# Patient Record
Sex: FEMALE | Race: WHITE | NOT HISPANIC OR LATINO | Employment: OTHER | URBAN - METROPOLITAN AREA
[De-identification: names, ages, dates, MRNs, and addresses within clinical notes are randomized per-mention and may not be internally consistent; named-entity substitution may affect disease eponyms.]

---

## 2017-01-04 ENCOUNTER — RADIANT APPOINTMENT (OUTPATIENT)
Dept: BONE DENSITY | Facility: CLINIC | Age: 77
End: 2017-01-04
Attending: INTERNAL MEDICINE
Payer: COMMERCIAL

## 2017-01-04 ENCOUNTER — TELEPHONE (OUTPATIENT)
Dept: INTERNAL MEDICINE | Facility: CLINIC | Age: 77
End: 2017-01-04

## 2017-01-04 DIAGNOSIS — E03.4 HYPOTHYROIDISM DUE TO ACQUIRED ATROPHY OF THYROID: ICD-10-CM

## 2017-01-04 DIAGNOSIS — Z78.0 ASYMPTOMATIC POSTMENOPAUSAL STATUS: ICD-10-CM

## 2017-01-04 LAB — TSH SERPL DL<=0.005 MIU/L-ACNC: 2.21 MU/L (ref 0.4–4)

## 2017-01-04 PROCEDURE — 84443 ASSAY THYROID STIM HORMONE: CPT | Performed by: INTERNAL MEDICINE

## 2017-01-04 PROCEDURE — 36415 COLL VENOUS BLD VENIPUNCTURE: CPT | Performed by: INTERNAL MEDICINE

## 2017-01-04 PROCEDURE — 77080 DXA BONE DENSITY AXIAL: CPT | Performed by: INTERNAL MEDICINE

## 2017-01-04 NOTE — PROGRESS NOTES
Quick Note:    Clementina Cooper    Your thyroid result is normal. No dose changes are needed.     Sincerely,      TAISHA MORROW M.D.  ______

## 2017-01-04 NOTE — Clinical Note
"[address]  [address]  [phone]                                   2017    Clementina Cooper  [address] [facility] [address]        Dear Clementina,      Here is your bone density report.      Although your density looks almost normal, your family history of fragility fracture (your mom) puts you at higher risk for a fragility fracture.  Treatment with a bone binder (\"bisphosphonate\") in order to reduce risk of fragility fractures IS recommended for you.  An example would be a weekly dose of Fosamax.     If you would like to consider this prescription (in addition to daily vitamin D, calcium and exercise) please make an appointment to review and discuss.        Otherwise, feel free to bring this discussion up at your next routinely scheduled visit.     Results for orders placed or performed in visit on 17   DEXA HIP/PELVIS/SPINE - Future    Narrative    BONE DENSITOMETRY  [address] [address]  2017     PATIENT: Clementina DURAN Allison  CHART: [id number]    :  1940  AGE:  76 year old  SEX:  female   REFERRING PROVIDER:  Estela Davila MD    PROCEDURE:  Bone density scanning was performed using DXA technology of   the lumbar spine and hip.  Scanning was performed on a Lunar Prodigy   scanner.  Reporting is completed in the form of a T-score.  The T-score   represents the standard deviation from peak bone mass based on a young   healthy adult.    REFERENCE T-SCORES:       Normal                -1.0 and greater                                  Osteopenia         Between -1.0 and   -2.5                                            Osteoporosis     -2.5 and less                                         RISK FACTORS:  Post-menopausal, Parent history of osteoporosis with a hip   fracture    CURRENT TREATMENT:  Vitamin D    FINDINGS:               Lumbar Spine " "(L1-L4)      T-score:  1.2, degenerative changes   present               Left Femoral Neck            T-score:  -0.3               Right Femoral Neck         T-score:  -0.4                            Lumbar (L1-L4) BMD: 1.329                            Total Hip Mean BMD: 1.030    IMPRESSION  Normal bone mineral density., Degenerative changes of the lumbar spine   which may falsely elevate results.    Patient had a study performed previously, however the scans are not   available to compare to the current study.   Recommendations include ensuring adequate Calcium and Vitamin D.    The current NOF Guidelines recommend treatment for patients with prior hip   or vertebral fracture, T-score -2.5 or below, or 10 year risk of any major   osteoporotic fracture >20% or 10 year risk of hip fracture >3%, as   calculated using the FRAX calculator (www.shef.ac.uk/FRAX or you can   google \"FRAX\").      This patient's risks based on available information, with the use of FRAX,   are 12 % for major osteoporotic fracture and 4.3 % for hip fracture.   Based on these guidelines, treatment (in addition to calcium and vitamin   D) is recommended for this patient, after ruling out other causes of   osteoporosis.  This is meant as an aid to clinical decision making; one must still use   clinical judgement.      Follow up can be considered in 2 years.   ___________________  Vivien Reyez M.D.  Electronically signed        If you have any questions please call the clinic at [phone]    Sincerely,    TAISHA MORROW M.D.  bmd    "

## 2017-01-04 NOTE — TELEPHONE ENCOUNTER
Clementina Cooper           Your thyroid result is normal.  No dose changes are needed.           Sincerely,            Chayito Romero RN  [facility]

## 2017-01-04 NOTE — TELEPHONE ENCOUNTER
Called patient at [phone] (home) to notify of message below from provider. Unable to reach, left a VM to call back to RN triage line.     Chayito Romero RN  [facility]

## 2017-01-05 NOTE — TELEPHONE ENCOUNTER
Notified patient of the message below per PCP.  Patient stated understanding and agreeable with the plan of care. Tosin Dozier RN C[facility] Triage.

## 2017-01-08 NOTE — PROGRESS NOTES
"Quick Note:    Clementina Cooper    Here is your bone density report.     Although your density looks almost normal, your family history of fragility fracture (your mom) puts you at higher risk for a fragility fracture. Treatment with a bone binder (\"bisphosphonate\") in order to reduce risk of fragility fractures IS recommended for you. An example would be a weekly dose of Fosamax.     If you would like to consider this prescription (in addition to daily vitamin D, calcium and exercise) please make an appointment to review and discuss.       Otherwise, feel free to bring this discussion up at your next routinely scheduled visit.       Sincerely,       TAISHA MORROW M.D.  ______  "

## 2017-01-19 ENCOUNTER — TRANSFERRED RECORDS (OUTPATIENT)
Dept: HEALTH INFORMATION MANAGEMENT | Facility: CLINIC | Age: 77
End: 2017-01-19

## 2017-01-23 ENCOUNTER — ANTICOAGULATION THERAPY VISIT (OUTPATIENT)
Dept: NURSING | Facility: CLINIC | Age: 77
End: 2017-01-23
Payer: COMMERCIAL

## 2017-01-23 DIAGNOSIS — Z79.01 LONG-TERM (CURRENT) USE OF ANTICOAGULANTS: Primary | ICD-10-CM

## 2017-01-23 LAB — INR POINT OF CARE: 2.3 (ref 0.86–1.14)

## 2017-01-23 PROCEDURE — 36416 COLLJ CAPILLARY BLOOD SPEC: CPT

## 2017-01-23 PROCEDURE — 99207 ZZC NO CHARGE NURSE ONLY: CPT

## 2017-01-23 PROCEDURE — 85610 PROTHROMBIN TIME: CPT | Mod: QW

## 2017-01-23 NOTE — MR AVS SNAPSHOT
Clementina Cooper   1/23/2017 9:40 AM   Anticoagulation Therapy Visit    Description:  76 year old female   Provider:  CONNOR ANTI COAG   Department:  Cp Nurse           INR as of 1/23/2017     Selected INR 2.3 (1/23/2017)      Anticoagulation Summary as of 1/23/2017     INR goal 2.0-3.0   Selected INR 2.3 (1/23/2017)   Full instructions 5 mg on Mon, Wed, Fri; 2.5 mg all other days   Next INR check 3/6/2017    Indications   Long-term (current) use of anticoagulants [Z79.01] [Z79.01]  Atrial fibrillation (H) (Resolved) [I48.91]  Atrial fibrillation (H) (Resolved) [I48.91]         Your next Anticoagulation Clinic appointment(s)     Mar 06, 2017  9:40 AM   [facility]    [address] [place] [address] [phone]              Contact Numbers     [facility]  Please call [phone] or [phone]  to cancel and/or reschedule your appointment.   Please call [phone] with any problems or questions regarding your therapy          January 2017 Details    Sun Mon Tue Wed Thu Fri Sat     1               2               3               4               5               6               7                 8               9               10               11               12               13               14                 15               16               17               18               19               20               21                 22               23      5 mg   See details      24      2.5 mg         25      5 mg         26      2.5 mg         27      5 mg         28      2.5 mg           29      2.5 mg         30      5 mg         31      2.5 mg              Date Details   01/23 This INR check               How to take your warfarin dose     To take:  2.5 mg Take 0.5 of a 5 mg tablet.    To take:  5 mg Take 1 of the 5 mg tablets.           February 2017 Details    [place]  Mon Tue Wed Thu Fri Sat        1      5 mg         2      2.5 mg         3      5 mg         4      2.5 mg           5      2.5 mg         6      5 mg         7      2.5 mg         8      5 mg         9      2.5 mg         10      5 mg         11      2.5 mg           12      2.5 mg         13      5 mg         14      2.5 mg         15      5 mg         16      2.5 mg         17      5 mg         18      2.5 mg           19      2.5 mg         20      5 mg         21      2.5 mg         22      5 mg         23      2.5 mg         24      5 mg         25      2.5 mg           26      2.5 mg         27      5 mg         28      2.5 mg              Date Details   No additional details            How to take your warfarin dose     To take:  2.5 mg Take 0.5 of a 5 mg tablet.    To take:  5 mg Take 1 of the 5 mg tablets.           March 2017 Details    Sun Mon Tue Wed Thu Fri Sat        1      5 mg         2      2.5 mg         3      5 mg         4      2.5 mg           5      2.5 mg         6            7               8               9               10               11                 12               13               14               15               16               17               18                 19               20               21               22               23               24               25                 26               27               28               29               30               31                 Date Details   No additional details    Date of next INR:  3/6/2017         How to take your warfarin dose     To take:  2.5 mg Take 0.5 of a 5 mg tablet.    To take:  5 mg Take 1 of the 5 mg tablets.

## 2017-01-23 NOTE — PROGRESS NOTES
ANTICOAGULATION FOLLOW-UP CLINIC VISIT    Patient Name:  Clementina Cooper  Date:  1/23/2017  Contact Type:  Face to Face    SUBJECTIVE:     Patient Findings     Positives No Problem Findings           OBJECTIVE    INR PROTIME   Date Value Ref Range Status   01/23/2017 2.3* 0.86 - 1.14 Final       ASSESSMENT / PLAN  INR assessment THER    Recheck INR In: 5 WEEKS    INR Location Clinic      Anticoagulation Summary as of 1/23/2017     INR goal 2.0-3.0   Selected INR 2.3 (1/23/2017)   Maintenance plan 5 mg (5 mg x 1) on Mon, Wed, Fri; 2.5 mg (5 mg x 0.5) all other days   Full instructions 5 mg on Mon, Wed, Fri; 2.5 mg all other days   Weekly total 25 mg   No change documented Shagufta Bell RN   Plan last modified Shagufta Bell RN (3/22/2016)   Next INR check 3/6/2017   Target end date     Indications   Long-term (current) use of anticoagulants [Z79.01] [Z79.01]  Atrial fibrillation (H) (Resolved) [I48.91]  Atrial fibrillation (H) (Resolved) [I48.91]         Anticoagulation Episode Summary     INR check location     Preferred lab     Send INR reminders to  [facility]    Comments       Anticoagulation Care Providers     Provider Role Specialty Phone number    Estela Davila MD  Internal Medicine [phone]            See the Encounter Report to view Anticoagulation Flowsheet and Dosing Calendar (Go to Encounters tab in chart review, and find the Anticoagulation Therapy Visit)    Dosage adjustment made based on physician directed care plan.    Shagufta Bell RN

## 2017-02-09 ENCOUNTER — TELEPHONE (OUTPATIENT)
Dept: INTERNAL MEDICINE | Facility: CLINIC | Age: 77
End: 2017-02-09

## 2017-02-09 DIAGNOSIS — E78.5 HYPERLIPIDEMIA LDL GOAL <100: ICD-10-CM

## 2017-02-09 DIAGNOSIS — E03.4 HYPOTHYROIDISM DUE TO ACQUIRED ATROPHY OF THYROID: ICD-10-CM

## 2017-02-09 DIAGNOSIS — I48.20 CHRONIC ATRIAL FIBRILLATION (H): ICD-10-CM

## 2017-02-09 DIAGNOSIS — E11.9 TYPE 2 DIABETES MELLITUS WITHOUT COMPLICATION, WITHOUT LONG-TERM CURRENT USE OF INSULIN (H): Primary | ICD-10-CM

## 2017-02-09 DIAGNOSIS — Z79.01 LONG-TERM (CURRENT) USE OF ANTICOAGULANTS: ICD-10-CM

## 2017-02-09 NOTE — TELEPHONE ENCOUNTER
.Reason for Call: Has appointment on 3/28/2017 with you and would like to come in one week prior for fasting labs    Order or referral being requested: Fasting Labs    Date needed: 3/21/2017    Has the patient been seen by the PCP for this problem? Yes    Additional comments:     Phone number Patient can be reached at:  [phone]    Best Time:      Can we leave a detailed message on this number?  No    Call taken on 2/9/2017 at 3:12 PM by Sara Gomez

## 2017-02-09 NOTE — TELEPHONE ENCOUNTER
Routed to provider to place desired future lab orders for pre-visit labs for 3/27/17 appt.    Anna Bruno RN  [facility]

## 2017-03-07 ENCOUNTER — ANTICOAGULATION THERAPY VISIT (OUTPATIENT)
Dept: NURSING | Facility: CLINIC | Age: 77
End: 2017-03-07
Payer: COMMERCIAL

## 2017-03-07 DIAGNOSIS — Z79.01 LONG-TERM (CURRENT) USE OF ANTICOAGULANTS: ICD-10-CM

## 2017-03-07 LAB — INR POINT OF CARE: 2.3 (ref 0.86–1.14)

## 2017-03-07 PROCEDURE — 85610 PROTHROMBIN TIME: CPT | Mod: QW

## 2017-03-07 PROCEDURE — 99207 ZZC NO CHARGE NURSE ONLY: CPT

## 2017-03-07 PROCEDURE — 36416 COLLJ CAPILLARY BLOOD SPEC: CPT

## 2017-03-07 NOTE — MR AVS SNAPSHOT
Clementina Cooper   3/7/2017 3:20 PM   Anticoagulation Therapy Visit    Description:  76 year old female   Provider:  CON[facility] ANTI COAG   Department:  Cp Nurse           INR as of 3/7/2017     Today's INR 2.3      Anticoagulation Summary as of 3/7/2017     INR goal 2.0-3.0   Today's INR 2.3   Full instructions 5 mg on Mon, Wed, Fri; 2.5 mg all other days   Next INR check 4/18/2017    Indications   Long-term (current) use of anticoagulants [Z79.01] [Z79.01]  Atrial fibrillation (H) (Resolved) [I48.91]  Atrial fibrillation (H) (Resolved) [I48.91]         Your next Anticoagulation Clinic appointment(s)     Apr 18, 2017  9:40 AM CDT   Anticoagulation [facility] with CONNOR ANTI COAG   [facility]    [address] [facility] [address] [phone]              Contact Numbers     [facility]  Please call [phone] or [phone]  to cancel and/or reschedule your appointment.   Please call [phone] with any problems or questions regarding your therapy          March 2017 Details    Sun Mon Tue Wed Thu Fri Sat        1               2               3               4                 5               6               7      2.5 mg   See details      8      5 mg         9      2.5 mg         10      5 mg         11      2.5 mg           12      2.5 mg         13      5 mg         14      2.5 mg         15      5 mg         16      2.5 mg         17      5 mg         18      2.5 mg           19      2.5 mg         20      5 mg         21      2.5 mg         22      5 mg         23      2.5 mg         24      5 mg         25      2.5 mg           26      2.5 mg         27      5 mg         28      2.5 mg         29      5 mg         30      2.5 mg         31      5 mg           Date Details   03/07 This INR check               How to take your warfarin dose     To take:  2.5 mg Take 0.5 of a 5 mg tablet.    To take:  5 mg Take 1 of  the 5 mg tablets.           April 2017 Details    Sun Mon Tue Wed Thu Fri Sat           1      2.5 mg           2      2.5 mg         3      5 mg         4      2.5 mg         5      5 mg         6      2.5 mg         7      5 mg         8      2.5 mg           9      2.5 mg         10      5 mg         11      2.5 mg         12      5 mg         13      2.5 mg         14      5 mg         15      2.5 mg           16      2.5 mg         17      5 mg         18            19               20               21               22                 23               24               25               26               27               28               29                 30                      Date Details   No additional details    Date of next INR:  4/18/2017         How to take your warfarin dose     To take:  2.5 mg Take 0.5 of a 5 mg tablet.    To take:  5 mg Take 1 of the 5 mg tablets.

## 2017-03-15 ENCOUNTER — OFFICE VISIT (OUTPATIENT)
Dept: FAMILY MEDICINE | Facility: CLINIC | Age: 77
End: 2017-03-15
Payer: COMMERCIAL

## 2017-03-15 VITALS
OXYGEN SATURATION: 96 % | HEART RATE: 79 BPM | HEIGHT: 66 IN | TEMPERATURE: 97.7 F | DIASTOLIC BLOOD PRESSURE: 74 MMHG | SYSTOLIC BLOOD PRESSURE: 132 MMHG

## 2017-03-15 DIAGNOSIS — J01.90 ACUTE SINUSITIS WITH SYMPTOMS > 10 DAYS: Primary | ICD-10-CM

## 2017-03-15 DIAGNOSIS — I48.20 CHRONIC ATRIAL FIBRILLATION (H): ICD-10-CM

## 2017-03-15 PROCEDURE — 99213 OFFICE O/P EST LOW 20 MIN: CPT | Performed by: FAMILY MEDICINE

## 2017-03-15 RX ORDER — CEFUROXIME AXETIL 250 MG/1
250 TABLET ORAL 2 TIMES DAILY
Qty: 20 TABLET | Refills: 0 | Status: SHIPPED | OUTPATIENT
Start: 2017-03-15 | End: 2017-03-28

## 2017-03-15 NOTE — NURSING NOTE
"Chief Complaint   Patient presents with     Sinus Problem     poss sinus inf x 1.5 weeks,sore throat,cough, runny nose        Initial /74  Pulse 79  Temp 97.7  F (36.5  C) (Oral)  Ht 5' 5.5\" (1.664 m)  SpO2 96% Estimated body mass index is 33.92 kg/(m^2) as calculated from the following:    Height as of 4/5/16: 5' 5.5\" (1.664 m).    Weight as of 10/5/16: 207 lb (93.9 kg).  Medication Reconciliation: complete  Oneal Mabry MA    "

## 2017-03-15 NOTE — MR AVS SNAPSHOT
After Visit Summary   3/15/2017    Clementina Cooper    MRN: [id number]           Patient Information     Date Of Birth          1940        Visit Information        Provider Department      3/15/2017 10:40 AM Candie Tinoco MD [facility]        Today's Diagnoses     Acute sinusitis with symptoms > 10 days    -  1    Chronic atrial fibrillation (H)           Follow-ups after your visit        Your next 10 appointments already scheduled     Mar 21, 2017  7:30 AM CDT   LAB with CP LAB   [facility]    [address] [place] [address]   [phone]           Patient must bring picture ID.  Patient should be prepared to give a urine specimen  Please do not eat 10-12 hours before your appointment if you are coming in fasting for labs on lipids, cholesterol, or glucose (sugar).  Pregnant women should follow their Care Team instructions. Water with medications is okay. Do not drink coffee or other fluids.   If you have concerns about taking  your medications, please ask at office or if scheduling via [employer], send a message by clicking on Secure Messaging, Message Your Care Team.            Mar 28, 2017  9:20 AM CDT   SHORT with Estela Davila MD   [facility]    [address] [place] [id number] [phone]            Apr 18, 2017  9:40 AM CDT   Anticoagulation Visit with CP ANTI COAG   [facility]    [address] [place] [address] [phone]              Who to contact     If you have questions or need follow up information about today's clinic visit or your schedule please contact [facility] directly at [phone].  Normal or non-critical lab and imaging results will be  "communicated to you by [employer], letter or phone within 4 business days after the clinic has received the results. If you do not hear from us within 7 days, please contact the clinic through [employer] or phone. If you have a critical or abnormal lab result, we will notify you by phone as soon as possible.  Submit refill requests through [employer] or call your pharmacy and they will forward the refill request to us. Please allow 3 business days for your refill to be completed.          Additional Information About Your Visit        [employer] Information     [employer] lets you send messages to your doctor, view your test results, renew your prescriptions, schedule appointments and more. To sign up, go to [email or web address] . Click on \"Log in\" on the left side of the screen, which will take you to the Welcome page. Then click on \"Sign up Now\" on the right side of the page.     You will be asked to enter the access code listed below, as well as some personal information. Please follow the directions to create your username and password.     Your access code is: [email or web address]  Expires: 2017 11:18 AM     Your access code will  in 90 days. If you need help or a new code, please call your [place] clinic or [phone].        Care EveryWhere ID     This is your Care EveryWhere ID. This could be used by other organizations to access your [place] medical records  [id number]        Your Vitals Were     Pulse Temperature Height Pulse Oximetry          79 97.7  F (36.5  C) (Oral) 5' 5.5\" (1.664 m) 96%         Blood Pressure from Last 3 Encounters:   03/15/17 132/74   10/05/16 121/60   16 132/76    Weight from Last 3 Encounters:   10/05/16 207 lb (93.9 kg)   16 209 lb (94.8 kg)   16 205 lb (93 kg)              Today, you had the following     No orders found for display         Today's Medication Changes          These changes are accurate as of: 3/15/17 11:21 AM.  If you have any questions, ask your " nurse or doctor.               Start taking these medicines.        Dose/Directions    cefUROXime 250 MG tablet   Commonly known as:  CEFTIN   Used for:  Acute sinusitis with symptoms > 10 days   Started by:  Candie Tinoco MD        Dose:  250 mg   Take 1 tablet (250 mg) by mouth 2 times daily   Quantity:  20 tablet   Refills:  0         These medicines have changed or have updated prescriptions.        Dose/Directions    levothyroxine 100 MCG tablet   Commonly known as:  SYNTHROID/LEVOTHROID   This may have changed:  Another medication with the same name was removed. Continue taking this medication, and follow the directions you see here.   Used for:  Hypothyroidism due to acquired atrophy of thyroid   Changed by:  Estela Davila MD        Dose:  100 mcg   Take 1 tablet (100 mcg) by mouth daily   Quantity:  90 tablet   Refills:  3            Where to get your medicines      These medications were sent to [facility]/pharmacy #[id number] - [facility], [place] - [address] [address]     Phone:  [phone]     cefUROXime 250 MG tablet                Primary Care Provider Office Phone # Fax #    Estela Davila [phone]       [facility] [address] [facility] [address]        Thank you!     Thank you for choosing [facility]  for your care. Our goal is always to provide you with excellent care. Hearing back from our patients is one way we can continue to improve our services. Please take a few minutes to complete the written survey that you may receive in the mail after your visit with us. Thank you!             Your Updated Medication List - Protect others around you: Learn how to safely use, store and throw away your medicines at www.disposemymeds.org.          This list is accurate as of: 3/15/17 11:21 AM.  Always use your most recent med list.                   Brand Name Dispense Instructions for use     ACE NOT PRESCRIBED (INTENTIONAL)     0 each    1 each At Bedtime ACE Inhibitor not prescribed due to Other:  Neck swelling.  Also, BP is on low normal side with the betablocker       aspirin 81 MG tablet      Take 1 tablet by mouth daily.       atorvastatin 20 MG tablet    LIPITOR    102 tablet    Take 1 tablet (20 mg) by mouth daily       B-12 1000 MCG Tbcr      Take 1 tablet by mouth daily       Biotin 5000 MCG Caps      Take  by mouth.       blood glucose monitoring test strip    no brand specified    200 each    One Touch Ultra test strips - Use to test blood sugar 2 times daily or as directed.       CALCIUM 500 + D PO      one daily       cefUROXime 250 MG tablet    CEFTIN    20 tablet    Take 1 tablet (250 mg) by mouth 2 times daily       CENTRUM SILVER PO      one daily       cholecalciferol 1000 UNIT tablet    vitamin D     1 TABLET DAILY       clobetasol 0.05 % cream    TEMOVATE    60 g    Apply sparingly to affected area twice weekly       Evening Primrose Oil 1000 MG Caps      Take by mouth daily       levothyroxine 100 MCG tablet    SYNTHROID/LEVOTHROID    90 tablet    Take 1 tablet (100 mcg) by mouth daily       losartan 25 MG tablet    COZAAR    45 tablet    Take 0.5 tablets (12.5 mg) by mouth daily       metoprolol 25 MG tablet    LOPRESSOR    90 tablet    Take 1 tablet (25 mg) by mouth every evening May take one extra tab as needed if HR>100 at rest       * order for DME     1 Box    Equipment being ordered:  chemstrips for daily blood sugar checks.       * order for DME     90 days    Equipment being ordered:  One Touch strips to be used daily       OSTEO BI-FLEX ADV TRIPLE ST Tabs      Take 2 tablets by mouth daily       warfarin 5 MG tablet    COUMADIN    102 tablet    Take 1 tablet (5 mg) by mouth daily MWF & 1/2 tablet (2.5mg) all other days       * Notice:  This list has 2 medication(s) that are the same as other medications prescribed for you. Read the directions carefully, and ask your  doctor or other care provider to review them with you.

## 2017-03-15 NOTE — PROGRESS NOTES
"  SUBJECTIVE:                                                    Clementina Cooper is a 76 year old female who presents to clinic today for the following health issues:    ENT Symptoms             Symptoms: cc Present Absent Comment   Fever/Chills   x    Fatigue   x    Muscle Aches   x    Eye Irritation  x  Watery eyes    Sneezing  x     Nasal Ej/Drg  x     Sinus Pressure/Pain  x     Loss of smell   x    Dental pain   x    Sore Throat  x     Swollen Glands   x    Ear Pain/Fullness   x    Cough  x     Wheeze   x    Chest Pain  x  From coughing    Shortness of breath   x    Rash   x    Other    Headache      Symptom duration:   X 1.5 weeks    Symptom severity:  moderate    Treatments tried:  tylenol    Contacts:  none      Not feeling well for more than 1 week now.   Sinus pressure, nasal congestion.   Cough, seems from the post nasal drip.   Upper chest gets tight with coughing bouts.     Problem list and histories reviewed & adjusted, as indicated.  Additional history: as documented    BP Readings from Last 3 Encounters:   03/15/17 132/74   10/05/16 121/60   08/09/16 132/76    Wt Readings from Last 3 Encounters:   10/05/16 207 lb (93.9 kg)   08/09/16 209 lb (94.8 kg)   06/09/16 205 lb (93 kg)                    Reviewed and updated as needed this visit by clinical staff  Tobacco  Allergies  Meds  Med Hx  Surg Hx  Fam Hx  Soc Hx      Reviewed and updated as needed this visit by Provider         ROS:  Constitutional, HEENT, cardiovascular, pulmonary, gi and gu systems are negative, except as otherwise noted.    OBJECTIVE:                                                    /74  Pulse 79  Temp 97.7  F (36.5  C) (Oral)  Ht 5' 5.5\" (1.664 m)  SpO2 96%  There is no height or weight on file to calculate BMI.  GENERAL: healthy, alert and no distress  HENT: ear canals and TM's normal, nose : bilateral nostrils congestion and mouth without ulcers or lesions  SINUSES: tenderness over bilateral maxillary sinuses. "   NECK: no adenopathy  RESP: lungs clear to auscultation - no rales, rhonchi or wheezes  CV: regular rate and rhythm, normal S1 S2, no S3 or S4, no murmur, click or rub, no peripheral edema and peripheral pulses strong  MS: no gross musculoskeletal defects noted, no edema       ASSESSMENT/PLAN:                                                        ICD-10-CM    1. Acute sinusitis with symptoms > 10 days J01.90 cefUROXime (CEFTIN) 250 MG tablet   2. Chronic atrial fibrillation (H) I48.2      Pt is on warfarin for her chronic A fib.   Cefuroxime as above.    Nasal saline drops, steam inhalation. Tylenol for pain if needed.   F/u if not better.       Candie Tinoco MD  [facility]

## 2017-03-21 DIAGNOSIS — Z79.01 LONG-TERM (CURRENT) USE OF ANTICOAGULANTS: ICD-10-CM

## 2017-03-21 DIAGNOSIS — E78.5 HYPERLIPIDEMIA LDL GOAL <100: ICD-10-CM

## 2017-03-21 DIAGNOSIS — E11.9 TYPE 2 DIABETES MELLITUS WITHOUT COMPLICATION, WITHOUT LONG-TERM CURRENT USE OF INSULIN (H): ICD-10-CM

## 2017-03-21 DIAGNOSIS — E03.4 HYPOTHYROIDISM DUE TO ACQUIRED ATROPHY OF THYROID: ICD-10-CM

## 2017-03-21 DIAGNOSIS — I48.20 CHRONIC ATRIAL FIBRILLATION (H): ICD-10-CM

## 2017-03-21 LAB
ALT SERPL W P-5'-P-CCNC: 29 U/L (ref 0–50)
ANION GAP SERPL CALCULATED.3IONS-SCNC: 9 MMOL/L (ref 3–14)
AST SERPL W P-5'-P-CCNC: 13 U/L (ref 0–45)
BUN SERPL-MCNC: 20 MG/DL (ref 7–30)
CALCIUM SERPL-MCNC: 9.1 MG/DL (ref 8.5–10.1)
CHLORIDE SERPL-SCNC: 105 MMOL/L (ref 94–109)
CHOLEST SERPL-MCNC: 154 MG/DL
CO2 SERPL-SCNC: 27 MMOL/L (ref 20–32)
CREAT SERPL-MCNC: 0.82 MG/DL (ref 0.52–1.04)
ERYTHROCYTE [DISTWIDTH] IN BLOOD BY AUTOMATED COUNT: 13.2 % (ref 10–15)
GFR SERPL CREATININE-BSD FRML MDRD: 68 ML/MIN/1.7M2
GLUCOSE SERPL-MCNC: 174 MG/DL (ref 70–99)
HBA1C MFR BLD: 8 % (ref 4.3–6)
HCT VFR BLD AUTO: 43.1 % (ref 35–47)
HDLC SERPL-MCNC: 42 MG/DL
HGB BLD-MCNC: 14.4 G/DL (ref 11.7–15.7)
LDLC SERPL CALC-MCNC: 72 MG/DL
MCH RBC QN AUTO: 30.4 PG (ref 26.5–33)
MCHC RBC AUTO-ENTMCNC: 33.4 G/DL (ref 31.5–36.5)
MCV RBC AUTO: 91 FL (ref 78–100)
NONHDLC SERPL-MCNC: 112 MG/DL
PLATELET # BLD AUTO: 332 10E9/L (ref 150–450)
POTASSIUM SERPL-SCNC: 4 MMOL/L (ref 3.4–5.3)
RBC # BLD AUTO: 4.74 10E12/L (ref 3.8–5.2)
SODIUM SERPL-SCNC: 141 MMOL/L (ref 133–144)
T4 FREE SERPL-MCNC: 1.02 NG/DL (ref 0.76–1.46)
TRIGL SERPL-MCNC: 202 MG/DL
TSH SERPL DL<=0.005 MIU/L-ACNC: 5.43 MU/L (ref 0.4–4)
WBC # BLD AUTO: 6.8 10E9/L (ref 4–11)

## 2017-03-21 PROCEDURE — 84460 ALANINE AMINO (ALT) (SGPT): CPT | Performed by: INTERNAL MEDICINE

## 2017-03-21 PROCEDURE — 84450 TRANSFERASE (AST) (SGOT): CPT | Performed by: INTERNAL MEDICINE

## 2017-03-21 PROCEDURE — 84443 ASSAY THYROID STIM HORMONE: CPT | Performed by: INTERNAL MEDICINE

## 2017-03-21 PROCEDURE — 80048 BASIC METABOLIC PNL TOTAL CA: CPT | Performed by: INTERNAL MEDICINE

## 2017-03-21 PROCEDURE — 84439 ASSAY OF FREE THYROXINE: CPT | Performed by: INTERNAL MEDICINE

## 2017-03-21 PROCEDURE — 80061 LIPID PANEL: CPT | Performed by: INTERNAL MEDICINE

## 2017-03-21 PROCEDURE — 85027 COMPLETE CBC AUTOMATED: CPT | Performed by: INTERNAL MEDICINE

## 2017-03-21 PROCEDURE — 36415 COLL VENOUS BLD VENIPUNCTURE: CPT | Performed by: INTERNAL MEDICINE

## 2017-03-21 PROCEDURE — 83036 HEMOGLOBIN GLYCOSYLATED A1C: CPT | Performed by: INTERNAL MEDICINE

## 2017-03-22 NOTE — PROGRESS NOTES
Clementina Cooper    Enclosed are your results.  Your labs are normal/stable at this time.  The HgbA1C is a little above goal, and we can discuss that at your upcoming appointment.  I look forward to seeing you then.     Please call  with any questions.  We can also discuss any questions regarding these labs at your next scheduled visit.    Sincerely,    Estela Davila M.D.

## 2017-03-28 ENCOUNTER — TELEPHONE (OUTPATIENT)
Dept: FAMILY MEDICINE | Facility: CLINIC | Age: 77
End: 2017-03-28

## 2017-03-28 ENCOUNTER — OFFICE VISIT (OUTPATIENT)
Dept: INTERNAL MEDICINE | Facility: CLINIC | Age: 77
End: 2017-03-28
Payer: COMMERCIAL

## 2017-03-28 VITALS
DIASTOLIC BLOOD PRESSURE: 57 MMHG | TEMPERATURE: 96.9 F | SYSTOLIC BLOOD PRESSURE: 127 MMHG | WEIGHT: 206 LBS | OXYGEN SATURATION: 93 % | HEART RATE: 70 BPM | BODY MASS INDEX: 33.76 KG/M2

## 2017-03-28 DIAGNOSIS — I48.20 CHRONIC ATRIAL FIBRILLATION (H): ICD-10-CM

## 2017-03-28 DIAGNOSIS — E11.9 TYPE 2 DIABETES MELLITUS WITHOUT COMPLICATION, WITHOUT LONG-TERM CURRENT USE OF INSULIN (H): Primary | ICD-10-CM

## 2017-03-28 DIAGNOSIS — E03.4 HYPOTHYROIDISM DUE TO ACQUIRED ATROPHY OF THYROID: ICD-10-CM

## 2017-03-28 DIAGNOSIS — Z23 NEED FOR STREPTOCOCCUS PNEUMONIAE VACCINATION: ICD-10-CM

## 2017-03-28 DIAGNOSIS — I73.9 PVD (PERIPHERAL VASCULAR DISEASE) (H): ICD-10-CM

## 2017-03-28 PROCEDURE — 99214 OFFICE O/P EST MOD 30 MIN: CPT | Performed by: INTERNAL MEDICINE

## 2017-03-28 RX ORDER — METOPROLOL TARTRATE 25 MG/1
25 TABLET, FILM COATED ORAL EVERY EVENING
Qty: 90 TABLET | Refills: 3 | Status: SHIPPED | OUTPATIENT
Start: 2017-03-28 | End: 2018-05-02

## 2017-03-28 NOTE — NURSING NOTE
"Chief Complaint   Patient presents with     Diabetes     Health Maintenance     foot,microalbumin        Initial /57 (BP Location: Right arm, Patient Position: Chair, Cuff Size: Adult Large)  Pulse 70  Temp 96.9  F (36.1  C) (Oral)  Wt 206 lb (93.4 kg)  SpO2 93%  BMI 33.76 kg/m2 Estimated body mass index is 33.76 kg/(m^2) as calculated from the following:    Height as of 3/15/17: 5' 5.5\" (1.664 m).    Weight as of this encounter: 206 lb (93.4 kg).  Medication Reconciliation: complete   Dannielle Travis ma      "

## 2017-03-28 NOTE — TELEPHONE ENCOUNTER
The patient was seen in clinic today by her pcp when the patient returned home and read through her AVS it stated that she was given the Pneumonia immunization, the patient said she discussed getting this and was supposed to receive this but never did. Please update in patients chart, the patient will be placed on schedule to receive immunization on a separate day.    . Riri Leo  Patient Representative

## 2017-03-28 NOTE — PROGRESS NOTES
SUBJECTIVE:                                                    Clementina Cooper is a 76 year old female who presents to clinic today for the following health issues:     A1C borderline at 8.0  this has been trending up....   TSH borderline at 5.43, we had increased levothyroxine from 88 to 100 since Fall '16    Will start walking soon.     Diabetes Follow-up    Patient is checking blood sugars: once daily.  Results are as follows:         am - 160    Diabetic concerns: None     Symptoms of hypoglycemia (low blood sugar): none     Paresthesias (numbness or burning in feet) or sores: No     Date of last diabetic eye exam: 11/4/16       Amount of exercise or physical activity: None    Problems taking medications regularly: No    Medication side effects: none    Diet: regular (no restrictions)           Problem list and histories reviewed & adjusted, as indicated.  Additional history: as documented    Patient Active Problem List   Diagnosis     PVD (peripheral vascular disease) (H)     Family history of colon cancer     HYPERLIPIDEMIA LDL GOAL <100     Open-angle glaucoma, mild-mod, ou     Advanced directives, counseling/discussion     Essential tremor     Pseudophakia, ou     S/P iridectomy, ou     Anticoagulation goal of INR 2 to 3     Hypothyroidism due to acquired atrophy of thyroid     Chronic atrial fibrillation (H)     overweight  HCC     Long-term (current) use of anticoagulants [Z79.01]     Acute pain of left knee     Type 2 diabetes mellitus without complication, without long-term current use of insulin (H)     Glaucoma suspect, bilateral     Past Surgical History:   Procedure Laterality Date     ANGIOPLASTY      right leg     C NONSPECIFIC PROCEDURE  2001    neck surgery/trauma     C STOMACH SURGERY PROCEDURE UNLISTED       CATARACT IOL, RT/LT       HC TRABECULOPLASTY BY LASER SURGERY       HC VASCULAR SURGERY PROCEDURE UNLIST       HYSTERECTOMY, CERVIX STATUS UNKNOWN  1989    uterine bleeding      HYSTERECTOMY, PAP NO LONGER INDICATED       LASER YAG IRIDOTOMY  remotely    both eyes (elsewhere)     PHACOEMULSIFICATION WITH STANDARD INTRAOCULAR LENS IMPLANT  7/2012; 8/2012    right eye; left eye       Social History   Substance Use Topics     Smoking status: Former Smoker     Quit date: 12/4/1999     Smokeless tobacco: Never Used     Alcohol use Yes      Comment: occasional     Family History   Problem Relation Age of Onset     DIABETES Mother      HEART DISEASE Mother      HEART DISEASE Father      HEART DISEASE Son      CANCER Brother      HEART DISEASE Sister      CANCER Brother      CANCER Brother      HEART DISEASE Sister          Current Outpatient Prescriptions   Medication Sig Dispense Refill     metoprolol (LOPRESSOR) 25 MG tablet Take 1 tablet (25 mg) by mouth every evening May take one extra tab as needed if HR>100 at rest 90 tablet 3     metFORMIN (GLUCOPHAGE) 500 MG tablet Take 1 tablet (500 mg) by mouth daily (with dinner) 30 tablet 3     losartan (COZAAR) 25 MG tablet Take 0.5 tablets (12.5 mg) by mouth daily 45 tablet 1     warfarin (COUMADIN) 5 MG tablet Take 1 tablet (5 mg) by mouth daily MWF & 1/2 tablet (2.5mg) all other days 102 tablet 3     atorvastatin (LIPITOR) 20 MG tablet Take 1 tablet (20 mg) by mouth daily 102 tablet 3     levothyroxine (SYNTHROID,LEVOTHROID) 100 MCG tablet Take 1 tablet (100 mcg) by mouth daily 90 tablet 3     clobetasol (TEMOVATE) 0.05 % cream Apply sparingly to affected area twice weekly 60 g 2     blood glucose monitoring (NO BRAND SPECIFIED) test strip One Touch Ultra test strips - Use to test blood sugar 2 times daily or as directed. 200 each 3     ORDER FOR DME Equipment being ordered:  One Touch strips to be used daily 90 days 3     ORDER FOR DME Equipment being ordered:  chemstrips for daily blood sugar checks. 1 Box 3     Cyanocobalamin (B-12) 1000 MCG TBCR Take 1 tablet by mouth daily       Evening Primrose Oil 1000 MG CAPS Take by mouth daily        Biotin 5000 MCG CAPS Take  by mouth.       aspirin 81 MG tablet Take 1 tablet by mouth daily.  3     CENTRUM SILVER OR one daily       CALCIUM 500 + D OR one daily       VITAMIN D 1000 UNIT OR TABS 1 TABLET DAILY       ACE NOT PRESCRIBED, INTENTIONAL, 1 each At Bedtime ACE Inhibitor not prescribed due to Other:  Neck swelling.  Also, BP is on low normal side with the betablocker (Patient not taking: Reported on 3/28/2017) 0 each 0     Allergies   Allergen Reactions     Benzocaine      Pravastatin      Muscle aches     Vagisil [Kdc:Benzocaine+Cetyl Alcohol+Edetic Acid+Methylparaben+Propylene Glycol+...]      It is the benzocaine       Xarelto [Rivaroxaban]      Daily headache     Zocor [Hmg-Coa-R Inhibitors]      Muscle aches     Recent Labs   Lab Test  03/21/17   0728  01/04/17   0914  09/28/16   0756  03/29/16   0738  06/18/15   0740   A1C  8.0*   --   7.5*  7.0*  7.2*   LDL  72   --    --   87  72   HDL  42*   --    --   39*  40*   TRIG  202*   --    --   213*  211*   ALT  29   --    --   32  34   CR  0.82   --   0.83  0.80  0.74   GFRESTIMATED  68   --   66  69  76   GFRESTBLACK  82   --   80  84  >90   GFR Calc     POTASSIUM  4.0   --   4.3  4.1  4.0   TSH  5.43*  2.21  5.10*  6.31*  3.95      BP Readings from Last 3 Encounters:   03/28/17 127/57   03/15/17 132/74   10/05/16 121/60    Wt Readings from Last 3 Encounters:   03/28/17 206 lb (93.4 kg)   10/05/16 207 lb (93.9 kg)   08/09/16 209 lb (94.8 kg)                  Labs reviewed in EPIC    Reviewed and updated as needed this visit by clinical staff  Tobacco  Allergies  Med Hx  Surg Hx  Fam Hx  Soc Hx      Reviewed and updated as needed this visit by Provider         ROS:  Constitutional, HEENT, cardiovascular, pulmonary, gi and gu systems are negative, except as otherwise noted.    OBJECTIVE:                                                    /57 (BP Location: Right arm, Patient Position: Chair, Cuff Size: Adult Large)  Pulse 70   Temp 96.9  F (36.1  C) (Oral)  Wt 206 lb (93.4 kg)  SpO2 93%  BMI 33.76 kg/m2  Body mass index is 33.76 kg/(m^2).  GENERAL: healthy, alert and no distress  EYES: Eyes grossly normal to inspection, PERRL and conjunctivae and sclerae normal  NECK: no adenopathy, no asymmetry, masses, or scars and thyroid normal to palpation  RESP: lungs clear to auscultation - no rales, rhonchi or wheezes  CV: regular rate and rhythm, normal S1 S2, no S3 or S4, no murmur, click or rub, no peripheral edema and peripheral pulses strong  MS: no gross musculoskeletal defects noted, no edema    Diagnostic Test Results:  Results for orders placed or performed in visit on 03/21/17   Lipid panel reflex to direct LDL   Result Value Ref Range    Cholesterol 154 <200 mg/dL    Triglycerides 202 (H) <150 mg/dL    HDL Cholesterol 42 (L) >49 mg/dL    LDL Cholesterol Calculated 72 <100 mg/dL    Non HDL Cholesterol 112 <130 mg/dL   Basic metabolic panel   Result Value Ref Range    Sodium 141 133 - 144 mmol/L    Potassium 4.0 3.4 - 5.3 mmol/L    Chloride 105 94 - 109 mmol/L    Carbon Dioxide 27 20 - 32 mmol/L    Anion Gap 9 3 - 14 mmol/L    Glucose 174 (H) 70 - 99 mg/dL    Urea Nitrogen 20 7 - 30 mg/dL    Creatinine 0.82 0.52 - 1.04 mg/dL    GFR Estimate 68 >60 mL/min/1.7m2    GFR Estimate If Black 82 >60 mL/min/1.7m2    Calcium 9.1 8.5 - 10.1 mg/dL   AST   Result Value Ref Range    AST 13 0 - 45 U/L   ALT   Result Value Ref Range    ALT 29 0 - 50 U/L   Hemoglobin A1c   Result Value Ref Range    Hemoglobin A1C 8.0 (H) 4.3 - 6.0 %   CBC with platelets   Result Value Ref Range    WBC 6.8 4.0 - 11.0 10e9/L    RBC Count 4.74 3.8 - 5.2 10e12/L    Hemoglobin 14.4 11.7 - 15.7 g/dL    Hematocrit 43.1 35.0 - 47.0 %    MCV 91 78 - 100 fl    MCH 30.4 26.5 - 33.0 pg    MCHC 33.4 31.5 - 36.5 g/dL    RDW 13.2 10.0 - 15.0 %    Platelet Count 332 150 - 450 10e9/L   TSH with free T4 reflex   Result Value Ref Range    TSH 5.43 (H) 0.40 - 4.00 mU/L   T4 free   Result  Value Ref Range    T4 Free 1.02 0.76 - 1.46 ng/dL        ASSESSMENT/PLAN:                                                          ICD-10-CM    1. Type 2 diabetes mellitus without complication, without long-term current use of insulin (H) E11.9 metFORMIN (GLUCOPHAGE) 500 MG tablet     **Albumin Random Urine Quant FUTURE 3mo     **A1C FUTURE 3mo   2. PVD (peripheral vascular disease) (H) I73.9    3. Chronic atrial fibrillation (H) I48.2 metoprolol (LOPRESSOR) 25 MG tablet   4. Hypothyroidism due to acquired atrophy of thyroid E03.4    5. Need for Streptococcus pneumoniae vaccination Z23 PNEUMOCOCCAL VACCINE,ADULT,SQ OR IM     VACCINE ADMINISTRATION, INITIAL       Patient Instructions   Start metformin 500 mg daily for diabetes    Return to clinic 3 months with labs prior             Estela Davila MD  [facility]

## 2017-03-28 NOTE — MR AVS SNAPSHOT
After Visit Summary   3/28/2017    Clementina Cooper    MRN: [id number]           Patient Information     Date Of Birth          1940        Visit Information        Provider Department      3/28/2017 9:20 AM Estela Davila MD [facility]        Today's Diagnoses     Type 2 diabetes mellitus without complication, without long-term current use of insulin (H)    -  1    PVD (peripheral vascular disease) (H)        Chronic atrial fibrillation (H)        Hypothyroidism due to acquired atrophy of thyroid        Need for Streptococcus pneumoniae vaccination          Care Instructions    Start metformin 500 mg daily for diabetes    Return to clinic 3 months with labs prior        Follow-ups after your visit        Your next 10 appointments already scheduled     Apr 18, 2017  9:40 AM CDT   Anticoagulation Visit with [facility] ANTI COAG   [facility] ([facility]    [address] [place] [id number] [phone]              Future tests that were ordered for you today     Open Future Orders        Priority Expected Expires Ordered    **Albumin Random Urine Quant FUTURE 3mo Routine 5/27/2017 7/26/2017 3/28/2017    **A1C FUTURE 3mo Routine 6/26/2017 7/26/2017 3/28/2017            Who to contact     If you have questions or need follow up information about today's clinic visit or your schedule please contact [facility] directly at [phone].  Normal or non-critical lab and imaging results will be communicated to you by MyChart, letter or phone within 4 business days after the clinic has received the results. If you do not hear from us within 7 days, please contact the clinic through MyChart or phone. If you have a critical or abnormal lab result, we will notify you by phone as soon as possible.  Submit refill requests through PayPal or call your pharmacy and they will forward the refill  "request to us. Please allow 3 business days for your refill to be completed.          Additional Information About Your Visit        "[employer] Information     [employer] lets you send messages to your doctor, view your test results, renew your prescriptions, schedule appointments and more. To sign up, go to [email or web address] . Click on \"Log in\" on the left side of the screen, which will take you to the Welcome page. Then click on \"Sign up Now\" on the right side of the page.     You will be asked to enter the access code listed below, as well as some personal information. Please follow the directions to create your username and password.     Your access code is: [email or web address]  Expires: 2017 11:18 AM     Your access code will  in 90 days. If you need help or a new code, please call your [facility] clinic or [phone].        Care EveryWhere ID     This is your Care EveryWhere ID. This could be used by other organizations to access your [facility] medical records  [id number]        Your Vitals Were     Pulse Temperature Pulse Oximetry BMI (Body Mass Index)          70 96.9  F (36.1  C) (Oral) 93% 33.76 kg/m2         Blood Pressure from Last 3 Encounters:   17 127/57   03/15/17 132/74   10/05/16 121/60    Weight from Last 3 Encounters:   17 206 lb (93.4 kg)   10/05/16 207 lb (93.9 kg)   16 209 lb (94.8 kg)              We Performed the Following     PNEUMOCOCCAL VACCINE,ADULT,SQ OR IM     VACCINE ADMINISTRATION, INITIAL          Today's Medication Changes          These changes are accurate as of: 3/28/17  9:48 AM.  If you have any questions, ask your nurse or doctor.               Start taking these medicines.        Dose/Directions    metFORMIN 500 MG tablet   Commonly known as:  GLUCOPHAGE   Used for:  Type 2 diabetes mellitus without complication, without long-term current use of insulin (H)   Started by:  Estela Davila MD        Dose:  500 mg   Take 1 tablet (500 mg) by mouth " daily (with dinner)   Quantity:  30 tablet   Refills:  3            Where to get your medicines      These medications were sent to [facility]/pharmacy #[id number] - [place], [place] - [address] [facility]  [address] [place] [address] [place] [address]     Phone:  [phone]     metFORMIN 500 MG tablet    metoprolol 25 MG tablet                Primary Care Provider Office Phone # Fax #    Estela Davila [phone]       [facility] [address] [place], [place]. \[facility]ospital [facility] Outlook.\"" [id number]        Thank you!     Thank you for choosing [facility]  for your care. Our goal is always to provide you with excellent care. Hearing back from our patients is one way we can continue to improve our services. Please take a few minutes to complete the written survey that you may receive in the mail after your visit with us. Thank you!             Your Updated Medication List - Protect others around you: Learn how to safely use, store and throw away your medicines at www.disposemymeds.org.          This list is accurate as of: 3/28/17  9:48 AM.  Always use your most recent med list.                   Brand Name Dispense Instructions for use    ACE NOT PRESCRIBED (INTENTIONAL)     0 each    1 each At Bedtime ACE Inhibitor not prescribed due to Other:  Neck swelling.  Also, BP is on low normal side with the betablocker       aspirin 81 MG tablet      Take 1 tablet by mouth daily.       atorvastatin 20 MG tablet    LIPITOR    102 tablet    Take 1 tablet (20 mg) by mouth daily       B-12 1000 MCG Tbcr      Take 1 tablet by mouth daily       Biotin 5000 MCG Caps      Take  by mouth.       blood glucose monitoring test strip    no brand specified    200 each    One Touch Ultra test strips - Use to test blood sugar 2 times daily or as directed.       CALCIUM 500 + D PO      one daily       CENTRUM SILVER PO      one daily       cholecalciferol 1000 UNIT tablet    vitamin D     1 TABLET DAILY       clobetasol  0.05 % cream    TEMOVATE    60 g    Apply sparingly to affected area twice weekly       Evening Primrose Oil 1000 MG Caps      Take by mouth daily       levothyroxine 100 MCG tablet    SYNTHROID/LEVOTHROID    90 tablet    Take 1 tablet (100 mcg) by mouth daily       losartan 25 MG tablet    COZAAR    45 tablet    Take 0.5 tablets (12.5 mg) by mouth daily       metFORMIN 500 MG tablet    GLUCOPHAGE    30 tablet    Take 1 tablet (500 mg) by mouth daily (with dinner)       metoprolol 25 MG tablet    LOPRESSOR    90 tablet    Take 1 tablet (25 mg) by mouth every evening May take one extra tab as needed if HR>100 at rest       * order for DME     1 Box    Equipment being ordered:  chemstrips for daily blood sugar checks.       * order for DME     90 days    Equipment being ordered:  One Touch strips to be used daily       warfarin 5 MG tablet    COUMADIN    102 tablet    Take 1 tablet (5 mg) by mouth daily MWF & 1/2 tablet (2.5mg) all other days       * Notice:  This list has 2 medication(s) that are the same as other medications prescribed for you. Read the directions carefully, and ask your doctor or other care provider to review them with you.

## 2017-04-18 ENCOUNTER — ANTICOAGULATION THERAPY VISIT (OUTPATIENT)
Dept: NURSING | Facility: CLINIC | Age: 77
End: 2017-04-18
Payer: COMMERCIAL

## 2017-04-18 DIAGNOSIS — Z79.01 LONG-TERM (CURRENT) USE OF ANTICOAGULANTS: ICD-10-CM

## 2017-04-18 LAB — INR POINT OF CARE: 2.2 (ref 0.86–1.14)

## 2017-04-18 PROCEDURE — 85610 PROTHROMBIN TIME: CPT | Mod: QW

## 2017-04-18 PROCEDURE — 36416 COLLJ CAPILLARY BLOOD SPEC: CPT

## 2017-04-18 PROCEDURE — 99207 ZZC NO CHARGE NURSE ONLY: CPT

## 2017-04-18 NOTE — MR AVS SNAPSHOT
Clementina Cooper   4/18/2017 9:40 AM   Anticoagulation Therapy Visit    Description:  76 year old female   Provider:  [facility]   Department:  [facility]           INR as of 4/18/2017     Today's INR 2.2      Anticoagulation Summary as of 4/18/2017     INR goal 2.0-3.0   Today's INR 2.2   Full instructions 5 mg on Mon, Wed, Fri; 2.5 mg all other days   Next INR check 5/30/2017    Indications   Long-term (current) use of anticoagulants [Z79.01] [Z79.01]  Atrial fibrillation (H) (Resolved) [I48.91]  Atrial fibrillation (H) (Resolved) [I48.91]         Your next Anticoagulation Clinic appointment(s)     May 30, 2017 10:00 AM CDT   [facility] with [facility]   [facility]    [address] [place] [id number] [phone]              Contact Numbers     [facility]  Please call [phone] or [phone]  to cancel and/or reschedule your appointment.   Please call [phone] with any problems or questions regarding your therapy          April 2017 Details    Sun Mon Tue Wed Thu Fri Sat           1                 2               3               4               5               6               7               8                 9               10               11               12               13               14               15                 16               17               18      2.5 mg   See details      19      5 mg         20      2.5 mg         21      5 mg         22      2.5 mg           23      2.5 mg         24      5 mg         25      2.5 mg         26      5 mg         27      2.5 mg         28      5 mg         29      2.5 mg           30      2.5 mg                Date Details   04/18 This INR check               How to take your warfarin dose     To take:  2.5 mg Take 0.5 of a 5 mg tablet.    To take:  5 mg Take 1 of the 5 mg tablets.           May 2017 Details    Sun Mon Tue Wed  Thu Fri Sat      1      5 mg         2      2.5 mg         3      5 mg         4      2.5 mg         5      5 mg         6      2.5 mg           7      2.5 mg         8      5 mg         9      2.5 mg         10      5 mg         11      2.5 mg         12      5 mg         13      2.5 mg           14      2.5 mg         15      5 mg         16      2.5 mg         17      5 mg         18      2.5 mg         19      5 mg         20      2.5 mg           21      2.5 mg         22      5 mg         23      2.5 mg         24      5 mg         25      2.5 mg         26      5 mg         27      2.5 mg           28      2.5 mg         29      5 mg         30            31                   Date Details   No additional details    Date of next INR:  5/30/2017         How to take your warfarin dose     To take:  2.5 mg Take 0.5 of a 5 mg tablet.    To take:  5 mg Take 1 of the 5 mg tablets.

## 2017-04-18 NOTE — PROGRESS NOTES
ANTICOAGULATION FOLLOW-UP CLINIC VISIT    Patient Name:  Clementina Cooper  Date:  4/18/2017  Contact Type:  Face to Face    SUBJECTIVE:     Patient Findings     Positives No Problem Findings           OBJECTIVE    INR Protime   Date Value Ref Range Status   04/18/2017 2.2 (A) 0.86 - 1.14 Final       ASSESSMENT / PLAN  INR assessment THER    Recheck INR In: 6 WEEKS    INR Location Clinic      Anticoagulation Summary as of 4/18/2017     INR goal 2.0-3.0   Today's INR 2.2   Maintenance plan 5 mg (5 mg x 1) on Mon, Wed, Fri; 2.5 mg (5 mg x 0.5) all other days   Full instructions 5 mg on Mon, Wed, Fri; 2.5 mg all other days   Weekly total 25 mg   No change documented Shagufta Bell RN   Plan last modified Shagufta Bell RN (3/22/2016)   Next INR check 5/30/2017   Target end date     Indications   Long-term (current) use of anticoagulants [Z79.01] [Z79.01]  Atrial fibrillation (H) (Resolved) [I48.91]  Atrial fibrillation (H) (Resolved) [I48.91]         Anticoagulation Episode Summary     INR check location     Preferred lab     Send INR reminders to [facility]    Comments       Anticoagulation Care Providers     Provider Role Specialty Phone number    Estela Davila MD  Internal Medicine [phone]            See the Encounter Report to view Anticoagulation Flowsheet and Dosing Calendar (Go to Encounters tab in chart review, and find the Anticoagulation Therapy Visit)    Dosage adjustment made based on physician directed care plan.    Shagufta Bell RN

## 2017-04-25 DIAGNOSIS — E11.9 TYPE 2 DIABETES MELLITUS WITHOUT COMPLICATION, WITHOUT LONG-TERM CURRENT USE OF INSULIN (H): ICD-10-CM

## 2017-04-25 NOTE — TELEPHONE ENCOUNTER
Reason for Call:  Medication or medication refill:    Do you use a [facility]?  Name of the pharmacy and phone number for the current request:  [facility], pended    Name of the medication requested: metFORMIN (GLUCOPHAGE) 500 MG    Other request: patient requesting a 90 day supply.  This was a new prescription and she states that it is working well.  Please call to inform when done.    Can we leave a detailed message on this number? YES    Phone number patient can be reached at: Home number on file [phone] (home)    Best Time: any    Call taken on 4/25/2017 at 10:21 AM by Rissa Kim    metFORMIN (GLUCOPHAGE) 500 MG         Last Written Prescription Date: 3-28-17  Last Fill Quantity: 30, # refills: 3  Last Office Visit with [facility], P or [facility] prescribing provider:  3-28-17   Next 5 appointments (look out 90 days)     Jul 03, 2017 10:00 AM CDT   SHORT with Estela Davila MD   [facility] ([facility])    [address] [place] [id number] [phone]                   BP Readings from Last 3 Encounters:   03/28/17 127/57   03/15/17 132/74   10/05/16 121/60     Lab Results   Component Value Date    MICROL 7 03/29/2016     Lab Results   Component Value Date    UMALCR 7.85 03/29/2016     Creatinine   Date Value Ref Range Status   03/21/2017 0.82 0.52 - 1.04 mg/dL Final   ]  GFR Estimate   Date Value Ref Range Status   03/21/2017 68 >60 mL/min/1.7m2 Final     Comment:     Non  GFR Calc   09/28/2016 66 >60 mL/min/1.7m2 Final     Comment:     Non  GFR Calc   03/29/2016 69 >60 mL/min/1.7m2 Final     Comment:     Non  GFR Calc     GFR Estimate If Black   Date Value Ref Range Status   03/21/2017 82 >60 mL/min/1.7m2 Final     Comment:      GFR Calc   09/28/2016 80 >60 mL/min/1.7m2 Final     Comment:      GFR Calc   03/29/2016 84 >60 mL/min/1.7m2 Final      Comment:      GFR Calc     Lab Results   Component Value Date    CHOL 154 03/21/2017     Lab Results   Component Value Date    HDL 42 03/21/2017     Lab Results   Component Value Date    LDL 72 03/21/2017     Lab Results   Component Value Date    TRIG 202 03/21/2017     Lab Results   Component Value Date    CHOLHDLRATIO 3.8 06/18/2015     Lab Results   Component Value Date    AST 13 03/21/2017     Lab Results   Component Value Date    ALT 29 03/21/2017     Lab Results   Component Value Date    A1C 8.0 03/21/2017    A1C 7.5 09/28/2016    A1C 7.0 03/29/2016    A1C 7.2 06/18/2015    A1C 6.9 10/29/2014     Potassium   Date Value Ref Range Status   03/21/2017 4.0 3.4 - 5.3 mmol/L Final

## 2017-05-30 ENCOUNTER — ANTICOAGULATION THERAPY VISIT (OUTPATIENT)
Dept: NURSING | Facility: CLINIC | Age: 77
End: 2017-05-30
Payer: COMMERCIAL

## 2017-05-30 DIAGNOSIS — Z79.01 LONG-TERM (CURRENT) USE OF ANTICOAGULANTS: ICD-10-CM

## 2017-05-30 LAB — INR POINT OF CARE: 2.4 (ref 0.86–1.14)

## 2017-05-30 PROCEDURE — 36416 COLLJ CAPILLARY BLOOD SPEC: CPT

## 2017-05-30 PROCEDURE — 85610 PROTHROMBIN TIME: CPT | Mod: QW

## 2017-05-30 PROCEDURE — 99207 ZZC NO CHARGE NURSE ONLY: CPT

## 2017-05-30 NOTE — MR AVS SNAPSHOT
Clementina Cooper   5/30/2017 10:00 AM   Anticoagulation Therapy Visit    Description:  76 year old female   Provider:  [facility]   Department:  [facility]           INR as of 5/30/2017     Today's INR 2.4      Anticoagulation Summary as of 5/30/2017     INR goal 2.0-3.0   Today's INR 2.4   Full instructions 5 mg on Mon, Wed, Fri; 2.5 mg all other days   Next INR check 7/11/2017    Indications   Long-term (current) use of anticoagulants [Z79.01] [Z79.01]  Atrial fibrillation (H) (Resolved) [I48.91]  Atrial fibrillation (H) (Resolved) [I48.91]         Your next Anticoagulation Clinic appointment(s)     Jul 11, 2017 10:00 AM CDT   Anticoagulation Visit with [facility]   [facility] ([facility]    [address] [place] [id number] [phone]              Contact Numbers     [facility]  Please call [phone] or [phone]  to cancel and/or reschedule your appointment.   Please call [phone] with any problems or questions regarding your therapy          May 2017 Details    Sun Mon Tue Wed Thu Fri Sat      1               2               3               4               5               6                 7               8               9               10               11               12               13                 14               15               16               17               18               19               20                 21               22               23               24               25               26               27                 28               29               30      2.5 mg   See details      31      5 mg             Date Details   05/30 This INR check               How to take your warfarin dose     To take:  2.5 mg Take 0.5 of a 5 mg tablet.    To take:  5 mg Take 1 of the 5 mg tablets.           June 2017 Details    Sun Mon Tue Wed Thu Fri Sat         1      2.5 mg         2      5  mg         3      2.5 mg           4      2.5 mg         5      5 mg         6      2.5 mg         7      5 mg         8      2.5 mg         9      5 mg         10      2.5 mg           11      2.5 mg         12      5 mg         13      2.5 mg         14      5 mg         15      2.5 mg         16      5 mg         17      2.5 mg           18      2.5 mg         19      5 mg         20      2.5 mg         21      5 mg         22      2.5 mg         23      5 mg         24      2.5 mg           25      2.5 mg         26      5 mg         27      2.5 mg         28      5 mg         29      2.5 mg         30      5 mg           Date Details   No additional details            How to take your warfarin dose     To take:  2.5 mg Take 0.5 of a 5 mg tablet.    To take:  5 mg Take 1 of the 5 mg tablets.           July 2017 Details    Sun Mon Tue Wed Thu Fri Sat           1      2.5 mg           2      2.5 mg         3      5 mg         4      2.5 mg         5      5 mg         6      2.5 mg         7      5 mg         8      2.5 mg           9      2.5 mg         10      5 mg         11            12               13               14               15                 16               17               18               19               20               21               22                 23               24               25               26               27               28               29                 30               31                     Date Details   No additional details    Date of next INR:  7/11/2017         How to take your warfarin dose     To take:  2.5 mg Take 0.5 of a 5 mg tablet.    To take:  5 mg Take 1 of the 5 mg tablets.

## 2017-05-30 NOTE — PROGRESS NOTES
ANTICOAGULATION FOLLOW-UP CLINIC VISIT    Patient Name:  Clementina Cooper  Date:  5/30/2017  Contact Type:  Face to Face    SUBJECTIVE:     Patient Findings     Positives No Problem Findings           OBJECTIVE    INR Protime   Date Value Ref Range Status   05/30/2017 2.4 (A) 0.86 - 1.14 Final       ASSESSMENT / PLAN  INR assessment THER    Recheck INR In: 6 WEEKS    INR Location Clinic      Anticoagulation Summary as of 5/30/2017     INR goal 2.0-3.0   Today's INR 2.4   Maintenance plan 5 mg (5 mg x 1) on Mon, Wed, Fri; 2.5 mg (5 mg x 0.5) all other days   Full instructions 5 mg on Mon, Wed, Fri; 2.5 mg all other days   Weekly total 25 mg   No change documented Shagufta Bell RN   Plan last modified Shagufta Bell RN (3/22/2016)   Next INR check 7/11/2017   Target end date     Indications   Long-term (current) use of anticoagulants [Z79.01] [Z79.01]  Atrial fibrillation (H) (Resolved) [I48.91]  Atrial fibrillation (H) (Resolved) [I48.91]         Anticoagulation Episode Summary     INR check location     Preferred lab     Send INR reminders to [facility]    Comments       Anticoagulation Care Providers     Provider Role Specialty Phone number    Estela Davila MD  Internal Medicine [phone]            See the Encounter Report to view Anticoagulation Flowsheet and Dosing Calendar (Go to Encounters tab in chart review, and find the Anticoagulation Therapy Visit)    Dosage adjustment made based on physician directed care plan.    Shagufta Bell RN

## 2017-06-14 DIAGNOSIS — E11.9 DIABETES MELLITUS, TYPE 2 (H): ICD-10-CM

## 2017-06-14 RX ORDER — LOSARTAN POTASSIUM 25 MG/1
TABLET ORAL
Qty: 45 TABLET | Refills: 0 | Status: SHIPPED | OUTPATIENT
Start: 2017-06-14 | End: 2017-09-12

## 2017-06-14 NOTE — TELEPHONE ENCOUNTER
losartan (COZAAR) 25 MG tablet      Last Written Prescription Date: 12-27-16  Last Fill Quantity: 45, # refills: 1  Last Office Visit with G, P or [facility] prescribing provider: 3-28-17  Next 5 appointments (look out 90 days)     Jul 03, 2017 10:00 AM CDT   SHORT with Estela Davila MD   [facility] ([facility])    [address] [place] [id number] [phone]                   Potassium   Date Value Ref Range Status   03/21/2017 4.0 3.4 - 5.3 mmol/L Final     Creatinine   Date Value Ref Range Status   03/21/2017 0.82 0.52 - 1.04 mg/dL Final     BP Readings from Last 3 Encounters:   03/28/17 127/57   03/15/17 132/74   10/05/16 121/60

## 2017-06-14 NOTE — TELEPHONE ENCOUNTER
Prescription approved per [facility] Refill Protocol.     Gita Cunningham RN

## 2017-06-26 DIAGNOSIS — E11.9 TYPE 2 DIABETES MELLITUS WITHOUT COMPLICATION, WITHOUT LONG-TERM CURRENT USE OF INSULIN (H): ICD-10-CM

## 2017-06-26 LAB
CREAT UR-MCNC: 71 MG/DL
HBA1C MFR BLD: 7 % (ref 4.3–6)
MICROALBUMIN UR-MCNC: 6 MG/L
MICROALBUMIN/CREAT UR: 8.32 MG/G CR (ref 0–25)

## 2017-06-26 PROCEDURE — 82043 UR ALBUMIN QUANTITATIVE: CPT | Performed by: INTERNAL MEDICINE

## 2017-06-26 PROCEDURE — 83036 HEMOGLOBIN GLYCOSYLATED A1C: CPT | Performed by: INTERNAL MEDICINE

## 2017-06-26 PROCEDURE — 36415 COLL VENOUS BLD VENIPUNCTURE: CPT | Performed by: INTERNAL MEDICINE

## 2017-07-03 ENCOUNTER — OFFICE VISIT (OUTPATIENT)
Dept: FAMILY MEDICINE | Facility: CLINIC | Age: 77
End: 2017-07-03
Payer: COMMERCIAL

## 2017-07-03 VITALS
SYSTOLIC BLOOD PRESSURE: 119 MMHG | TEMPERATURE: 97 F | BODY MASS INDEX: 33.27 KG/M2 | HEART RATE: 70 BPM | DIASTOLIC BLOOD PRESSURE: 66 MMHG | OXYGEN SATURATION: 94 % | WEIGHT: 203 LBS

## 2017-07-03 DIAGNOSIS — L40.9 PSORIASIS: ICD-10-CM

## 2017-07-03 DIAGNOSIS — E03.4 HYPOTHYROIDISM DUE TO ACQUIRED ATROPHY OF THYROID: ICD-10-CM

## 2017-07-03 DIAGNOSIS — E11.9 TYPE 2 DIABETES MELLITUS WITHOUT COMPLICATION, WITHOUT LONG-TERM CURRENT USE OF INSULIN (H): Primary | ICD-10-CM

## 2017-07-03 DIAGNOSIS — G25.0 ESSENTIAL TREMOR: ICD-10-CM

## 2017-07-03 DIAGNOSIS — Z23 NEED FOR PNEUMOCOCCAL VACCINE: ICD-10-CM

## 2017-07-03 DIAGNOSIS — I48.20 CHRONIC ATRIAL FIBRILLATION (H): ICD-10-CM

## 2017-07-03 DIAGNOSIS — I73.9 PVD (PERIPHERAL VASCULAR DISEASE) (H): ICD-10-CM

## 2017-07-03 PROCEDURE — G0009 ADMIN PNEUMOCOCCAL VACCINE: HCPCS | Performed by: INTERNAL MEDICINE

## 2017-07-03 PROCEDURE — 90732 PPSV23 VACC 2 YRS+ SUBQ/IM: CPT | Performed by: INTERNAL MEDICINE

## 2017-07-03 PROCEDURE — 99214 OFFICE O/P EST MOD 30 MIN: CPT | Mod: 25 | Performed by: INTERNAL MEDICINE

## 2017-07-03 PROCEDURE — 99207 C FOOT EXAM  NO CHARGE: CPT | Performed by: INTERNAL MEDICINE

## 2017-07-03 RX ORDER — PROPRANOLOL HYDROCHLORIDE 10 MG/1
10 TABLET ORAL 2 TIMES DAILY PRN
Qty: 30 TABLET | Refills: 1 | Status: SHIPPED | OUTPATIENT
Start: 2017-07-03 | End: 2017-07-24

## 2017-07-03 RX ORDER — CLOBETASOL PROPIONATE 0.5 MG/G
CREAM TOPICAL 2 TIMES DAILY
COMMUNITY
Start: 2017-07-03 | End: 2018-05-22

## 2017-07-03 NOTE — PROGRESS NOTES
SUBJECTIVE:                                                    Clementina Cooper is a 76 year old female who presents to clinic today for the following health issues:    77 y/o with h/o PVD and essential tremor here for DM follow up.       A1C  recently 7.0   TSH borderline at 5.43, we had increased levothyroxine from 88 to 100 since Fall '16     Serial Free T4 remain normal at 1.0...  Should recheck TSH, Free T4 again along with TPO in fall '17       Pvx (23)...  Willing to have done today    Diabetes Follow-up    Patient is checking blood sugars: once daily.  Results are as follows:         am - 160's    Diabetic concerns: None     Symptoms of hypoglycemia (low blood sugar): none     Paresthesias (numbness or burning in feet) or sores: No     Date of last diabetic eye exam: 11/4/16      Amount of exercise or physical activity: 3-5 days a week walks     Problems taking medications regularly: No    Medication side effects: none    Diet: regular (no restrictions)      Patient feels her ET is getting worse.   Dropped a tray once.  At [employer] check out, unable to write check on the small platform    Comes and goes.     Problem list and histories reviewed & adjusted, as indicated.  Additional history: as documented    Patient Active Problem List   Diagnosis     PVD (peripheral vascular disease) (H)     Family history of colon cancer     HYPERLIPIDEMIA LDL GOAL <100     Open-angle glaucoma, mild-mod, ou     Advanced directives, counseling/discussion     Essential tremor     Pseudophakia, ou     S/P iridectomy, ou     Anticoagulation goal of INR 2 to 3     Hypothyroidism due to acquired atrophy of thyroid     Chronic atrial fibrillation (H)     overweight  HCC     Long-term (current) use of anticoagulants [Z79.01]     Acute pain of left knee     Type 2 diabetes mellitus without complication, without long-term current use of insulin (H)     Glaucoma suspect, bilateral     Past Surgical History:   Procedure Laterality  Date     ANGIOPLASTY      right leg     C NONSPECIFIC PROCEDURE  2001    neck surgery/trauma     C STOMACH SURGERY PROCEDURE UNLISTED       CATARACT IOL, RT/LT       HC TRABECULOPLASTY BY LASER SURGERY       HC VASCULAR SURGERY PROCEDURE UNLIST       HYSTERECTOMY, CERVIX STATUS UNKNOWN  1989    uterine bleeding     HYSTERECTOMY, PAP NO LONGER INDICATED       LASER YAG IRIDOTOMY  remotely    both eyes (elsewhere)     PHACOEMULSIFICATION WITH STANDARD INTRAOCULAR LENS IMPLANT  7/2012; 8/2012    right eye; left eye       Social History   Substance Use Topics     Smoking status: Former Smoker     Quit date: 12/4/1999     Smokeless tobacco: Never Used     Alcohol use Yes      Comment: occasional     Family History   Problem Relation Age of Onset     DIABETES Mother      HEART DISEASE Mother      HEART DISEASE Father      HEART DISEASE Son      CANCER Brother      HEART DISEASE Sister      CANCER Brother      CANCER Brother      HEART DISEASE Sister          Current Outpatient Prescriptions   Medication Sig Dispense Refill     losartan (COZAAR) 25 MG tablet TAKE 0.5 TABLETS (12.5 MG) BY MOUTH DAILY 45 tablet 0     metFORMIN (GLUCOPHAGE) 500 MG tablet Take 1 tablet (500 mg) by mouth daily (with dinner) 90 tablet 3     metoprolol (LOPRESSOR) 25 MG tablet Take 1 tablet (25 mg) by mouth every evening May take one extra tab as needed if HR>100 at rest 90 tablet 3     warfarin (COUMADIN) 5 MG tablet Take 1 tablet (5 mg) by mouth daily MWF & 1/2 tablet (2.5mg) all other days 102 tablet 3     atorvastatin (LIPITOR) 20 MG tablet Take 1 tablet (20 mg) by mouth daily 102 tablet 3     levothyroxine (SYNTHROID,LEVOTHROID) 100 MCG tablet Take 1 tablet (100 mcg) by mouth daily 90 tablet 3     clobetasol (TEMOVATE) 0.05 % cream Apply sparingly to affected area twice weekly 60 g 2     blood glucose monitoring (NO BRAND SPECIFIED) test strip One Touch Ultra test strips - Use to test blood sugar 2 times daily or as directed. 200 each 3      ORDER FOR DME Equipment being ordered:  One Touch strips to be used daily 90 days 3     ORDER FOR DME Equipment being ordered:  chemstrips for daily blood sugar checks. 1 Box 3     Cyanocobalamin (B-12) 1000 MCG TBCR Take 1 tablet by mouth daily       Evening Primrose Oil 1000 MG CAPS Take by mouth daily       Biotin 5000 MCG CAPS Take  by mouth.       aspirin 81 MG tablet Take 1 tablet by mouth daily.  3     CENTRUM SILVER OR one daily       CALCIUM 500 + D OR one daily       VITAMIN D 1000 UNIT OR TABS 1 TABLET DAILY       ACE NOT PRESCRIBED, INTENTIONAL, 1 each At Bedtime ACE Inhibitor not prescribed due to Other:  Neck swelling.  Also, BP is on low normal side with the betablocker (Patient not taking: Reported on 3/28/2017) 0 each 0     Allergies   Allergen Reactions     Benzocaine      Pravastatin      Muscle aches     Vagisil [Kdc:Benzocaine+Cetyl Alcohol+Edetic Acid+Methylparaben+Propylene Glycol+...]      It is the benzocaine       Xarelto [Rivaroxaban]      Daily headache     Zocor [Hmg-Coa-R Inhibitors]      Muscle aches     Recent Labs   Lab Test  06/26/17   1000  03/21/17   0728  01/04/17   0914  09/28/16   0756  03/29/16   0738  06/18/15   0740   A1C  7.0*  8.0*   --   7.5*  7.0*  7.2*   LDL   --   72   --    --   87  72   HDL   --   42*   --    --   39*  40*   TRIG   --   202*   --    --   213*  211*   ALT   --   29   --    --   32  34   CR   --   0.82   --   0.83  0.80  0.74   GFRESTIMATED   --   68   --   66  69  76   GFRESTBLACK   --   82   --   80  84  >90   GFR Calc     POTASSIUM   --   4.0   --   4.3  4.1  4.0   TSH   --   5.43*  2.21  5.10*  6.31*  3.95      BP Readings from Last 3 Encounters:   07/03/17 119/66   03/28/17 127/57   03/15/17 132/74    Wt Readings from Last 3 Encounters:   07/03/17 203 lb (92.1 kg)   03/28/17 206 lb (93.4 kg)   10/05/16 207 lb (93.9 kg)                  Labs reviewed in EPIC    Reviewed and updated as needed this visit by clinical staff  Tobacco   Allergies  Med Hx  Surg Hx  Fam Hx  Soc Hx      Reviewed and updated as needed this visit by Provider         ROS:  Constitutional, HEENT, cardiovascular, pulmonary, gi and gu systems are negative, except as otherwise noted.    OBJECTIVE:     /66 (BP Location: Right arm, Patient Position: Chair, Cuff Size: Adult Large)  Pulse 70  Temp 97  F (36.1  C) (Oral)  Wt 203 lb (92.1 kg)  SpO2 94%  BMI 33.27 kg/m2  Body mass index is 33.27 kg/(m^2).  GENERAL: healthy, alert and no distress  EYES: Eyes grossly normal to inspection, PERRL and conjunctivae and sclerae normal  NECK: no adenopathy, no asymmetry, masses, or scars and thyroid normal to palpation  RESP: lungs clear to auscultation - no rales, rhonchi or wheezes  CV: regular rate and rhythm, normal S1 S2, no S3 or S4, no murmur, click or rub, no peripheral edema and peripheral pulses strong  ABDOMEN: soft, nontender, no hepatosplenomegaly, no masses and bowel sounds normal  MS: no gross musculoskeletal defects noted, no edema  SKIN: no suspicious lesions or rashes  NEURO: Normal strength and tone, mentation intact and speech normal  PSYCH: mentation appears normal, affect normal/bright  Diabetic foot exam: no trophic changes or ulcerative lesions, normal sensory exam and DP reduced left, but normal temp, color and hair growth.     Diagnostic Test Results:  Results for orders placed or performed in visit on 06/26/17   **Albumin Random Urine Quant FUTURE 3mo   Result Value Ref Range    Creatinine Urine 71 mg/dL    Albumin Urine mg/L 6 mg/L    Albumin Urine mg/g Cr 8.32 0 - 25 mg/g Cr   **A1C FUTURE 3mo   Result Value Ref Range    Hemoglobin A1C 7.0 (H) 4.3 - 6.0 %       ASSESSMENT/PLAN:           ICD-10-CM    1. Type 2 diabetes mellitus without complication, without long-term current use of insulin (H) E11.9 FOOT EXAM   2. Chronic atrial fibrillation (H) I48.2 Hemoglobin A1c     INR CLINIC REFERRAL   3. PVD (peripheral vascular disease) (H) I73.9    4.  Hypothyroidism due to acquired atrophy of thyroid E03.4 TSH     T4, free     Thyroid peroxidase antibody   5. Essential tremor G25.0 propranolol (INDERAL) 10 MG tablet   6. Need for pneumococcal vaccine Z23 PNEUMOCOCCAL VACCINE,ADULT,SQ OR IM     VACCINE ADMINISTRATION, INITIAL   7. Psoriasis L40.9 clobetasol (TEMOVATE) 0.05 % cream     Okay to increase intervals for INR checks to 5-6 weeks as she is very stable with INR.  Patient requested this and understands that under changed circumstances this would not be the plan.     She is well controlled.  Pvx(23) updated    Propanolol prn tremor.  Trial.  Discussed notion of non selective beta blocker.  Will keep metoprolol on med list.     Will recheck TFTs at next visit (increased dose for subclinical values months ago)     she has normal feet, with diminished pulse on left.  Watchful waiting.  Not limited in any way on left side.     Estela Davila MD  [facility]

## 2017-07-03 NOTE — NURSING NOTE
Screening Questionnaire for Adult Immunization    Are you sick today?   No   Do you have allergies to medications, food, a vaccine component or latex?   No   Have you ever had a serious reaction after receiving a vaccination?   No   Do you have a long-term health problem with heart disease, lung disease, asthma, kidney disease, metabolic disease (e.g. diabetes), anemia, or other blood disorder?   Yes   Do you have cancer, leukemia, HIV/AIDS, or any other immune system problem?   No   In the past 3 months, have you taken medications that affect  your immune system, such as prednisone, other steroids, or anticancer drugs; drugs for the treatment of rheumatoid arthritis, Crohn s disease, or psoriasis; or have you had radiation treatments?   No   Have you had a seizure, or a brain or other nervous system problem?   No   During the past year, have you received a transfusion of blood or blood     products, or been given immune (gamma) globulin or antiviral drug?   No   For women: Are you pregnant or is there a chance you could become        pregnant during the next month?   No   Have you received any vaccinations in the past 4 weeks?   No     Immunization questionnaire was positive for at least one answer.  Notified Dr. Davila.      MNV doesn't apply on this patient     Patient instructed to remain in clinic fo15 minutes afterwards, and to report any adverse reaction to me immediately.       Screening performed by Dannielle Travis on 7/3/2017 at 10:55 AM.

## 2017-07-03 NOTE — NURSING NOTE
"Chief Complaint   Patient presents with     Diabetes     Health Maintenance       Initial /66 (BP Location: Right arm, Patient Position: Chair, Cuff Size: Adult Large)  Pulse 70  Temp 97  F (36.1  C) (Oral)  Wt 203 lb (92.1 kg)  SpO2 94%  BMI 33.27 kg/m2 Estimated body mass index is 33.27 kg/(m^2) as calculated from the following:    Height as of 3/15/17: 5' 5.5\" (1.664 m).    Weight as of this encounter: 203 lb (92.1 kg).  Medication Reconciliation: complete   Dannielle Travis ma      "

## 2017-07-03 NOTE — Clinical Note
Hi,  I think it is okay for Lynda to check INR q6 weeks as long as she remains solidly stable as she has been.  Let me know if you have any misgivings.  She will come sooner if symptoms like black/bloody stool or if abx started..

## 2017-07-03 NOTE — MR AVS SNAPSHOT
After Visit Summary   7/3/2017    Clementina Cooper    MRN: [id number]           Patient Information     Date Of Birth          1940        Visit Information        Provider Department      7/3/2017 10:00 AM Estela Davila MD [facility]        Today's Diagnoses     Type 2 diabetes mellitus without complication, without long-term current use of insulin (H)    -  1    Chronic atrial fibrillation (H)        PVD (peripheral vascular disease) (H)        Hypothyroidism due to acquired atrophy of thyroid        Essential tremor        Need for pneumococcal vaccine        Psoriasis          Care Instructions    Trial propanolol 10 mg -- one tab up to twice daily as needed for tremor    Return to clinic about 4 months (November '17) with labs prior              Follow-ups after your visit        Additional Services     INR CLINIC REFERRAL       Your provider has referred you to INR Services.    Please be aware that coverage of these services is subject to the terms and limitations of your health insurance plan.  Call member services at your health plan with any benefit or coverage questions.    If her INR is 2.x for 4 months in a row, okay to increase interval between checks to 5 - 6 weeks.     Indication for Anticoagulation: Atrial Fibrillation  If nonstandard INR is desired, indicate goal range and explanation:    Expected Duration of Therapy: Lifetime                  Follow-up notes from your care team     Return in about 4 months (around 11/3/2017) for diabetes follow up.      Your next 10 appointments already scheduled     Jul 11, 2017 10:00 AM CDT   Anticoagulation Visit with CP [facility]AG   [facility]    [address] [place] [address]   [phone]              Future tests that were ordered for you today     Open Future Orders        Priority Expected Expires Ordered    TSH  "Routine 2017 2018 7/3/2017    T4, free Routine 2017 2018 7/3/2017    Thyroid peroxidase antibody Routine 2017 2018 7/3/2017    Hemoglobin A1c Routine  2018 7/3/2017            Who to contact     If you have questions or need follow up information about today's clinic visit or your schedule please contact [facility] directly at [phone].  Normal or non-critical lab and imaging results will be communicated to you by MyChart, letter or phone within 4 business days after the clinic has received the results. If you do not hear from us within 7 days, please contact the clinic through [employer] or phone. If you have a critical or abnormal lab result, we will notify you by phone as soon as possible.  Submit refill requests through [employer] or call your pharmacy and they will forward the refill request to us. Please allow 3 business days for your refill to be completed.          Additional Information About Your Visit        Bringme[facility]Smack Information     [facility] [employer] lets you send messages to your doctor, view your test results, renew your prescriptions, schedule appointments and more. To sign up, go to [email or web address] . Click on \"Log in\" on the left side of the screen, which will take you to the Welcome page. Then click on \"Sign up Now\" on the right side of the page.     You will be asked to enter the access code listed below, as well as some personal information. Please follow the directions to create your username and password.     Your access code is: [email or web address]  Expires: 10/1/2017 10:57 AM     Your access code will  in 90 days. If you need help or a new code, please call your [facility] or [phone].        Care EveryWhere ID     This is your Care EveryWhere ID. This could be used by other organizations to access your [facility] medical records  [id number]        Your Vitals Were     Pulse Temperature Pulse Oximetry BMI (Body Mass Index)          70 97 "  F (36.1  C) (Oral) 94% 33.27 kg/m2         Blood Pressure from Last 3 Encounters:   07/03/17 119/66   03/28/17 127/57   03/15/17 132/74    Weight from Last 3 Encounters:   07/03/17 203 lb (92.1 kg)   03/28/17 206 lb (93.4 kg)   10/05/16 207 lb (93.9 kg)              We Performed the Following     FOOT EXAM     INR CLINIC REFERRAL     PNEUMOCOCCAL VACCINE,ADULT,SQ OR IM     VACCINE ADMINISTRATION, INITIAL          Today's Medication Changes          These changes are accurate as of: 7/3/17 10:57 AM.  If you have any questions, ask your nurse or doctor.               Start taking these medicines.        Dose/Directions    propranolol 10 MG tablet   Commonly known as:  INDERAL   Used for:  Essential tremor   Started by:  Estela Davila MD        Dose:  10 mg   Take 1 tablet (10 mg) by mouth 2 times daily as needed   Quantity:  30 tablet   Refills:  1            Where to get your medicines      These medications were sent to [facility]/pharmacy #[id number] [address] [address]     Phone:  [phone]     propranolol 10 MG tablet                Primary Care Provider Office Phone # Fax #    Estela Davila [phone]       [facility] [address] [facility] [address]        Equal Access to Services     DERECK ZEPEDA AH: Abhishek valle hadasho Soomaali, waaxda luqadaha, qaybta kaalmada adeegyada, jeanette anderson. So [facility] [phone].    ATENCIÓN: Si habla español, tiene a carvajal disposición servicios gratuitos de asistencia lingüística. Llame al [phone].    We comply with applicable federal civil rights laws and [place] laws. We do not discriminate on the basis of race, color, national origin, age, disability sex, sexual orientation or gender identity.            Thank you!     Thank you for choosing [facility]  for your care. Our goal is always to provide you with  excellent care. Hearing back from our patients is one way we can continue to improve our services. Please take a few minutes to complete the written survey that you may receive in the mail after your visit with us. Thank you!             Your Updated Medication List - Protect others around you: Learn how to safely use, store and throw away your medicines at www.disposemymeds.org.          This list is accurate as of: 7/3/17 10:57 AM.  Always use your most recent med list.                   Brand Name Dispense Instructions for use Diagnosis    ACE NOT PRESCRIBED (INTENTIONAL)     0 each    1 each At Bedtime ACE Inhibitor not prescribed due to Other:  Neck swelling.  Also, BP is on low normal side with the betablocker    Type 2 diabetes, HbA1c goal < 7% (H)       aspirin 81 MG tablet      Take 1 tablet by mouth daily.    Type 2 diabetes, HbA1c goal < 7% (H)       atorvastatin 20 MG tablet    LIPITOR    102 tablet    Take 1 tablet (20 mg) by mouth daily    Hyperlipidemia LDL goal <100, PVD (peripheral vascular disease) (H)       B-12 1000 MCG Tbcr      Take 1 tablet by mouth daily        Biotin 5000 MCG Caps      Take  by mouth.        blood glucose monitoring test strip    no brand specified    200 each    One Touch Ultra test strips - Use to test blood sugar 2 times daily or as directed.    Type 2 diabetes mellitus without complication (H)       CALCIUM 500 + D PO      one daily        CENTRUM SILVER PO      one daily        cholecalciferol 1000 UNIT tablet    vitamin D     1 TABLET DAILY        * clobetasol 0.05 % cream    TEMOVATE    60 g    Apply sparingly to affected area twice weekly    Vulvar itching       * clobetasol 0.05 % cream    TEMOVATE     Apply topically 2 times daily    Psoriasis       Evening Primrose Oil 1000 MG Caps      Take by mouth daily        levothyroxine 100 MCG tablet    SYNTHROID/LEVOTHROID    90 tablet    Take 1 tablet (100 mcg) by mouth daily    Hypothyroidism due to acquired atrophy of  thyroid       losartan 25 MG tablet    COZAAR    45 tablet    TAKE 0.5 TABLETS (12.5 MG) BY MOUTH DAILY    Diabetes mellitus, type 2 (H)       metFORMIN 500 MG tablet    GLUCOPHAGE    90 tablet    Take 1 tablet (500 mg) by mouth daily (with dinner)    Type 2 diabetes mellitus without complication, without long-term current use of insulin (H)       metoprolol 25 MG tablet    LOPRESSOR    90 tablet    Take 1 tablet (25 mg) by mouth every evening May take one extra tab as needed if HR>100 at rest    Chronic atrial fibrillation (H)       * order for DME     1 Box    Equipment being ordered:  chemstrips for daily blood sugar checks.    Type 2 diabetes, HbA1c goal < 7% (H)       * order for DME     90 days    Equipment being ordered:  One Touch strips to be used daily    Type 2 diabetes, HbA1c goal < 7% (H)       propranolol 10 MG tablet    INDERAL    30 tablet    Take 1 tablet (10 mg) by mouth 2 times daily as needed    Essential tremor       warfarin 5 MG tablet    COUMADIN    102 tablet    Take 1 tablet (5 mg) by mouth daily MWF & 1/2 tablet (2.5mg) all other days    Anticoagulation goal of INR 2 to 3       * Notice:  This list has 4 medication(s) that are the same as other medications prescribed for you. Read the directions carefully, and ask your doctor or other care provider to review them with you.

## 2017-07-03 NOTE — PATIENT INSTRUCTIONS
Trial propanolol 10 mg -- one tab up to twice daily as needed for tremor    Return to clinic about 4 months (November '17) with labs prior

## 2017-07-11 ENCOUNTER — ANTICOAGULATION THERAPY VISIT (OUTPATIENT)
Dept: NURSING | Facility: CLINIC | Age: 77
End: 2017-07-11
Payer: COMMERCIAL

## 2017-07-11 DIAGNOSIS — Z79.01 LONG-TERM (CURRENT) USE OF ANTICOAGULANTS: ICD-10-CM

## 2017-07-11 LAB — INR POINT OF CARE: 2.8 (ref 0.86–1.14)

## 2017-07-11 PROCEDURE — 36416 COLLJ CAPILLARY BLOOD SPEC: CPT

## 2017-07-11 PROCEDURE — 85610 PROTHROMBIN TIME: CPT | Mod: QW

## 2017-07-11 PROCEDURE — 99207 ZZC NO CHARGE NURSE ONLY: CPT

## 2017-07-11 NOTE — PROGRESS NOTES
ANTICOAGULATION FOLLOW-UP CLINIC VISIT    Patient Name:  Clementina Cooper  Date:  7/11/2017  Contact Type:  Face to Face    SUBJECTIVE: see below.  Patient will call INR RN to be seen sooner if develops any signs INR might be elevated.     Patient Findings     Positives Change in medications (Inderal new for tremors.  this medicine can raise INR.  plan recheck in 2-3 wks.)           OBJECTIVE    INR Protime   Date Value Ref Range Status   07/11/2017 2.8 (A) 0.86 - 1.14 Final       ASSESSMENT / PLAN  INR assessment THER    Recheck INR In: 3 WEEKS    INR Location Clinic      Anticoagulation Summary as of 7/11/2017     INR goal 2.0-3.0   Today's INR 2.8   Maintenance plan 5 mg (5 mg x 1) on Mon, Wed, Fri; 2.5 mg (5 mg x 0.5) all other days   Full instructions 5 mg on Mon, Wed, Fri; 2.5 mg all other days   Weekly total 25 mg   No change documented Shagufta Bell RN   Plan last modified Shagufta Bell RN (3/22/2016)   Next INR check 8/1/2017   Target end date     Indications   Long-term (current) use of anticoagulants [Z79.01] [Z79.01]  Atrial fibrillation (H) (Resolved) [I48.91]  Atrial fibrillation (H) (Resolved) [I48.91]         Anticoagulation Episode Summary     INR check location     Preferred lab     Send INR reminders to [facility] CLINIC    Comments       Anticoagulation Care Providers     Provider Role Specialty Phone number    Estela Davila MD  Internal Medicine [phone]            See the Encounter Report to view Anticoagulation Flowsheet and Dosing Calendar (Go to Encounters tab in chart review, and find the Anticoagulation Therapy Visit)    Dosage adjustment made based on physician directed care plan.    Shagufta Bell RN

## 2017-07-11 NOTE — MR AVS SNAPSHOT
Clementina Cooper   7/11/2017 10:00 AM   Anticoagulation Therapy Visit    Description:  77 year old female   Provider:  CONNOR ANTI COAG   Department:  Cp Nurse           INR as of 7/11/2017     Today's INR 2.8      Anticoagulation Summary as of 7/11/2017     INR goal 2.0-3.0   Today's INR 2.8   Full instructions 5 mg on Mon, Wed, Fri; 2.5 mg all other days   Next INR check 8/1/2017    Indications   Long-term (current) use of anticoagulants [Z79.01] [Z79.01]  Atrial fibrillation (H) (Resolved) [I48.91]  Atrial fibrillation (H) (Resolved) [I48.91]         Your next Anticoagulation Clinic appointment(s)     Aug 01, 2017  9:40 AM CDT   Anticoagulation Visit with CONNOR ANTI COAG   [facility] ([facility]    [address] [place] [address] [phone]              Contact Numbers     [facility]  Please call [phone] or [phone]  to cancel and/or reschedule your appointment.   Please call [phone] with any problems or questions regarding your therapy          July 2017 Details    Sun Mon Tue Wed Thu Fri Sat           1                 2               3               4               5               6               7               8                 9               10               11      2.5 mg   See details      12      5 mg         13      2.5 mg         14      5 mg         15      2.5 mg           16      2.5 mg         17      5 mg         18      2.5 mg         19      5 mg         20      2.5 mg         21      5 mg         22      2.5 mg           23      2.5 mg         24      5 mg         25      2.5 mg         26      5 mg         27      2.5 mg         28      5 mg         29      2.5 mg           30      2.5 mg         31      5 mg               Date Details   07/11 This INR check               How to take your warfarin dose     To take:  2.5 mg Take 0.5 of a 5 mg tablet.    To take:  5 mg Take 1 of the 5 mg  tablets.           August 2017 Details    Sun Mon Tue Wed Thu Fri Sat       1            2               3               4               5                 6               7               8               9               10               11               12                 13               14               15               16               17               18               19                 20               21               22               23               24               25               26                 27               28               29               30               31                  Date Details   No additional details    Date of next INR:  8/1/2017         How to take your warfarin dose     To take:  2.5 mg Take 0.5 of a 5 mg tablet.

## 2017-07-24 ENCOUNTER — TELEPHONE (OUTPATIENT)
Dept: FAMILY MEDICINE | Facility: CLINIC | Age: 77
End: 2017-07-24

## 2017-07-24 DIAGNOSIS — G25.0 ESSENTIAL TREMOR: ICD-10-CM

## 2017-07-24 RX ORDER — PROPRANOLOL HYDROCHLORIDE 10 MG/1
10 TABLET ORAL 2 TIMES DAILY PRN
Qty: 180 TABLET | Refills: 1 | Status: SHIPPED | OUTPATIENT
Start: 2017-07-24 | End: 2018-01-27

## 2017-07-24 NOTE — TELEPHONE ENCOUNTER
Please see message below, okay to send 90 day supply?    Routed to PCP.    Tere Eller RN  [facility]

## 2017-07-24 NOTE — TELEPHONE ENCOUNTER
Reason for Call:  Medication or medication refill:    Do you use a [facility]?  Name of the pharmacy and phone number for the current request:  [facility] [phone]    Name of the medication requested: propranolol    Other request: Patient says medication is working well and would like to get the 90 day supply     Can we leave a detailed message on this number? YES    Phone number patient can be reached at: Home number on file [phone] (home)    Best Time: anytime    Call taken on 7/24/2017 at 2:26 PM by Inez Cyr

## 2017-08-08 ENCOUNTER — ANTICOAGULATION THERAPY VISIT (OUTPATIENT)
Dept: NURSING | Facility: CLINIC | Age: 77
End: 2017-08-08
Payer: COMMERCIAL

## 2017-08-08 DIAGNOSIS — Z79.01 LONG-TERM (CURRENT) USE OF ANTICOAGULANTS: ICD-10-CM

## 2017-08-08 LAB — INR POINT OF CARE: 2 (ref 0.86–1.14)

## 2017-08-08 PROCEDURE — 85610 PROTHROMBIN TIME: CPT | Mod: QW

## 2017-08-08 PROCEDURE — 36416 COLLJ CAPILLARY BLOOD SPEC: CPT

## 2017-08-08 PROCEDURE — 99207 ZZC NO CHARGE NURSE ONLY: CPT

## 2017-08-08 NOTE — MR AVS SNAPSHOT
Clementina Cooper   8/8/2017 9:00 AM   Anticoagulation Therapy Visit    Description:  77 year old female   Provider:  CON[facility] ANTI COAG   Department:  Cp Nurse           INR as of 8/8/2017     Today's INR 2.0      Anticoagulation Summary as of 8/8/2017     INR goal 2.0-3.0   Today's INR 2.0   Full instructions 5 mg on Mon, Wed, Fri; 2.5 mg all other days   Next INR check 10/3/2017    Indications   Long-term (current) use of anticoagulants [Z79.01] [Z79.01]  Atrial fibrillation (H) (Resolved) [I48.91]  Atrial fibrillation (H) (Resolved) [I48.91]         Your next Anticoagulation Clinic appointment(s)     Oct 03, 2017  9:20 AM CDT   Anticoagulation Visit with CONNOR ANTI COAG   [facility]    [address] [facility] [address] [phone]              Contact Numbers     [facility]  Please call [phone] or [phone]  to cancel and/or reschedule your appointment.   Please call [phone] with any problems or questions regarding your therapy          August 2017 Details    Sun Mon Tue Wed Thu Fri Sat       1               2               3               4               5                 6               7               8      2.5 mg   See details      9      5 mg         10      2.5 mg         11      5 mg         12      2.5 mg           13      2.5 mg         14      5 mg         15      2.5 mg         16      5 mg         17      2.5 mg         18      5 mg         19      2.5 mg           20      2.5 mg         21      5 mg         22      2.5 mg         23      5 mg         24      2.5 mg         25      5 mg         26      2.5 mg           27      2.5 mg         28      5 mg         29      2.5 mg         30      5 mg         31      2.5 mg            Date Details   08/08 This INR check               How to take your warfarin dose     To take:  2.5 mg Take 0.5 of a 5 mg tablet.    To take:  5 mg Take 1 of the  5 mg tablets.           September 2017 Details    Sun Mon Tue Wed Thu Fri Sat          1      5 mg         2      2.5 mg           3      2.5 mg         4      5 mg         5      2.5 mg         6      5 mg         7      2.5 mg         8      5 mg         9      2.5 mg           10      2.5 mg         11      5 mg         12      2.5 mg         13      5 mg         14      2.5 mg         15      5 mg         16      2.5 mg           17      2.5 mg         18      5 mg         19      2.5 mg         20      5 mg         21      2.5 mg         22      5 mg         23      2.5 mg           24      2.5 mg         25      5 mg         26      2.5 mg         27      5 mg         28      2.5 mg         29      5 mg         30      2.5 mg          Date Details   No additional details            How to take your warfarin dose     To take:  2.5 mg Take 0.5 of a 5 mg tablet.    To take:  5 mg Take 1 of the 5 mg tablets.           October 2017 Details    Sun Mon Tue Wed Thu Fri Sat     1      2.5 mg         2      5 mg         3            4               5               6               7                 8               9               10               11               12               13               14                 15               16               17               18               19               20               21                 22               23               24               25               26               27               28                 29               30               31                    Date Details   No additional details    Date of next INR:  10/3/2017         How to take your warfarin dose     To take:  2.5 mg Take 0.5 of a 5 mg tablet.    To take:  5 mg Take 1 of the 5 mg tablets.

## 2017-08-08 NOTE — PROGRESS NOTES
ANTICOAGULATION FOLLOW-UP CLINIC VISIT    Patient Name:  Clementina Cooper  Date:  8/8/2017  Contact Type:  Face to Face    SUBJECTIVE: patient requests to go 8 weeks in-between INR appt.  PCP ok'd this, see chart note.     Patient Findings     Positives No Problem Findings           OBJECTIVE    INR Protime   Date Value Ref Range Status   08/08/2017 2.0 (A) 0.86 - 1.14 Final       ASSESSMENT / PLAN  INR assessment THER    Recheck INR In: 8 WEEKS    INR Location Clinic      Anticoagulation Summary as of 8/8/2017     INR goal 2.0-3.0   Today's INR 2.0   Maintenance plan 5 mg (5 mg x 1) on Mon, Wed, Fri; 2.5 mg (5 mg x 0.5) all other days   Full instructions 5 mg on Mon, Wed, Fri; 2.5 mg all other days   Weekly total 25 mg   No change documented Shagufta Bell, RN   Plan last modified Shagufta Bell RN (3/22/2016)   Next INR check 10/3/2017   Target end date     Indications   Long-term (current) use of anticoagulants [Z79.01] [Z79.01]  Atrial fibrillation (H) (Resolved) [I48.91]  Atrial fibrillation (H) (Resolved) [I48.91]         Anticoagulation Episode Summary     INR check location     Preferred lab     Send INR reminders to [facility] CL[facility]    Comments       Anticoagulation Care Providers     Provider Role Specialty Phone number    Estela Davila MD  Internal Medicine [phone]            See the Encounter Report to view Anticoagulation Flowsheet and Dosing Calendar (Go to Encounters tab in chart review, and find the Anticoagulation Therapy Visit)    Dosage adjustment made based on physician directed care plan.    Shagufta Bell RN

## 2017-09-12 DIAGNOSIS — E11.9 DIABETES MELLITUS, TYPE 2 (H): ICD-10-CM

## 2017-09-12 RX ORDER — LOSARTAN POTASSIUM 25 MG/1
TABLET ORAL
Qty: 45 TABLET | Refills: 1 | Status: SHIPPED | OUTPATIENT
Start: 2017-09-12 | End: 2018-03-15

## 2017-09-12 NOTE — TELEPHONE ENCOUNTER
losartan (COZAAR) 25 MG tablet      Last Written Prescription Date: 6/14/17  Last Fill Quantity: 45, # refills: 0  Last Office Visit with G, P or [facility] prescribing provider: 7/3/17  Next 5 appointments (look out 90 days)     Nov 02, 2017  9:00 AM CDT   SHORT with Estela Davila MD   [facility]    [address] [place] [address] [phone]                   Potassium   Date Value Ref Range Status   03/21/2017 4.0 3.4 - 5.3 mmol/L Final     Creatinine   Date Value Ref Range Status   03/21/2017 0.82 0.52 - 1.04 mg/dL Final     BP Readings from Last 3 Encounters:   07/03/17 119/66   03/28/17 127/57   03/15/17 132/74

## 2017-09-18 ENCOUNTER — OFFICE VISIT (OUTPATIENT)
Dept: FAMILY MEDICINE | Facility: CLINIC | Age: 77
End: 2017-09-18
Payer: COMMERCIAL

## 2017-09-18 VITALS
HEART RATE: 66 BPM | WEIGHT: 202 LBS | SYSTOLIC BLOOD PRESSURE: 108 MMHG | DIASTOLIC BLOOD PRESSURE: 63 MMHG | OXYGEN SATURATION: 96 % | TEMPERATURE: 97.5 F | BODY MASS INDEX: 33.1 KG/M2

## 2017-09-18 DIAGNOSIS — J01.00 ACUTE NON-RECURRENT MAXILLARY SINUSITIS: Primary | ICD-10-CM

## 2017-09-18 PROCEDURE — 99213 OFFICE O/P EST LOW 20 MIN: CPT | Performed by: NURSE PRACTITIONER

## 2017-09-18 RX ORDER — FLUTICASONE PROPIONATE 50 MCG
1-2 SPRAY, SUSPENSION (ML) NASAL DAILY
Qty: 1 BOTTLE | Refills: 1 | Status: SHIPPED | OUTPATIENT
Start: 2017-09-18 | End: 2018-01-15

## 2017-09-18 ASSESSMENT — PAIN SCALES - GENERAL: PAINLEVEL: SEVERE PAIN (6)

## 2017-09-18 NOTE — NURSING NOTE
"Chief Complaint   Patient presents with     Sinus Problem       Initial /63 (BP Location: Right arm, Patient Position: Chair, Cuff Size: Adult Regular)  Pulse 66  Temp 97.5  F (36.4  C) (Oral)  Wt 202 lb (91.6 kg)  SpO2 96%  Breastfeeding? No  BMI 33.1 kg/m2 Estimated body mass index is 33.1 kg/(m^2) as calculated from the following:    Height as of 3/15/17: 5' 5.5\" (1.664 m).    Weight as of this encounter: 202 lb (91.6 kg).  Medication Reconciliation: complete.  CELSO Vitale MA      "

## 2017-09-18 NOTE — MR AVS SNAPSHOT
After Visit Summary   9/18/2017    Clementina Cooper    MRN: [id number]           Patient Information     Date Of Birth          1940        Visit Information        Provider Department      9/18/2017 9:40 AM Paris Lemus APRN CNP [facility]        Today's Diagnoses     Acute non-recurrent maxillary sinusitis    -  1       Follow-ups after your visit        Your next 10 appointments already scheduled     Oct 03, 2017  9:20 AM CDT   Anticoagulation Visit with CP ANTI COAG   [facility] ([facility]    [address] [place] [id number]   [phone]            Oct 26, 2017  9:30 AM CDT   LAB with CP LAB   [facility] ([facility])    [address] [place] [id number]   [phone]           Patient must bring picture ID. Patient should be prepared to give a urine specimen  Please do not eat 10-12 hours before your appointment if you are coming in fasting for labs on lipids, cholesterol, or glucose (sugar). Pregnant women should follow their Care Team instructions. Water with medications is okay. Do not drink coffee or other fluids. If you have concerns about taking  your medications, please ask at office or if scheduling via [employer], send a message by clicking on Secure Messaging, Message Your Care Team.            Nov 01, 2017 10:00 AM CDT   [facility]ul [facility] MD   [facility]    [address] [place] [id number]   [phone]            Nov 02, 2017  9:00 AM CDT   SHORT with Estela Davila MD   [facility] ([facility]    [address] [place] [id number]   [phone]              Who to contact     If you have questions or need follow up information about today's clinic visit  "or your schedule please contact [facility] directly at [phone].  Normal or non-critical lab and imaging results will be communicated to you by MyChart, letter or phone within 4 business days after the clinic has received the results. If you do not hear from us within 7 days, please contact the clinic through [employer] or phone. If you have a critical or abnormal lab result, we will notify you by phone as soon as possible.  Submit refill requests through [employer] or call your pharmacy and they will forward the refill request to us. Please allow 3 business days for your refill to be completed.          Additional Information About Your Visit        [employer] Information     [employer] lets you send messages to your doctor, view your test results, renew your prescriptions, schedule appointments and more. To sign up, go to [email or web address] . Click on \"Log in\" on the left side of the screen, which will take you to the Welcome page. Then click on \"Sign up Now\" on the right side of the page.     You will be asked to enter the access code listed below, as well as some personal information. Please follow the directions to create your username and password.     Your access code is: [email or web address]  Expires: 10/1/2017 10:57 AM     Your access code will  in 90 days. If you need help or a new code, please call your [facility] clinic or [phone].        Care EveryWhere ID     This is your Care EveryWhere ID. This could be used by other organizations to access your [facility] medical records  [id number]        Your Vitals Were     Pulse Temperature Pulse Oximetry Breastfeeding? BMI (Body Mass Index)       66 97.5  F (36.4  C) (Oral) 96% No 33.1 kg/m2        Blood Pressure from Last 3 Encounters:   17 108/63   17 119/66   17 127/57    Weight from Last 3 Encounters:   17 202 lb (91.6 kg)   17 203 lb (92.1 kg)   17 206 lb (93.4 kg)              Today, you had the " following     No orders found for display         Today's Medication Changes          These changes are accurate as of: 9/18/17  9:59 AM.  If you have any questions, ask your nurse or doctor.               Start taking these medicines.        Dose/Directions    amoxicillin-clavulanate 875-125 MG per tablet   Commonly known as:  AUGMENTIN   Used for:  Acute non-recurrent maxillary sinusitis   Started by:  Paris Lemus APRN CNP        Dose:  1 tablet   Take 1 tablet by mouth 2 times daily   Quantity:  20 tablet   Refills:  0       fluticasone 50 MCG/ACT spray   Commonly known as:  FLONASE   Used for:  Acute non-recurrent maxillary sinusitis   Started by:  Paris Lemus APRN CNP        Dose:  1-2 spray   Spray 1-2 sprays into both nostrils daily   Quantity:  1 Bottle   Refills:  1            Where to get your medicines      These medications were sent to [facility]/pharmacy #[id number] - [address] [address]     Phone:  [phone]     amoxicillin-clavulanate 875-125 MG per tablet    fluticasone 50 MCG/ACT spray                Primary Care Provider Office Phone # Fax #    Estela Davila [phone]       [address] [facility] [address]        Equal Access to Services     DERECK ZEPEDA AH: Abhishek valle hadasho Soomaali, waaxda luqadaha, qaybta kaalmada adeegyada, waxay chris anderson. So [facility] [phone].    ATENCIÓN: Si habla español, tiene a carvajal disposición servicios gratuitos de asistencia lingüística. jefry al [phone].    We comply with applicable federal civil rights laws and [place] laws. We do not discriminate on the basis of race, color, national origin, age, disability sex, sexual orientation or gender identity.            Thank you!     Thank you for choosing [facility]  for your care. Our goal is always to provide you with excellent care. Hearing back from our  patients is one way we can continue to improve our services. Please take a few minutes to complete the written survey that you may receive in the mail after your visit with us. Thank you!             Your Updated Medication List - Protect others around you: Learn how to safely use, store and throw away your medicines at www.disposemymeds.org.          This list is accurate as of: 9/18/17  9:59 AM.  Always use your most recent med list.                   Brand Name Dispense Instructions for use Diagnosis    ACE NOT PRESCRIBED (INTENTIONAL)     0 each    1 each At Bedtime ACE Inhibitor not prescribed due to Other:  Neck swelling.  Also, BP is on low normal side with the betablocker    Type 2 diabetes, HbA1c goal < 7% (H)       amoxicillin-clavulanate 875-125 MG per tablet    AUGMENTIN    20 tablet    Take 1 tablet by mouth 2 times daily    Acute non-recurrent maxillary sinusitis       aspirin 81 MG tablet      Take 1 tablet by mouth daily.    Type 2 diabetes, HbA1c goal < 7% (H)       atorvastatin 20 MG tablet    LIPITOR    102 tablet    Take 1 tablet (20 mg) by mouth daily    Hyperlipidemia LDL goal <100, PVD (peripheral vascular disease) (H)       B-12 1000 MCG Tbcr      Take 1 tablet by mouth daily        Biotin 5000 MCG Caps      Take  by mouth.        blood glucose monitoring test strip    no brand specified    200 each    One Touch Ultra test strips - Use to test blood sugar 2 times daily or as directed.    Type 2 diabetes mellitus without complication (H)       CALCIUM 500 + D PO      one daily        CENTRUM SILVER PO      one daily        cholecalciferol 1000 UNIT tablet    vitamin D     1 TABLET DAILY        * clobetasol 0.05 % cream    TEMOVATE    60 g    Apply sparingly to affected area twice weekly    Vulvar itching       * clobetasol 0.05 % cream    TEMOVATE     Apply topically 2 times daily    Psoriasis       Evening Primrose Oil 1000 MG Caps      Take by mouth daily        fluticasone 50 MCG/ACT spray     FLONASE    1 Bottle    Spray 1-2 sprays into both nostrils daily    Acute non-recurrent maxillary sinusitis       levothyroxine 100 MCG tablet    SYNTHROID/LEVOTHROID    90 tablet    Take 1 tablet (100 mcg) by mouth daily    Hypothyroidism due to acquired atrophy of thyroid       losartan 25 MG tablet    COZAAR    45 tablet    TAKE 0.5 TABLETS (12.5 MG) BY MOUTH DAILY    Diabetes mellitus, type 2 (H)       metFORMIN 500 MG tablet    GLUCOPHAGE    90 tablet    Take 1 tablet (500 mg) by mouth daily (with dinner)    Type 2 diabetes mellitus without complication, without long-term current use of insulin (H)       metoprolol 25 MG tablet    LOPRESSOR    90 tablet    Take 1 tablet (25 mg) by mouth every evening May take one extra tab as needed if HR>100 at rest    Chronic atrial fibrillation (H)       * order for DME     1 Box    Equipment being ordered:  chemstrips for daily blood sugar checks.    Type 2 diabetes, HbA1c goal < 7% (H)       * order for DME     90 days    Equipment being ordered:  One Touch strips to be used daily    Type 2 diabetes, HbA1c goal < 7% (H)       propranolol 10 MG tablet    INDERAL    180 tablet    Take 1 tablet (10 mg) by mouth 2 times daily as needed    Essential tremor       warfarin 5 MG tablet    COUMADIN    102 tablet    Take 1 tablet (5 mg) by mouth daily MWF & 1/2 tablet (2.5mg) all other days    Anticoagulation goal of INR 2 to 3       * Notice:  This list has 4 medication(s) that are the same as other medications prescribed for you. Read the directions carefully, and ask your doctor or other care provider to review them with you.

## 2017-09-18 NOTE — PROGRESS NOTES
"  SUBJECTIVE:   Clementina Cooper is a 77 year old female who presents to clinic today for the following health issues:      Acute Illness   Acute illness concerns: Sinus infection  Onset: 4 days    Fever: YES- chills and sweats    Chills/Sweats: YES    Headache (location?): YES    Sinus Pressure:YES- tender, post-nasal drainage and facial pain    Conjunctivitis:  no    Ear Pain: no    Rhinorrhea: YES    Congestion: YES- head    Sore Throat: no     Cough: no    Wheeze: no    Decreased Appetite: YES    Nausea: no    Vomiting: no    Diarrhea:  no    Dysuria/Freq.: no    Fatigue/Achiness: YES    Sick/Strep Exposure: no     Therapies Tried and outcome: OTC tylenol for the headache    \"my whole head hurts\"  Pain worse in right maxillary  Pain is worsening  Subjective fevers      Problem list and histories reviewed & adjusted, as indicated.  Additional history: none    Patient Active Problem List   Diagnosis     PVD (peripheral vascular disease) (H)     Family history of colon cancer     HYPERLIPIDEMIA LDL GOAL <100     Open-angle glaucoma, mild-mod, ou     Advanced directives, counseling/discussion     Essential tremor     Pseudophakia, ou     S/P iridectomy, ou     Anticoagulation goal of INR 2 to 3     Hypothyroidism due to acquired atrophy of thyroid     Chronic atrial fibrillation (H)     overweight  HCC     Long-term (current) use of anticoagulants [Z79.01]     Acute pain of left knee     Type 2 diabetes mellitus without complication, without long-term current use of insulin (H)     Glaucoma suspect, bilateral     Past Surgical History:   Procedure Laterality Date     ANGIOPLASTY      right leg     C NONSPECIFIC PROCEDURE  2001    neck surgery/trauma     C STOMACH SURGERY PROCEDURE UNLISTED       CATARACT IOL, RT/LT       HC TRABECULOPLASTY BY LASER SURGERY       HC VASCULAR SURGERY PROCEDURE UNLIST       HYSTERECTOMY, CERVIX STATUS UNKNOWN  1989    uterine bleeding     HYSTERECTOMY, PAP NO LONGER INDICATED       " LASER YAG IRIDOTOMY  remotely    both eyes (elsewhere)     PHACOEMULSIFICATION WITH STANDARD INTRAOCULAR LENS IMPLANT  7/2012; 8/2012    right eye; left eye       Social History   Substance Use Topics     Smoking status: Former Smoker     Quit date: 12/4/1999     Smokeless tobacco: Never Used     Alcohol use Yes      Comment: occasional     Family History   Problem Relation Age of Onset     DIABETES Mother      HEART DISEASE Mother      HEART DISEASE Father      HEART DISEASE Son      CANCER Brother      HEART DISEASE Sister      CANCER Brother      CANCER Brother      HEART DISEASE Sister          Current Outpatient Prescriptions   Medication Sig Dispense Refill     amoxicillin-clavulanate (AUGMENTIN) 875-125 MG per tablet Take 1 tablet by mouth 2 times daily 20 tablet 0     fluticasone (FLONASE) 50 MCG/ACT spray Spray 1-2 sprays into both nostrils daily 1 Bottle 1     losartan (COZAAR) 25 MG tablet TAKE 0.5 TABLETS (12.5 MG) BY MOUTH DAILY 45 tablet 1     propranolol (INDERAL) 10 MG tablet Take 1 tablet (10 mg) by mouth 2 times daily as needed 180 tablet 1     clobetasol (TEMOVATE) 0.05 % cream Apply topically 2 times daily       metFORMIN (GLUCOPHAGE) 500 MG tablet Take 1 tablet (500 mg) by mouth daily (with dinner) 90 tablet 3     metoprolol (LOPRESSOR) 25 MG tablet Take 1 tablet (25 mg) by mouth every evening May take one extra tab as needed if HR>100 at rest 90 tablet 3     warfarin (COUMADIN) 5 MG tablet Take 1 tablet (5 mg) by mouth daily MWF & 1/2 tablet (2.5mg) all other days 102 tablet 3     atorvastatin (LIPITOR) 20 MG tablet Take 1 tablet (20 mg) by mouth daily 102 tablet 3     levothyroxine (SYNTHROID,LEVOTHROID) 100 MCG tablet Take 1 tablet (100 mcg) by mouth daily 90 tablet 3     clobetasol (TEMOVATE) 0.05 % cream Apply sparingly to affected area twice weekly 60 g 2     blood glucose monitoring (NO BRAND SPECIFIED) test strip One Touch Ultra test strips - Use to test blood sugar 2 times daily or as  directed. 200 each 3     ORDER FOR DME Equipment being ordered:  One Touch strips to be used daily 90 days 3     ACE NOT PRESCRIBED, INTENTIONAL, 1 each At Bedtime ACE Inhibitor not prescribed due to Other:  Neck swelling.  Also, BP is on low normal side with the betablocker 0 each 0     ORDER FOR DME Equipment being ordered:  chemstrips for daily blood sugar checks. 1 Box 3     Cyanocobalamin (B-12) 1000 MCG TBCR Take 1 tablet by mouth daily       Evening Primrose Oil 1000 MG CAPS Take by mouth daily       Biotin 5000 MCG CAPS Take  by mouth.       aspirin 81 MG tablet Take 1 tablet by mouth daily.  3     CENTRUM SILVER OR one daily       CALCIUM 500 + D OR one daily       VITAMIN D 1000 UNIT OR TABS 1 TABLET DAILY           Reviewed and updated as needed this visit by clinical staffTobacco  Allergies  Meds  Med Hx  Surg Hx  Fam Hx  Soc Hx      Reviewed and updated as needed this visit by Provider         ROS:  Constitutional, HEENT, cardiovascular, pulmonary, gi and gu systems are negative, except as otherwise noted.      OBJECTIVE:   /63 (BP Location: Right arm, Patient Position: Chair, Cuff Size: Adult Regular)  Pulse 66  Temp 97.5  F (36.4  C) (Oral)  Wt 202 lb (91.6 kg)  SpO2 96%  Breastfeeding? No  BMI 33.1 kg/m2  Body mass index is 33.1 kg/(m^2).  GENERAL: healthy, alert and no distress  EYES: Eyes grossly normal to inspection, PERRL and conjunctivae and sclerae normal  HENT: normal cephalic/atraumatic, ear canals and TM's normal, nose and mouth without ulcers or lesions, nasal mucosa edematous , rhinorrhea yellow, oropharynx clear, oral mucous membranes moist and sinuses: maxillary tenderness on right  NECK: no adenopathy, no asymmetry, masses, or scars and thyroid normal to palpation  RESP: lungs clear to auscultation - no rales, rhonchi or wheezes  CV: regular rate and rhythm, normal S1 S2, no S3 or S4, no murmur, click or rub, no peripheral edema and peripheral pulses  strong    Diagnostic Test Results:  none     ASSESSMENT/PLAN:       ICD-10-CM    1. Acute non-recurrent maxillary sinusitis J01.00 amoxicillin-clavulanate (AUGMENTIN) 875-125 MG per tablet     fluticasone (FLONASE) 50 MCG/ACT spray       Patient had acute tenderness over right maxillary sinus. Symptoms present only 4 days, but as they are worsening and it has been 4 days, do recommend antibiotic treatment.  Also add steroid nasal spray  Reviewed self cares, hydration, steam inhalation    CHARLOTTE Guzman [facility]

## 2017-09-28 ENCOUNTER — ALLIED HEALTH/NURSE VISIT (OUTPATIENT)
Dept: NURSING | Facility: CLINIC | Age: 77
End: 2017-09-28
Payer: COMMERCIAL

## 2017-09-28 DIAGNOSIS — Z23 NEED FOR PROPHYLACTIC VACCINATION AND INOCULATION AGAINST INFLUENZA: Primary | ICD-10-CM

## 2017-09-28 PROCEDURE — 99207 ZZC NO CHARGE NURSE ONLY: CPT

## 2017-09-28 PROCEDURE — 90662 IIV NO PRSV INCREASED AG IM: CPT

## 2017-09-28 PROCEDURE — G0008 ADMIN INFLUENZA VIRUS VAC: HCPCS

## 2017-09-28 NOTE — MR AVS SNAPSHOT
After Visit Summary   9/28/2017    Clementina Cooper    MRN: [id number]           Patient Information     Date Of Birth          1940        Visit Information        Provider Department      9/28/2017 9:05 AM CP FLU CLINIC [facility]        Today's Diagnoses     Need for prophylactic vaccination and inoculation against influenza    -  1       Follow-ups after your visit        Your next 10 appointments already scheduled     Oct 03, 2017  9:20 AM CDT   Anticoagulation Visit with CP ANTI COAG   [facility] ([facility])    [address] [place] [id number]   [phone]            Oct 26, 2017  9:30 AM CDT   LAB with CP LAB   [facility] ([facility])    [address] [place] [id number]   [phone]           Patient must bring picture ID. Patient should be prepared to give a urine specimen  Please do not eat 10-12 hours before your appointment if you are coming in fasting for labs on lipids, cholesterol, or glucose (sugar). Pregnant women should follow their Care Team instructions. Water with medications is okay. Do not drink coffee or other fluids. If you have concerns about taking  your medications, please ask at office or if scheduling via NEM[employer]t, send a message by clicking on Secure Messaging, Message Your Care Team.            Nov 01, 2017 10:00 AM CDT   New Visit with Rahul Jennings MD   [facility] ([facility])    [address] [address]   [phone]            Nov 02, 2017  9:00 AM CDT   SHORT with Estela Davila MD   [facility] ([facility])    [address] [place] [phone]   [phone]              Who to contact     If you have questions or need follow up information about today's  "clinic visit or your schedule please contact [facility] directly at [phone].  Normal or non-critical lab and imaging results will be communicated to you by MyChart, letter or phone within 4 business days after the clinic has received the results. If you do not hear from us within 7 days, please contact the clinic through [email or web address] or phone. If you have a critical or abnormal lab result, we will notify you by phone as soon as possible.  Submit refill requests through [employer] or call your pharmacy and they will forward the refill request to us. Please allow 3 business days for your refill to be completed.          Additional Information About Your Visit        MyChart Information     [employer] lets you send messages to your doctor, view your test results, renew your prescriptions, schedule appointments and more. To sign up, go to [email or web address] . Click on \"Log in\" on the left side of the screen, which will take you to the Welcome page. Then click on \"Sign up Now\" on the right side of the page.     You will be asked to enter the access code listed below, as well as some personal information. Please follow the directions to create your username and password.     Your access code is: [email or web address]  Expires: 10/1/2017 10:57 AM     Your access code will  in 90 days. If you need help or a new code, please call your [facility] clinic or [phone].        Care EveryWhere ID     This is your Care EveryWhere ID. This could be used by other organizations to access your [facility] medical records  [id number]         Blood Pressure from Last 3 Encounters:   17 108/63   17 119/66   17 127/57    Weight from Last 3 Encounters:   17 202 lb (91.6 kg)   17 203 lb (92.1 kg)   17 206 lb (93.4 kg)              We Performed the Following     ADMIN INFLUENZA (For MEDICARE Patients ONLY) []     FLU VACCINE, INCREASED ANTIGEN, PRESV FREE, AGE 65+ [51026]        " Primary Care Provider Office Phone # Fax #    Estela Davila [phone]       [address] [facility] [address]        Equal Access to Services     DERECK ZEPEDA : Hadii aad ku hadkitjuvenal Jayleen, waaxda luqadaha, qaybta kaalmada sravan, jeanette hanson kalebisaac pereyra brayan anderson. So [facility] [phone].    ATENCIÓN: Si habla español, tiene a carvajal disposición servicios gratuitos de asistencia lingüística. Llame al [phone].    We comply with applicable federal civil rights laws and [place] laws. We do not discriminate on the basis of race, color, national origin, age, disability sex, sexual orientation or gender identity.            Thank you!     Thank you for choosing [facility]  for your care. Our goal is always to provide you with excellent care. Hearing back from our patients is one way we can continue to improve our services. Please take a few minutes to complete the written survey that you may receive in the mail after your visit with us. Thank you!             Your Updated Medication List - Protect others around you: Learn how to safely use, store and throw away your medicines at www.disposemymeds.org.          This list is accurate as of: 9/28/17  9:05 AM.  Always use your most recent med list.                   Brand Name Dispense Instructions for use Diagnosis    ACE NOT PRESCRIBED (INTENTIONAL)     0 each    1 each At Bedtime ACE Inhibitor not prescribed due to Other:  Neck swelling.  Also, BP is on low normal side with the betablocker    Type 2 diabetes, HbA1c goal < 7% (H)       amoxicillin-clavulanate 875-125 MG per tablet    AUGMENTIN    20 tablet    Take 1 tablet by mouth 2 times daily    Acute non-recurrent maxillary sinusitis       aspirin 81 MG tablet      Take 1 tablet by mouth daily.    Type 2 diabetes, HbA1c goal < 7% (H)       atorvastatin 20 MG tablet    LIPITOR    102 tablet    Take 1 tablet (20 mg) by mouth daily    Hyperlipidemia  LDL goal <100, PVD (peripheral vascular disease) (H)       B-12 1000 MCG Tbcr      Take 1 tablet by mouth daily        Biotin 5000 MCG Caps      Take  by mouth.        blood glucose monitoring test strip    no brand specified    200 each    One Touch Ultra test strips - Use to test blood sugar 2 times daily or as directed.    Type 2 diabetes mellitus without complication (H)       CALCIUM 500 + D PO      one daily        CENTRUM SILVER PO      one daily        cholecalciferol 1000 UNIT tablet    vitamin D     1 TABLET DAILY        * clobetasol 0.05 % cream    TEMOVATE    60 g    Apply sparingly to affected area twice weekly    Vulvar itching       * clobetasol 0.05 % cream    TEMOVATE     Apply topically 2 times daily    Psoriasis       Evening Primrose Oil 1000 MG Caps      Take by mouth daily        fluticasone 50 MCG/ACT spray    FLONASE    1 Bottle    Spray 1-2 sprays into both nostrils daily    Acute non-recurrent maxillary sinusitis       levothyroxine 100 MCG tablet    SYNTHROID/LEVOTHROID    90 tablet    Take 1 tablet (100 mcg) by mouth daily    Hypothyroidism due to acquired atrophy of thyroid       losartan 25 MG tablet    COZAAR    45 tablet    TAKE 0.5 TABLETS (12.5 MG) BY MOUTH DAILY    Diabetes mellitus, type 2 (H)       metFORMIN 500 MG tablet    GLUCOPHAGE    90 tablet    Take 1 tablet (500 mg) by mouth daily (with dinner)    Type 2 diabetes mellitus without complication, without long-term current use of insulin (H)       metoprolol 25 MG tablet    LOPRESSOR    90 tablet    Take 1 tablet (25 mg) by mouth every evening May take one extra tab as needed if HR>100 at rest    Chronic atrial fibrillation (H)       * order for DME     1 Box    Equipment being ordered:  chemstrips for daily blood sugar checks.    Type 2 diabetes, HbA1c goal < 7% (H)       * order for DME     90 days    Equipment being ordered:  One Touch strips to be used daily    Type 2 diabetes, HbA1c goal < 7% (H)       propranolol 10 MG  tablet    INDERAL    180 tablet    Take 1 tablet (10 mg) by mouth 2 times daily as needed    Essential tremor       warfarin 5 MG tablet    COUMADIN    102 tablet    Take 1 tablet (5 mg) by mouth daily MWF & 1/2 tablet (2.5mg) all other days    Anticoagulation goal of INR 2 to 3       * Notice:  This list has 4 medication(s) that are the same as other medications prescribed for you. Read the directions carefully, and ask your doctor or other care provider to review them with you.

## 2017-09-28 NOTE — PROGRESS NOTES
Injectable Influenza Immunization Documentation    1.  Is the person to be vaccinated sick today?   No    2. Does the person to be vaccinated have an allergy to a component   of the vaccine?   No    3. Has the person to be vaccinated ever had a serious reaction   to influenza vaccine in the past?   No    4. Has the person to be vaccinated ever had Guillain-Barré syndrome?   No    Form completed by Dannielle Travis ma

## 2017-10-03 ENCOUNTER — ANTICOAGULATION THERAPY VISIT (OUTPATIENT)
Dept: NURSING | Facility: CLINIC | Age: 77
End: 2017-10-03
Payer: COMMERCIAL

## 2017-10-03 DIAGNOSIS — Z79.01 LONG-TERM (CURRENT) USE OF ANTICOAGULANTS: ICD-10-CM

## 2017-10-03 LAB — INR POINT OF CARE: 1.8 (ref 0.86–1.14)

## 2017-10-03 PROCEDURE — 85610 PROTHROMBIN TIME: CPT | Mod: QW

## 2017-10-03 PROCEDURE — 99207 ZZC NO CHARGE NURSE ONLY: CPT

## 2017-10-03 PROCEDURE — 36416 COLLJ CAPILLARY BLOOD SPEC: CPT

## 2017-10-03 NOTE — MR AVS SNAPSHOT
Clementina Cooper   10/3/2017 9:20 AM   Anticoagulation Therapy Visit    Description:  77 year old female   Provider:  CONNOR ANTI COAG   Department:  Cp Nurse           INR as of 10/3/2017     Today's INR 1.8!      Anticoagulation Summary as of 10/3/2017     INR goal 2.0-3.0   Today's INR 1.8!   Full instructions 10/3: 5 mg; Otherwise 5 mg on Mon, Wed, Fri; 2.5 mg all other days   Next INR check 11/28/2017    Indications   Long-term (current) use of anticoagulants [Z79.01] [Z79.01]  Atrial fibrillation (H) (Resolved) [I48.91]  Atrial fibrillation (H) (Resolved) [I48.91]         Your next Anticoagulation Clinic appointment(s)     Nov 28, 2017  9:20 AM CST   Anticoagulation Visit with CONNOR ANTI MAHING   [facility] ([facility]    [address] [facility] [address] [phone]              Contact Numbers     [facility]  Please call [phone] or [phone]  to cancel and/or reschedule your appointment.   Please call [phone] with any problems or questions regarding your therapy          October 2017 Details    Sun Mon Tue Wed Thu Fri Sat     1               2               3      5 mg   See details      4      5 mg         5      2.5 mg         6      5 mg         7      2.5 mg           8      2.5 mg         9      5 mg         10      2.5 mg         11      5 mg         12      2.5 mg         13      5 mg         14      2.5 mg           15      2.5 mg         16      5 mg         17      2.5 mg         18      5 mg         19      2.5 mg         20      5 mg         21      2.5 mg           22      2.5 mg         23      5 mg         24      2.5 mg         25      5 mg         26      2.5 mg         27      5 mg         28      2.5 mg           29      2.5 mg         30      5 mg         31      2.5 mg              Date Details   10/03 This INR check               How to take your warfarin dose     To take:  2.5 mg Take  0.5 of a 5 mg tablet.    To take:  5 mg Take 1 of the 5 mg tablets.           November 2017 Details    Sun Mon Tue Wed Thu Fri Sat        1      5 mg         2      2.5 mg         3      5 mg         4      2.5 mg           5      2.5 mg         6      5 mg         7      2.5 mg         8      5 mg         9      2.5 mg         10      5 mg         11      2.5 mg           12      2.5 mg         13      5 mg         14      2.5 mg         15      5 mg         16      2.5 mg         17      5 mg         18      2.5 mg           19      2.5 mg         20      5 mg         21      2.5 mg         22      5 mg         23      2.5 mg         24      5 mg         25      2.5 mg           26      2.5 mg         27      5 mg         28            29               30                  Date Details   No additional details    Date of next INR:  11/28/2017         How to take your warfarin dose     To take:  2.5 mg Take 0.5 of a 5 mg tablet.    To take:  5 mg Take 1 of the 5 mg tablets.

## 2017-10-03 NOTE — PROGRESS NOTES
ANTICOAGULATION FOLLOW-UP CLINIC VISIT    Patient Name:  Clementina Cooper  Date:  10/3/2017  Contact Type:  Face to Face    SUBJECTIVE: see below.  Patient denies changes in diet or medicine.  No symptoms of bleeding or clotting.  I offered recheck in 2 weeks and patient prefers her 8 week recheck. She has a lab appt 10-26 and I offered to add an INR to that appt and patient declines.  Therefore, plan to take extra warfarin today and resume schedule.     Patient Findings     Positives Missed doses (pt states missed one dose and did not take it because it was so late.  We discussed for the future to take it as soon as she remembers because her INR is affected with even one dose missed.)           OBJECTIVE    INR Protime   Date Value Ref Range Status   10/03/2017 1.8 (A) 0.86 - 1.14 Final       ASSESSMENT / PLAN  INR assessment THER    Recheck INR In: 8 WEEKS    INR Location Clinic      Anticoagulation Summary as of 10/3/2017     INR goal 2.0-3.0   Today's INR 1.8!   Maintenance plan 5 mg (5 mg x 1) on Mon, Wed, Fri; 2.5 mg (5 mg x 0.5) all other days   Full instructions 10/3: 5 mg; Otherwise 5 mg on Mon, Wed, Fri; 2.5 mg all other days   Weekly total 25 mg   Plan last modified Shagufta Bell RN (3/22/2016)   Next INR check 11/28/2017   Target end date Indefinite    Indications   Long-term (current) use of anticoagulants [Z79.01] [Z79.01]  Atrial fibrillation (H) (Resolved) [I48.91]  Atrial fibrillation (H) (Resolved) [I48.91]         Anticoagulation Episode Summary     INR check location     Preferred lab     Send INR reminders to [facility]    Comments       Anticoagulation Care Providers     Provider Role Specialty Phone number    Estela Davila MD  Internal Medicine [phone]            See the Encounter Report to view Anticoagulation Flowsheet and Dosing Calendar (Go to Encounters tab in chart review, and find the Anticoagulation Therapy Visit)    Dosage adjustment made based on physician  directed care plan.    Shagufta Bell RN

## 2017-10-10 DIAGNOSIS — L29.2 VULVAR ITCHING: ICD-10-CM

## 2017-10-10 RX ORDER — CLOBETASOL PROPIONATE 0.5 MG/G
CREAM TOPICAL
Qty: 60 G | Refills: 2 | Status: SHIPPED | OUTPATIENT
Start: 2017-10-10 | End: 2017-11-02

## 2017-10-10 NOTE — TELEPHONE ENCOUNTER
Clobetasol (TEMOVATE) 0.05% cream  Last Written Prescription Date: 08/09/2016  Last Fill Quantity: 60 g,  # refills: 2   Last Office Visit with [facility], [facility] or [facility] prescribing provider: 07/03/2017                                         Next 5 appointments (look out 90 days)     Nov 02, 2017  9:00 AM CDT   SHORT with Estela Davila MD   [facility] ([facility])    [address] [place] [id number] [phone]

## 2017-10-11 DIAGNOSIS — E03.4 HYPOTHYROIDISM DUE TO ACQUIRED ATROPHY OF THYROID: ICD-10-CM

## 2017-10-11 NOTE — TELEPHONE ENCOUNTER
levothyroxine (SYNTHROID,LEVOTHROID) 100 MCG tablet     Last Written Prescription Date: 10-5-16  Last Quantity: 90, # refills: 3  Last Office Visit with G, P or [facility] prescribing provider: 9-18-17   Next 5 appointments (look out 90 days)     Nov 02, 2017  9:00 AM CDT   SHORT with Estela Davila MD   [facility]    [address] [place] [id number] [phone]                   TSH   Date Value Ref Range Status   03/21/2017 5.43 (H) 0.40 - 4.00 mU/L Final

## 2017-10-12 RX ORDER — LEVOTHYROXINE SODIUM 100 UG/1
TABLET ORAL
Qty: 90 TABLET | Refills: 3 | Status: SHIPPED | OUTPATIENT
Start: 2017-10-12 | End: 2018-10-15

## 2017-10-26 DIAGNOSIS — I48.20 CHRONIC ATRIAL FIBRILLATION (H): ICD-10-CM

## 2017-10-26 DIAGNOSIS — E03.4 HYPOTHYROIDISM DUE TO ACQUIRED ATROPHY OF THYROID: ICD-10-CM

## 2017-10-26 LAB
HBA1C MFR BLD: 6.9 % (ref 4.3–6)
T4 FREE SERPL-MCNC: 1.13 NG/DL (ref 0.76–1.46)
TSH SERPL DL<=0.005 MIU/L-ACNC: 2.84 MU/L (ref 0.4–4)

## 2017-10-26 PROCEDURE — 84439 ASSAY OF FREE THYROXINE: CPT | Performed by: INTERNAL MEDICINE

## 2017-10-26 PROCEDURE — 36415 COLL VENOUS BLD VENIPUNCTURE: CPT | Performed by: INTERNAL MEDICINE

## 2017-10-26 PROCEDURE — 84443 ASSAY THYROID STIM HORMONE: CPT | Performed by: INTERNAL MEDICINE

## 2017-10-26 PROCEDURE — 83036 HEMOGLOBIN GLYCOSYLATED A1C: CPT | Performed by: INTERNAL MEDICINE

## 2017-10-26 PROCEDURE — 86376 MICROSOMAL ANTIBODY EACH: CPT | Performed by: INTERNAL MEDICINE

## 2017-10-26 NOTE — LETTER
[facility]  [address] [place]  [address]  [phone]        October 27, 2017    Clementina Cooper  [address] [address]        Dear Clementina,    Enclosed are your results.  Your labs are normal/stable at this time.     Please call  with any questions.  We can also discuss any questions regarding these labs at your next scheduled visit.    Results for orders placed or performed in visit on 10/26/17   TSH   Result Value Ref Range    TSH 2.84 0.40 - 4.00 mU/L   T4, free   Result Value Ref Range    T4 Free 1.13 0.76 - 1.46 ng/dL   Thyroid peroxidase antibody   Result Value Ref Range    Thyroid Peroxidase Antibody <10 <35 IU/mL   Hemoglobin A1c   Result Value Ref Range    Hemoglobin A1C 6.9 (H) 4.3 - 6.0 %       If you have any questions please call the clinic at [phone].    Sincerely,    Estela RIZVI

## 2017-10-27 LAB — THYROPEROXIDASE AB SERPL-ACNC: <10 IU/ML

## 2017-10-31 NOTE — PATIENT INSTRUCTIONS
Return to clinic Spring/Summer '18 with fasting labs prior -- Annual Physical.     Nystatin cream to area twice daily until healed    You can apply occasional hydrocortisone cream (over the counter) for the irritation

## 2017-11-02 ENCOUNTER — OFFICE VISIT (OUTPATIENT)
Dept: FAMILY MEDICINE | Facility: CLINIC | Age: 77
End: 2017-11-02
Payer: COMMERCIAL

## 2017-11-02 VITALS
BODY MASS INDEX: 33.59 KG/M2 | DIASTOLIC BLOOD PRESSURE: 74 MMHG | TEMPERATURE: 97 F | SYSTOLIC BLOOD PRESSURE: 116 MMHG | HEART RATE: 63 BPM | WEIGHT: 205 LBS | OXYGEN SATURATION: 94 %

## 2017-11-02 DIAGNOSIS — E78.5 HYPERLIPIDEMIA LDL GOAL <100: ICD-10-CM

## 2017-11-02 DIAGNOSIS — I73.9 PVD (PERIPHERAL VASCULAR DISEASE) (H): ICD-10-CM

## 2017-11-02 DIAGNOSIS — Z13.5 SCREENING FOR DIABETIC RETINOPATHY: ICD-10-CM

## 2017-11-02 DIAGNOSIS — E11.9 TYPE 2 DIABETES MELLITUS WITHOUT COMPLICATION, WITHOUT LONG-TERM CURRENT USE OF INSULIN (H): Primary | ICD-10-CM

## 2017-11-02 DIAGNOSIS — Z79.01 LONG-TERM (CURRENT) USE OF ANTICOAGULANTS: ICD-10-CM

## 2017-11-02 DIAGNOSIS — B37.2 INTERTRIGINOUS CANDIDIASIS: ICD-10-CM

## 2017-11-02 DIAGNOSIS — E03.4 HYPOTHYROIDISM DUE TO ACQUIRED ATROPHY OF THYROID: ICD-10-CM

## 2017-11-02 DIAGNOSIS — E66.9 OBESITY (BMI 30-39.9): ICD-10-CM

## 2017-11-02 DIAGNOSIS — I48.20 CHRONIC ATRIAL FIBRILLATION (H): ICD-10-CM

## 2017-11-02 PROCEDURE — 99214 OFFICE O/P EST MOD 30 MIN: CPT | Performed by: INTERNAL MEDICINE

## 2017-11-02 RX ORDER — NYSTATIN 100000 U/G
CREAM TOPICAL 3 TIMES DAILY
Qty: 30 G | Refills: 1 | Status: SHIPPED | OUTPATIENT
Start: 2017-11-02 | End: 2017-11-16

## 2017-11-02 NOTE — MR AVS SNAPSHOT
After Visit Summary   11/2/2017    Clementina Cooper    MRN: [id number]           Patient Information     Date Of Birth          1940        Visit Information        Provider Department      11/2/2017 9:00 AM Estela Davila MD [facility]        Today's Diagnoses     Type 2 diabetes mellitus without complication, without long-term current use of insulin (H)    -  1    Chronic atrial fibrillation (H)        overweight  HCC        PVD (peripheral vascular disease) (H)        Hypothyroidism due to acquired atrophy of thyroid        Long-term (current) use of anticoagulants [Z79.01]        Hyperlipidemia LDL goal <100        Screening for diabetic retinopathy        Intertriginous candidiasis          Care Instructions    Return to clinic Spring/Summer '18 with fasting labs prior -- Annual Physical.     Nystatin cream to area twice daily until healed    You can apply occasional hydrocortisone cream (over the counter) for the irritation               Follow-ups after your visit        Additional Services     OPTOMETRY REFERRAL       Your provider has referred you to:  [facility]: [facility] [place] [phone]    [email or web address]   On 11/16/17 as scheduled with Dr zhou    Please be aware that coverage of these services is subject to the terms and limitations of your health insurance plan.  Call member services at your health plan with any benefit or coverage questions.      Please bring the following to your appointment:  >>   Any x-rays, CTs or MRIs which have been performed.  Contact the facility where they were done to arrange for  prior to your scheduled appointment.  Any new CT, MRI or other procedures ordered by your specialist must be performed at a [facility] facility or coordinated by your clinic's referral office.    >>   List of current medications   >>   This referral request   >>   Any documents/labs given to you for  this referral                  Your next 10 appointments already scheduled     Nov 16, 2017  8:30 AM CST   New Visit with Rahul Jennings MD   [facility] ([facility]    [address]  [place] [address] [phone]            Nov 28, 2017  9:20 AM CST   Anticoagulation Visit with CP ANTI COAG   [facility] ([facility])    [address] [address] [phone]              Future tests that were ordered for you today     Open Future Orders        Priority Expected Expires Ordered    Albumin Random Urine Quantitative with Creat Ratio Routine  10/30/2018 11/2/2017    Lipid panel reflex to direct LDL Fasting Routine  10/29/2018 11/2/2017    AST Routine  10/30/2018 11/2/2017    ALT Routine  10/30/2018 11/2/2017    Hemoglobin A1c Routine  10/30/2018 11/2/2017    TSH with free T4 reflex Routine  10/30/2018 11/2/2017    Basic metabolic panel Routine  10/29/2018 11/2/2017            Who to contact     If you have questions or need follow up information about today's clinic visit or your schedule please contact [facility] directly at [phone].  Normal or non-critical lab and imaging results will be communicated to you by Kaboodlehart, letter or phone within 4 business days after the clinic has received the results. If you do not hear from us within 7 days, please contact the clinic through Kaboodlehart or phone. If you have a critical or abnormal lab result, we will notify you by phone as soon as possible.  Submit refill requests through Sovex or call your pharmacy and they will forward the refill request to us. Please allow 3 business days for your refill to be completed.          Additional Information About Your Visit        Kaboodlehart Information     Sovex lets you send messages to your doctor, view your test results, renew your prescriptions, schedule appointments and more. To  "sign up, go to [email or web address] . Click on \"Log in\" on the left side of the screen, which will take you to the Welcome page. Then click on \"Sign up Now\" on the right side of the page.     You will be asked to enter the access code listed below, as well as some personal information. Please follow the directions to create your username and password.     Your access code is: [email or web address]  Expires: 2018  9:37 AM     Your access code will  in 90 days. If you need help or a new code, please call your [facility] clinic or [phone].        Care EveryWhere ID     This is your Care EveryWhere ID. This could be used by other organizations to access your [facility] medical records  [id number]        Your Vitals Were     Pulse Temperature Pulse Oximetry BMI (Body Mass Index)          63 97  F (36.1  C) (Oral) 94% 33.59 kg/m2         Blood Pressure from Last 3 Encounters:   17 116/74   17 108/63   17 119/66    Weight from Last 3 Encounters:   17 205 lb (93 kg)   17 202 lb (91.6 kg)   17 203 lb (92.1 kg)              We Performed the Following     OPTOMETRY REFERRAL          Today's Medication Changes          These changes are accurate as of: 17  9:37 AM.  If you have any questions, ask your nurse or doctor.               Start taking these medicines.        Dose/Directions    nystatin cream   Commonly known as:  MYCOSTATIN   Used for:  Intertriginous candidiasis   Started by:  Estela Davila MD        Apply topically 3 times daily for 14 days   Quantity:  30 g   Refills:  1            Where to get your medicines      These medications were sent to [facility]/pharmacy #[id number] - [place], [place] - [address] [address]     Phone:  [phone]     nystatin cream                Primary Care Provider Office Phone # Fax #    Estela Davila [phone]       [address] [address]        Equal " Access to Services     [facility]: Hadii kalee valle trisha Clemens, waaxda luqadaha, qaybta kaalmajeanette guo. So [facility] [phone].    ATENCIÓN: Si habla kla, tiene a carvajal disposición servicios gratuitos de asistencia lingüística. Llame al [phone].    We comply with applicable federal civil rights laws and [place] laws. We do not discriminate on the basis of race, color, national origin, age, disability, sex, sexual orientation, or gender identity.            Thank you!     Thank you for choosing [facility]  for your care. Our goal is always to provide you with excellent care. Hearing back from our patients is one way we can continue to improve our services. Please take a few minutes to complete the written survey that you may receive in the mail after your visit with us. Thank you!             Your Updated Medication List - Protect others around you: Learn how to safely use, store and throw away your medicines at www.disposemymeds.org.          This list is accurate as of: 11/2/17  9:37 AM.  Always use your most recent med list.                   Brand Name Dispense Instructions for use Diagnosis    ACE NOT PRESCRIBED (INTENTIONAL)     0 each    1 each At Bedtime ACE Inhibitor not prescribed due to Other:  Neck swelling.  Also, BP is on low normal side with the betablocker    Type 2 diabetes, HbA1c goal < 7% (H)       aspirin 81 MG tablet      Take 1 tablet by mouth daily.    Type 2 diabetes, HbA1c goal < 7% (H)       atorvastatin 20 MG tablet    LIPITOR    102 tablet    Take 1 tablet (20 mg) by mouth daily    Hyperlipidemia LDL goal <100, PVD (peripheral vascular disease) (H)       B-12 1000 MCG Tbcr      Take 1 tablet by mouth daily        Biotin 5000 MCG Caps      Take  by mouth.        blood glucose monitoring test strip    no brand specified    200 each    One Touch Ultra test strips - Use to test blood sugar 2 times daily or as  directed.    Type 2 diabetes mellitus without complication (H)       CALCIUM 500 + D PO      one daily        CENTRUM SILVER PO      one daily        cholecalciferol 1000 UNIT tablet    vitamin D3     1 TABLET DAILY        clobetasol 0.05 % cream    TEMOVATE     Apply topically 2 times daily    Psoriasis       Evening Primrose Oil 1000 MG Caps      Take by mouth daily        fluticasone 50 MCG/ACT spray    FLONASE    1 Bottle    Spray 1-2 sprays into both nostrils daily    Acute non-recurrent maxillary sinusitis       levothyroxine 100 MCG tablet    SYNTHROID/LEVOTHROID    90 tablet    TAKE 1 TABLET (100 MCG) BY MOUTH DAILY    Hypothyroidism due to acquired atrophy of thyroid       losartan 25 MG tablet    COZAAR    45 tablet    TAKE 0.5 TABLETS (12.5 MG) BY MOUTH DAILY    Diabetes mellitus, type 2 (H)       metFORMIN 500 MG tablet    GLUCOPHAGE    90 tablet    Take 1 tablet (500 mg) by mouth daily (with dinner)    Type 2 diabetes mellitus without complication, without long-term current use of insulin (H)       metoprolol 25 MG tablet    LOPRESSOR    90 tablet    Take 1 tablet (25 mg) by mouth every evening May take one extra tab as needed if HR>100 at rest    Chronic atrial fibrillation (H)       nystatin cream    MYCOSTATIN    30 g    Apply topically 3 times daily for 14 days    Intertriginous candidiasis       * order for DME     1 Box    Equipment being ordered:  chemstrips for daily blood sugar checks.    Type 2 diabetes, HbA1c goal < 7% (H)       * order for DME     90 days    Equipment being ordered:  One Touch strips to be used daily    Type 2 diabetes, HbA1c goal < 7% (H)       propranolol 10 MG tablet    INDERAL    180 tablet    Take 1 tablet (10 mg) by mouth 2 times daily as needed    Essential tremor       warfarin 5 MG tablet    COUMADIN    102 tablet    Take 1 tablet (5 mg) by mouth daily MWF & 1/2 tablet (2.5mg) all other days    Anticoagulation goal of INR 2 to 3       * Notice:  This list has 2  medication(s) that are the same as other medications prescribed for you. Read the directions carefully, and ask your doctor or other care provider to review them with you.

## 2017-11-02 NOTE — PROGRESS NOTES
SUBJECTIVE:   Clementina Cooper is a 77 year old female who presents to clinic today for the following health issues:    77 y/o with h/o PVD and essential tremor here for DM follow up.       A1C  recently 6.9...   We had increased levothyroxine from 88 to 100 since Fall '16 (due to borderline TSH) , recent TSH and T4 are normal.   Tremor is minor, stable.     Diabetes Follow-up      Patient is checking blood sugars: 3 times a week    Diabetic concerns:      Symptoms of hypoglycemia (low blood sugar): weak     Paresthesias (numbness or burning in feet) or sores: No     Date of last diabetic eye exam: 11/4/16        Amount of exercise or physical activity: occasional     Problems taking medications regularly: No    Medication side effects: none    Diet: regular (no restrictions)         Problem list and histories reviewed & adjusted, as indicated.  Additional history: as documented    Patient Active Problem List   Diagnosis     PVD (peripheral vascular disease) (H)     Family history of colon cancer     HYPERLIPIDEMIA LDL GOAL <100     Open-angle glaucoma, mild-mod, ou     Advanced directives, counseling/discussion     Essential tremor     Pseudophakia, ou     S/P iridectomy, ou     Anticoagulation goal of INR 2 to 3     Hypothyroidism due to acquired atrophy of thyroid     Chronic atrial fibrillation (H)     overweight  HCC     Long-term (current) use of anticoagulants [Z79.01]     Acute pain of left knee     Type 2 diabetes mellitus without complication, without long-term current use of insulin (H)     Glaucoma suspect, bilateral     Past Surgical History:   Procedure Laterality Date     ANGIOPLASTY      right leg     C NONSPECIFIC PROCEDURE  2001    neck surgery/trauma     C STOMACH SURGERY PROCEDURE UNLISTED       CATARACT IOL, RT/LT       HC TRABECULOPLASTY BY LASER SURGERY       HC VASCULAR SURGERY PROCEDURE UNLIST       HYSTERECTOMY, CERVIX STATUS UNKNOWN  1989    uterine bleeding     HYSTERECTOMY, PAP NO  LONGER INDICATED       LASER YAG IRIDOTOMY  remotely    both eyes (elsewhere)     PHACOEMULSIFICATION WITH STANDARD INTRAOCULAR LENS IMPLANT  7/2012; 8/2012    right eye; left eye       Social History   Substance Use Topics     Smoking status: Former Smoker     Quit date: 12/4/1999     Smokeless tobacco: Never Used     Alcohol use Yes      Comment: occasional     Family History   Problem Relation Age of Onset     DIABETES Mother      HEART DISEASE Mother      HEART DISEASE Father      HEART DISEASE Son      CANCER Brother      HEART DISEASE Sister      CANCER Brother      CANCER Brother      HEART DISEASE Sister          Current Outpatient Prescriptions   Medication Sig Dispense Refill     levothyroxine (SYNTHROID/LEVOTHROID) 100 MCG tablet TAKE 1 TABLET (100 MCG) BY MOUTH DAILY 90 tablet 3     losartan (COZAAR) 25 MG tablet TAKE 0.5 TABLETS (12.5 MG) BY MOUTH DAILY 45 tablet 1     propranolol (INDERAL) 10 MG tablet Take 1 tablet (10 mg) by mouth 2 times daily as needed 180 tablet 1     clobetasol (TEMOVATE) 0.05 % cream Apply topically 2 times daily       metFORMIN (GLUCOPHAGE) 500 MG tablet Take 1 tablet (500 mg) by mouth daily (with dinner) 90 tablet 3     metoprolol (LOPRESSOR) 25 MG tablet Take 1 tablet (25 mg) by mouth every evening May take one extra tab as needed if HR>100 at rest 90 tablet 3     warfarin (COUMADIN) 5 MG tablet Take 1 tablet (5 mg) by mouth daily MWF & 1/2 tablet (2.5mg) all other days 102 tablet 3     atorvastatin (LIPITOR) 20 MG tablet Take 1 tablet (20 mg) by mouth daily 102 tablet 3     blood glucose monitoring (NO BRAND SPECIFIED) test strip One Touch Ultra test strips - Use to test blood sugar 2 times daily or as directed. 200 each 3     ORDER FOR DME Equipment being ordered:  One Touch strips to be used daily 90 days 3     ORDER FOR DME Equipment being ordered:  chemstrips for daily blood sugar checks. 1 Box 3     Cyanocobalamin (B-12) 1000 MCG TBCR Take 1 tablet by mouth daily        Evening Primrose Oil 1000 MG CAPS Take by mouth daily       Biotin 5000 MCG CAPS Take  by mouth.       aspirin 81 MG tablet Take 1 tablet by mouth daily.  3     CENTRUM SILVER OR one daily       CALCIUM 500 + D OR one daily       VITAMIN D 1000 UNIT OR TABS 1 TABLET DAILY       fluticasone (FLONASE) 50 MCG/ACT spray Spray 1-2 sprays into both nostrils daily (Patient not taking: Reported on 11/2/2017) 1 Bottle 1     ACE NOT PRESCRIBED, INTENTIONAL, 1 each At Bedtime ACE Inhibitor not prescribed due to Other:  Neck swelling.  Also, BP is on low normal side with the betablocker (Patient not taking: Reported on 11/2/2017) 0 each 0     Allergies   Allergen Reactions     Benzocaine      Pravastatin      Muscle aches     Vagisil [Kdc:Benzocaine+Cetyl Alcohol+Edetic Acid+Methylparaben+Propylene Glycol+...]      It is the benzocaine       Xarelto [Rivaroxaban]      Daily headache     Zocor [Hmg-Coa-R Inhibitors]      Muscle aches     Recent Labs   Lab Test  10/26/17   0929  06/26/17   1000  03/21/17   0728   09/28/16   0756  03/29/16   0738  06/18/15   0740   A1C  6.9*  7.0*  8.0*   --   7.5*  7.0*  7.2*   LDL   --    --   72   --    --   87  72   HDL   --    --   42*   --    --   39*  40*   TRIG   --    --   202*   --    --   213*  211*   ALT   --    --   29   --    --   32  34   CR   --    --   0.82   --   0.83  0.80  0.74   GFRESTIMATED   --    --   68   --   66  69  76   GFRESTBLACK   --    --   82   --   80  84  >90   GFR Calc     POTASSIUM   --    --   4.0   --   4.3  4.1  4.0   TSH  2.84   --   5.43*   < >  5.10*  6.31*  3.95    < > = values in this interval not displayed.      BP Readings from Last 3 Encounters:   11/02/17 116/74   09/18/17 108/63   07/03/17 119/66    Wt Readings from Last 3 Encounters:   11/02/17 205 lb (93 kg)   09/18/17 202 lb (91.6 kg)   07/03/17 203 lb (92.1 kg)                  Labs reviewed in EPIC          Reviewed and updated as needed this visit by clinical staffTobacco   Allergies  Med Hx  Surg Hx  Fam Hx  Soc Hx      Reviewed and updated as needed this visit by Provider         ROS:  Constitutional, HEENT, cardiovascular, pulmonary, gi and gu systems are negative, except as otherwise noted.  No falls.  No palpitations. No muscle aches.  No diarrhea    Using PRN propranolol for her tremor.  Makes her feel tired.        OBJECTIVE:   /74 (BP Location: Other (Comment), Patient Position: Sitting, Cuff Size: Adult Regular)  Pulse 63  Temp 97  F (36.1  C) (Oral)  Wt 205 lb (93 kg)  SpO2 94%  BMI 33.59 kg/m2  Body mass index is 33.59 kg/(m^2).  GENERAL: healthy, alert and no distress  EYES: Eyes grossly normal to inspection, PERRL and conjunctivae and sclerae normal  NECK: no adenopathy, no asymmetry, masses, or scars and thyroid normal to palpation  RESP: lungs clear to auscultation - no rales, rhonchi or wheezes  CV: regular rate and rhythm, normal S1 S2, no S3 or S4, no murmur, click or rub, no peripheral edema and peripheral pulses strong  ABDOMEN: soft, nontender, no hepatosplenomegaly, no masses and bowel sounds normal  MS: no gross musculoskeletal defects noted, no edema.  Mild intertrigo in groin.    SKIN: no suspicious lesions or rashes  NEURO: Normal strength and tone, mentation intact and speech normal  PSYCH: mentation appears normal, affect normal/bright    Diagnostic Test Results:  Results for orders placed or performed in visit on 10/26/17   TSH   Result Value Ref Range    TSH 2.84 0.40 - 4.00 mU/L   T4, free   Result Value Ref Range    T4 Free 1.13 0.76 - 1.46 ng/dL   Thyroid peroxidase antibody   Result Value Ref Range    Thyroid Peroxidase Antibody <10 <35 IU/mL   Hemoglobin A1c   Result Value Ref Range    Hemoglobin A1C 6.9 (H) 4.3 - 6.0 %       ASSESSMENT/PLAN:               ICD-10-CM    1. Type 2 diabetes mellitus without complication, without long-term current use of insulin (H) E11.9 Basic metabolic panel     Albumin Random Urine Quantitative with  Creat Ratio     Hemoglobin A1c     OPTOMETRY REFERRAL   2. Chronic atrial fibrillation (H) I48.2    3. overweight  HCC E66.9    4. PVD (peripheral vascular disease) (H) I73.9 Lipid panel reflex to direct LDL Fasting   5. Hypothyroidism due to acquired atrophy of thyroid E03.4 TSH with free T4 reflex   6. Long-term (current) use of anticoagulants [Z79.01] Z79.01    7. Hyperlipidemia LDL goal <100 E78.5 Lipid panel reflex to direct LDL Fasting     AST     ALT   8. Screening for diabetic retinopathy Z13.5    9. Intertriginous candidiasis B37.2 nystatin (MYCOSTATIN) cream        Patient Instructions   Return to clinic Spring/Summer '18 with fasting labs prior -- Annual Physical.     Nystatin cream to area twice daily until healed    You can apply occasional hydrocortisone cream (over the counter) for the irritation     Estela Davila MD  [facility]

## 2017-11-15 ENCOUNTER — TELEPHONE (OUTPATIENT)
Dept: FAMILY MEDICINE | Facility: CLINIC | Age: 77
End: 2017-11-15

## 2017-11-16 ENCOUNTER — MEDICAL CORRESPONDENCE (OUTPATIENT)
Dept: HEALTH INFORMATION MANAGEMENT | Facility: CLINIC | Age: 77
End: 2017-11-16

## 2017-11-20 DIAGNOSIS — E78.5 HYPERLIPIDEMIA LDL GOAL <100: ICD-10-CM

## 2017-11-20 DIAGNOSIS — I73.9 PVD (PERIPHERAL VASCULAR DISEASE) (H): ICD-10-CM

## 2017-11-22 RX ORDER — ATORVASTATIN CALCIUM 20 MG/1
TABLET, FILM COATED ORAL
Qty: 102 TABLET | Refills: 2 | Status: SHIPPED | OUTPATIENT
Start: 2017-11-22 | End: 2018-08-22

## 2017-11-22 NOTE — TELEPHONE ENCOUNTER
atorvastatin      Last Written Prescription Date: 11/4/16  Last Fill Quantity: 102,  # refills: 3   Last Office Visit with [facility], [facility] or [facility] prescribing provider: 11/2/17, see plan:      Patient Instructions   Return to clinic Spring/Summer '18 with fasting labs prior -- Annual Physical.       Requested Prescriptions   Pending Prescriptions Disp Refills     atorvastatin (LIPITOR) 20 MG tablet [Pharmacy Med Name: ATORVASTATIN 20 MG TABLET] 102 tablet 2     Sig: TAKE 1 TABLET (20 MG) BY MOUTH DAILY    Statins Protocol Passed    11/22/2017 12:14 PM       Passed - LDL on file in past 12 months    Recent Labs   Lab Test  03/21/17   0728   LDL  72            Passed - No abnormal creatine kinase in past 12 months    No lab results found.         Passed - Recent or future visit with authorizing provider    Patient had office visit in the last year or has a visit in the next 30 days with authorizing provider.  See chart review.              Passed - Patient is age 18 or older       Passed - No active pregnancy on record       Passed - No positive pregnancy test in past 12 months          Prescription approved per [facility] Refill Protocol.    Anna Bruno RN  [facility]

## 2017-11-28 ENCOUNTER — ANTICOAGULATION THERAPY VISIT (OUTPATIENT)
Dept: NURSING | Facility: CLINIC | Age: 77
End: 2017-11-28
Payer: COMMERCIAL

## 2017-11-28 DIAGNOSIS — Z79.01 LONG-TERM (CURRENT) USE OF ANTICOAGULANTS: ICD-10-CM

## 2017-11-28 LAB — INR POINT OF CARE: 1.9 (ref 0.86–1.14)

## 2017-11-28 PROCEDURE — 36416 COLLJ CAPILLARY BLOOD SPEC: CPT

## 2017-11-28 PROCEDURE — 99207 ZZC NO CHARGE NURSE ONLY: CPT

## 2017-11-28 PROCEDURE — 85610 PROTHROMBIN TIME: CPT | Mod: QW

## 2017-11-28 NOTE — PROGRESS NOTES
ANTICOAGULATION FOLLOW-UP CLINIC VISIT    Patient Name:  Clementina Cooper  Date:  11/28/2017  Contact Type:  Face to Face    SUBJECTIVE:     Patient reports no changes in medicine, diet, activity, etc.  The only change was adding inderal / propranolol for hand tremor a few months back.  She is taking it one time a day due to it making her tired.  She tried not to use it and her tremor returned.  Her INR has been going down since starting the new medicine.  Therefore, the plan today is to raise her weekly warfarin dose slightly and see where INR is at routine recheck.  Patient agreeable and verbalized understanding.     Patient Findings     Positives No Problem Findings           OBJECTIVE    INR Protime   Date Value Ref Range Status   11/28/2017 1.9 (A) 0.86 - 1.14 Final       ASSESSMENT / PLAN  INR assessment THER    Recheck INR In: 8 WEEKS    INR Location Clinic      Anticoagulation Summary as of 11/28/2017     INR goal 2.0-3.0   Today's INR 1.9!   Maintenance plan 2.5 mg (5 mg x 0.5) on Tue, Thu, Sat; 5 mg (5 mg x 1) all other days   Full instructions 2.5 mg on Tue, Thu, Sat; 5 mg all other days   Weekly total 27.5 mg   Plan last modified Shagufta Bell RN (11/28/2017)   Next INR check 1/23/2018   Target end date Indefinite    Indications   Long-term (current) use of anticoagulants [Z79.01] [Z79.01]  Atrial fibrillation (H) (Resolved) [I48.91]  Atrial fibrillation (H) (Resolved) [I48.91]         Anticoagulation Episode Summary     INR check location     Preferred lab     Send INR reminders to [facility]    Comments       Anticoagulation Care Providers     Provider Role Specialty Phone number    Estela Davila MD  Internal Medicine [phone]            See the Encounter Report to view Anticoagulation Flowsheet and Dosing Calendar (Go to Encounters tab in chart review, and find the Anticoagulation Therapy Visit)    Dosage adjustment made based on physician directed care plan.    Shagufta Bell  RN

## 2017-11-28 NOTE — MR AVS SNAPSHOT
Clementina Cooper   11/28/2017 9:20 AM   Anticoagulation Therapy Visit    Description:  77 year old female   Provider:  [facility]   Department:  [facility]           INR as of 11/28/2017     Today's INR 1.9!      Anticoagulation Summary as of 11/28/2017     INR goal 2.0-3.0   Today's INR 1.9!   Full instructions 2.5 mg on Tue, Thu, Sat; 5 mg all other days   Next INR check 1/23/2018    Indications   Long-term (current) use of anticoagulants [Z79.01] [Z79.01]  Atrial fibrillation (H) (Resolved) [I48.91]  Atrial fibrillation (H) (Resolved) [I48.91]         Your next Anticoagulation Clinic appointment(s)     Jan 23, 2018  9:20 AM CST   Anticoagulation Visit with CP ANTI COAG   [facility]    [address] [place] [address] [phone]              Contact Numbers     [facility]  Please call [phone] or [phone]  to cancel and/or reschedule your appointment.   Please call [phone] with any problems or questions regarding your therapy          November 2017 Details    Sun Mon Tue Wed Thu Fri Sat        1               2               3               4                 5               6               7               8               9               10               11                 12               13               14               15               16               17               18                 19               20               21               22               23               24               25                 26               27               28      2.5 mg   See details      29      5 mg         30      2.5 mg            Date Details   11/28 This INR check               How to take your warfarin dose     To take:  2.5 mg Take 0.5 of a 5 mg tablet.    To take:  5 mg Take 1 of the 5 mg tablets.           December 2017 Details    Sun Mon Tue Wed Thu Fri Sat          1      5 mg         2       2.5 mg           3      5 mg         4      5 mg         5      2.5 mg         6      5 mg         7      2.5 mg         8      5 mg         9      2.5 mg           10      5 mg         11      5 mg         12      2.5 mg         13      5 mg         14      2.5 mg         15      5 mg         16      2.5 mg           17      5 mg         18      5 mg         19      2.5 mg         20      5 mg         21      2.5 mg         22      5 mg         23      2.5 mg           24      5 mg         25      5 mg         26      2.5 mg         27      5 mg         28      2.5 mg         29      5 mg         30      2.5 mg           31      5 mg                Date Details   No additional details            How to take your warfarin dose     To take:  2.5 mg Take 0.5 of a 5 mg tablet.    To take:  5 mg Take 1 of the 5 mg tablets.           January 2018 Details    Sun Mon Tue Wed Thu Fri Sat      1      5 mg         2      2.5 mg         3      5 mg         4      2.5 mg         5      5 mg         6      2.5 mg           7      5 mg         8      5 mg         9      2.5 mg         10      5 mg         11      2.5 mg         12      5 mg         13      2.5 mg           14      5 mg         15      5 mg         16      2.5 mg         17      5 mg         18      2.5 mg         19      5 mg         20      2.5 mg           21      5 mg         22      5 mg         23            24               25               26               27                 28               29               30               31                   Date Details   No additional details    Date of next INR:  1/23/2018         How to take your warfarin dose     To take:  2.5 mg Take 0.5 of a 5 mg tablet.    To take:  5 mg Take 1 of the 5 mg tablets.

## 2018-01-15 ENCOUNTER — OFFICE VISIT (OUTPATIENT)
Dept: OPHTHALMOLOGY | Facility: CLINIC | Age: 78
End: 2018-01-15
Payer: COMMERCIAL

## 2018-01-15 DIAGNOSIS — Z01.01 ENCOUNTER FOR EXAMINATION OF EYES AND VISION WITH ABNORMAL FINDINGS: Primary | ICD-10-CM

## 2018-01-15 DIAGNOSIS — H40.003 GLAUCOMA SUSPECT, BILATERAL: ICD-10-CM

## 2018-01-15 DIAGNOSIS — E11.9 TYPE 2 DIABETES MELLITUS WITHOUT COMPLICATION, WITHOUT LONG-TERM CURRENT USE OF INSULIN (H): ICD-10-CM

## 2018-01-15 DIAGNOSIS — H52.4 PRESBYOPIA: ICD-10-CM

## 2018-01-15 DIAGNOSIS — H26.491 RIGHT POSTERIOR CAPSULAR OPACIFICATION: ICD-10-CM

## 2018-01-15 PROCEDURE — 92014 COMPRE OPH EXAM EST PT 1/>: CPT | Performed by: OPHTHALMOLOGY

## 2018-01-15 PROCEDURE — 92015 DETERMINE REFRACTIVE STATE: CPT | Performed by: OPHTHALMOLOGY

## 2018-01-15 ASSESSMENT — REFRACTION_WEARINGRX
OD_ADD: +2.75
SPECS_TYPE: BIFOCAL
OS_ADD: +2.75
OS_SPHERE: -1.50
OS_CYLINDER: +0.75
OD_CYLINDER: +0.75
OS_AXIS: 089
OD_SPHERE: -0.25
OD_AXIS: 172

## 2018-01-15 ASSESSMENT — CUP TO DISC RATIO
OS_RATIO: 0.6
OD_RATIO: 0.7

## 2018-01-15 ASSESSMENT — VISUAL ACUITY
OS_CC: 20/25
METHOD: SNELLEN - LINEAR
OS_CC+: -1
OD_CC: 20/30
OD_CC+: -1

## 2018-01-15 ASSESSMENT — REFRACTION_MANIFEST
OD_CYLINDER: +0.75
OS_CYLINDER: +0.50
OD_ADD: +3.00
OS_SPHERE: -0.75
OS_AXIS: 035
OD_AXIS: 155
OD_SPHERE: -0.50
OS_ADD: +3.00

## 2018-01-15 ASSESSMENT — SLIT LAMP EXAM - LIDS
COMMENTS: 1+ DERMATOCHALASIS - UPPER LID
COMMENTS: 1+ DERMATOCHALASIS - UPPER LID

## 2018-01-15 ASSESSMENT — EXTERNAL EXAM - RIGHT EYE: OD_EXAM: NORMAL

## 2018-01-15 ASSESSMENT — CONF VISUAL FIELD
METHOD: COUNTING FINGERS
OD_NORMAL: 1
OS_NORMAL: 1

## 2018-01-15 ASSESSMENT — EXTERNAL EXAM - LEFT EYE: OS_EXAM: NORMAL

## 2018-01-15 ASSESSMENT — TONOMETRY
IOP_METHOD: APPLANATION
OD_IOP_MMHG: 18
OS_IOP_MMHG: 18

## 2018-01-15 NOTE — LETTER
1/15/2018         RE: Clementina Cooper  [address] [facility] [address]        Dear Colleague,    Thank you for referring your patient, Clementina Cooper, to the [facility]. Please see a copy of my visit note below.     Current Eye Medications:  no     Subjective: Diabetic eye exam. Vision is OK both eyes, unchanged. Zero pain, or discomfort both eyes.    Lab Results   Component Value Date    A1C 6.9 10/26/2017    A1C 7.0 06/26/2017    A1C 8.0 03/21/2017    A1C 7.5 09/28/2016    A1C 7.0 03/29/2016         Objective:  See Ophthalmology Exam.       Assessment:  Visually significant posterior capsule opacity right eye.  No diabetic retinopathy.      ICD-10-CM    1. Encounter for examination of eyes and vision with abnormal findings Z01.01 REFRACTIVE STATUS   2. Presbyopia H52.4 REFRACTIVE STATUS   3. Type 2 diabetes mellitus without complication, without long-term current use of insulin (H) E11.9 EYE EXAM (SIMPLE-NONBILLABLE)   4. Glaucoma suspect, bilateral H40.003    5. Right posterior capsular opacification H26.491         Plan:  Continue observation with regard to glaucoma suspect status.  Possible posterior vitreous detachment (sudden onset large floater and/or flashing lights) discussed.   Possible clouding of posterior capsule discussed.   Return visit next available at convenience for Yag Capsulotomy right eye  (7:45 AM or 12:45 PM)     Rahul Jennings M.D.              Again, thank you for allowing me to participate in the care of your patient.        Sincerely,        Rahul Jennings MD

## 2018-01-15 NOTE — MR AVS SNAPSHOT
After Visit Summary   1/15/2018    Clementina Cooper    MRN: [id number]           Patient Information     Date Of Birth          1940        Visit Information        Provider Department      1/15/2018 9:00 AM Rahul Jennings MD [facility]        Today's Diagnoses     Glaucoma suspect, bilateral    -  1    Encounter for examination of eyes and vision with abnormal findings        Presbyopia        Regular astigmatism of both eyes        Type 2 diabetes mellitus without complication, without long-term current use of insulin (H)          Care Instructions    Continue observation with regard to glaucoma suspect status.  Possible posterior vitreous detachment (sudden onset large floater and/or flashing lights) discussed.   Possible clouding of posterior capsule discussed.   Return visit next available at convenience for Yag Capsulotomy right eye  (7:45 AM or 12:45 PM)     Rahul Jennings M.D.             Follow-ups after your visit        Your next 10 appointments already scheduled     Jan 23, 2018  9:20 AM CST   Anticoagulation Visit with CP ANTI COAG   [facility]    [address] [place] [address] [phone]              Who to contact     If you have questions or need follow up information about today's clinic visit or your schedule please contact [facility] directly at [phone].  Normal or non-critical lab and imaging results will be communicated to you by MyChart, letter or phone within 4 business days after the clinic has received the results. If you do not hear from us within 7 days, please contact the clinic through MyChart or phone. If you have a critical or abnormal lab result, we will notify you by phone as soon as possible.  Submit refill requests through [employer] or call your pharmacy and they will forward the refill request to us. Please allow 3 business days for  "your refill to be completed.          Additional Information About Your Visit        MyChart Information     [employer] lets you send messages to your doctor, view your test results, renew your prescriptions, schedule appointments and more. To sign up, go to [email or web address] . Click on \"Log in\" on the left side of the screen, which will take you to the Welcome page. Then click on \"Sign up Now\" on the right side of the page.     You will be asked to enter the access code listed below, as well as some personal information. Please follow the directions to create your username and password.     Your access code is: [email or web address]  Expires: 2018  8:37 AM     Your access code will  in 90 days. If you need help or a new code, please call your [facility] clinic or [phone].        Care EveryWhere ID     This is your Care EveryWhere ID. This could be used by other organizations to access your [facility] medical records  [id number]         Blood Pressure from Last 3 Encounters:   17 116/74   17 108/63   17 119/66    Weight from Last 3 Encounters:   17 93 kg (205 lb)   17 91.6 kg (202 lb)   17 92.1 kg (203 lb)              Today, you had the following     No orders found for display       Primary Care Provider Office Phone # Fax #    Estela Davila [phone]       [address] [address]        Equal Access to Services     [facility] : Hadii kalee valle hadasho Soiselaali, waaxda luqadaha, qaybta kaalmada adeegyada, jeanette anderson. So [facility] [phone].    ATENCIÓN: Si habla español, tiene a carvajal disposición servicios gratuitos de asistencia lingüística. Llame al [phone].    We comply with applicable federal civil rights laws and [place] laws. We do not discriminate on the basis of race, color, national origin, age, disability, sex, sexual orientation, or gender identity.            Thank you!     Thank " you for choosing [facility]  for your care. Our goal is always to provide you with excellent care. Hearing back from our patients is one way we can continue to improve our services. Please take a few minutes to complete the written survey that you may receive in the mail after your visit with us. Thank you!             Your Updated Medication List - Protect others around you: Learn how to safely use, store and throw away your medicines at www.disposemymeds.org.          This list is accurate as of: 1/15/18 10:18 AM.  Always use your most recent med list.                   Brand Name Dispense Instructions for use Diagnosis    ACE NOT PRESCRIBED (INTENTIONAL)     0 each    1 each At Bedtime ACE Inhibitor not prescribed due to Other:  Neck swelling.  Also, BP is on low normal side with the betablocker    Type 2 diabetes, HbA1c goal < 7% (H)       aspirin 81 MG tablet      Take 1 tablet by mouth daily.    Type 2 diabetes, HbA1c goal < 7% (H)       atorvastatin 20 MG tablet    LIPITOR    102 tablet    TAKE 1 TABLET (20 MG) BY MOUTH DAILY    Hyperlipidemia LDL goal <100, PVD (peripheral vascular disease) (H)       B-12 1000 MCG Tbcr      Take 1 tablet by mouth daily        Biotin 5000 MCG Caps      Take  by mouth.        blood glucose monitoring test strip    no brand specified    200 each    One Touch Ultra test strips - Use to test blood sugar 2 times daily or as directed.    Type 2 diabetes mellitus without complication (H)       CALCIUM 500 + D PO      one daily        CENTRUM SILVER PO      one daily        cholecalciferol 1000 UNIT tablet    vitamin D3     1 TABLET DAILY        clobetasol 0.05 % cream    TEMOVATE     Apply topically 2 times daily    Psoriasis       Evening Primrose Oil 1000 MG Caps      Take by mouth daily        levothyroxine 100 MCG tablet    SYNTHROID/LEVOTHROID    90 tablet    TAKE 1 TABLET (100 MCG) BY MOUTH DAILY    Hypothyroidism due to acquired atrophy of thyroid       losartan  25 MG tablet    COZAAR    45 tablet    TAKE 0.5 TABLETS (12.5 MG) BY MOUTH DAILY    Diabetes mellitus, type 2 (H)       metFORMIN 500 MG tablet    GLUCOPHAGE    90 tablet    Take 1 tablet (500 mg) by mouth daily (with dinner)    Type 2 diabetes mellitus without complication, without long-term current use of insulin (H)       metoprolol tartrate 25 MG tablet    LOPRESSOR    90 tablet    Take 1 tablet (25 mg) by mouth every evening May take one extra tab as needed if HR>100 at rest    Chronic atrial fibrillation (H)       * order for DME     1 Box    Equipment being ordered:  chemstrips for daily blood sugar checks.    Type 2 diabetes, HbA1c goal < 7% (H)       * order for DME     90 days    Equipment being ordered:  One Touch strips to be used daily    Type 2 diabetes, HbA1c goal < 7% (H)       propranolol 10 MG tablet    INDERAL    180 tablet    Take 1 tablet (10 mg) by mouth 2 times daily as needed    Essential tremor       warfarin 5 MG tablet    COUMADIN    102 tablet    Take 1 tablet (5 mg) by mouth daily MWF & 1/2 tablet (2.5mg) all other days    Anticoagulation goal of INR 2 to 3       * Notice:  This list has 2 medication(s) that are the same as other medications prescribed for you. Read the directions carefully, and ask your doctor or other care provider to review them with you.

## 2018-01-15 NOTE — PATIENT INSTRUCTIONS
Continue observation with regard to glaucoma suspect status.  Possible posterior vitreous detachment (sudden onset large floater and/or flashing lights) discussed.   Possible clouding of posterior capsule discussed.   Return visit next available at convenience for Yag Capsulotomy right eye  (7:45 AM or 12:45 PM)     Rahul Jennings M.D.

## 2018-01-15 NOTE — PROGRESS NOTES
Current Eye Medications:  no     Subjective: Diabetic eye exam. Vision is OK both eyes, unchanged. Zero pain, or discomfort both eyes.    Lab Results   Component Value Date    A1C 6.9 10/26/2017    A1C 7.0 06/26/2017    A1C 8.0 03/21/2017    A1C 7.5 09/28/2016    A1C 7.0 03/29/2016         Objective:  See Ophthalmology Exam.       Assessment:  Visually significant posterior capsule opacity right eye.  No diabetic retinopathy.      ICD-10-CM    1. Encounter for examination of eyes and vision with abnormal findings Z01.01 REFRACTIVE STATUS   2. Presbyopia H52.4 REFRACTIVE STATUS   3. Type 2 diabetes mellitus without complication, without long-term current use of insulin (H) E11.9 EYE EXAM (SIMPLE-NONBILLABLE)   4. Glaucoma suspect, bilateral H40.003    5. Right posterior capsular opacification H26.491         Plan:  Continue observation with regard to glaucoma suspect status.  Possible posterior vitreous detachment (sudden onset large floater and/or flashing lights) discussed.   Possible clouding of posterior capsule discussed.   Return visit next available at convenience for Yag Capsulotomy right eye  (7:45 AM or 12:45 PM)     Rahul Jennings M.D.

## 2018-01-23 ENCOUNTER — ANTICOAGULATION THERAPY VISIT (OUTPATIENT)
Dept: NURSING | Facility: CLINIC | Age: 78
End: 2018-01-23
Payer: COMMERCIAL

## 2018-01-23 DIAGNOSIS — Z79.01 LONG-TERM (CURRENT) USE OF ANTICOAGULANTS: ICD-10-CM

## 2018-01-23 LAB — INR POINT OF CARE: 2.8 (ref 0.86–1.14)

## 2018-01-23 PROCEDURE — 85610 PROTHROMBIN TIME: CPT | Mod: QW

## 2018-01-23 PROCEDURE — 36416 COLLJ CAPILLARY BLOOD SPEC: CPT

## 2018-01-23 PROCEDURE — 99207 ZZC NO CHARGE NURSE ONLY: CPT

## 2018-01-23 NOTE — PROGRESS NOTES
ANTICOAGULATION FOLLOW-UP CLINIC VISIT    Patient Name:  Clementina Cooper  Date:  1/23/2018  Contact Type:  Face to Face    SUBJECTIVE: Patient denies symptoms of bleeding or clotting.  States medicine and diet are the same.  She has not been ill.  She followed her warfarin dose as ordered, none missed.  Plan same dose and recheck.  Patient agreeable to plan of care.     Patient Findings     Positives No Problem Findings           OBJECTIVE    INR Protime   Date Value Ref Range Status   01/23/2018 2.8 (A) 0.86 - 1.14 Final       ASSESSMENT / PLAN  INR assessment THER    Recheck INR In: 8 WEEKS    INR Location Clinic      Anticoagulation Summary as of 1/23/2018     INR goal 2.0-3.0   Today's INR 2.8   Maintenance plan 2.5 mg (5 mg x 0.5) on Tue, Thu, Sat; 5 mg (5 mg x 1) all other days   Full instructions 2.5 mg on Tue, Thu, Sat; 5 mg all other days   Weekly total 27.5 mg   No change documented Shagufta Bell RN   Plan last modified Shagufta Bell RN (11/28/2017)   Next INR check 3/20/2018   Target end date Indefinite    Indications   Long-term (current) use of anticoagulants [Z79.01] [Z79.01]  Atrial fibrillation (H) (Resolved) [I48.91]  Atrial fibrillation (H) (Resolved) [I48.91]         Anticoagulation Episode Summary     INR check location     Preferred lab     Send INR reminders to [facility]    Comments       Anticoagulation Care Providers     Provider Role Specialty Phone number    Estela Davila MD  Internal Medicine [phone]            See the Encounter Report to view Anticoagulation Flowsheet and Dosing Calendar (Go to Encounters tab in chart review, and find the Anticoagulation Therapy Visit)    Dosage adjustment made based on physician directed care plan.    Shagufta Bell RN

## 2018-01-23 NOTE — MR AVS SNAPSHOT
Clementina Cooper   1/23/2018 9:20 AM   Anticoagulation Therapy Visit    Description:  77 year old female   Provider:  [facility]   Department:  [facility] Nurse           INR as of 1/23/2018     Today's INR 2.8      Anticoagulation Summary as of 1/23/2018     INR goal 2.0-3.0   Today's INR 2.8   Full instructions 2.5 mg on Tue, Thu, Sat; 5 mg all other days   Next INR check 3/20/2018    Indications   Long-term (current) use of anticoagulants [Z79.01] [Z79.01]  Atrial fibrillation (H) (Resolved) [I48.91]  Atrial fibrillation (H) (Resolved) [I48.91]         Your next Anticoagulation Clinic appointment(s)     Mar 20, 2018  9:20 AM CDT   Anticoagulation Visit with CP ANTI COAG   [facility]    [address] [place] [address] [phone]              Contact Numbers     [facility]  Please call [phone] or [phone]  to cancel and/or reschedule your appointment.   Please call [phone] with any problems or questions regarding your therapy          January 2018 Details    Sun Mon Tue Wed Thu Fri Sat      1               2               3               4               5               6                 7               8               9               10               11               12               13                 14               15               16               17               18               19               20                 21               22               23      2.5 mg   See details      24      5 mg         25      2.5 mg         26      5 mg         27      2.5 mg           28      5 mg         29      5 mg         30      2.5 mg         31      5 mg             Date Details   01/23 This INR check               How to take your warfarin dose     To take:  2.5 mg Take 0.5 of a 5 mg tablet.    To take:  5 mg Take 1 of the 5 mg tablets.           February 2018 Details    Sun Mon Tue Wed Thu Fri  Sat         1      2.5 mg         2      5 mg         3      2.5 mg           4      5 mg         5      5 mg         6      2.5 mg         7      5 mg         8      2.5 mg         9      5 mg         10      2.5 mg           11      5 mg         12      5 mg         13      2.5 mg         14      5 mg         15      2.5 mg         16      5 mg         17      2.5 mg           18      5 mg         19      5 mg         20      2.5 mg         21      5 mg         22      2.5 mg         23      5 mg         24      2.5 mg           25      5 mg         26      5 mg         27      2.5 mg         28      5 mg             Date Details   No additional details            How to take your warfarin dose     To take:  2.5 mg Take 0.5 of a 5 mg tablet.    To take:  5 mg Take 1 of the 5 mg tablets.           March 2018 Details    Sun Mon Tue Wed Thu Fri Sat         1      2.5 mg         2      5 mg         3      2.5 mg           4      5 mg         5      5 mg         6      2.5 mg         7      5 mg         8      2.5 mg         9      5 mg         10      2.5 mg           11      5 mg         12      5 mg         13      2.5 mg         14      5 mg         15      2.5 mg         16      5 mg         17      2.5 mg           18      5 mg         19      5 mg         20            21               22               23               24                 25               26               27               28               29               30               31                Date Details   No additional details    Date of next INR:  3/20/2018         How to take your warfarin dose     To take:  2.5 mg Take 0.5 of a 5 mg tablet.    To take:  5 mg Take 1 of the 5 mg tablets.

## 2018-01-27 DIAGNOSIS — G25.0 ESSENTIAL TREMOR: ICD-10-CM

## 2018-01-29 RX ORDER — PROPRANOLOL HYDROCHLORIDE 10 MG/1
TABLET ORAL
Qty: 180 TABLET | Refills: 3 | Status: SHIPPED | OUTPATIENT
Start: 2018-01-29 | End: 2019-05-28

## 2018-01-29 NOTE — TELEPHONE ENCOUNTER
"Requested Prescriptions   Pending Prescriptions Disp Refills     propranolol (INDERAL) 10 MG tablet [Pharmacy Med Name: PROPRANOLOL 10 MG TABLET] 180 tablet 1    Last Written Prescription Date:  7/24/17  Last Fill Quantity: 180,  # refills: 1   Last Office Visit with [facility] provider:  11/2/17   Future Office Visit:    Next 5 appointments (look out 90 days)     Feb 06, 2018 12:45 PM CST   Return Visit with Rahul Jennings MD   [facility] ([address] [address]   [phone]            Feb 13, 2018 12:45 PM CST   Return Visit with Rahul Jennings MD   [facility] ([address] [address]   [phone]                  Sig: TAKE 1 TABLET (10 MG) BY MOUTH 2 TIMES DAILY AS NEEDED    Beta-Blockers Protocol Passed    1/27/2018 12:30 AM       Passed - Blood pressure under 140/90    BP Readings from Last 3 Encounters:   11/02/17 116/74   09/18/17 108/63   07/03/17 119/66                Passed - Patient is age 6 or older       Passed - Recent or future visit with authorizing provider's specialty    Patient had office visit in the last year or has a visit in the next 30 days with authorizing provider.  See \"Patient Info\" tab in inbasket, or \"Choose Columns\" in Meds & Orders section of the refill encounter.               "

## 2018-01-30 ENCOUNTER — TRANSFERRED RECORDS (OUTPATIENT)
Dept: HEALTH INFORMATION MANAGEMENT | Facility: CLINIC | Age: 78
End: 2018-01-30

## 2018-02-06 ENCOUNTER — OFFICE VISIT (OUTPATIENT)
Dept: OPHTHALMOLOGY | Facility: CLINIC | Age: 78
End: 2018-02-06
Payer: COMMERCIAL

## 2018-02-06 DIAGNOSIS — H26.491 RIGHT POSTERIOR CAPSULAR OPACIFICATION: Primary | ICD-10-CM

## 2018-02-06 PROCEDURE — 66821 AFTER CATARACT LASER SURGERY: CPT | Mod: RT | Performed by: OPHTHALMOLOGY

## 2018-02-06 ASSESSMENT — TONOMETRY
IOP_METHOD: APPLANATION
OD_IOP_MMHG: 20

## 2018-02-06 ASSESSMENT — EXTERNAL EXAM - LEFT EYE: OS_EXAM: NORMAL

## 2018-02-06 ASSESSMENT — SLIT LAMP EXAM - LIDS
COMMENTS: 1+ DERMATOCHALASIS - UPPER LID
COMMENTS: 1+ DERMATOCHALASIS - UPPER LID

## 2018-02-06 ASSESSMENT — EXTERNAL EXAM - RIGHT EYE: OD_EXAM: NORMAL

## 2018-02-06 ASSESSMENT — CUP TO DISC RATIO: OD_RATIO: 0.7

## 2018-02-06 NOTE — PATIENT INSTRUCTIONS
Your eye may be slightly red, sore, and blurry for a few days.  You may notice some floaters for a few days.  Keep scheduled return visit for a intraocular pressure check and refraction in about one  week.  Rahul Jennings M.D.

## 2018-02-06 NOTE — MR AVS SNAPSHOT
After Visit Summary   2/6/2018    Clementina Cooper    MRN: [id number]           Patient Information     Date Of Birth          1940        Visit Information        Provider Department      2/6/2018 12:45 PM Rahul Jennings MD [facility]        Today's Diagnoses     Right posterior capsular opacification    -  1      Care Instructions    Your eye may be slightly red, sore, and blurry for a few days.  You may notice some floaters for a few days.  Keep scheduled return visit for a intraocular pressure check and refraction in about one  week.  Rahul Jennings M.D.             Follow-ups after your visit        Your next 10 appointments already scheduled     Feb 13, 2018 12:45 PM CST   Return Visit with Rahul Jennings MD   [facility] ([address] [id number] [phone]            Mar 13, 2018  9:20 AM CDT   Anticoagulation Visit with CP ANTI COAG   [facility] ([facility])    [address] [place] [address] [phone]              Who to contact     If you have questions or need follow up information about today's clinic visit or your schedule please contact [facility] directly at [phone].  Normal or non-critical lab and imaging results will be communicated to you by MyChart, letter or phone within 4 business days after the clinic has received the results. If you do not hear from us within 7 days, please contact the clinic through MyChart or phone. If you have a critical or abnormal lab result, we will notify you by phone as soon as possible.  Submit refill requests through StyleQ or call your pharmacy and they will forward the refill request to us. Please allow 3 business days for your refill to be completed.          Additional Information About Your Visit        BexharSpineVision Information     StyleQ lets you send  "messages to your doctor, view your test results, renew your prescriptions, schedule appointments and more. To sign up, go to [email or web address] . Click on \"Log in\" on the left side of the screen, which will take you to the Welcome page. Then click on \"Sign up Now\" on the right side of the page.     You will be asked to enter the access code listed below, as well as some personal information. Please follow the directions to create your username and password.     Your access code is: [email or web address]  Expires: 2018 12:59 PM     Your access code will  in 90 days. If you need help or a new code, please call your [place] clinic or [phone].        Care EveryWhere ID     This is your Care EveryWhere ID. This could be used by other organizations to access your [place] medical records  [id number]         Blood Pressure from Last 3 Encounters:   17 116/74   17 108/63   17 119/66    Weight from Last 3 Encounters:   17 93 kg (205 lb)   17 91.6 kg (202 lb)   17 92.1 kg (203 lb)              We Performed the Following     YAG LASER CAPSULOTOMY        Primary Care Provider Office Phone # Fax #    Estela Davila [phone]       [address] [facility] [address]        Equal Access to Services     DERECK ZEPEDA AH: Hadii aad ku hadasho Soiselaali, waaxda luqadaha, qaybta kaalmada adeegyada, jeanette anderson. So [facility] [phone].    ATENCIÓN: Si habla español, tiene a carvajal disposición servicios gratuitos de asistencia lingüística. Llame al [phone].    We comply with applicable federal civil rights laws and [place] laws. We do not discriminate on the basis of race, color, national origin, age, disability, sex, sexual orientation, or gender identity.            Thank you!     Thank you for choosing [facility]  for your care. Our goal is always to provide you with excellent care. Hearing back from our " patients is one way we can continue to improve our services. Please take a few minutes to complete the written survey that you may receive in the mail after your visit with us. Thank you!             Your Updated Medication List - Protect others around you: Learn how to safely use, store and throw away your medicines at www.disposemymeds.org.          This list is accurate as of 2/6/18 12:59 PM.  Always use your most recent med list.                   Brand Name Dispense Instructions for use Diagnosis    ACE NOT PRESCRIBED (INTENTIONAL)     0 each    1 each At Bedtime ACE Inhibitor not prescribed due to Other:  Neck swelling.  Also, BP is on low normal side with the betablocker    Type 2 diabetes, HbA1c goal < 7% (H)       aspirin 81 MG tablet      Take 1 tablet by mouth daily.    Type 2 diabetes, HbA1c goal < 7% (H)       atorvastatin 20 MG tablet    LIPITOR    102 tablet    TAKE 1 TABLET (20 MG) BY MOUTH DAILY    Hyperlipidemia LDL goal <100, PVD (peripheral vascular disease) (H)       B-12 1000 MCG Tbcr      Take 1 tablet by mouth daily        Biotin 5000 MCG Caps      Take  by mouth.        blood glucose monitoring test strip    no brand specified    200 each    One Touch Ultra test strips - Use to test blood sugar 2 times daily or as directed.    Type 2 diabetes mellitus without complication (H)       CALCIUM 500 + D PO      one daily        CENTRUM SILVER PO      one daily        cholecalciferol 1000 UNIT tablet    vitamin D3     1 TABLET DAILY        clobetasol 0.05 % cream    TEMOVATE     Apply topically 2 times daily    Psoriasis       Evening Primrose Oil 1000 MG Caps      Take by mouth daily        levothyroxine 100 MCG tablet    SYNTHROID/LEVOTHROID    90 tablet    TAKE 1 TABLET (100 MCG) BY MOUTH DAILY    Hypothyroidism due to acquired atrophy of thyroid       losartan 25 MG tablet    COZAAR    45 tablet    TAKE 0.5 TABLETS (12.5 MG) BY MOUTH DAILY    Diabetes mellitus, type 2 (H)       metFORMIN 500 MG  tablet    GLUCOPHAGE    90 tablet    Take 1 tablet (500 mg) by mouth daily (with dinner)    Type 2 diabetes mellitus without complication, without long-term current use of insulin (H)       metoprolol tartrate 25 MG tablet    LOPRESSOR    90 tablet    Take 1 tablet (25 mg) by mouth every evening May take one extra tab as needed if HR>100 at rest    Chronic atrial fibrillation (H)       * order for DME     1 Box    Equipment being ordered:  chemstrips for daily blood sugar checks.    Type 2 diabetes, HbA1c goal < 7% (H)       * order for DME     90 days    Equipment being ordered:  One Touch strips to be used daily    Type 2 diabetes, HbA1c goal < 7% (H)       propranolol 10 MG tablet    INDERAL    180 tablet    TAKE 1 TABLET (10 MG) BY MOUTH 2 TIMES DAILY AS NEEDED    Essential tremor       warfarin 5 MG tablet    COUMADIN    102 tablet    Take 1 tablet (5 mg) by mouth daily MWF & 1/2 tablet (2.5mg) all other days    Anticoagulation goal of INR 2 to 3       * Notice:  This list has 2 medication(s) that are the same as other medications prescribed for you. Read the directions carefully, and ask your doctor or other care provider to review them with you.

## 2018-02-06 NOTE — LETTER
2/6/2018         RE: Clementina Cooper  [facility] [address]        Dear Colleague,    Thank you for referring your patient, Clementina Cooper, to the [facility]. Please see a copy of my visit note below.     Current Eye Medications:       Subjective:  Patient is here for a Yag Capsulotomy, right eye.  Consent signed.     Objective:  See Ophthalmology Exam.       Assessment:  Visually significant posterior capsule opacity right eye.       Plan: Yag Capsulotomy performed without problems right eye under Proparacaine anesthetic. Well tolerated.  Number of applications:25  Power of applications: 1.3 mJ  Iopidine given.    Rahul Jennings M.D.                   Again, thank you for allowing me to participate in the care of your patient.        Sincerely,        Rahul Jennings MD

## 2018-02-06 NOTE — PROGRESS NOTES
Current Eye Medications:       Subjective:  Patient is here for a Yag Capsulotomy, right eye.  Consent signed.     Objective:  See Ophthalmology Exam.       Assessment:  Visually significant posterior capsule opacity right eye.       Plan: Yag Capsulotomy performed without problems right eye under Proparacaine anesthetic. Well tolerated.  Number of applications:25  Power of applications: 1.3 mJ  Iopidine given.    Rahul Jennings M.D.

## 2018-02-07 PROBLEM — H26.491 RIGHT POSTERIOR CAPSULAR OPACIFICATION: Status: RESOLVED | Noted: 2018-01-15 | Resolved: 2018-02-07

## 2018-02-13 ENCOUNTER — OFFICE VISIT (OUTPATIENT)
Dept: OPHTHALMOLOGY | Facility: CLINIC | Age: 78
End: 2018-02-13
Payer: COMMERCIAL

## 2018-02-13 DIAGNOSIS — Z96.1 PSEUDOPHAKIA: Primary | ICD-10-CM

## 2018-02-13 PROCEDURE — 99024 POSTOP FOLLOW-UP VISIT: CPT | Performed by: OPHTHALMOLOGY

## 2018-02-13 ASSESSMENT — TONOMETRY
OD_IOP_MMHG: 18
IOP_METHOD: APPLANATION

## 2018-02-13 ASSESSMENT — SLIT LAMP EXAM - LIDS
COMMENTS: 1+ DERMATOCHALASIS - UPPER LID
COMMENTS: 1+ DERMATOCHALASIS - UPPER LID

## 2018-02-13 ASSESSMENT — VISUAL ACUITY
METHOD: SNELLEN - LINEAR
OD_SC: 20/25
OD_SC+: -2
OS_SC: 20/25
OS_SC+: -2

## 2018-02-13 ASSESSMENT — REFRACTION_MANIFEST
OD_CYLINDER: SPHERE
OD_SPHERE: -0.50
OD_ADD: +3.00

## 2018-02-13 ASSESSMENT — EXTERNAL EXAM - LEFT EYE: OS_EXAM: NORMAL

## 2018-02-13 ASSESSMENT — EXTERNAL EXAM - RIGHT EYE: OD_EXAM: NORMAL

## 2018-02-13 NOTE — MR AVS SNAPSHOT
"              After Visit Summary   2/13/2018    Clementina Cooper    MRN: [id number]           Patient Information     Date Of Birth          1940        Visit Information        Provider Department      2/13/2018 12:45 PM Rahul Jennings MD [facility]        Today's Diagnoses     Pseudophakia, ou; Yag Caps, od    -  1      Care Instructions    Continue same glasses   Call in October 2018 for an appointment in February 2019 for Complete Exam    Dr. Jennings [phone]          Follow-ups after your visit        Your next 10 appointments already scheduled     Mar 13, 2018  9:20 AM CDT   Anticoagulation Visit with CP ANTI COAG   [facility]    [address] [facility] [address] [phone]              Who to contact     If you have questions or need follow up information about today's clinic visit or your schedule please contact [facility] directly at [phone].  Normal or non-critical lab and imaging results will be communicated to you by Taboolahart, letter or phone within 4 business days after the clinic has received the results. If you do not hear from us within 7 days, please contact the clinic through Intervention Insightst or phone. If you have a critical or abnormal lab result, we will notify you by phone as soon as possible.  Submit refill requests through [facility] or call your pharmacy and they will forward the refill request to us. Please allow 3 business days for your refill to be completed.          Additional Information About Your Visit        TaboolaharPorphyrio Information     [facility] lets you send messages to your doctor, view your test results, renew your prescriptions, schedule appointments and more. To sign up, go to [email or web address] . Click on \"Log in\" on the left side of the screen, which will take you to the Welcome page. Then click on \"Sign up Now\" on the right side of the page.     You " will be asked to enter the access code listed below, as well as some personal information. Please follow the directions to create your username and password.     Your access code is: [email or web address]  Expires: 2018 12:59 PM     Your access code will  in 90 days. If you need help or a new code, please call your [place] clinic or [phone].        Care EveryWhere ID     This is your Care EveryWhere ID. This could be used by other organizations to access your [place] medical records  [id number]         Blood Pressure from Last 3 Encounters:   17 116/74   17 108/63   17 119/66    Weight from Last 3 Encounters:   17 93 kg (205 lb)   17 91.6 kg (202 lb)   17 92.1 kg (203 lb)              Today, you had the following     No orders found for display       Primary Care Provider Office Phone # Fax #    Estela Davila [phone]       [address] [address]        Equal Access to Services     DIANA [facility] HospitalBLANCA : Hadii aad ku hadasho Soherson, waaxda luqadaha, qaybta kaalmada sravan, jeanette schmidt . So [facility] [phone].    ATENCIÓN: Si habla español, tiene a carvjaal disposición servicios gratuitos de asistencia lingüística. LlOhioHealth [facility] [phone].    We comply with applicable federal civil rights laws and [place] laws. We do not discriminate on the basis of race, color, national origin, age, disability, sex, sexual orientation, or gender identity.            Thank you!     Thank you for choosing [facility]  for your care. Our goal is always to provide you with excellent care. Hearing back from our patients is one way we can continue to improve our services. Please take a few minutes to complete the written survey that you may receive in the mail after your visit with us. Thank you!             Your Updated Medication List - Protect others around you: Learn how to safely use, store and throw away  your medicines at www.disposemymeds.org.          This list is accurate as of 2/13/18  1:02 PM.  Always use your most recent med list.                   Brand Name Dispense Instructions for use Diagnosis    ACE NOT PRESCRIBED (INTENTIONAL)     0 each    1 each At Bedtime ACE Inhibitor not prescribed due to Other:  Neck swelling.  Also, BP is on low normal side with the betablocker    Type 2 diabetes, HbA1c goal < 7% (H)       aspirin 81 MG tablet      Take 1 tablet by mouth daily.    Type 2 diabetes, HbA1c goal < 7% (H)       atorvastatin 20 MG tablet    LIPITOR    102 tablet    TAKE 1 TABLET (20 MG) BY MOUTH DAILY    Hyperlipidemia LDL goal <100, PVD (peripheral vascular disease) (H)       B-12 1000 MCG Tbcr      Take 1 tablet by mouth daily        Biotin 5000 MCG Caps      Take  by mouth.        blood glucose monitoring test strip    no brand specified    200 each    One Touch Ultra test strips - Use to test blood sugar 2 times daily or as directed.    Type 2 diabetes mellitus without complication (H)       CALCIUM 500 + D PO      one daily        CENTRUM SILVER PO      one daily        cholecalciferol 1000 UNIT tablet    vitamin D3     1 TABLET DAILY        clobetasol 0.05 % cream    TEMOVATE     Apply topically 2 times daily    Psoriasis       Evening Primrose Oil 1000 MG Caps      Take by mouth daily        levothyroxine 100 MCG tablet    SYNTHROID/LEVOTHROID    90 tablet    TAKE 1 TABLET (100 MCG) BY MOUTH DAILY    Hypothyroidism due to acquired atrophy of thyroid       losartan 25 MG tablet    COZAAR    45 tablet    TAKE 0.5 TABLETS (12.5 MG) BY MOUTH DAILY    Diabetes mellitus, type 2 (H)       metFORMIN 500 MG tablet    GLUCOPHAGE    90 tablet    Take 1 tablet (500 mg) by mouth daily (with dinner)    Type 2 diabetes mellitus without complication, without long-term current use of insulin (H)       metoprolol tartrate 25 MG tablet    LOPRESSOR    90 tablet    Take 1 tablet (25 mg) by mouth every evening May  take one extra tab as needed if HR>100 at rest    Chronic atrial fibrillation (H)       * order for DME     1 Box    Equipment being ordered:  chemstrips for daily blood sugar checks.    Type 2 diabetes, HbA1c goal < 7% (H)       * order for DME     90 days    Equipment being ordered:  One Touch strips to be used daily    Type 2 diabetes, HbA1c goal < 7% (H)       propranolol 10 MG tablet    INDERAL    180 tablet    TAKE 1 TABLET (10 MG) BY MOUTH 2 TIMES DAILY AS NEEDED    Essential tremor       warfarin 5 MG tablet    COUMADIN    102 tablet    Take 1 tablet (5 mg) by mouth daily MWF & 1/2 tablet (2.5mg) all other days    Anticoagulation goal of INR 2 to 3       * Notice:  This list has 2 medication(s) that are the same as other medications prescribed for you. Read the directions carefully, and ask your doctor or other care provider to review them with you.

## 2018-02-13 NOTE — LETTER
2/13/2018         RE: Clementina Cooper  [address] [facility] [address]        Dear Colleague,    Thank you for referring your patient, Clementina Cooper, to the [facility]. Please see a copy of my visit note below.     Current Eye Medications:  None     Subjective:  S/p YAG right eye, doing well, seeing better.  No pain     Objective:  See Ophthalmology Exam.       Assessment:  Doing well s/p Yag Caps right eye.      Plan:  Continue same glasses   Call in October 2018 for an appointment in February 2019 for Complete Exam    Dr. Jennings [phone]         Again, thank you for allowing me to participate in the care of your patient.        Sincerely,        Rahul Jennings MD

## 2018-02-13 NOTE — PROGRESS NOTES
Current Eye Medications:  None     Subjective:  S/p YAG right eye, doing well, seeing better.  No pain     Objective:  See Ophthalmology Exam.       Assessment:  Doing well s/p Yag Caps right eye.      Plan:  Continue same glasses   Call in October 2018 for an appointment in February 2019 for Complete Exam    Dr. Jennings [phone]

## 2018-03-05 DIAGNOSIS — L29.2 VULVAR ITCHING: ICD-10-CM

## 2018-03-06 DIAGNOSIS — Z79.01 ANTICOAGULATION GOAL OF INR 2 TO 3: ICD-10-CM

## 2018-03-06 DIAGNOSIS — Z51.81 ANTICOAGULATION GOAL OF INR 2 TO 3: ICD-10-CM

## 2018-03-06 RX ORDER — CLOBETASOL PROPIONATE 0.5 MG/G
CREAM TOPICAL
Qty: 60 G | Refills: 2 | Status: SHIPPED | OUTPATIENT
Start: 2018-03-06 | End: 2019-02-04

## 2018-03-06 NOTE — TELEPHONE ENCOUNTER
Routing refill request to provider for review/approval because:  Medication is reported/historical    clobetasol (TEMOVATE) 0.05 % cream   7/3/2017  --   Sig: Apply topically 2 times daily   Class: Historical     Route to PCP    Shagufta BrownRN

## 2018-03-06 NOTE — TELEPHONE ENCOUNTER
Requested Prescriptions   Pending Prescriptions Disp Refills     clobetasol (TEMOVATE) 0.05 % cream [Pharmacy Med Name: CLOBETASOL 0.05% CREAM] 60 g 2     Sig: APPLY SPARINGLY TO AFFECTED AREA TWICE WEEKLY    There is no refill protocol information for this order         Last Written Prescription Date:  na  Last Fill Quantity: na,   # refills: na  Last Office Visit: 11/2/17  Future Office visit:    Next 5 appointments (look out 90 days)     May 22, 2018  9:20 AM CDT   PHYSICAL with Estela Davila MD   [facility] ([facility])    [address] [place] [id number] [phone]                   Routing refill request to provider for review/approval because:  Medication is reported/historical

## 2018-03-07 RX ORDER — WARFARIN SODIUM 5 MG/1
TABLET ORAL
Qty: 102 TABLET | Refills: 2 | Status: SHIPPED | OUTPATIENT
Start: 2018-03-07 | End: 2018-05-22

## 2018-03-13 ENCOUNTER — ANTICOAGULATION THERAPY VISIT (OUTPATIENT)
Dept: NURSING | Facility: CLINIC | Age: 78
End: 2018-03-13
Payer: COMMERCIAL

## 2018-03-13 DIAGNOSIS — Z79.01 LONG-TERM (CURRENT) USE OF ANTICOAGULANTS: ICD-10-CM

## 2018-03-13 LAB — INR POINT OF CARE: 2.2 (ref 0.86–1.14)

## 2018-03-13 PROCEDURE — 85610 PROTHROMBIN TIME: CPT | Mod: QW

## 2018-03-13 PROCEDURE — 99207 ZZC NO CHARGE NURSE ONLY: CPT

## 2018-03-13 PROCEDURE — 36416 COLLJ CAPILLARY BLOOD SPEC: CPT

## 2018-03-13 NOTE — PROGRESS NOTES
ANTICOAGULATION FOLLOW-UP CLINIC VISIT    Patient Name:  Clementina Cooper  Date:  3/13/2018  Contact Type:  Face to Face    SUBJECTIVE: Patient here for routine INR.  She reports everything is the same, no medicine or diet changes.  She is feeling well and has not had any bleeding or clotting symptoms.  Plan same warfarin dose and recheck.  Patient agreeable to plan of care.     Patient Findings     Positives No Problem Findings           OBJECTIVE    INR Protime   Date Value Ref Range Status   03/13/2018 2.2 (A) 0.86 - 1.14 Final       ASSESSMENT / PLAN  INR assessment THER    Recheck INR In: 8 WEEKS    INR Location Clinic      Anticoagulation Summary as of 3/13/2018     INR goal 2.0-3.0   Today's INR 2.2   Maintenance plan 2.5 mg (5 mg x 0.5) on Tue, Thu, Sat; 5 mg (5 mg x 1) all other days   Full instructions 2.5 mg on Tue, Thu, Sat; 5 mg all other days   Weekly total 27.5 mg   No change documented Shagufta Bell RN   Plan last modified Shagufta Bell RN (11/28/2017)   Next INR check 5/8/2018   Target end date Indefinite    Indications   Long-term (current) use of anticoagulants [Z79.01] [Z79.01]  Atrial fibrillation (H) (Resolved) [I48.91]  Atrial fibrillation (H) (Resolved) [I48.91]         Anticoagulation Episode Summary     INR check location     Preferred lab     Send INR reminders to [facility]    Comments       Anticoagulation Care Providers     Provider Role Specialty Phone number    Estela Davila MD  Internal Medicine [phone]            See the Encounter Report to view Anticoagulation Flowsheet and Dosing Calendar (Go to Encounters tab in chart review, and find the Anticoagulation Therapy Visit)    Dosage adjustment made based on physician directed care plan.    Shagufta Bell RN

## 2018-03-13 NOTE — MR AVS SNAPSHOT
Jingjanellsandra Cooper   3/13/2018 9:20 AM   Anticoagulation Therapy Visit    Description:  77 year old female   Provider:  CONNOR ANTI COAG   Department:  Cp Nurse           INR as of 3/13/2018     Today's INR 2.2      Anticoagulation Summary as of 3/13/2018     INR goal 2.0-3.0   Today's INR 2.2   Full instructions 2.5 mg on Tue, Thu, Sat; 5 mg all other days   Next INR check 5/8/2018    Indications   Long-term (current) use of anticoagulants [Z79.01] [Z79.01]  Atrial fibrillation (H) (Resolved) [I48.91]  Atrial fibrillation (H) (Resolved) [I48.91]         Your next Anticoagulation Clinic appointment(s)     May 08, 2018  9:20 AM CDT   Anticoagulation Visit with CONNOR ANTI COAG   [facility] ([facility])    [address] [place] [address] [phone]              Contact Numbers     [facility]  Please call [phone] or [phone]  to cancel and/or reschedule your appointment.   Please call [phone] with any problems or questions regarding your therapy          March 2018 Details    Sun Mon Tue Wed Thu Fri Sat         1               2               3                 4               5               6               7               8               9               10                 11               12               13      2.5 mg   See details      14      5 mg         15      2.5 mg         16      5 mg         17      2.5 mg           18      5 mg         19      5 mg         20      2.5 mg         21      5 mg         22      2.5 mg         23      5 mg         24      2.5 mg           25      5 mg         26      5 mg         27      2.5 mg         28      5 mg         29      2.5 mg         30      5 mg         31      2.5 mg          Date Details   03/13 This INR check               How to take your warfarin dose     To take:  2.5 mg Take 0.5 of a 5 mg tablet.    To take:  5 mg Take 1 of the 5 mg tablets.            April 2018 Details    Sun Mon Tue Wed Thu Fri Sat     1      5 mg         2      5 mg         3      2.5 mg         4      5 mg         5      2.5 mg         6      5 mg         7      2.5 mg           8      5 mg         9      5 mg         10      2.5 mg         11      5 mg         12      2.5 mg         13      5 mg         14      2.5 mg           15      5 mg         16      5 mg         17      2.5 mg         18      5 mg         19      2.5 mg         20      5 mg         21      2.5 mg           22      5 mg         23      5 mg         24      2.5 mg         25      5 mg         26      2.5 mg         27      5 mg         28      2.5 mg           29      5 mg         30      5 mg               Date Details   No additional details            How to take your warfarin dose     To take:  2.5 mg Take 0.5 of a 5 mg tablet.    To take:  5 mg Take 1 of the 5 mg tablets.           May 2018 Details    Sun Mon Tue Wed Thu Fri Sat       1      2.5 mg         2      5 mg         3      2.5 mg         4      5 mg         5      2.5 mg           6      5 mg         7      5 mg         8            9               10               11               12                 13               14               15               16               17               18               19                 20               21               22               23               24               25               26                 27               28               29               30               31                  Date Details   No additional details    Date of next INR:  5/8/2018         How to take your warfarin dose     To take:  2.5 mg Take 0.5 of a 5 mg tablet.    To take:  5 mg Take 1 of the 5 mg tablets.

## 2018-03-15 DIAGNOSIS — E11.9 DIABETES MELLITUS, TYPE 2 (H): ICD-10-CM

## 2018-03-15 NOTE — TELEPHONE ENCOUNTER
"Requested Prescriptions   Pending Prescriptions Disp Refills     losartan (COZAAR) 25 MG tablet [Pharmacy Med Name: LOSARTAN POTASSIUM 25 MG TAB] 45 tablet 1    Last Written Prescription Date:  9-12-17  Last Fill Quantity: 45,  # refills: 1   Last office visit: 11/2/2017 with prescribing provider:     Future Office Visit:   Next 5 appointments (look out 90 days)     May 22, 2018  9:20 AM CDT   [facility]ila [facility]   [facility]    [address] [place] [id number] [phone]                  Sig: TAKE 0.5 TABLETS (12.5 MG) BY MOUTH DAILY    Angiotensin-II Receptors Passed    3/15/2018  8:39 AM       Passed - Blood pressure under 140/90 in past 12 months    BP Readings from Last 3 Encounters:   11/02/17 116/74   09/18/17 108/63   07/03/17 119/66                Passed - Recent (12 mo) or future (30 days) visit within the authorizing provider's specialty    Patient had office visit in the last 12 months or has a visit in the next 30 days with authorizing provider or within the authorizing provider's specialty.  See \"Patient Info\" tab in inbasket, or \"Choose Columns\" in Meds & Orders section of the refill encounter.           Passed - Patient is age 18 or older       Passed - No active pregnancy on record       Passed - Normal serum creatinine on file in past 12 months    Recent Labs   Lab Test  03/21/17   0728   CR  0.82            Passed - Normal serum potassium on file in past 12 months    Recent Labs   Lab Test  03/21/17   0728   POTASSIUM  4.0                   Passed - No positive pregnancy test in past 12 months          "

## 2018-03-19 RX ORDER — LOSARTAN POTASSIUM 25 MG/1
TABLET ORAL
Qty: 45 TABLET | Refills: 1 | Status: SHIPPED | OUTPATIENT
Start: 2018-03-19 | End: 2018-09-12

## 2018-03-19 NOTE — TELEPHONE ENCOUNTER
BP WNL    Anticoagulation Therapy Visit on 03/13/2018   Component Date Value Ref Range Status     INR Protime 03/13/2018 2.2* 0.86 - 1.14 Final     Prescription approved per [facility] to the [facility] Refill Protocol.  Crista Kay RN  [facility]

## 2018-05-02 DIAGNOSIS — I48.20 CHRONIC ATRIAL FIBRILLATION (H): ICD-10-CM

## 2018-05-02 NOTE — TELEPHONE ENCOUNTER
"Requested Prescriptions   Pending Prescriptions Disp Refills     metoprolol tartrate (LOPRESSOR) 25 MG tablet [Pharmacy Med Name: METOPROLOL TARTRATE 25 MG TAB] 90 tablet 3    Last Written Prescription Date:  3/28/17  Last Fill Quantity: 90,  # refills: 3   Last office visit: 11/2/2017 with prescribing provider:     Future Office Visit:   Next 5 appointments (look out 90 days)     May 22, 2018  9:20 AM CDT   PHYSICAL with Estela Davila MD   [facility]    [address] [place] [id number] [phone]                  Sig: TAKE 1 TABLET (25 MG) BY MOUTH EVERY EVENING MAY TAKE ONE EXTRA TAB AS NEEDED IF HR>100 AT REST    Beta-Blockers Protocol Passed    5/2/2018  1:59 PM       Passed - Blood pressure under 140/90 in past 12 months    BP Readings from Last 3 Encounters:   11/02/17 116/74   09/18/17 108/63   07/03/17 119/66                Passed - Patient is age 6 or older       Passed - Recent (12 mo) or future (30 days) visit within the authorizing provider's specialty    Patient had office visit in the last 12 months or has a visit in the next 30 days with authorizing provider or within the authorizing provider's specialty.  See \"Patient Info\" tab in inbasket, or \"Choose Columns\" in Meds & Orders section of the refill encounter.              "

## 2018-05-03 RX ORDER — METOPROLOL TARTRATE 25 MG/1
TABLET, FILM COATED ORAL
Qty: 90 TABLET | Refills: 1 | Status: SHIPPED | OUTPATIENT
Start: 2018-05-03 | End: 2018-09-20

## 2018-05-03 NOTE — TELEPHONE ENCOUNTER
Prescription approved per [facility] Refill Protocol.  Anna Bruno RN  [facility]

## 2018-05-08 ENCOUNTER — ANTICOAGULATION THERAPY VISIT (OUTPATIENT)
Dept: NURSING | Facility: CLINIC | Age: 78
End: 2018-05-08
Payer: COMMERCIAL

## 2018-05-08 DIAGNOSIS — Z79.01 LONG-TERM (CURRENT) USE OF ANTICOAGULANTS: ICD-10-CM

## 2018-05-08 LAB — INR POINT OF CARE: 2 (ref 0.86–1.14)

## 2018-05-08 PROCEDURE — 85610 PROTHROMBIN TIME: CPT | Mod: QW

## 2018-05-08 PROCEDURE — 36416 COLLJ CAPILLARY BLOOD SPEC: CPT

## 2018-05-08 PROCEDURE — 99207 ZZC NO CHARGE NURSE ONLY: CPT

## 2018-05-08 NOTE — PROGRESS NOTES
ANTICOAGULATION FOLLOW-UP CLINIC VISIT    Patient Name:  Clementina Cooper  Date:  5/8/2018  Contact Type:  Face to Face    SUBJECTIVE: Patient is here for routine INR.  She reports everything is the same, no changes or problems.  She is feeling well.  Plan to continue the same warfarin dose and usual recheck.  Patient agreeable to plan of care.     Patient Findings     Positives No Problem Findings           OBJECTIVE    INR Protime   Date Value Ref Range Status   05/08/2018 2.0 (A) 0.86 - 1.14 Final       ASSESSMENT / PLAN  INR assessment THER    Recheck INR In: 8 WEEKS    INR Location Clinic      Anticoagulation Summary as of 5/8/2018     INR goal 2.0-3.0   Today's INR 2.0   Maintenance plan 2.5 mg (5 mg x 0.5) on Tue, Thu, Sat; 5 mg (5 mg x 1) all other days   Full instructions 2.5 mg on Tue, Thu, Sat; 5 mg all other days   Weekly total 27.5 mg   No change documented Shagufta Bell RN   Plan last modified Shagufta Bell RN (11/28/2017)   Next INR check 7/3/2018   Target end date Indefinite    Indications   Long-term (current) use of anticoagulants [Z79.01] [Z79.01]  Atrial fibrillation (H) (Resolved) [I48.91]  Atrial fibrillation (H) (Resolved) [I48.91]         Anticoagulation Episode Summary     INR check location     Preferred lab     Send INR reminders to [facility]    Comments       Anticoagulation Care Providers     Provider Role Specialty Phone number    Estela Davila MD  Internal Medicine [phone]            See the Encounter Report to view Anticoagulation Flowsheet and Dosing Calendar (Go to Encounters tab in chart review, and find the Anticoagulation Therapy Visit)    Dosage adjustment made based on physician directed care plan.    Shagufta Bell RN

## 2018-05-08 NOTE — MR AVS SNAPSHOT
Clementina Cooper   5/8/2018 9:20 AM   Anticoagulation Therapy Visit    Description:  77 year old female   Provider:  [facility]   Department:  [facility]           INR as of 5/8/2018     Today's INR 2.0      Anticoagulation Summary as of 5/8/2018     INR goal 2.0-3.0   Today's INR 2.0   Full instructions 2.5 mg on Tue, Thu, Sat; 5 mg all other days   Next INR check 7/3/2018    Indications   Long-term (current) use of anticoagulants [Z79.01] [Z79.01]  Atrial fibrillation (H) (Resolved) [I48.91]  Atrial fibrillation (H) (Resolved) [I48.91]         Your next Anticoagulation Clinic appointment(s)     Jul 03, 2018  9:20 AM CDT   Anticoagulation Visit with [facility]   [facility] ([facility])    [address] [place] [id number] [phone]              Contact Numbers     [facility]  Please call [phone] or [phone]  to cancel and/or reschedule your appointment.   Please call [phone] with any problems or questions regarding your therapy          May 2018 Details    Sun Mon Tue Wed Thu Fri Sat       1               2               3               4               5                 6               7               8      2.5 mg   See details      9      5 mg         10      2.5 mg         11      5 mg         12      2.5 mg           13      5 mg         14      5 mg         15      2.5 mg         16      5 mg         17      2.5 mg         18      5 mg         19      2.5 mg           20      5 mg         21      5 mg         22      2.5 mg         23      5 mg         24      2.5 mg         25      5 mg         26      2.5 mg           27      5 mg         28      5 mg         29      2.5 mg         30      5 mg         31      2.5 mg            Date Details   05/08 This INR check               How to take your warfarin dose     To take:  2.5 mg Take 0.5 of a 5 mg tablet.    To take:  5 mg Take 1 of the 5 mg  tablets.           June 2018 Details    Sun Mon Tue Wed Thu Fri Sat          1      5 mg         2      2.5 mg           3      5 mg         4      5 mg         5      2.5 mg         6      5 mg         7      2.5 mg         8      5 mg         9      2.5 mg           10      5 mg         11      5 mg         12      2.5 mg         13      5 mg         14      2.5 mg         15      5 mg         16      2.5 mg           17      5 mg         18      5 mg         19      2.5 mg         20      5 mg         21      2.5 mg         22      5 mg         23      2.5 mg           24      5 mg         25      5 mg         26      2.5 mg         27      5 mg         28      2.5 mg         29      5 mg         30      2.5 mg          Date Details   No additional details            How to take your warfarin dose     To take:  2.5 mg Take 0.5 of a 5 mg tablet.    To take:  5 mg Take 1 of the 5 mg tablets.           July 2018 Details    Sun Mon Tue Wed Thu Fri Sat     1      5 mg         2      5 mg         3            4               5               6               7                 8               9               10               11               12               13               14                 15               16               17               18               19               20               21                 22               23               24               25               26               27               28                 29               30               31                    Date Details   No additional details    Date of next INR:  7/3/2018         How to take your warfarin dose     To take:  2.5 mg Take 0.5 of a 5 mg tablet.    To take:  5 mg Take 1 of the 5 mg tablets.

## 2018-05-16 DIAGNOSIS — E78.5 HYPERLIPIDEMIA LDL GOAL <100: ICD-10-CM

## 2018-05-16 DIAGNOSIS — E03.4 HYPOTHYROIDISM DUE TO ACQUIRED ATROPHY OF THYROID: ICD-10-CM

## 2018-05-16 DIAGNOSIS — I73.9 PVD (PERIPHERAL VASCULAR DISEASE) (H): ICD-10-CM

## 2018-05-16 DIAGNOSIS — E11.9 TYPE 2 DIABETES MELLITUS WITHOUT COMPLICATION, WITHOUT LONG-TERM CURRENT USE OF INSULIN (H): ICD-10-CM

## 2018-05-16 LAB
ALT SERPL W P-5'-P-CCNC: 31 U/L (ref 0–50)
ANION GAP SERPL CALCULATED.3IONS-SCNC: 6 MMOL/L (ref 3–14)
AST SERPL W P-5'-P-CCNC: 18 U/L (ref 0–45)
BUN SERPL-MCNC: 21 MG/DL (ref 7–30)
CALCIUM SERPL-MCNC: 8.9 MG/DL (ref 8.5–10.1)
CHLORIDE SERPL-SCNC: 109 MMOL/L (ref 94–109)
CHOLEST SERPL-MCNC: 148 MG/DL
CO2 SERPL-SCNC: 29 MMOL/L (ref 20–32)
CREAT SERPL-MCNC: 0.84 MG/DL (ref 0.52–1.04)
CREAT UR-MCNC: 151 MG/DL
GFR SERPL CREATININE-BSD FRML MDRD: 66 ML/MIN/1.7M2
GLUCOSE SERPL-MCNC: 135 MG/DL (ref 70–99)
HBA1C MFR BLD: 7 % (ref 0–5.6)
HDLC SERPL-MCNC: 44 MG/DL
LDLC SERPL CALC-MCNC: 81 MG/DL
MICROALBUMIN UR-MCNC: <5 MG/L
MICROALBUMIN/CREAT UR: NORMAL MG/G CR (ref 0–25)
NONHDLC SERPL-MCNC: 104 MG/DL
POTASSIUM SERPL-SCNC: 4.2 MMOL/L (ref 3.4–5.3)
SODIUM SERPL-SCNC: 144 MMOL/L (ref 133–144)
TRIGL SERPL-MCNC: 113 MG/DL
TSH SERPL DL<=0.005 MIU/L-ACNC: 2.61 MU/L (ref 0.4–4)

## 2018-05-16 PROCEDURE — 80061 LIPID PANEL: CPT | Performed by: INTERNAL MEDICINE

## 2018-05-16 PROCEDURE — 84443 ASSAY THYROID STIM HORMONE: CPT | Performed by: INTERNAL MEDICINE

## 2018-05-16 PROCEDURE — 80048 BASIC METABOLIC PNL TOTAL CA: CPT | Performed by: INTERNAL MEDICINE

## 2018-05-16 PROCEDURE — 36415 COLL VENOUS BLD VENIPUNCTURE: CPT | Performed by: INTERNAL MEDICINE

## 2018-05-16 PROCEDURE — 83036 HEMOGLOBIN GLYCOSYLATED A1C: CPT | Performed by: INTERNAL MEDICINE

## 2018-05-16 PROCEDURE — 82043 UR ALBUMIN QUANTITATIVE: CPT | Performed by: INTERNAL MEDICINE

## 2018-05-16 PROCEDURE — 84460 ALANINE AMINO (ALT) (SGPT): CPT | Performed by: INTERNAL MEDICINE

## 2018-05-16 PROCEDURE — 84450 TRANSFERASE (AST) (SGOT): CPT | Performed by: INTERNAL MEDICINE

## 2018-05-16 NOTE — PROGRESS NOTES
Clementina Cooper    Enclosed are your results.  Your labs are normal/stable at this time.     Please call  with any questions.  We can also discuss any questions regarding these labs at your next scheduled visit.    Sincerely,    Estela Davila M.D.

## 2018-05-16 NOTE — LETTER
[facility]  [address] [place]  [address]  [phone]        May 17, 2018    Clementina Cooper  [address] [facility] [address]        Dear Clementina,    Enclosed are your results.  Your labs are normal/stable at this time.     Please call  with any questions.  We can also discuss any questions regarding these labs at your next scheduled visit.    Results for orders placed or performed in visit on 05/16/18   Basic metabolic panel   Result Value Ref Range    Sodium 144 133 - 144 mmol/L    Potassium 4.2 3.4 - 5.3 mmol/L    Chloride 109 94 - 109 mmol/L    Carbon Dioxide 29 20 - 32 mmol/L    Anion Gap 6 3 - 14 mmol/L    Glucose 135 (H) 70 - 99 mg/dL    Urea Nitrogen 21 7 - 30 mg/dL    Creatinine 0.84 0.52 - 1.04 mg/dL    GFR Estimate 66 >60 mL/min/1.7m2    GFR Estimate If Black 80 >60 mL/min/1.7m2    Calcium 8.9 8.5 - 10.1 mg/dL   Albumin Random Urine Quantitative with Creat Ratio   Result Value Ref Range    Creatinine Urine 151 mg/dL    Albumin Urine mg/L <5 mg/L    Albumin Urine mg/g Cr Unable to calculate due to low value 0 - 25 mg/g Cr   Lipid panel reflex to direct LDL Fasting   Result Value Ref Range    Cholesterol 148 <200 mg/dL    Triglycerides 113 <150 mg/dL    HDL Cholesterol 44 (L) >49 mg/dL    LDL Cholesterol Calculated 81 <100 mg/dL    Non HDL Cholesterol 104 <130 mg/dL   AST   Result Value Ref Range    AST 18 0 - 45 U/L   ALT   Result Value Ref Range    ALT 31 0 - 50 U/L   Hemoglobin A1c   Result Value Ref Range    Hemoglobin A1C 7.0 (H) 0 - 5.6 %   TSH with free T4 reflex   Result Value Ref Range    TSH 2.61 0.40 - 4.00 mU/L       If you have any questions please call the clinic at [phone].    Sincerely,    Estela RIZVI

## 2018-05-22 ENCOUNTER — OFFICE VISIT (OUTPATIENT)
Dept: FAMILY MEDICINE | Facility: CLINIC | Age: 78
End: 2018-05-22
Payer: COMMERCIAL

## 2018-05-22 VITALS
HEART RATE: 62 BPM | SYSTOLIC BLOOD PRESSURE: 133 MMHG | HEIGHT: 65 IN | OXYGEN SATURATION: 95 % | WEIGHT: 207 LBS | DIASTOLIC BLOOD PRESSURE: 60 MMHG | TEMPERATURE: 97.1 F | BODY MASS INDEX: 34.49 KG/M2

## 2018-05-22 DIAGNOSIS — R21 GROIN RASH: ICD-10-CM

## 2018-05-22 DIAGNOSIS — I73.9 PVD (PERIPHERAL VASCULAR DISEASE) (H): ICD-10-CM

## 2018-05-22 DIAGNOSIS — E78.5 HYPERLIPIDEMIA LDL GOAL <100: ICD-10-CM

## 2018-05-22 DIAGNOSIS — I48.20 CHRONIC ATRIAL FIBRILLATION (H): ICD-10-CM

## 2018-05-22 DIAGNOSIS — Z51.81 ANTICOAGULATION GOAL OF INR 2 TO 3: ICD-10-CM

## 2018-05-22 DIAGNOSIS — Z79.01 LONG-TERM (CURRENT) USE OF ANTICOAGULANTS: ICD-10-CM

## 2018-05-22 DIAGNOSIS — Z79.01 ANTICOAGULATION GOAL OF INR 2 TO 3: ICD-10-CM

## 2018-05-22 DIAGNOSIS — Z23 NEED FOR SHINGLES VACCINE: ICD-10-CM

## 2018-05-22 DIAGNOSIS — Z00.00 ROUTINE GENERAL MEDICAL EXAMINATION AT A HEALTH CARE FACILITY: Primary | ICD-10-CM

## 2018-05-22 DIAGNOSIS — L40.9 PSORIASIS: ICD-10-CM

## 2018-05-22 DIAGNOSIS — E11.9 TYPE 2 DIABETES MELLITUS WITHOUT COMPLICATION, WITHOUT LONG-TERM CURRENT USE OF INSULIN (H): ICD-10-CM

## 2018-05-22 DIAGNOSIS — E03.4 HYPOTHYROIDISM DUE TO ACQUIRED ATROPHY OF THYROID: ICD-10-CM

## 2018-05-22 PROCEDURE — 99397 PER PM REEVAL EST PAT 65+ YR: CPT | Mod: 25 | Performed by: INTERNAL MEDICINE

## 2018-05-22 PROCEDURE — 99213 OFFICE O/P EST LOW 20 MIN: CPT | Mod: 25 | Performed by: INTERNAL MEDICINE

## 2018-05-22 PROCEDURE — 90471 IMMUNIZATION ADMIN: CPT | Performed by: INTERNAL MEDICINE

## 2018-05-22 RX ORDER — WARFARIN SODIUM 5 MG/1
TABLET ORAL
Qty: 102 TABLET | Refills: 3 | Status: SHIPPED | OUTPATIENT
Start: 2018-05-22 | End: 2018-12-18

## 2018-05-22 RX ORDER — FLUCONAZOLE 150 MG/1
150 TABLET ORAL
Qty: 4 TABLET | Refills: 0 | Status: SHIPPED | OUTPATIENT
Start: 2018-05-22 | End: 2018-09-27

## 2018-05-22 RX ORDER — CLOBETASOL PROPIONATE 0.5 MG/G
1 CREAM TOPICAL 2 TIMES DAILY PRN
Qty: 180 G | Refills: 2 | Status: SHIPPED | OUTPATIENT
Start: 2018-05-22 | End: 2019-07-01

## 2018-05-22 ASSESSMENT — ACTIVITIES OF DAILY LIVING (ADL)
I_NEED_ASSISTANCE_FOR_THE_FOLLOWING_DAILY_ACTIVITIES:: NO ASSISTANCE IS NEEDED
CURRENT_FUNCTION: NO ASSISTANCE NEEDED

## 2018-05-22 NOTE — PATIENT INSTRUCTIONS
Trial fluconazole 150 mg -- may repeat every few days as needed.      Consider Dermatology referral:  [facility] Dougie Reagan [phone]   [email or web address]  -- bring all remedies you have tried for review    Return to clinic 4 - 6 months (mid to late Fall) for diabetes follow up, labs prior

## 2018-05-22 NOTE — NURSING NOTE
Screening Questionnaire for Adult Immunization    Are you sick today?   No   Do you have allergies to medications, food, a vaccine component or latex?   No   Have you ever had a serious reaction after receiving a vaccination?   No   Do you have a long-term health problem with heart disease, lung disease, asthma, kidney disease, metabolic disease (e.g. diabetes), anemia, or other blood disorder?   No   Do you have cancer, leukemia, HIV/AIDS, or any other immune system problem?   No   In the past 3 months, have you taken medications that affect  your immune system, such as prednisone, other steroids, or anticancer drugs; drugs for the treatment of rheumatoid arthritis, Crohn s disease, or psoriasis; or have you had radiation treatments?   No   Have you had a seizure, or a brain or other nervous system problem?   No   During the past year, have you received a transfusion of blood or blood     products, or been given immune (gamma) globulin or antiviral drug?   No   For women: Are you pregnant or is there a chance you could become        pregnant during the next month?   No   Have you received any vaccinations in the past 4 weeks?   No     Immunization questionnaire answers were all negative.        Patient instructed to remain in clinic for 15 minutes afterwards, and to report any adverse reaction to me immediately.  Vaccine information supplied.       Screening performed by Dannielle Travis on 5/22/2018 at 10:24 AM.

## 2018-05-22 NOTE — MR AVS SNAPSHOT
After Visit Summary   5/22/2018    Clementina Cooper    MRN: [id number]           Patient Information     Date Of Birth          1940        Visit Information        Provider Department      5/22/2018 9:20 AM Estela Davila MD [facility]        Today's Diagnoses     Routine general medical examination at a health care facility    -  1    Type 2 diabetes mellitus without complication, without long-term current use of insulin (H)        PVD (peripheral vascular disease) (H)        Hyperlipidemia LDL goal <100        Hypothyroidism due to acquired atrophy of thyroid        Chronic atrial fibrillation (H)        Long-term (current) use of anticoagulants [Z79.01]        Anticoagulation goal of INR 2 to 3        Need for shingles vaccine        Psoriasis        Groin rash          Care Instructions    Trial fluconazole 150 mg -- may repeat every few days as needed.      Consider Dermatology referral:  Christine Karuna  [facility] [phone]   [email or web address]  -- bring all remedies you have tried for review    Return to clinic 4 - 6 months (mid to late Fall) for diabetes follow up, labs prior            Follow-ups after your visit        Additional Services     DERMATOLOGY REFERRAL       Your provider has referred you to: N: [facility]  [facility] [phone]   [email or web address]    Please be aware that coverage of these services is subject to the terms and limitations of your health insurance plan.  Call member services at your health plan with any benefit or coverage questions.      Please bring the following with you to your appointment:    (1) Any X-Rays, CTs or MRIs which have been performed.  Contact the facility where they were done to arrange for  prior to your scheduled appointment.    (2) List of current medications  (3) This referral request   (4) Any documents/labs given to you for this referral       "            Your next 10 appointments already scheduled     2018  9:20 AM CDT   Anticoagulation Visit with CP ANTI COAG   [facility] ([facility]    [address] [facility] [id number] [phone]              Future tests that were ordered for you today     Open Future Orders        Priority Expected Expires Ordered    Hemoglobin A1c Routine  2019    **Vitamin B12 FUTURE 2mo Routine 2018            Who to contact     If you have questions or need follow up information about today's clinic visit or your schedule please contact [facility] directly at [phone].  Normal or non-critical lab and imaging results will be communicated to you by MyChart, letter or phone within 4 business days after the clinic has received the results. If you do not hear from us within 7 days, please contact the clinic through [employer] or phone. If you have a critical or abnormal lab result, we will notify you by phone as soon as possible.  Submit refill requests through [employer] or call your pharmacy and they will forward the refill request to us. Please allow 3 business days for your refill to be completed.          Additional Information About Your Visit        [employer] Information     [employer] lets you send messages to your doctor, view your test results, renew your prescriptions, schedule appointments and more. To sign up, go to [email or web address] . Click on \"Log in\" on the left side of the screen, which will take you to the Welcome page. Then click on \"Sign up Now\" on the right side of the page.     You will be asked to enter the access code listed below, as well as some personal information. Please follow the directions to create your username and password.     Your access code is: [email or web address]  Expires: 2018 10:19 AM     Your access code will  in 90 days. If you need help or " "a new code, please call your [facility] or [phone].        Care EveryWhere ID     This is your Care EveryWhere ID. This could be used by other organizations to access your [place] medical records  [id number]        Your Vitals Were     Pulse Temperature Height Pulse Oximetry BMI (Body Mass Index)       62 97.1  F (36.2  C) (Oral) 5' 5\" (1.651 m) 95% 34.45 kg/m2        Blood Pressure from Last 3 Encounters:   05/22/18 133/60   11/02/17 116/74   09/18/17 108/63    Weight from Last 3 Encounters:   05/22/18 207 lb (93.9 kg)   11/02/17 205 lb (93 kg)   09/18/17 202 lb (91.6 kg)              We Performed the Following     DERMATOLOGY REFERRAL     PAF COMPLETED     VACCINE ADMINISTRATION, INITIAL          Today's Medication Changes          These changes are accurate as of 5/22/18 10:19 AM.  If you have any questions, ask your nurse or doctor.               Start taking these medicines.        Dose/Directions    fluconazole 150 MG tablet   Commonly known as:  DIFLUCAN   Used for:  Groin rash   Started by:  Estela Davila MD        Dose:  150 mg   Take 1 tablet (150 mg) by mouth every 3 days   Quantity:  4 tablet   Refills:  0       zoster vaccine recombinant adjuvanted injection   Commonly known as:  SHINGRIX   Used for:  Need for shingles vaccine   Started by:  Estela Davila MD        Dose:  1 each   Inject 0.5 mLs into the muscle once for 1 dose   Quantity:  0.5 mL   Refills:  0         These medicines have changed or have updated prescriptions.        Dose/Directions    * clobetasol 0.05 % cream   Commonly known as:  TEMOVATE   This may have changed:  Another medication with the same name was changed. Make sure you understand how and when to take each.   Used for:  Vulvar itching   Changed by:  Estela Davila MD        APPLY SPARINGLY TO AFFECTED AREA TWICE WEEKLY   Quantity:  60 g   Refills:  2       * clobetasol 0.05 % cream   Commonly known as:  TEMOVATE   This may have changed:    - " how much to take  - when to take this  - reasons to take this   Used for:  Psoriasis   Changed by:  Estela Davila MD        Dose:  1 g   Apply 1 g topically 2 times daily as needed   Quantity:  180 g   Refills:  2       warfarin 5 MG tablet   Commonly known as:  COUMADIN   This may have changed:  additional instructions   Used for:  Anticoagulation goal of INR 2 to 3   Changed by:  Estela Davila MD        Take as directed by [facility]. Current dose is 5 mg S,M,W,F, and 2.5 mg T,Th, and Sat.   Quantity:  102 tablet   Refills:  3       * Notice:  This list has 2 medication(s) that are the same as other medications prescribed for you. Read the directions carefully, and ask your doctor or other care provider to review them with you.         Where to get your medicines      These medications were sent to [facility]/pharmacy #[id number] - [facility], [place] - [address] [address]     Phone:  [phone]     clobetasol 0.05 % cream    fluconazole 150 MG tablet    warfarin 5 MG tablet         These medications were sent to [facility] [address]  [address] [facility] [address]     Phone:  [phone]     zoster vaccine recombinant adjuvanted injection                Primary Care Provider Office Phone # Fax #    Estela Davila [phone]       [address] [facility] [address]        Equal Access to Services     [facility]: Hadii kalee valle hadkito Soherson, waaxda luqadaha, qaybta kaalmada adeegyada, jeanette anderson. So [place] -[phone].    ATENCIÓN: Si habla español, tiene a carvajal disposición servicios gratuitos de asistencia lingüística. Llame al [phone].    We comply with applicable federal civil rights laws and [place] laws. We do not discriminate on the basis of race, color, national origin, age, disability, sex, sexual orientation, or gender  identity.            Thank you!     Thank you for choosing [facility]  for your care. Our goal is always to provide you with excellent care. Hearing back from our patients is one way we can continue to improve our services. Please take a few minutes to complete the written survey that you may receive in the mail after your visit with us. Thank you!             Your Updated Medication List - Protect others around you: Learn how to safely use, store and throw away your medicines at www.disposemymeds.org.          This list is accurate as of 5/22/18 10:19 AM.  Always use your most recent med list.                   Brand Name Dispense Instructions for use Diagnosis    ACE NOT PRESCRIBED (INTENTIONAL)     0 each    1 each At Bedtime ACE Inhibitor not prescribed due to Other:  Neck swelling.  Also, BP is on low normal side with the betablocker    Type 2 diabetes, HbA1c goal < 7% (H)       aspirin 81 MG tablet      Take 1 tablet by mouth daily.    Type 2 diabetes, HbA1c goal < 7% (H)       atorvastatin 20 MG tablet    LIPITOR    102 tablet    TAKE 1 TABLET (20 MG) BY MOUTH DAILY    Hyperlipidemia LDL goal <100, PVD (peripheral vascular disease) (H)       B-12 1000 MCG Tbcr      Take 1 tablet by mouth daily        Biotin 5000 MCG Caps      Take  by mouth.        blood glucose monitoring test strip    no brand specified    200 each    One Touch Ultra test strips - Use to test blood sugar 2 times daily or as directed.    Type 2 diabetes mellitus without complication (H)       CENTRUM SILVER PO      one daily        cholecalciferol 1000 UNIT tablet    vitamin D3     1 TABLET DAILY        * clobetasol 0.05 % cream    TEMOVATE    60 g    APPLY SPARINGLY TO AFFECTED AREA TWICE WEEKLY    Vulvar itching       * clobetasol 0.05 % cream    TEMOVATE    180 g    Apply 1 g topically 2 times daily as needed    Psoriasis       Evening Primrose Oil 1000 MG Caps      Take by mouth daily        fluconazole 150 MG  tablet    DIFLUCAN    4 tablet    Take 1 tablet (150 mg) by mouth every 3 days    Groin rash       levothyroxine 100 MCG tablet    SYNTHROID/LEVOTHROID    90 tablet    TAKE 1 TABLET (100 MCG) BY MOUTH DAILY    Hypothyroidism due to acquired atrophy of thyroid       losartan 25 MG tablet    COZAAR    45 tablet    TAKE 0.5 TABLETS (12.5 MG) BY MOUTH DAILY    Diabetes mellitus, type 2 (H)       metFORMIN 500 MG tablet    GLUCOPHAGE    90 tablet    Take 1 tablet (500 mg) by mouth daily (with dinner)    Type 2 diabetes mellitus without complication, without long-term current use of insulin (H)       metoprolol tartrate 25 MG tablet    LOPRESSOR    90 tablet    TAKE 1 TABLET (25 MG) BY MOUTH EVERY EVENING MAY TAKE ONE EXTRA TAB AS NEEDED IF HR>100 AT REST    Chronic atrial fibrillation (H)       order for DME     1 Box    Equipment being ordered:  chemstrips for daily blood sugar checks.    Type 2 diabetes, HbA1c goal < 7% (H)       order for DME     90 days    Equipment being ordered:  One Touch strips to be used daily    Type 2 diabetes, HbA1c goal < 7% (H)       propranolol 10 MG tablet    INDERAL    180 tablet    TAKE 1 TABLET (10 MG) BY MOUTH 2 TIMES DAILY AS NEEDED    Essential tremor       warfarin 5 MG tablet    COUMADIN    102 tablet    Take as directed by [facility]. Current dose is 5 mg S,M,W,F, and 2.5 mg T,Th, and Sat.    Anticoagulation goal of INR 2 to 3       zoster vaccine recombinant adjuvanted injection    SHINGRIX    0.5 mL    Inject 0.5 mLs into the muscle once for 1 dose    Need for shingles vaccine       * Notice:  This list has 2 medication(s) that are the same as other medications prescribed for you. Read the directions carefully, and ask your doctor or other care provider to review them with you.

## 2018-05-22 NOTE — Clinical Note
Shagufta:  Clementina will be trying a few doses of Fluconazole for rash.  Please see her sooner if apprpriate.  She will try 150 mg fluconazole this week and might repeat the dose.

## 2018-05-22 NOTE — PROGRESS NOTES
SUBJECTIVE:   Clementina Cooper is a 77 year old female who presents for Preventive Visit.    77 y/o with h/o PVD, DMII, chronic Afib (coumadin), hypothyroid and essential tremor here for AFE.    Recent labs show normal BMP FLP TSH (2.61) and her A1C is 7.0.    She has no Concerns   Still has chaffing in groin area.   Nystatin failed.  H/o psoriasis.     Are you in the first 12 months of your Medicare coverage?  No    Physical   Annual:     Getting at least 3 servings of Calcium per day::  Yes    Bi-annual eye exam::  Yes    Dental care twice a year::  Yes    Sleep apnea or symptoms of sleep apnea::  Daytime drowsiness    Diet::  Regular (no restrictions)    Taking medications regularly::  Yes    Medication side effects::  No muscle aches, No significant flushing, Muscle aches, Significant flushing and Lightheadedness    Additional concerns today::  No    Ability to successfully perform activities of daily living: no assistance needed  Home Safety:  No safety concerns identified  Hearing Impairment: difficulty following a conversation in a noisy restaurant or crowded room, feel that people are mumbling or not speaking clearly and need to ask people to speak up or repeat themselves      Ability to successfully perform activities of daily living: Yes, no assistance needed  Home safety:  none identified   Hearing impairment: Yes,     Fall risk:       COGNITIVE SCREEN  1) Repeat 3 items (Banana, Sunrise, Chair)    2) Clock draw: NORMAL  3) 3 item recall: Recalls 3 objects  Results: 3 items recalled: COGNITIVE IMPAIRMENT LESS LIKELY    Mini-CogTM Copyright CHIN[employer]U Wilson. Licensed by the author for use in [facility]; reprinted with permission (agatha@.[facility]). All rights reserved.        Reviewed and updated as needed this visit by clinical staff  Tobacco  Allergies  Meds  Med Hx  Surg Hx  Fam Hx  Soc Hx        Reviewed and updated as needed this visit by Provider        Social History   Substance Use Topics      Smoking status: Former Smoker     Quit date: 12/4/1999     Smokeless tobacco: Never Used     Alcohol use Yes      Comment: occasional       Alcohol Use 5/22/2018   If you drink alcohol do you typically have greater than 3 drinks per day OR greater than 7 drinks per week? No             Today's PHQ-2 Score:   PHQ-2 ( 1999 [employer]) 5/22/2018   Q1: Little interest or pleasure in doing things 0   Q2: Feeling down, depressed or hopeless 0   PHQ-2 Score 0   Q1: Little interest or pleasure in doing things Not at all   Q2: Feeling down, depressed or hopeless Not at all   PHQ-2 Score 0       Do you feel safe in your environment - Yes    Do you have a Health Care Directive?: No: Advance care planning reviewed with patient; information given to patient to review.    Current providers sharing in care for this patient include:   Patient Care Team:  Estela Davila MD as PCP - General    The following health maintenance items are reviewed in Epic and correct as of today:  Health Maintenance   Topic Date Due     FALL RISK ASSESSMENT  03/15/2018     FOOT EXAM Q1 YEAR  07/03/2018     OP ANNUAL INR REFERRAL  11/02/2018     A1C Q6 MO  11/16/2018     EYE EXAM Q1 YEAR  02/06/2019     TSH Q1 YEAR  05/16/2019     CREATININE Q1 YEAR  05/16/2019     LIPID MONITORING Q1 YEAR  05/16/2019     MICROALBUMIN Q1 YEAR  05/16/2019     DEXA Q3 YR  01/04/2020     TSH W/ FREE T4 REFLEX Q2 YEAR  05/16/2020     COLONOSCOPY Q5 YR  09/01/2020     ADVANCE DIRECTIVE PLANNING Q5 YRS  10/06/2021     TETANUS IMMUNIZATION (SYSTEM ASSIGNED)  01/09/2023     PNEUMOCOCCAL  Completed     INFLUENZA VACCINE  Completed     Labs reviewed in EPIC  BP Readings from Last 3 Encounters:   05/22/18 133/60   11/02/17 116/74   09/18/17 108/63    Wt Readings from Last 3 Encounters:   05/22/18 207 lb (93.9 kg)   11/02/17 205 lb (93 kg)   09/18/17 202 lb (91.6 kg)                  Patient Active Problem List   Diagnosis     PVD (peripheral vascular disease) (H)     Family  history of colon cancer     HYPERLIPIDEMIA LDL GOAL <100     Open-angle glaucoma, mild-mod, ou     Advanced directives, counseling/discussion     Essential tremor     Pseudophakia, ou; Yag Caps, od     S/P iridectomy, ou     Anticoagulation goal of INR 2 to 3     Hypothyroidism due to acquired atrophy of thyroid     Chronic atrial fibrillation (H)     overweight  HCC     Long-term (current) use of anticoagulants [Z79.01]     Type 2 diabetes mellitus without complication, without long-term current use of insulin (H)     Glaucoma suspect, bilateral     Past Surgical History:   Procedure Laterality Date     ANGIOPLASTY      right leg     C NONSPECIFIC PROCEDURE  2001    neck surgery/trauma     C STOMACH SURGERY PROCEDURE UNLISTED       CATARACT IOL, RT/LT       HC TRABECULOPLASTY BY LASER SURGERY       HC VASCULAR SURGERY PROCEDURE UNLIST       HYSTERECTOMY, CERVIX STATUS UNKNOWN  1989    uterine bleeding     HYSTERECTOMY, PAP NO LONGER INDICATED       LASER YAG CAPSULOTOMY Right 02/06/2018    right eye     LASER YAG IRIDOTOMY  remotely    both eyes (elsewhere)     PHACOEMULSIFICATION WITH STANDARD INTRAOCULAR LENS IMPLANT  7/2012; 8/2012    right eye; left eye       Social History   Substance Use Topics     Smoking status: Former Smoker     Quit date: 12/4/1999     Smokeless tobacco: Never Used     Alcohol use Yes      Comment: occasional     Family History   Problem Relation Age of Onset     DIABETES Mother      HEART DISEASE Mother      HEART DISEASE Father      HEART DISEASE Son      CANCER Brother      HEART DISEASE Sister      CANCER Brother      CANCER Brother      HEART DISEASE Sister      Macular Degeneration No family hx of      Glaucoma No family hx of          Current Outpatient Prescriptions   Medication Sig Dispense Refill     ACE NOT PRESCRIBED, INTENTIONAL, 1 each At Bedtime ACE Inhibitor not prescribed due to Other:  Neck swelling.  Also, BP is on low normal side with the betablocker 0 each 0      aspirin 81 MG tablet Take 1 tablet by mouth daily.  3     atorvastatin (LIPITOR) 20 MG tablet TAKE 1 TABLET (20 MG) BY MOUTH DAILY 102 tablet 2     Biotin 5000 MCG CAPS Take  by mouth.       blood glucose monitoring (NO BRAND SPECIFIED) test strip One Touch Ultra test strips - Use to test blood sugar 2 times daily or as directed. 200 each 3     CENTRUM SILVER OR one daily       clobetasol (TEMOVATE) 0.05 % cream APPLY SPARINGLY TO AFFECTED AREA TWICE WEEKLY 60 g 2     Cyanocobalamin (B-12) 1000 MCG TBCR Take 1 tablet by mouth daily       Evening Primrose Oil 1000 MG CAPS Take by mouth daily       levothyroxine (SYNTHROID/LEVOTHROID) 100 MCG tablet TAKE 1 TABLET (100 MCG) BY MOUTH DAILY 90 tablet 3     losartan (COZAAR) 25 MG tablet TAKE 0.5 TABLETS (12.5 MG) BY MOUTH DAILY 45 tablet 1     metFORMIN (GLUCOPHAGE) 500 MG tablet Take 1 tablet (500 mg) by mouth daily (with dinner) 90 tablet 3     metoprolol tartrate (LOPRESSOR) 25 MG tablet TAKE 1 TABLET (25 MG) BY MOUTH EVERY EVENING MAY TAKE ONE EXTRA TAB AS NEEDED IF HR>100 AT REST 90 tablet 1     ORDER FOR DME Equipment being ordered:  chemstrips for daily blood sugar checks. 1 Box 3     ORDER FOR DME Equipment being ordered:  One Touch strips to be used daily 90 days 3     propranolol (INDERAL) 10 MG tablet TAKE 1 TABLET (10 MG) BY MOUTH 2 TIMES DAILY AS NEEDED 180 tablet 3     VITAMIN D 1000 UNIT OR TABS 1 TABLET DAILY       warfarin (COUMADIN) 5 MG tablet Take as directed by ACC. Current dose is 5 mg M,W,F,Sun and 2.5 mg T,Th, and Sat. 102 tablet 2     Allergies   Allergen Reactions     Benzocaine      Pravastatin      Muscle aches     Vagisil [Kdc:Benzocaine+Cetyl Alcohol+Edetic Acid+Methylparaben+Propylene Glycol+...]      It is the benzocaine       Xarelto [Rivaroxaban]      Daily headache     Zocor [Hmg-Coa-R Inhibitors]      Muscle aches     Recent Labs   Lab Test  05/16/18   0729  10/26/17   0929  06/26/17   1000  03/21/17   0728   03/29/16   0738   A1C   "7.0*  6.9*  7.0*  8.0*   < >  7.0*   LDL  81   --    --   72   --   87   HDL  44*   --    --   42*   --   39*   TRIG  113   --    --   202*   --   213*   ALT  31   --    --   29   --   32   CR  0.84   --    --   0.82   < >  0.80   GFRESTIMATED  66   --    --   68   < >  69   GFRESTBLACK  80   --    --   82   < >  84   POTASSIUM  4.2   --    --   4.0   < >  4.1   TSH  2.61  2.84   --   5.43*   < >  6.31*    < > = values in this interval not displayed.             Review of Systems  Constitutional, HEENT, cardiovascular, pulmonary, GI, , musculoskeletal, neuro, skin, endocrine and psych systems are negative, except as otherwise noted.  Sometimes sore back after vacuuming for example.     OBJECTIVE:   /60 (BP Location: Right arm, Patient Position: Sitting, Cuff Size: Adult Large)  Pulse 62  Temp 97.1  F (36.2  C) (Oral)  Ht 5' 5\" (1.651 m)  Wt 207 lb (93.9 kg)  SpO2 95%  BMI 34.45 kg/m2 Estimated body mass index is 34.45 kg/(m^2) as calculated from the following:    Height as of this encounter: 5' 5\" (1.651 m).    Weight as of this encounter: 207 lb (93.9 kg).  Physical Exam  GENERAL APPEARANCE: healthy, alert and no distress  EYES: Eyes grossly normal to inspection, PERRL and conjunctivae and sclerae normal  HENT: ear canals and TM's normal, nose and mouth without ulcers or lesions, oropharynx clear and oral mucous membranes moist  NECK: no adenopathy, no asymmetry, masses, or scars and thyroid normal to palpation  RESP: lungs clear to auscultation - no rales, rhonchi or wheezes  BREAST: normal without masses, tenderness or nipple discharge and no palpable axillary masses or adenopathy  CV: regular rate and rhythm, normal S1 S2, no S3 or S4, no murmur, click or rub, no peripheral edema and peripheral pulses strong  ABDOMEN: soft, nontender, no hepatosplenomegaly, no masses and bowel sounds normal   (female): normal female external genitalia, normal urethral meatus, vaginal mucosal atrophy noted, normal " cervix, adnexae, and uterus without masses or abnormal discharge   (female)  Bimanual only  MS: no musculoskeletal defects are noted and gait is age appropriate without ataxia  SKIN: no suspicious lesions or rashes   Redness at inner thighs and genitalia.   NEURO: Normal strength and tone, sensory exam grossly normal, mentation intact and speech normal  PSYCH: mentation appears normal and affect normal/bright    ASSESSMENT / PLAN:       ICD-10-CM    1. Routine general medical examination at a health care facility Z00.00    2. Type 2 diabetes mellitus without complication, without long-term current use of insulin (H) E11.9 Hemoglobin A1c     **Vitamin B12 FUTURE 2mo   3. PVD (peripheral vascular disease) (H) I73.9    4. Hyperlipidemia LDL goal <100 E78.5    5. Hypothyroidism due to acquired atrophy of thyroid E03.4    6. Chronic atrial fibrillation (H) I48.2    7. Long-term (current) use of anticoagulants [Z79.01] Z79.01    8. Anticoagulation goal of INR 2 to 3 Z51.81 warfarin (COUMADIN) 5 MG tablet    Z79.01    9. Need for shingles vaccine Z23 zoster vaccine recombinant adjuvanted (SHINGRIX) injection     VACCINE ADMINISTRATION, INITIAL   10. Psoriasis L40.9 clobetasol (TEMOVATE) 0.05 % cream   11. Groin rash R21 fluconazole (DIFLUCAN) 150 MG tablet     DERMATOLOGY REFERRAL     If rash not improve with oral fluconalole (nystatin failed) see derm.     Patient Instructions   Trial fluconazole 150 mg -- may repeat every few days as needed.      Consider Dermatology referral:  [facility] [phone]   [email or web address]  -- bring all remedies you have tried for review    Return to clinic 4 - 6 months (mid to late Fall) for diabetes follow up, labs prior      End of Life Planning:  Patient currently has an advanced directive: No.  I have verified the patient's ablity to prepare an advanced directive/make health care decisions.  Literature was provided to assist patient in  "preparing an advanced directive.    COUNSELING:  Reviewed preventive health counseling, as reflected in patient instructions       Immunizations    Vaccinated for: Zoster          Estimated body mass index is 34.45 kg/(m^2) as calculated from the following:    Height as of this encounter: 5' 5\" (1.651 m).    Weight as of this encounter: 207 lb (93.9 kg).  Weight management plan: walking daily   reports that she quit smoking about 18 years ago. She has never used smokeless tobacco.      Appropriate preventive services were discussed with this patient, including applicable screening as appropriate for cardiovascular disease, diabetes, osteopenia/osteoporosis, and glaucoma.  As appropriate for age/gender, discussed screening for colorectal cancer, prostate cancer, breast cancer, and cervical cancer. Checklist reviewing preventive services available has been given to the patient.    Reviewed patients plan of care and provided an AVS. The Basic Care Plan (routine screening as documented in Health Maintenance) for Clementina meets the Care Plan requirement. This Care Plan has been established and reviewed with the Patient.    Counseling Resources:  ATP IV Guidelines  Pooled Cohorts Equation Calculator  Breast Cancer Risk Calculator  FRAX Risk Assessment  ICSI Preventive Guidelines  Dietary Guidelines for Americans, 2010  [employer]'s MyPlate  ASA Prophylaxis  Lung CA Screening    Estela Davila MD  [facility]  Answers for HPI/ROS submitted by the patient on 5/22/2018   PHQ-2 Score: 0    "

## 2018-05-24 ENCOUNTER — ANTICOAGULATION THERAPY VISIT (OUTPATIENT)
Dept: NURSING | Facility: CLINIC | Age: 78
End: 2018-05-24
Payer: COMMERCIAL

## 2018-05-24 DIAGNOSIS — Z79.01 LONG-TERM (CURRENT) USE OF ANTICOAGULANTS: ICD-10-CM

## 2018-05-24 LAB — INR POINT OF CARE: 2.8 (ref 0.86–1.14)

## 2018-05-24 PROCEDURE — 85610 PROTHROMBIN TIME: CPT | Mod: QW

## 2018-05-24 PROCEDURE — 36416 COLLJ CAPILLARY BLOOD SPEC: CPT

## 2018-05-24 PROCEDURE — 99207 ZZC NO CHARGE NURSE ONLY: CPT

## 2018-05-24 NOTE — MR AVS SNAPSHOT
Clementina Cooper   5/24/2018 9:40 AM   Anticoagulation Therapy Visit    Description:  77 year old female   Provider:  [facility]   Department:  C[facility] Nurse           INR as of 5/24/2018     Today's INR 2.8      Anticoagulation Summary as of 5/24/2018     INR goal 2.0-3.0   Today's INR 2.8   Full warfarin instructions 2.5 mg on Tue, Thu, Sat; 5 mg all other days   Next INR check 7/3/2018    Indications   Long-term (current) use of anticoagulants [Z79.01] [Z79.01]  Atrial fibrillation (H) (Resolved) [I48.91]  Atrial fibrillation (H) (Resolved) [I48.91]         Your next Anticoagulation Clinic appointment(s)     Jul 03, 2018  9:20 AM CDT   [facility]    [address] [place] [address] [phone]              Contact Numbers     [facility]  Please call [phone] or [phone]  to cancel and/or reschedule your appointment.   Please call [phone] with any problems or questions regarding your therapy          May 2018 Details    Sun Mon Tue Wed Thu Fri Sat       1               2               3               4               5                 6               7               8               9               10               11               12                 13               14               15               16               17               18               19                 20               21               22               23               24      2.5 mg   See details      25      5 mg         26      2.5 mg           27      5 mg         28      5 mg         29      2.5 mg         30      5 mg         31      2.5 mg            Date Details   05/24 This INR check               How to take your warfarin dose     To take:  2.5 mg Take 0.5 of a 5 mg tablet.    To take:  5 mg Take 1 of the 5 mg tablets.           June 2018 Details    Sun Mon Tue Wed Thu Fri Sat           1      5 mg         2      2.5 mg           3      5 mg         4      5 mg         5      2.5 mg         6      5 mg         7      2.5 mg         8      5 mg         9      2.5 mg           10      5 mg         11      5 mg         12      2.5 mg         13      5 mg         14      2.5 mg         15      5 mg         16      2.5 mg           17      5 mg         18      5 mg         19      2.5 mg         20      5 mg         21      2.5 mg         22      5 mg         23      2.5 mg           24      5 mg         25      5 mg         26      2.5 mg         27      5 mg         28      2.5 mg         29      5 mg         30      2.5 mg          Date Details   No additional details            How to take your warfarin dose     To take:  2.5 mg Take 0.5 of a 5 mg tablet.    To take:  5 mg Take 1 of the 5 mg tablets.           July 2018 Details    Sun Mon Tue Wed Thu Fri Sat     1      5 mg         2      5 mg         3            4               5               6               7                 8               9               10               11               12               13               14                 15               16               17               18               19               20               21                 22               23               24               25               26               27               28                 29               30               31                    Date Details   No additional details    Date of next INR:  7/3/2018         How to take your warfarin dose     To take:  2.5 mg Take 0.5 of a 5 mg tablet.    To take:  5 mg Take 1 of the 5 mg tablets.

## 2018-05-24 NOTE — PROGRESS NOTES
ANTICOAGULATION FOLLOW-UP CLINIC VISIT    Patient Name:  Clementina Cooper  Date:  5/24/2018  Contact Type:  Face to Face    SUBJECTIVE: Patient is here for a spot check due to new medication below.  Patient states she is planning on doing another tablet tomorrow.  It is recommended she do 1/2 warfarin dose tomorrow with her reading today.  If patient takes more of the medication next week she will contact INR nurse for further recommendation on warfarin / INR testing.     Patient Findings     Positives Antibiotic use or infection (possible yeast infection.  diflucan 1 tab every 3 days for symptoms for a total of 4 pils)           OBJECTIVE    INR Protime   Date Value Ref Range Status   05/24/2018 2.8 (A) 0.86 - 1.14 Final       ASSESSMENT / PLAN  INR assessment THER    Recheck INR In: 8 WEEKS    INR Location Clinic      Anticoagulation Summary as of 5/24/2018     INR goal 2.0-3.0   Today's INR 2.8   Warfarin maintenance plan 2.5 mg (5 mg x 0.5) on Tue, Thu, Sat; 5 mg (5 mg x 1) all other days   Full warfarin instructions 2.5 mg on Tue, Thu, Sat; 5 mg all other days   Weekly warfarin total 27.5 mg   No change documented Shagufta Bell, RN   Plan last modified Shagufta Bell, RN (11/28/2017)   Next INR check 7/3/2018   Target end date Indefinite    Indications   Long-term (current) use of anticoagulants [Z79.01] [Z79.01]  Atrial fibrillation (H) (Resolved) [I48.91]  Atrial fibrillation (H) (Resolved) [I48.91]         Anticoagulation Episode Summary     INR check location     Preferred lab     Send INR reminders to [facility]    Comments       Anticoagulation Care Providers     Provider Role Specialty Phone number    Estela Davila MD  Internal Medicine [phone]            See the Encounter Report to view Anticoagulation Flowsheet and Dosing Calendar (Go to Encounters tab in chart review, and find the Anticoagulation Therapy Visit)    Dosage adjustment made based on physician directed care  plan.    Shagufta Bell RN

## 2018-06-13 DIAGNOSIS — E11.9 TYPE 2 DIABETES MELLITUS WITHOUT COMPLICATION, WITHOUT LONG-TERM CURRENT USE OF INSULIN (H): ICD-10-CM

## 2018-06-13 NOTE — TELEPHONE ENCOUNTER
"Requested Prescriptions   Pending Prescriptions Disp Refills     metFORMIN (GLUCOPHAGE) 500 MG tablet [Pharmacy Med Name: METFORMIN  MG TABLET] 90 tablet 3    Last Written Prescription Date:  4/27/17  Last Fill Quantity: 90,  # refills: 3   Last office visit: 5/22/2018 with prescribing provider:     Future Office Visit:     Sig: TAKE 1 TABLET (500 MG) BY MOUTH DAILY (WITH DINNER)    Biguanide Agents Passed    6/13/2018  4:24 PM       Passed - Blood pressure less than 140/90 in past 6 months    BP Readings from Last 3 Encounters:   05/22/18 133/60   11/02/17 116/74   09/18/17 108/63                Passed - Patient has documented LDL within the past 12 mos.    Recent Labs   Lab Test  05/16/18   0729   LDL  81            Passed - Patient has had a Microalbumin in the past 12 mos.    Recent Labs   Lab Test  05/16/18   0734   MICROL  <5   UMALCR  Unable to calculate due to low value            Passed - Patient is age 10 or older       Passed - Patient has documented A1c within the specified period of time.    If HgbA1C is 8 or greater, it needs to be on file within the past 3 months.  If less than 8, must be on file within the past 6 months.     Recent Labs   Lab Test  05/16/18   0729   A1C  7.0*            Passed - Patient's CR is NOT>1.4 OR Patient's EGFR is NOT<45 within past 12 mos.    Recent Labs   Lab Test  05/16/18   0729   GFRESTIMATED  66   GFRESTBLACK  80       Recent Labs   Lab Test  05/16/18   0729   CR  0.84            Passed - Patient does NOT have a diagnosis of CHF.       Passed - Patient is not pregnant       Passed - Patient has not had a positive pregnancy test within the past 12 mos.        Passed - Recent (6 mo) or future (30 days) visit within the authorizing provider's specialty    Patient had office visit in the last 6 months or has a visit in the next 30 days with authorizing provider or within the authorizing provider's specialty.  See \"Patient Info\" tab in inbasket, or \"Choose Columns\" " in Meds & Orders section of the refill encounter.

## 2018-07-03 ENCOUNTER — ANTICOAGULATION THERAPY VISIT (OUTPATIENT)
Dept: NURSING | Facility: CLINIC | Age: 78
End: 2018-07-03
Payer: COMMERCIAL

## 2018-07-03 DIAGNOSIS — Z79.01 LONG-TERM (CURRENT) USE OF ANTICOAGULANTS: ICD-10-CM

## 2018-07-03 LAB — INR POINT OF CARE: 3.8 (ref 0.86–1.14)

## 2018-07-03 PROCEDURE — 85610 PROTHROMBIN TIME: CPT | Mod: QW

## 2018-07-03 PROCEDURE — 36416 COLLJ CAPILLARY BLOOD SPEC: CPT

## 2018-07-03 PROCEDURE — 99207 ZZC NO CHARGE NURSE ONLY: CPT

## 2018-07-03 NOTE — MR AVS SNAPSHOT
Clementina Cooper   7/3/2018 9:20 AM   Anticoagulation Therapy Visit    Description:  77 year old female   Provider:  [facility]   Department:  [facility] Nurse           INR as of 7/3/2018     Today's INR 3.8!      Anticoagulation Summary as of 7/3/2018     INR goal 2.0-3.0   Today's INR 3.8!   Full warfarin instructions 7/3: Hold; 7/4: 2.5 mg; Otherwise 2.5 mg on Tue, Thu, Sat; 5 mg all other days   Next INR check 7/17/2018    Indications   Long-term (current) use of anticoagulants [Z79.01] [Z79.01]  Atrial fibrillation (H) (Resolved) [I48.91]  Atrial fibrillation (H) (Resolved) [I48.91]         Your next Anticoagulation Clinic appointment(s)     Jul 17, 2018  9:20 AM CDT   [facility]    [address] [facility] [address] [phone]              Contact Numbers     [facility]  Please call [phone] or [phone]  to cancel and/or reschedule your appointment.   Please call [phone] with any problems or questions regarding your therapy          July 2018 Details    Sun Mon Tue Wed Thu Fri Sat     1               2               3      Hold   See details      4      2.5 mg         5      2.5 mg         6      5 mg         7      2.5 mg           8      5 mg         9      5 mg         10      2.5 mg         11      5 mg         12      2.5 mg         13      5 mg         14      2.5 mg           15      5 mg         16      5 mg         17            18               19               20               21                 22               23               24               25               26               27               28                 29               30               31                    Date Details   07/03 This INR check       Date of next INR:  7/17/2018         How to take your warfarin dose     To take:  2.5 mg Take 0.5 of a 5 mg tablet.    To take:  5 mg  Take 1 of the 5 mg tablets.    Hold Do not take your warfarin dose. See the Details table to the right for additional instructions.

## 2018-07-03 NOTE — PROGRESS NOTES
ANTICOAGULATION FOLLOW-UP CLINIC VISIT    Patient Name:  Clementina Cooper  Date:  7/3/2018  Contact Type:  Face to Face    SUBJECTIVE: Patient is here for routine INR.  She reports one medicine change below that she has completed and feels her symptoms are improved.  She reports more bruises recently.  Her INR is over range today thought to be from the antifungal medication interaction.  Plan to hold the 2.5 mg dose today and then do 1/2 dose tomorrow of 2.5 mg and then resume usual dose.  She will come back in 2 weeks for a recheck.  Patient agreeable to plan of care.     Patient Findings     Positives Bruising (hand and arm bruises recently), Antibiotic use or infection (patient finished a few weeks of oral diflucan)           OBJECTIVE    INR Protime   Date Value Ref Range Status   07/03/2018 3.8 (A) 0.86 - 1.14 Final       ASSESSMENT / PLAN  INR assessment SUPRA    Recheck INR In: 2 WEEKS    INR Location Clinic      Anticoagulation Summary as of 7/3/2018     INR goal 2.0-3.0   Today's INR 3.8!   Warfarin maintenance plan 2.5 mg (5 mg x 0.5) on Tue, Thu, Sat; 5 mg (5 mg x 1) all other days   Full warfarin instructions 7/3: Hold; 7/4: 2.5 mg; Otherwise 2.5 mg on Tue, Thu, Sat; 5 mg all other days   Weekly warfarin total 27.5 mg   Plan last modified Shagufta Bell, RN (11/28/2017)   Next INR check 7/17/2018   Target end date Indefinite    Indications   Long-term (current) use of anticoagulants [Z79.01] [Z79.01]  Atrial fibrillation (H) (Resolved) [I48.91]  Atrial fibrillation (H) (Resolved) [I48.91]         Anticoagulation Episode Summary     INR check location     Preferred lab     Send INR reminders to [facility]    Comments       Anticoagulation Care Providers     Provider Role Specialty Phone number    Estela Davila MD  Internal Medicine [phone]            See the Encounter Report to view Anticoagulation Flowsheet and Dosing Calendar (Go to Encounters tab in chart review, and find the  Anticoagulation Therapy Visit)    Dosage adjustment made based on physician directed care plan.    Shagufta Bell RN

## 2018-07-17 ENCOUNTER — ANTICOAGULATION THERAPY VISIT (OUTPATIENT)
Dept: NURSING | Facility: CLINIC | Age: 78
End: 2018-07-17
Payer: COMMERCIAL

## 2018-07-17 DIAGNOSIS — Z79.01 LONG-TERM (CURRENT) USE OF ANTICOAGULANTS: ICD-10-CM

## 2018-07-17 LAB — INR POINT OF CARE: 3.7 (ref 0.86–1.14)

## 2018-07-17 PROCEDURE — 85610 PROTHROMBIN TIME: CPT | Mod: QW

## 2018-07-17 PROCEDURE — 99207 ZZC NO CHARGE NURSE ONLY: CPT

## 2018-07-17 PROCEDURE — 36416 COLLJ CAPILLARY BLOOD SPEC: CPT

## 2018-07-17 NOTE — PROGRESS NOTES
ANTICOAGULATION FOLLOW-UP CLINIC VISIT    Patient Name:  Clementina Cooper  Date:  7/17/2018  Contact Type:  Face to Face    SUBJECTIVE: Patient is here for a 2 week recheck following antifungal oral tx and elevated INR.  We skipped her warfarin x 1 and then resumed same warfarin dose.  She denies any further medicine changes.  Her condition is not improved and she is having additional skin issues, see below.  Her INR is the same on this dose.  Plan to skip warfarin today and then reduce weekly dose slightly to one that worked for her in the past.  Her usual recheck schedule is 8 weeks and she wants to go 4 weeks.  She is not having any bleeding or clot symptoms.  She will call INR office with any concerns.     Patient Findings     Positives Other complaints (persistent groin rash and occasional roseaca with no now meds)           OBJECTIVE    INR Protime   Date Value Ref Range Status   07/17/2018 3.7 (A) 0.86 - 1.14 Final       ASSESSMENT / PLAN  INR assessment SUPRA    Recheck INR In: 4 WEEKS    INR Location Clinic      Anticoagulation Summary as of 7/17/2018     INR goal 2.0-3.0   Today's INR 3.7!   Warfarin maintenance plan 5 mg (5 mg x 1) on Mon, Wed, Fri; 2.5 mg (5 mg x 0.5) all other days   Full warfarin instructions 7/17: Hold; Otherwise 5 mg on Mon, Wed, Fri; 2.5 mg all other days   Weekly warfarin total 25 mg   Plan last modified Shagufta Bell, RN (7/17/2018)   Next INR check 8/14/2018   Target end date Indefinite    Indications   Long-term (current) use of anticoagulants [Z79.01] [Z79.01]  Atrial fibrillation (H) (Resolved) [I48.91]  Atrial fibrillation (H) (Resolved) [I48.91]         Anticoagulation Episode Summary     INR check location     Preferred lab     Send INR reminders to [facility]    Comments       Anticoagulation Care Providers     Provider Role Specialty Phone number    Estela Davila MD  Internal Medicine [phone]            See the Encounter Report to view  Anticoagulation Flowsheet and Dosing Calendar (Go to Encounters tab in chart review, and find the Anticoagulation Therapy Visit)    Dosage adjustment made based on physician directed care plan.    Shagufta Bell RN

## 2018-07-17 NOTE — MR AVS SNAPSHOT
Clementina Cooper   7/17/2018 9:20 AM   Anticoagulation Therapy Visit    Description:  78 year old female   Provider:  [facility]   Department:  Cp Nurse           INR as of 7/17/2018     Today's INR 3.7!      Anticoagulation Summary as of 7/17/2018     INR goal 2.0-3.0   Today's INR 3.7!   Full warfarin instructions 7/17: Hold; Otherwise 5 mg on Mon, Wed, Fri; 2.5 mg all other days   Next INR check 8/14/2018    Indications   Long-term (current) use of anticoagulants [Z79.01] [Z79.01]  Atrial fibrillation (H) (Resolved) [I48.91]  Atrial fibrillation (H) (Resolved) [I48.91]         Your next Anticoagulation Clinic appointment(s)     Aug 14, 2018  9:40 AM CDT   [facility]agulation [facility] with CP ANTI COAG   [facility]    [address] [place] [id number] [phone]              Contact Numbers     [facility]  Please call [phone] or [phone]  to cancel and/or reschedule your appointment.   Please call [phone] with any problems or questions regarding your therapy          July 2018 Details    Sun Mon Tue Wed Thu Fri Sat     1               2               3               4               5               6               7                 8               9               10               11               12               13               14                 15               16               17      Hold   See details      18      5 mg         19      2.5 mg         20      5 mg         21      2.5 mg           22      2.5 mg         23      5 mg         24      2.5 mg         25      5 mg         26      2.5 mg         27      5 mg         28      2.5 mg           29      2.5 mg         30      5 mg         31      2.5 mg              Date Details   07/17 This INR check               How to take your warfarin dose     To take:  2.5 mg Take 0.5 of a 5 mg tablet.    To take:  5 mg Take 1 of the 5 mg tablets.     Hold Do not take your warfarin dose. See the Details table to the right for additional instructions.                August 2018 Details    Sun Mon Tue Wed Thu Fri Sat        1      5 mg         2      2.5 mg         3      5 mg         4      2.5 mg           5      2.5 mg         6      5 mg         7      2.5 mg         8      5 mg         9      2.5 mg         10      5 mg         11      2.5 mg           12      2.5 mg         13      5 mg         14            15               16               17               18                 19               20               21               22               23               24               25                 26               27               28               29               30               31                 Date Details   No additional details    Date of next INR:  8/14/2018         How to take your warfarin dose     To take:  2.5 mg Take 0.5 of a 5 mg tablet.    To take:  5 mg Take 1 of the 5 mg tablets.

## 2018-07-19 ENCOUNTER — TELEPHONE (OUTPATIENT)
Dept: FAMILY MEDICINE | Facility: CLINIC | Age: 78
End: 2018-07-19

## 2018-07-19 DIAGNOSIS — I48.20 CHRONIC ATRIAL FIBRILLATION (H): ICD-10-CM

## 2018-07-19 DIAGNOSIS — Z79.01 LONG TERM CURRENT USE OF ANTICOAGULANT THERAPY: Primary | ICD-10-CM

## 2018-07-19 NOTE — TELEPHONE ENCOUNTER
Please review the new screening questions that I have completed for your review below for INR Clinic Referral Orders and Renewal Order process.  If correct, please sign / pended orders and close encounter.  Thanks      Has the patient previously taken warfarin? yes  If yes, for what indication? A-fib    Does the patient have any of the following indications for a higher range of 2.5-3.5:    Mitral position mechanical valve? no    Lissette-Shiley, Ball and Cage or Monoleaflet valve (regardless of position) no    Other (if yes, please explain) no    Route to PCP    Shagufta Brown RN

## 2018-08-14 ENCOUNTER — ANTICOAGULATION THERAPY VISIT (OUTPATIENT)
Dept: NURSING | Facility: CLINIC | Age: 78
End: 2018-08-14
Payer: COMMERCIAL

## 2018-08-14 DIAGNOSIS — Z79.01 LONG-TERM (CURRENT) USE OF ANTICOAGULANTS: ICD-10-CM

## 2018-08-14 LAB — INR POINT OF CARE: 2.9 (ref 0.86–1.14)

## 2018-08-14 PROCEDURE — 36416 COLLJ CAPILLARY BLOOD SPEC: CPT

## 2018-08-14 PROCEDURE — 99207 ZZC NO CHARGE NURSE ONLY: CPT

## 2018-08-14 PROCEDURE — 85610 PROTHROMBIN TIME: CPT | Mod: QW

## 2018-08-14 NOTE — PROGRESS NOTES
ANTICOAGULATION FOLLOW-UP CLINIC VISIT    Patient Name:  Clementina Cooper  Date:  8/14/2018  Contact Type:  Face to Face    SUBJECTIVE: Patient is here for a recheck following two elevated readings with warfarin dose adjustment and she is back in range.  She has below permanent change in medications, states on-going chronic skin condition.  Plan to continue the same warfarin dose and usual recheck.     Patient Findings     Positives Change in medications (clobetasol (TEMOVATE) 0.05 % cream)           OBJECTIVE    INR Protime   Date Value Ref Range Status   08/14/2018 2.9 (A) 0.86 - 1.14 Final       ASSESSMENT / PLAN  INR assessment THER    Recheck INR In: 8 WEEKS    INR Location Clinic      Anticoagulation Summary as of 8/14/2018     INR goal 2.0-3.0   Today's INR 2.9   Warfarin maintenance plan 5 mg (5 mg x 1) on Mon, Wed, Fri; 2.5 mg (5 mg x 0.5) all other days   Full warfarin instructions 5 mg on Mon, Wed, Fri; 2.5 mg all other days   Weekly warfarin total 25 mg   No change documented Shagufta Bell RN   Plan last modified Shagufta Bell RN (7/17/2018)   Next INR check 10/9/2018   Target end date Indefinite    Indications   Long-term (current) use of anticoagulants [Z79.01] [Z79.01]  Atrial fibrillation (H) (Resolved) [I48.91]  Atrial fibrillation (H) (Resolved) [I48.91]         Anticoagulation Episode Summary     INR check location     Preferred lab     Send INR reminders to [facility]    Comments       Anticoagulation Care Providers     Provider Role Specialty Phone number    Estela Davila MD  Internal Medicine [phone]            See the Encounter Report to view Anticoagulation Flowsheet and Dosing Calendar (Go to Encounters tab in chart review, and find the Anticoagulation Therapy Visit)    Dosage adjustment made based on physician directed care plan.    Shagufta Bell RN

## 2018-08-14 NOTE — MR AVS SNAPSHOT
Clementina Cooper   8/14/2018 9:40 AM   Anticoagulation Therapy Visit    Description:  78 year old female   Provider:  [facility]   Department:  C[facility] Nurse           INR as of 8/14/2018     Today's INR 2.9      Anticoagulation Summary as of 8/14/2018     INR goal 2.0-3.0   Today's INR 2.9   Full warfarin instructions 5 mg on Mon, Wed, Fri; 2.5 mg all other days   Next INR check 10/9/2018    Indications   Long-term (current) use of anticoagulants [Z79.01] [Z79.01]  Atrial fibrillation (H) (Resolved) [I48.91]  Atrial fibrillation (H) (Resolved) [I48.91]         Your next Anticoagulation Clinic appointment(s)     Oct 09, 2018 10:40 AM CDT   Anticoagulation Visit with CONNOR ANTI DEEPAK   [facility] ([facility]    [address] [place] [id number] [phone]              Contact Numbers     [facility]  Please call [phone] or [phone]  to cancel and/or reschedule your appointment.   Please call [phone] with any problems or questions regarding your therapy          August 2018 Details    Sun Mon Tue Wed Thu Fri Sat        1               2               3               4                 5               6               7               8               9               10               11                 12               13               14      2.5 mg   See details      15      5 mg         16      2.5 mg         17      5 mg         18      2.5 mg           19      2.5 mg         20      5 mg         21      2.5 mg         22      5 mg         23      2.5 mg         24      5 mg         25      2.5 mg           26      2.5 mg         27      5 mg         28      2.5 mg         29      5 mg         30      2.5 mg         31      5 mg           Date Details   08/14 This INR check               How to take your warfarin dose     To take:  2.5 mg Take 0.5 of a 5 mg tablet.    To take:  5 mg Take 1 of the 5 mg tablets.            September 2018 Details    Sun Mon Tue Wed Thu Fri Sat           1      2.5 mg           2      2.5 mg         3      5 mg         4      2.5 mg         5      5 mg         6      2.5 mg         7      5 mg         8      2.5 mg           9      2.5 mg         10      5 mg         11      2.5 mg         12      5 mg         13      2.5 mg         14      5 mg         15      2.5 mg           16      2.5 mg         17      5 mg         18      2.5 mg         19      5 mg         20      2.5 mg         21      5 mg         22      2.5 mg           23      2.5 mg         24      5 mg         25      2.5 mg         26      5 mg         27      2.5 mg         28      5 mg         29      2.5 mg           30      2.5 mg                Date Details   No additional details            How to take your warfarin dose     To take:  2.5 mg Take 0.5 of a 5 mg tablet.    To take:  5 mg Take 1 of the 5 mg tablets.           October 2018 Details    Sun Mon Tue Wed Thu Fri Sat      1      5 mg         2      2.5 mg         3      5 mg         4      2.5 mg         5      5 mg         6      2.5 mg           7      2.5 mg         8      5 mg         9            10               11               12               13                 14               15               16               17               18               19               20                 21               22               23               24               25               26               27                 28               29               30               31                   Date Details   No additional details    Date of next INR:  10/9/2018         How to take your warfarin dose     To take:  2.5 mg Take 0.5 of a 5 mg tablet.    To take:  5 mg Take 1 of the 5 mg tablets.

## 2018-08-22 DIAGNOSIS — E78.5 HYPERLIPIDEMIA LDL GOAL <100: ICD-10-CM

## 2018-08-22 DIAGNOSIS — I73.9 PVD (PERIPHERAL VASCULAR DISEASE) (H): ICD-10-CM

## 2018-08-23 RX ORDER — ATORVASTATIN CALCIUM 20 MG/1
TABLET, FILM COATED ORAL
Qty: 90 TABLET | Refills: 3 | Status: SHIPPED | OUTPATIENT
Start: 2018-08-23 | End: 2019-05-28

## 2018-08-23 NOTE — TELEPHONE ENCOUNTER
"Requested Prescriptions   Pending Prescriptions Disp Refills     atorvastatin (LIPITOR) 20 MG tablet [Pharmacy Med Name: ATORVASTATIN 20 MG TABLET] 90 tablet 2    Last Written Prescription Date:  11-22-17  Last Fill Quantity: 102,  # refills: 2   Last office visit: 5/22/2018 with prescribing provider:  5-22-18   Future Office Visit:   Next 5 appointments (look out 90 days)     Oct 09, 2018 11:00 AM CDT   SHORT with Estela Davila MD   [facility] ([facility]    [address] [place] [id number] [phone]                  Sig: TAKE 1 TABLET (20 MG) BY MOUTH DAILY    Statins Protocol Passed    8/22/2018  8:25 PM       Passed - LDL on file in past 12 months    Recent Labs   Lab Test  05/16/18   0729   LDL  81            Passed - No abnormal creatine kinase in past 12 months    Recent Labs   Lab Test  06/26/14   0742   CKT  68               Passed - Recent (12 mo) or future (30 days) visit within the authorizing provider's specialty    Patient had office visit in the last 12 months or has a visit in the next 30 days with authorizing provider or within the authorizing provider's specialty.  See \"Patient Info\" tab in inbasket, or \"Choose Columns\" in Meds & Orders section of the refill encounter.           Passed - Patient is age 18 or older       Passed - No active pregnancy on record       Passed - No positive pregnancy test in past 12 months          "

## 2018-08-24 NOTE — TELEPHONE ENCOUNTER
Routing refill request to provider for review/approval because:  Allergy alert    Anna Bruno RN  [facility]

## 2018-09-12 DIAGNOSIS — E11.9 DIABETES MELLITUS, TYPE 2 (H): ICD-10-CM

## 2018-09-12 NOTE — TELEPHONE ENCOUNTER
"Requested Prescriptions   Pending Prescriptions Disp Refills     losartan (COZAAR) 25 MG tablet [Pharmacy Med Name: LOSARTAN POTASSIUM 25 MG TAB] 45 tablet 1    Last Written Prescription Date:  3/19/18  Last Fill Quantity: 45,  # refills: 1   Last office visit: 5/22/2018 with prescribing provider:     Future Office Visit:   Next 5 appointments (look out 90 days)     Oct 09, 2018 11:00 AM CDT   SHORT with Estela Davila MD   [facility] ([facility])    [address] [place] [id number] [phone]                  Sig: TAKE 0.5 TABLETS (12.5 MG) BY MOUTH DAILY    Angiotensin-II Receptors Passed    9/12/2018  9:10 AM       Passed - Blood pressure under 140/90 in past 12 months    BP Readings from Last 3 Encounters:   05/22/18 133/60   11/02/17 116/74   09/18/17 108/63                Passed - Recent (12 mo) or future (30 days) visit within the authorizing provider's specialty    Patient had office visit in the last 12 months or has a visit in the next 30 days with authorizing provider or within the authorizing provider's specialty.  See \"Patient Info\" tab in inbasket, or \"Choose Columns\" in Meds & Orders section of the refill encounter.           Passed - Patient is age 18 or older       Passed - No active pregnancy on record       Passed - Normal serum creatinine on file in past 12 months    Recent Labs   Lab Test  05/16/18   0729   CR  0.84            Passed - Normal serum potassium on file in past 12 months    Recent Labs   Lab Test  05/16/18   0729   POTASSIUM  4.2                   Passed - No positive pregnancy test in past 12 months          "

## 2018-09-13 RX ORDER — LOSARTAN POTASSIUM 25 MG/1
TABLET ORAL
Qty: 45 TABLET | Refills: 1 | Status: SHIPPED | OUTPATIENT
Start: 2018-09-13 | End: 2018-12-10

## 2018-09-20 DIAGNOSIS — I48.20 CHRONIC ATRIAL FIBRILLATION (H): ICD-10-CM

## 2018-09-20 RX ORDER — METOPROLOL TARTRATE 25 MG/1
TABLET, FILM COATED ORAL
Qty: 90 TABLET | Refills: 1 | Status: SHIPPED | OUTPATIENT
Start: 2018-09-20 | End: 2018-09-27 | Stop reason: DRUGHIGH

## 2018-09-20 NOTE — TELEPHONE ENCOUNTER
"Requested Prescriptions   Pending Prescriptions Disp Refills     metoprolol tartrate (LOPRESSOR) 25 MG tablet [Pharmacy Med Name: METOPROLOL TARTRATE 25 MG TAB] 90 tablet 1    Last Written Prescription Date:  5-3-18  Last Fill Quantity: 90,  # refills: 1   Last office visit: 5/22/2018 with prescribing provider:  5-22-18   Future Office Visit:   Next 5 appointments (look out 90 days)     Oct 09, 2018 11:00 AM CDT   SHORT with Estela Davila MD   [facility] ([facility])    [address] [place] [id number]   [phone]                  Sig: TAKE 1 TABLET (25 MG) BY MOUTH EVERY EVENING MAY TAKE ONE EXTRA TAB AS NEEDED IF HR>100 AT REST    Beta-Blockers Protocol Passed    9/20/2018  3:49 AM       Passed - Blood pressure under 140/90 in past 12 months    BP Readings from Last 3 Encounters:   05/22/18 133/60   11/02/17 116/74   09/18/17 108/63                Passed - Patient is age 6 or older       Passed - Recent (12 mo) or future (30 days) visit within the authorizing provider's specialty    Patient had office visit in the last 12 months or has a visit in the next 30 days with authorizing provider or within the authorizing provider's specialty.  See \"Patient Info\" tab in inbasket, or \"Choose Columns\" in Meds & Orders section of the refill encounter.              "

## 2018-09-26 ENCOUNTER — TELEPHONE (OUTPATIENT)
Dept: FAMILY MEDICINE | Facility: CLINIC | Age: 78
End: 2018-09-26

## 2018-09-26 ENCOUNTER — RADIANT APPOINTMENT (OUTPATIENT)
Dept: GENERAL RADIOLOGY | Facility: CLINIC | Age: 78
End: 2018-09-26
Attending: INTERNAL MEDICINE
Payer: COMMERCIAL

## 2018-09-26 DIAGNOSIS — R06.02 SOB (SHORTNESS OF BREATH): Primary | ICD-10-CM

## 2018-09-26 DIAGNOSIS — R06.02 SOB (SHORTNESS OF BREATH): ICD-10-CM

## 2018-09-26 LAB
D DIMER PPP FEU-MCNC: <0.3 UG/ML FEU (ref 0–0.5)
ERYTHROCYTE [DISTWIDTH] IN BLOOD BY AUTOMATED COUNT: 13.9 % (ref 10–15)
HCT VFR BLD AUTO: 41.9 % (ref 35–47)
HGB BLD-MCNC: 14 G/DL (ref 11.7–15.7)
MCH RBC QN AUTO: 30.4 PG (ref 26.5–33)
MCHC RBC AUTO-ENTMCNC: 33.4 G/DL (ref 31.5–36.5)
MCV RBC AUTO: 91 FL (ref 78–100)
NT-PROBNP SERPL-MCNC: 1656 PG/ML (ref 0–450)
PLATELET # BLD AUTO: 321 10E9/L (ref 150–450)
RBC # BLD AUTO: 4.61 10E12/L (ref 3.8–5.2)
WBC # BLD AUTO: 8.8 10E9/L (ref 4–11)

## 2018-09-26 PROCEDURE — 83880 ASSAY OF NATRIURETIC PEPTIDE: CPT | Performed by: INTERNAL MEDICINE

## 2018-09-26 PROCEDURE — 36415 COLL VENOUS BLD VENIPUNCTURE: CPT | Performed by: INTERNAL MEDICINE

## 2018-09-26 PROCEDURE — 85027 COMPLETE CBC AUTOMATED: CPT | Performed by: INTERNAL MEDICINE

## 2018-09-26 PROCEDURE — 71046 X-RAY EXAM CHEST 2 VIEWS: CPT | Mod: FY

## 2018-09-26 PROCEDURE — 85379 FIBRIN DEGRADATION QUANT: CPT | Performed by: INTERNAL MEDICINE

## 2018-09-26 NOTE — LETTER
[facility]  [address] [place]  [address]  [phone]        September 27, 2018    Clementina Cooper  [address] [id number]        Dear Clementina,    Here is a copy of your recent chest xray report, for your records.     Results for orders placed or performed in visit on 09/26/18   XR Chest 2 Views    Narrative    CHEST TWO VIEWS    9/26/2018 4:10 PM     HISTORY: SOB (shortness of breath)    COMPARISON: None.    FINDINGS: The heart size is normal. The lungs are clear. No  pneumothorax or pleural effusion.      Impression    IMPRESSION: No acute abnormality.    VERNON ZIMMER MD       If you have any questions please call the clinic at [phone].    Sincerely,    Estela RIZVI

## 2018-09-26 NOTE — LETTER
[facility]  [address] [place]  [address]  [phone]        September 27, 2018    Clementina Cooper  [address] [facility] [id number]        Dear Clementina,    Here are your recent results.  The N-Terminal Pro Bnp is likely elevated due to the uncontrolled afib, which you addressed at the appointment with Paris Lemus NP.     Results for orders placed or performed in visit on 09/26/18   BNP-N terminal pro   Result Value Ref Range    N-Terminal Pro Bnp 1656 (H) 0 - 450 pg/mL   D dimer, quantitative   Result Value Ref Range    D Dimer <0.3 0.0 - 0.50 ug/ml FEU   CBC with platelets   Result Value Ref Range    WBC 8.8 4.0 - 11.0 10e9/L    RBC Count 4.61 3.8 - 5.2 10e12/L    Hemoglobin 14.0 11.7 - 15.7 g/dL    Hematocrit 41.9 35.0 - 47.0 %    MCV 91 78 - 100 fl    MCH 30.4 26.5 - 33.0 pg    MCHC 33.4 31.5 - 36.5 g/dL    RDW 13.9 10.0 - 15.0 %    Platelet Count 321 150 - 450 10e9/L       If you have any questions please call the clinic at [phone].    Sincerely,    Estela RIZVI

## 2018-09-26 NOTE — TELEPHONE ENCOUNTER
Pt is having a hard time breathing at night while laying down, she does have a appt tomorrow with Paris, alea Contreras, TC

## 2018-09-27 ENCOUNTER — OFFICE VISIT (OUTPATIENT)
Dept: FAMILY MEDICINE | Facility: CLINIC | Age: 78
End: 2018-09-27
Payer: COMMERCIAL

## 2018-09-27 VITALS
HEART RATE: 120 BPM | OXYGEN SATURATION: 93 % | BODY MASS INDEX: 33.78 KG/M2 | SYSTOLIC BLOOD PRESSURE: 100 MMHG | DIASTOLIC BLOOD PRESSURE: 73 MMHG | WEIGHT: 203 LBS | TEMPERATURE: 97.1 F

## 2018-09-27 DIAGNOSIS — E11.9 TYPE 2 DIABETES MELLITUS WITHOUT COMPLICATION, WITHOUT LONG-TERM CURRENT USE OF INSULIN (H): ICD-10-CM

## 2018-09-27 DIAGNOSIS — R00.0 TACHYCARDIA: Primary | ICD-10-CM

## 2018-09-27 DIAGNOSIS — R06.01 ORTHOPNEA: ICD-10-CM

## 2018-09-27 DIAGNOSIS — I48.20 CHRONIC ATRIAL FIBRILLATION (H): ICD-10-CM

## 2018-09-27 DIAGNOSIS — Z86.79 HISTORY OF ATRIAL FIBRILLATION: ICD-10-CM

## 2018-09-27 LAB
ANION GAP SERPL CALCULATED.3IONS-SCNC: 7 MMOL/L (ref 3–14)
BUN SERPL-MCNC: 19 MG/DL (ref 7–30)
CALCIUM SERPL-MCNC: 9.1 MG/DL (ref 8.5–10.1)
CHLORIDE SERPL-SCNC: 104 MMOL/L (ref 94–109)
CO2 SERPL-SCNC: 29 MMOL/L (ref 20–32)
CREAT SERPL-MCNC: 0.83 MG/DL (ref 0.52–1.04)
GFR SERPL CREATININE-BSD FRML MDRD: 66 ML/MIN/1.7M2
GLUCOSE SERPL-MCNC: 175 MG/DL (ref 70–99)
HBA1C MFR BLD: 7.6 % (ref 0–5.6)
MAGNESIUM SERPL-MCNC: 1.8 MG/DL (ref 1.6–2.3)
POTASSIUM SERPL-SCNC: 4.1 MMOL/L (ref 3.4–5.3)
SODIUM SERPL-SCNC: 140 MMOL/L (ref 133–144)
TSH SERPL DL<=0.005 MIU/L-ACNC: 2.37 MU/L (ref 0.4–4)
VIT B12 SERPL-MCNC: 855 PG/ML (ref 193–986)

## 2018-09-27 PROCEDURE — 83036 HEMOGLOBIN GLYCOSYLATED A1C: CPT | Performed by: INTERNAL MEDICINE

## 2018-09-27 PROCEDURE — 93005 ELECTROCARDIOGRAM TRACING: CPT | Performed by: NURSE PRACTITIONER

## 2018-09-27 PROCEDURE — 82607 VITAMIN B-12: CPT | Performed by: INTERNAL MEDICINE

## 2018-09-27 PROCEDURE — 80048 BASIC METABOLIC PNL TOTAL CA: CPT | Performed by: INTERNAL MEDICINE

## 2018-09-27 PROCEDURE — 84443 ASSAY THYROID STIM HORMONE: CPT | Performed by: INTERNAL MEDICINE

## 2018-09-27 PROCEDURE — 99214 OFFICE O/P EST MOD 30 MIN: CPT | Performed by: NURSE PRACTITIONER

## 2018-09-27 PROCEDURE — 36415 COLL VENOUS BLD VENIPUNCTURE: CPT | Performed by: INTERNAL MEDICINE

## 2018-09-27 PROCEDURE — 83735 ASSAY OF MAGNESIUM: CPT | Performed by: INTERNAL MEDICINE

## 2018-09-27 RX ORDER — METOPROLOL TARTRATE 50 MG
50 TABLET ORAL 2 TIMES DAILY
Qty: 60 TABLET | Refills: 1 | Status: CANCELLED | OUTPATIENT
Start: 2018-09-27

## 2018-09-27 RX ORDER — METOPROLOL SUCCINATE 50 MG/1
50 TABLET, EXTENDED RELEASE ORAL DAILY
Qty: 30 TABLET | Refills: 1 | Status: SHIPPED | OUTPATIENT
Start: 2018-09-27 | End: 2018-10-09

## 2018-09-27 ASSESSMENT — PAIN SCALES - GENERAL: PAINLEVEL: NO PAIN (0)

## 2018-09-27 NOTE — PROGRESS NOTES
Clementina Cooper    Here are your recent results.  The N-Terminal Pro Bnp is likely elevated due to the uncontrolled afib, which you addressed at the appointment with Paris Lemus NP.     Sincerely,     TAISHA MORROW M.D.

## 2018-09-27 NOTE — PATIENT INSTRUCTIONS
I increased your metoprolol 50 mg once daily. Please  new prescription which is an extended release. You will stop taking the metoprolol you have at home which is an immediate release

## 2018-09-27 NOTE — PROGRESS NOTES
"  SUBJECTIVE:   Clementina Cooper is a 78 year old female who presents to clinic today for the following health issues:    She called into clinic yesterday complaining of SOB when laying down  Her primary care provider ordered chest xray which was normal  She had negative d-dimer, unremarkable CBC  BNP elevated at 1656    She woke up 6 days ago with chest and back pain  Felt like when she had a pleurisy years ago  Had SOB when laying down  Used 2 pillows which helped with SOB  The chest/back pain resolved after 1-2 days  Denies edema, coughing, palpitations, chest pain, nausea, lightheadedness  Will occasionally feel SOB during the day at rest or with exertion    She used to go on daily walks  She quit months ago d/t fatigue  \"I don't have any ambition\"    She has history of atrial fibrillation, dx 2014  She is on metoprolol for Atrial fib, 25 mg daily  She was started on propranolol for tremors  Stopped taking 1 month ago. Caused fatigue  Fatigue improved after stopping    Former smoker, quit 19 years ago    Hx hypothyroidism  TSH 2.61 May 2018        Problem list and histories reviewed & adjusted, as indicated.  Additional history: none    Patient Active Problem List   Diagnosis     PVD (peripheral vascular disease) (H)     Family history of colon cancer     HYPERLIPIDEMIA LDL GOAL <100     Open-angle glaucoma, mild-mod, ou     Advanced directives, counseling/discussion     Essential tremor     Pseudophakia, ou; Yag Caps, od     S/P iridectomy, ou     Anticoagulation goal of INR 2 to 3     Hypothyroidism due to acquired atrophy of thyroid     Chronic atrial fibrillation (H)     overweight  HCC     Long-term (current) use of anticoagulants [Z79.01]     Type 2 diabetes mellitus without complication, without long-term current use of insulin (H)     Glaucoma suspect, bilateral     Past Surgical History:   Procedure Laterality Date     ANGIOPLASTY      right leg     C NONSPECIFIC PROCEDURE  2001    neck surgery/trauma "     C STOMACH SURGERY PROCEDURE UNLISTED       CATARACT IOL, RT/LT       HC TRABECULOPLASTY BY LASER SURGERY       HC VASCULAR SURGERY PROCEDURE UNLIST       HYSTERECTOMY, CERVIX STATUS UNKNOWN  1989    uterine bleeding     HYSTERECTOMY, PAP NO LONGER INDICATED       LASER YAG CAPSULOTOMY Right 02/06/2018    right eye     LASER YAG IRIDOTOMY  remotely    both eyes (elsewhere)     PHACOEMULSIFICATION WITH STANDARD INTRAOCULAR LENS IMPLANT  7/2012; 8/2012    right eye; left eye       Social History   Substance Use Topics     Smoking status: Former Smoker     Quit date: 12/4/1999     Smokeless tobacco: Never Used     Alcohol use Yes      Comment: occasional     Family History   Problem Relation Age of Onset     Diabetes Mother      HEART DISEASE Mother      HEART DISEASE Father      HEART DISEASE Son      Cancer Brother      HEART DISEASE Sister      Cancer Brother      Cancer Brother      HEART DISEASE Sister      Macular Degeneration No family hx of      Glaucoma No family hx of            Reviewed and updated as needed this visit by clinical staff       Reviewed and updated as needed this visit by Provider         ROS:  Constitutional, HEENT, cardiovascular, pulmonary, gi and gu systems are negative, except as otherwise noted.    OBJECTIVE:     /73 (BP Location: Right arm, Patient Position: Chair, Cuff Size: Adult Regular)  Pulse 120  Temp 97.1  F (36.2  C) (Oral)  Wt 203 lb (92.1 kg)  SpO2 93%  Breastfeeding? No  BMI 33.78 kg/m2  Body mass index is 33.78 kg/(m^2).  GENERAL: healthy, alert and no distress  RESP: lungs clear to auscultation - no rales, rhonchi or wheezes  CV: rapid rate, regular rhythm, normal S1 S2, no S3 or S4, no murmur, click or rub, moderate non pitting peripheral edema  PSYCH: mentation appears normal, affect normal/bright    Diagnostic Test Results:  ECG: Atrial flutter-fib,   CHEST TWO VIEWS    9/26/2018 4:10 PM      HISTORY: SOB (shortness of breath)     COMPARISON:  None.     FINDINGS: The heart size is normal. The lungs are clear. No  pneumothorax or pleural effusion.         IMPRESSION: No acute abnormality.     VERNON ZIMMER MD    ASSESSMENT/PLAN:       ICD-10-CM    1. Tachycardia R00.0 Echocardiogram Complete     EKG 12-lead, tracing only     TSH with free T4 reflex     Basic metabolic panel     Magnesium     metoprolol succinate (TOPROL-XL) 50 MG 24 hr tablet   2. Chronic atrial fibrillation (H) I48.2 EKG 12-lead, tracing only   3. Orthopnea R06.01 Echocardiogram Complete   4. Type 2 diabetes mellitus without complication, without long-term current use of insulin (H) E11.9 Hemoglobin A1c     **Vitamin B12 FUTURE 2mo   5. History of atrial fibrillation Z86.79      Afib previously rate controlled, now . I suspect this is the cause of her symptoms  She did have normal TSH checked 4 months ago, but will recheck with new rate  Increase metoprolol to 50 mg daily and follow up in 2-3 weeks  Our TC helped to schedule ECHO  She has appt scheduled with primary care provider 10/9 and can follow up on symptoms then      Patient Instructions   I increased your metoprolol 50 mg once daily. Please  new prescription which is an extended release. You will stop taking the metoprolol you have at home which is an immediate release          CHARLOTTE Guzman [facility]

## 2018-09-27 NOTE — LETTER
[facility]  [address] [place]  [address]  [phone]        September 28, 2018    Clementina Cooper  [address] [facility] [address]        Dear Clementina,    The results of your recent labs are enclosed.  Your labs are stable. No abnormalities to explain a cause for increase in heart rate. Please proceed with ECHO as planned.   Please call the clinic if you have any concerns.    Results for orders placed or performed in visit on 09/27/18   TSH with free T4 reflex   Result Value Ref Range    TSH 2.37 0.40 - 4.00 mU/L   Basic metabolic panel   Result Value Ref Range    Sodium 140 133 - 144 mmol/L    Potassium 4.1 3.4 - 5.3 mmol/L    Chloride 104 94 - 109 mmol/L    Carbon Dioxide 29 20 - 32 mmol/L    Anion Gap 7 3 - 14 mmol/L    Glucose 175 (H) 70 - 99 mg/dL    Urea Nitrogen 19 7 - 30 mg/dL    Creatinine 0.83 0.52 - 1.04 mg/dL    GFR Estimate 66 >60 mL/min/1.7m2    GFR Estimate If Black 80 >60 mL/min/1.7m2    Calcium 9.1 8.5 - 10.1 mg/dL   Magnesium   Result Value Ref Range    Magnesium 1.8 1.6 - 2.3 mg/dL   Hemoglobin A1c   Result Value Ref Range    Hemoglobin A1C 7.6 (H) 0 - 5.6 %   **Vitamin B12 FUTURE 2mo   Result Value Ref Range    Vitamin B12 855 193 - 986 pg/mL   If you have any questions please call the clinic at [phone].  Sincerely,  Paris PORTER CNP  LMD

## 2018-09-27 NOTE — MR AVS SNAPSHOT
After Visit Summary   9/27/2018    Clementina Cooper    MRN: [id number]           Patient Information     Date Of Birth          1940        Visit Information        Provider Department      9/27/2018 9:20 AM Paris Lemus APRN CNP [facility]        Today's Diagnoses     Tachycardia    -  1    Chronic atrial fibrillation (H)        Orthopnea        Type 2 diabetes mellitus without complication, without long-term current use of insulin (H)          Care Instructions    I increased your metoprolol 50 mg once daily. Please  new prescription which is an extended release. You will stop taking the metoprolol you have at home which is an immediate release              Follow-ups after your visit        Your next 10 appointments already scheduled     Oct 02, 2018  7:30 AM CDT   LAB with CP LAB   [facility] ([facility])    [address] [facility] [address] [phone]           Please do not eat 10-12 hours before your appointment if you are coming in fasting for labs on lipids, cholesterol, or glucose (sugar). This does not apply to pregnant women. Water, hot tea and black coffee (with nothing added) are okay. Do not drink other fluids, diet soda or chew gum.            Oct 02, 2018  9:00 AM CDT   Ech Complete with [id number]   [facility] ([facility])    [address] [place] [address] [phone]           1.  Please bring or wear a comfortable two-piece outfit. 2.  You may eat, drink and take your normal medicines. 3.  For any questions that cannot be answered, please contact the ordering physician 4.  Please do not wear perfumes or scented lotions on the day of your exam.            Oct 09, 2018 10:40 AM CDT   Anticoagulation Visit with CP ANTI COAG   [facility] ([facility])    [address] [facility] [address]   [phone]            Oct 09, 2018 11:00 AM CDT   SHORT with Estela Davila MD   [facility] ([facility])    [address] [facility] [address]   [phone]              Future tests that were ordered for you today     Open Future Orders        Priority Expected Expires Ordered    Echocardiogram Complete Routine  9/27/2019 9/27/2018            Who to contact     If you have questions or need follow up information about today's clinic visit or your schedule please contact [facility] directly at [phone].  Normal or non-critical lab and imaging results will be communicated to you by MyChart, letter or phone within 4 business days after the clinic has received the results. If you do not hear from us within 7 days, please contact the clinic through MyChart or phone. If you have a critical or abnormal lab result, we will notify you by phone as soon as possible.  Submit refill requests through [employer] or call your pharmacy and they will forward the refill request to us. Please allow 3 business days for your refill to be completed.          Additional Information About Your Visit        Care EveryWhere ID     This is your Care EveryWhere ID. This could be used by other organizations to access your [place] medical records  [id number]        Your Vitals Were     Pulse Temperature Pulse Oximetry Breastfeeding? BMI (Body Mass Index)       120 97.1  F (36.2  C) (Oral) 93% No 33.78 kg/m2        Blood Pressure from Last 3 Encounters:   09/27/18 100/73   05/22/18 133/60   11/02/17 116/74    Weight from Last 3 Encounters:   09/27/18 203 lb (92.1 kg)   05/22/18 207 lb (93.9 kg)   11/02/17 205 lb (93 kg)              We Performed the Following     **Vitamin B12 FUTURE 2mo     Basic metabolic panel     EKG 12-lead, tracing only     Hemoglobin A1c      Magnesium     TSH with free T4 reflex          Today's Medication Changes          These changes are accurate as of 9/27/18 10:12 AM.  If you have any questions, ask your nurse or doctor.               Start taking these medicines.        Dose/Directions    metoprolol succinate 50 MG 24 hr tablet   Commonly known as:  TOPROL-XL   Used for:  Tachycardia   Started by:  Paris Lemus APRN CNP        Dose:  50 mg   Take 1 tablet (50 mg) by mouth daily   Quantity:  30 tablet   Refills:  1         Stop taking these medicines if you haven't already. Please contact your care team if you have questions.     metoprolol tartrate 25 MG tablet   Commonly known as:  LOPRESSOR   Stopped by:  Paris Lemus APRN CNP                Where to get your medicines      These medications were sent to [facility]/pharmacy #[id number] [place], [place] - [address] [address]     Phone:  [phone]     metoprolol succinate 50 MG 24 hr tablet                Primary Care Provider Office Phone # Fax #    Estela Davila [phone]       [address] [facility] [address]        Equal Access to Services     [facility] Hospital[facility]AN[facility] : Hadii kalee valle hadasho Soomaali, waaxda luqadaha, qaybta kaalmada adeisaacyapaula, jeanette schmidt . So [facility] [phone].    ATENCIÓN: Si habla español, tiene a carvajal disposición servicios gratuitos de asistencia lingüística. [facility] [phone].    We comply with applicable federal civil rights laws and [place] laws. We do not discriminate on the basis of race, color, national origin, age, disability, sex, sexual orientation, or gender identity.            Thank you!     Thank you for choosing [facility]  for your care. Our goal is always to provide you with excellent care. Hearing back from our patients is one way we can continue to improve our services. Please take a few minutes to complete the written  survey that you may receive in the mail after your visit with us. Thank you!             Your Updated Medication List - Protect others around you: Learn how to safely use, store and throw away your medicines at www.disposemymeds.org.          This list is accurate as of 9/27/18 10:12 AM.  Always use your most recent med list.                   Brand Name Dispense Instructions for use Diagnosis    ACE NOT PRESCRIBED (INTENTIONAL)     0 each    1 each At Bedtime ACE Inhibitor not prescribed due to Other:  Neck swelling.  Also, BP is on low normal side with the betablocker    Type 2 diabetes, HbA1c goal < 7% (H)       aspirin 81 MG tablet      Take 1 tablet by mouth daily.    Type 2 diabetes, HbA1c goal < 7% (H)       atorvastatin 20 MG tablet    LIPITOR    90 tablet    TAKE 1 TABLET (20 MG) BY MOUTH DAILY    Hyperlipidemia LDL goal <100, PVD (peripheral vascular disease) (H)       B-12 1000 MCG Tbcr      Take 1 tablet by mouth daily        Biotin 5000 MCG Caps      Take  by mouth.        CENTRUM SILVER PO      one daily        cholecalciferol 1000 UNIT tablet    vitamin D3     1 TABLET DAILY        clobetasol 0.05 % cream    TEMOVATE    60 g    APPLY SPARINGLY TO AFFECTED AREA TWICE WEEKLY    Vulvar itching       Evening Primrose Oil 1000 MG Caps      Take by mouth daily        levothyroxine 100 MCG tablet    SYNTHROID/LEVOTHROID    90 tablet    TAKE 1 TABLET (100 MCG) BY MOUTH DAILY    Hypothyroidism due to acquired atrophy of thyroid       losartan 25 MG tablet    COZAAR    45 tablet    TAKE 0.5 TABLETS (12.5 MG) BY MOUTH DAILY    Diabetes mellitus, type 2 (H)       metFORMIN 500 MG tablet    GLUCOPHAGE    90 tablet    TAKE 1 TABLET (500 MG) BY MOUTH DAILY (WITH DINNER)    Type 2 diabetes mellitus without complication, without long-term current use of insulin (H)       metoprolol succinate 50 MG 24 hr tablet    TOPROL-XL    30 tablet    Take 1 tablet (50 mg) by mouth daily    Tachycardia       ONETOUCH ULTRA test  strip   Generic drug:  blood glucose monitoring     200 strip    USE TO TEST BLOOD SUGAR TWICE A DAY OR AS DIRECTED    Type 2 diabetes mellitus without complication (H)       order for DME     1 Box    Equipment being ordered:  chemstrips for daily blood sugar checks.    Type 2 diabetes, HbA1c goal < 7% (H)       order for DME     90 days    Equipment being ordered:  One Touch strips to be used daily    Type 2 diabetes, HbA1c goal < 7% (H)       propranolol 10 MG tablet    INDERAL    180 tablet    TAKE 1 TABLET (10 MG) BY MOUTH 2 TIMES DAILY AS NEEDED    Essential tremor       warfarin 5 MG tablet    COUMADIN    102 tablet    Take as directed by [facility]. Current dose is 5 mg S,M,W,F, and 2.5 mg T,Th, and Sat.    Anticoagulation goal of INR 2 to 3

## 2018-09-27 NOTE — PROGRESS NOTES
Clementina Cooper    Here is a copy of your recent chest xray report, for your records.     Sincerely,     TAISHA MORROW M.D.

## 2018-09-27 NOTE — LETTER
[facility]  [address] [place]  [address]  [phone]        September 27, 2018    Clementina Cooper  [address] [facility] [address]        Dear Clementina,    Here is a copy of your recent A1C results.    Results for orders placed or performed in visit on 09/27/18   Hemoglobin A1c   Result Value Ref Range    Hemoglobin A1C 7.6 (H) 0 - 5.6 %       If you have any questions please call the clinic at [phone].    Sincerely,    Estela RIZVI

## 2018-09-28 NOTE — PROGRESS NOTES
[address] [address]  Phone: [phone]  Fax: [phone]      09/28/18    Clementina Cooper  [address] [place] [address]      Dear Clementina,    The results of your recent labs are enclosed.  Your labs are stable. No abnormalities to explain a cause for increase in heart rate. Please proceed with ECHO as planned.   Please call the clinic if you have any concerns.    Sincerely,    CHARLOTTE Guzman CNP    Your [facility] Care Team

## 2018-09-28 NOTE — TELEPHONE ENCOUNTER
Patient had labs done and came in to see provider yesterday.    Chayito Romero RN  [facility]

## 2018-10-02 ENCOUNTER — RADIANT APPOINTMENT (OUTPATIENT)
Dept: CARDIOLOGY | Facility: CLINIC | Age: 78
End: 2018-10-02
Attending: NURSE PRACTITIONER
Payer: COMMERCIAL

## 2018-10-02 ENCOUNTER — TELEPHONE (OUTPATIENT)
Dept: FAMILY MEDICINE | Facility: CLINIC | Age: 78
End: 2018-10-02

## 2018-10-02 DIAGNOSIS — I27.20 PULMONARY HYPERTENSION (H): Primary | ICD-10-CM

## 2018-10-02 DIAGNOSIS — R00.0 TACHYCARDIA: ICD-10-CM

## 2018-10-02 DIAGNOSIS — R06.01 ORTHOPNEA: ICD-10-CM

## 2018-10-02 PROCEDURE — 40000264 ZZHC STATISTIC IV PUSH SINGLE INITIAL SUBSTANCE: Performed by: INTERNAL MEDICINE

## 2018-10-02 PROCEDURE — 93306 TTE W/DOPPLER COMPLETE: CPT | Mod: GC | Performed by: INTERNAL MEDICINE

## 2018-10-02 RX ADMIN — Medication 3 ML: at 09:45

## 2018-10-02 NOTE — PROGRESS NOTES
[facility] [place]  [address] [place] [address] [address] [facility] [address]  Phone: [phone]  Fax: [phone]      10/02/18    Clementina Cooper  [address] [place] [address]      Dear Clementina,    The results of your ECHO are enclosed.  We discussed these results on the phone. You can bring this with you to your cardiology consult.     Please call the clinic if you have any concerns.    Sincerely,    CHARLOTTE Guzman CNP    Your [facility] Care Team

## 2018-10-02 NOTE — NURSING NOTE
Patient presents today for resting echo ordered by MD.     Echo Tech provided patient education about resting echo. IV started in left lower forearm.   IV start documented in  [employer].  Echo technician completed resting echo. Patient monitored according to Optison protocol. Data transferred to [facility] for final interpretation through [employer].     Optison medication:  Amount used 3ml Optison mixed with 6ml Saline - [id number].  6ml Wasted.   After completion of resting echo, IV removed.    Patient education provided about cardiology interpretation and primary provider will be notified of results.    Petra Cook RDCS

## 2018-10-02 NOTE — TELEPHONE ENCOUNTER
I spoke with her regarding results of ECHO. Recommend cardiology consult. She would like to be seen at [facility] where her  goes. Referral placed. She will call to schedule

## 2018-10-09 ENCOUNTER — OFFICE VISIT (OUTPATIENT)
Dept: FAMILY MEDICINE | Facility: CLINIC | Age: 78
End: 2018-10-09
Payer: COMMERCIAL

## 2018-10-09 ENCOUNTER — ANTICOAGULATION THERAPY VISIT (OUTPATIENT)
Dept: NURSING | Facility: CLINIC | Age: 78
End: 2018-10-09
Payer: COMMERCIAL

## 2018-10-09 VITALS
TEMPERATURE: 97 F | HEART RATE: 118 BPM | OXYGEN SATURATION: 94 % | SYSTOLIC BLOOD PRESSURE: 120 MMHG | WEIGHT: 203 LBS | BODY MASS INDEX: 33.78 KG/M2 | DIASTOLIC BLOOD PRESSURE: 70 MMHG

## 2018-10-09 DIAGNOSIS — I48.20 CHRONIC ATRIAL FIBRILLATION (H): Primary | ICD-10-CM

## 2018-10-09 DIAGNOSIS — E11.9 TYPE 2 DIABETES MELLITUS WITHOUT COMPLICATION, WITHOUT LONG-TERM CURRENT USE OF INSULIN (H): ICD-10-CM

## 2018-10-09 DIAGNOSIS — R00.0 TACHYCARDIA: ICD-10-CM

## 2018-10-09 DIAGNOSIS — G25.0 ESSENTIAL TREMOR: ICD-10-CM

## 2018-10-09 DIAGNOSIS — Z13.89 SCREENING FOR DIABETIC PERIPHERAL NEUROPATHY: ICD-10-CM

## 2018-10-09 DIAGNOSIS — Z23 NEED FOR PROPHYLACTIC VACCINATION AND INOCULATION AGAINST INFLUENZA: ICD-10-CM

## 2018-10-09 LAB — INR POINT OF CARE: 2 (ref 0.86–1.14)

## 2018-10-09 PROCEDURE — 36416 COLLJ CAPILLARY BLOOD SPEC: CPT

## 2018-10-09 PROCEDURE — 85610 PROTHROMBIN TIME: CPT | Mod: QW

## 2018-10-09 PROCEDURE — 90662 IIV NO PRSV INCREASED AG IM: CPT | Performed by: INTERNAL MEDICINE

## 2018-10-09 PROCEDURE — 99207 ZZC NO CHARGE NURSE ONLY: CPT

## 2018-10-09 PROCEDURE — 99214 OFFICE O/P EST MOD 30 MIN: CPT | Mod: 25 | Performed by: INTERNAL MEDICINE

## 2018-10-09 PROCEDURE — G0008 ADMIN INFLUENZA VIRUS VAC: HCPCS | Performed by: INTERNAL MEDICINE

## 2018-10-09 RX ORDER — METOPROLOL SUCCINATE 50 MG/1
100 TABLET, EXTENDED RELEASE ORAL DAILY
Qty: 180 TABLET | Refills: 3 | Status: SHIPPED | OUTPATIENT
Start: 2018-10-09 | End: 2018-10-16

## 2018-10-09 NOTE — MR AVS SNAPSHOT
After Visit Summary   10/9/2018    Clementina Cooper    MRN: [id number]           Patient Information     Date Of Birth          1940        Visit Information        Provider Department      10/9/2018 11:00 AM Estela Davila MD [facility]        Today's Diagnoses     Chronic atrial fibrillation (H)    -  1    Essential tremor        Type 2 diabetes mellitus without complication, without long-term current use of insulin (H)        Screening for diabetic peripheral neuropathy        Need for prophylactic vaccination and inoculation against influenza        Tachycardia          Care Instructions    Increase the metoprolol to TWO daily     return to clinic next week:  Tuesday 10/16/18 at 10:20          Follow-ups after your visit        Additional Services     INR CLINIC REFERRAL       Your provider has referred you to INR Services.    Please be aware that coverage of these services is subject to the terms and limitations of your health insurance plan.  Call member services at your health plan with any benefit or coverage questions.    Indication for Anticoagulation: Atrial Fibrillation  If nonstandard INR is desired, indicate goal range and explanation:    Expected Duration of Therapy: Lifetime                  Follow-up notes from your care team     Return in about 7 days (around 10/16/2018) for recheck Pulse/ .      Your next 10 appointments already scheduled     Nov 27, 2018 10:00 AM CST   Anticoagulation Visit with CONNOR ANTI [facility]AG   [facility]    [address] [address] [address] [facility] [address] [phone]            Feb 13, 2019  9:30 AM CST   New Visit with Rahul Jennings MD   [facility] ([facility]    [address]  [place] [address]   [phone]              Who to contact     If you have questions or need follow up information about today's clinic  "visit or your schedule please contact [facility] directly at [phone].  Normal or non-critical lab and imaging results will be communicated to you by MyChart, letter or phone within 4 business days after the clinic has received the results. If you do not hear from us within 7 days, please contact the clinic through MyChart or phone. If you have a critical or abnormal lab result, we will notify you by phone as soon as possible.  Submit refill requests through [employer] or call your pharmacy and they will forward the refill request to us. Please allow 3 business days for your refill to be completed.          Additional Information About Your Visit        MyChart Information     [employer] lets you send messages to your doctor, view your test results, renew your prescriptions, schedule appointments and more. To sign up, go to [email or web address] . Click on \"Log in\" on the left side of the screen, which will take you to the Welcome page. Then click on \"Sign up Now\" on the right side of the page.     You will be asked to enter the access code listed below, as well as some personal information. Please follow the directions to create your username and password.     Your access code is: [email or web address]  Expires: 2019 11:54 AM     Your access code will  in 90 days. If you need help or a new code, please call your [place] clinic or [phone].        Care EveryWhere ID     This is your Care EveryWhere ID. This could be used by other organizations to access your [place] medical records  [id number]        Your Vitals Were     Pulse Temperature Pulse Oximetry BMI (Body Mass Index)          118 97  F (36.1  C) (Oral) 94% 33.78 kg/m2         Blood Pressure from Last 3 Encounters:   10/09/18 120/70   18 100/73   18 133/60    Weight from Last 3 Encounters:   10/09/18 203 lb (92.1 kg)   18 203 lb (92.1 kg)   18 207 lb (93.9 kg)              We Performed the Following     " FLU VACCINE, INCREASED ANTIGEN, PRESV FREE, AGE 65+ [97283]     INR CLINIC REFERRAL     Vaccine Administration, Initial [19457]          Today's Medication Changes          These changes are accurate as of 10/9/18 11:54 AM.  If you have any questions, ask your nurse or doctor.               These medicines have changed or have updated prescriptions.        Dose/Directions    metoprolol succinate 50 MG 24 hr tablet   Commonly known as:  TOPROL-XL   This may have changed:  how much to take   Used for:  Tachycardia   Changed by:  Estela Davila MD        Dose:  100 mg   Take 2 tablets (100 mg) by mouth daily   Quantity:  180 tablet   Refills:  3            Where to get your medicines      These medications were sent to [facility]/pharmacy #[id number] [address] [address]     Phone:  [phone]     metoprolol succinate 50 MG 24 hr tablet                Primary Care Provider Office Phone # Fax #    Estela Davila [phone]       [address] [facility] [address] [facility] [address]        Equal Access to Services     [facility] Hadii kalee ku hadasho Soomaali, waaxda luqadaha, qaybta kaalmada adeegyada, waxay idiin haymaria m schmidt . So [facility] [phone].    ATENCIÓN: Si habla español, tiene a carvajal disposición servicios gratuitos de asistencia lingüística. Llame al [phone].    We comply with applicable federal civil rights laws and [place] laws. We do not discriminate on the basis of race, color, national origin, age, disability, sex, sexual orientation, or gender identity.            Thank you!     Thank you for choosing [facility]  for your care. Our goal is always to provide you with excellent care. Hearing back from our patients is one way we can continue to improve our services. Please take a few minutes to complete the written survey that you may receive in the mail after your visit with us. Thank  you!             Your Updated Medication List - Protect others around you: Learn how to safely use, store and throw away your medicines at www.disposemymeds.org.          This list is accurate as of 10/9/18 11:54 AM.  Always use your most recent med list.                   Brand Name Dispense Instructions for use Diagnosis    ACE NOT PRESCRIBED (INTENTIONAL)     0 each    1 each At Bedtime ACE Inhibitor not prescribed due to Other:  Neck swelling.  Also, BP is on low normal side with the betablocker    Type 2 diabetes, HbA1c goal < 7% (H)       aspirin 81 MG tablet      Take 1 tablet by mouth daily.    Type 2 diabetes, HbA1c goal < 7% (H)       atorvastatin 20 MG tablet    LIPITOR    90 tablet    TAKE 1 TABLET (20 MG) BY MOUTH DAILY    Hyperlipidemia LDL goal <100, PVD (peripheral vascular disease) (H)       B-12 1000 MCG Tbcr      Take 1 tablet by mouth daily        Biotin 5000 MCG Caps      Take  by mouth.        CENTRUM SILVER PO      one daily        cholecalciferol 1000 UNIT tablet    vitamin D3     1 TABLET DAILY        clobetasol 0.05 % cream    TEMOVATE    60 g    APPLY SPARINGLY TO AFFECTED AREA TWICE WEEKLY    Vulvar itching       Evening Primrose Oil 1000 MG Caps      Take by mouth daily        levothyroxine 100 MCG tablet    SYNTHROID/LEVOTHROID    90 tablet    TAKE 1 TABLET (100 MCG) BY MOUTH DAILY    Hypothyroidism due to acquired atrophy of thyroid       losartan 25 MG tablet    COZAAR    45 tablet    TAKE 0.5 TABLETS (12.5 MG) BY MOUTH DAILY    Diabetes mellitus, type 2 (H)       metFORMIN 500 MG tablet    GLUCOPHAGE    90 tablet    TAKE 1 TABLET (500 MG) BY MOUTH DAILY (WITH DINNER)    Type 2 diabetes mellitus without complication, without long-term current use of insulin (H)       metoprolol succinate 50 MG 24 hr tablet    TOPROL-XL    180 tablet    Take 2 tablets (100 mg) by mouth daily    Tachycardia       ONETOUCH ULTRA test strip   Generic drug:  blood glucose monitoring     200 strip    USE TO  TEST BLOOD SUGAR TWICE A DAY OR AS DIRECTED    Type 2 diabetes mellitus without complication (H)       order for DME     1 Box    Equipment being ordered:  chemstrips for daily blood sugar checks.    Type 2 diabetes, HbA1c goal < 7% (H)       order for DME     90 days    Equipment being ordered:  One Touch strips to be used daily    Type 2 diabetes, HbA1c goal < 7% (H)       propranolol 10 MG tablet    INDERAL    180 tablet    TAKE 1 TABLET (10 MG) BY MOUTH 2 TIMES DAILY AS NEEDED    Essential tremor       warfarin 5 MG tablet    COUMADIN    102 tablet    Take as directed by [facility]. Current dose is 5 mg S,M,W,F, and 2.5 mg T,Th, and Sat.    Anticoagulation goal of INR 2 to 3

## 2018-10-09 NOTE — PROGRESS NOTES
ANTICOAGULATION FOLLOW-UP CLINIC VISIT    Patient Name:  Clementina Cooper  Date:  10/9/2018  Contact Type:  Face to Face    SUBJECTIVE: Continue same warfarin dose and usual recheck.     Patient Findings     Positives Change in medications (d/c propranolol.  new metoprolol.)           OBJECTIVE    INR Protime   Date Value Ref Range Status   10/09/2018 2.0 (A) 0.86 - 1.14 Final       ASSESSMENT / PLAN  INR assessment THER    Recheck INR In: 8 WEEKS    INR Location Clinic      Anticoagulation Summary as of 10/9/2018     INR goal 2.0-3.0   Today's INR 2.0   Warfarin maintenance plan 5 mg (5 mg x 1) on Mon, Wed, Fri; 2.5 mg (5 mg x 0.5) all other days   Full warfarin instructions 5 mg on Mon, Wed, Fri; 2.5 mg all other days   Weekly warfarin total 25 mg   No change documented Shagufta Bell RN   Plan last modified Shagufta Bell RN (7/17/2018)   Next INR check 11/27/2018   Target end date Indefinite    Indications   Long-term (current) use of anticoagulants [Z79.01] [Z79.01]  Atrial fibrillation (H) (Resolved) [I48.91]  Atrial fibrillation (H) (Resolved) [I48.91]         Anticoagulation Episode Summary     INR check location     Preferred lab     Send INR reminders to [facility]    Comments       Anticoagulation Care Providers     Provider Role Specialty Phone number    Estela Davila MD  Internal Medicine [phone]            See the Encounter Report to view Anticoagulation Flowsheet and Dosing Calendar (Go to Encounters tab in chart review, and find the Anticoagulation Therapy Visit)    Dosage adjustment made based on physician directed care plan.    Shagufta Bell RN

## 2018-10-09 NOTE — NURSING NOTE
Screening Questionnaire for Adult Immunization    Are you sick today?   No   Do you have allergies to medications, food, a vaccine component or latex?   No   Have you ever had a serious reaction after receiving a vaccination?   No   Do you have a long-term health problem with heart disease, lung disease, asthma, kidney disease, metabolic disease (e.g. diabetes), anemia, or other blood disorder?   No   Do you have cancer, leukemia, HIV/AIDS, or any other immune system problem?   No   In the past 3 months, have you taken medications that affect  your immune system, such as prednisone, other steroids, or anticancer drugs; drugs for the treatment of rheumatoid arthritis, Crohn s disease, or psoriasis; or have you had radiation treatments?   No   Have you had a seizure, or a brain or other nervous system problem?   No   During the past year, have you received a transfusion of blood or blood     products, or been given immune (gamma) globulin or antiviral drug?   No   For women: Are you pregnant or is there a chance you could become        pregnant during the next month?   No   Have you received any vaccinations in the past 4 weeks?   No     Immunization questionnaire answers were all negative.         Patient instructed to remain in clinic for 15 minutes afterwards, and to report any adverse reaction to me immediately.    Due to injection administration, patient instructed to remain in clinic for 15 minutes  afterwards, and to report any adverse reaction to me immediately.     Screening performed by Dannielle Travis on 10/9/2018 at 11:25 AM.

## 2018-10-09 NOTE — MR AVS SNAPSHOT
Clementina Cooper   10/9/2018 10:40 AM   Anticoagulation Therapy Visit    Description:  78 year old female   Provider:  CONNOR ANTI COAG   Department:  Cp Nurse           INR as of 10/9/2018     Today's INR 2.0      Anticoagulation Summary as of 10/9/2018     INR goal 2.0-3.0   Today's INR 2.0   Full warfarin instructions 5 mg on Mon, Wed, Fri; 2.5 mg all other days   Next INR check 11/27/2018    Indications   Long-term (current) use of anticoagulants [Z79.01] [Z79.01]  Atrial fibrillation (H) (Resolved) [I48.91]  Atrial fibrillation (H) (Resolved) [I48.91]         Your next Anticoagulation Clinic appointment(s)     Nov 27, 2018 10:00 AM CST   Anticoagulation Visit with CONNOR ANTI DEEPAK   [facility]    [address] [facility] [address] [phone]              Contact Numbers     [facility]  Please call [phone] or [phone]  to cancel and/or reschedule your appointment.   Please call [phone] with any problems or questions regarding your therapy          October 2018 Details    Sun Mon Tue Wed Thu Fri Sat      1               2               3               4               5               6                 7               8               9      2.5 mg   See details      10      5 mg         11      2.5 mg         12      5 mg         13      2.5 mg           14      2.5 mg         15      5 mg         16      2.5 mg         17      5 mg         18      2.5 mg         19      5 mg         20      2.5 mg           21      2.5 mg         22      5 mg         23      2.5 mg         24      5 mg         25      2.5 mg         26      5 mg         27      2.5 mg           28      2.5 mg         29      5 mg         30      2.5 mg         31      5 mg             Date Details   10/09 This INR check               How to take your warfarin dose     To take:  2.5 mg Take 0.5 of a 5 mg tablet.    To take:  5 mg Take  1 of the 5 mg tablets.           November 2018 Details    Sun Mon Tue Wed Thu Fri Sat         1      2.5 mg         2      5 mg         3      2.5 mg           4      2.5 mg         5      5 mg         6      2.5 mg         7      5 mg         8      2.5 mg         9      5 mg         10      2.5 mg           11      2.5 mg         12      5 mg         13      2.5 mg         14      5 mg         15      2.5 mg         16      5 mg         17      2.5 mg           18      2.5 mg         19      5 mg         20      2.5 mg         21      5 mg         22      2.5 mg         23      5 mg         24      2.5 mg           25      2.5 mg         26      5 mg         27            28               29               30                 Date Details   No additional details    Date of next INR:  11/27/2018         How to take your warfarin dose     To take:  2.5 mg Take 0.5 of a 5 mg tablet.    To take:  5 mg Take 1 of the 5 mg tablets.

## 2018-10-12 ENCOUNTER — TELEPHONE (OUTPATIENT)
Dept: FAMILY MEDICINE | Facility: CLINIC | Age: 78
End: 2018-10-12

## 2018-10-12 DIAGNOSIS — E11.9 TYPE 2 DIABETES MELLITUS WITHOUT COMPLICATION, WITHOUT LONG-TERM CURRENT USE OF INSULIN (H): ICD-10-CM

## 2018-10-12 DIAGNOSIS — E03.4 HYPOTHYROIDISM DUE TO ACQUIRED ATROPHY OF THYROID: Primary | ICD-10-CM

## 2018-10-12 DIAGNOSIS — I48.20 CHRONIC ATRIAL FIBRILLATION (H): ICD-10-CM

## 2018-10-12 DIAGNOSIS — E78.5 HYPERLIPIDEMIA LDL GOAL <100: ICD-10-CM

## 2018-10-12 NOTE — TELEPHONE ENCOUNTER
Reason for Call:  Other prescription    Detailed comments: Patient said that she was having side effects of metoprolol while taking two tabs so she reduced down to one and would like to know when provider would like to see patient next. [facility]      Phone Number Patient can be reached at: Home number on file [phone] (home)    Best Time:     Can we leave a detailed message on this number? Not Applicable    Call taken on 10/12/2018 at 10:33 AM by Martha Park

## 2018-10-12 NOTE — TELEPHONE ENCOUNTER
"To PCP:  Patient states when she double up her Metoprolol to 100 mg, she felt \"Really bad all day long and tired\"....\"it is hard to explain how I felt\".  She moved back down to 1 tab or 50 mg and states she feels much better.  She has a cardiology appt next Friday so she just wanted to give you the heads up.  She plans to be seen in May with you for yearly  Physical.  Please provide any further recommendations.  Thank you.  Nicole Gallardo RN        "

## 2018-10-15 DIAGNOSIS — E03.4 HYPOTHYROIDISM DUE TO ACQUIRED ATROPHY OF THYROID: ICD-10-CM

## 2018-10-15 RX ORDER — LEVOTHYROXINE SODIUM 100 UG/1
TABLET ORAL
Qty: 90 TABLET | Refills: 3 | Status: SHIPPED | OUTPATIENT
Start: 2018-10-15 | End: 2019-10-18

## 2018-10-15 NOTE — TELEPHONE ENCOUNTER
MD called.  Patient has /105       HR 96.          She will recheck her VS tomorrow and give us an update.       She will rest and treat symptoms .  Call if not improving.  Sees cardiololgy next week.

## 2018-10-15 NOTE — TELEPHONE ENCOUNTER
"Requested Prescriptions   Pending Prescriptions Disp Refills     levothyroxine (SYNTHROID/LEVOTHROID) 100 MCG tablet [Pharmacy Med Name: LEVOTHYROXINE 100 MCG TABLET] 90 tablet 3    Last Written Prescription Date:  10/12/17  Last Fill Quantity: 90,  # refills: 3   Last office visit: 10/9/2018 with prescribing provider:     Future Office Visit:     Sig: TAKE 1 TABLET (100 MCG) BY MOUTH DAILY    Thyroid Protocol Passed    10/15/2018  9:30 AM       Passed - Patient is 12 years or older       Passed - Recent (12 mo) or future (30 days) visit within the authorizing provider's specialty    Patient had office visit in the last 12 months or has a visit in the next 30 days with authorizing provider or within the authorizing provider's specialty.  See \"Patient Info\" tab in inbasket, or \"Choose Columns\" in Meds & Orders section of the refill encounter.           Passed - Normal TSH on file in past 12 months    Recent Labs   Lab Test  09/27/18   1017   TSH  2.37             Passed - No active pregnancy on record    If patient is pregnant or has had a positive pregnancy test, please check TSH.         Passed - No positive pregnancy test in past 12 months    If patient is pregnant or has had a positive pregnancy test, please check TSH.            "

## 2018-10-16 ENCOUNTER — TELEPHONE (OUTPATIENT)
Dept: FAMILY MEDICINE | Facility: CLINIC | Age: 78
End: 2018-10-16

## 2018-10-16 DIAGNOSIS — R00.0 TACHYCARDIA: ICD-10-CM

## 2018-10-16 RX ORDER — METOPROLOL SUCCINATE 50 MG/1
50 TABLET, EXTENDED RELEASE ORAL DAILY
Qty: 90 TABLET | Refills: 3
Start: 2018-10-16 | End: 2018-12-17 | Stop reason: ALTCHOICE

## 2018-10-16 NOTE — TELEPHONE ENCOUNTER
Keep checking this daily, bring to cardiologist.   Drink a lot of fluid.     Patient reports she feels well, no dizziness.      Her URI symptoms are better.

## 2018-10-16 NOTE — TELEPHONE ENCOUNTER
Reason for Call:  Other     Detailed comments: Patient calling stating that PCP requesting that she check her vitals today and call them in. Her BP was 95/75 and pulse was 82.    Phone Number Patient can be reached at: Home number on file [phone] (home)    Best Time: any    Can we leave a detailed message on this number? YES    Call taken on 10/16/2018 at 12:27 PM by Beena Hudson

## 2018-10-22 ENCOUNTER — TRANSFERRED RECORDS (OUTPATIENT)
Dept: HEALTH INFORMATION MANAGEMENT | Facility: CLINIC | Age: 78
End: 2018-10-22

## 2018-11-15 ENCOUNTER — TRANSFERRED RECORDS (OUTPATIENT)
Dept: HEALTH INFORMATION MANAGEMENT | Facility: CLINIC | Age: 78
End: 2018-11-15

## 2018-11-19 ENCOUNTER — TELEPHONE (OUTPATIENT)
Dept: FAMILY MEDICINE | Facility: CLINIC | Age: 78
End: 2018-11-19

## 2018-11-19 NOTE — TELEPHONE ENCOUNTER
Received faxed message from [facility]. Ph. [phone] Fax [phone]    Cardioversion has been ordered for patient by cardiologist. Prior to cardioversion being scheduled, patient requires 4 consecutive weekly INRs greater than 2.0. Please schedule patient for weekly INRs and fax results to fax # above. If patient already has 4 weekly INRs completed, please fax results. Attn to Rissa ESPINAL. Thanks!

## 2018-11-20 ENCOUNTER — ANTICOAGULATION THERAPY VISIT (OUTPATIENT)
Dept: NURSING | Facility: CLINIC | Age: 78
End: 2018-11-20
Payer: COMMERCIAL

## 2018-11-20 LAB — INR POINT OF CARE: 3.3 (ref 0.86–1.14)

## 2018-11-20 PROCEDURE — 36416 COLLJ CAPILLARY BLOOD SPEC: CPT

## 2018-11-20 PROCEDURE — 99207 ZZC NO CHARGE NURSE ONLY: CPT

## 2018-11-20 PROCEDURE — 85610 PROTHROMBIN TIME: CPT | Mod: QW

## 2018-11-20 NOTE — PROGRESS NOTES
ANTICOAGULATION FOLLOW-UP CLINIC VISIT    Patient Name:  Clementina Cooper  Date:  11/20/2018  Contact Type:  Face to Face    SUBJECTIVE: patient here as an add on for weekly INR needed before px, see below.  She also reports two medicine changes below.     Patient Findings     Positives Change in medications (d/c propranolol - raised metoprolol last month), Other complaints (needs cardioversion.  weekly INR planned / fax to [phone] & [phone] [facility])           OBJECTIVE    INR Protime   Date Value Ref Range Status   11/20/2018 3.3 (A) 0.86 - 1.14 Final       ASSESSMENT / PLAN  INR assessment SUPRA    Recheck INR In: 1 WEEK    INR Location Clinic      Anticoagulation Summary as of 11/20/2018     INR goal 2.0-3.0   Today's INR 3.3!   Warfarin maintenance plan 5 mg (5 mg x 1) on Mon, Wed, Fri; 2.5 mg (5 mg x 0.5) all other days   Full warfarin instructions 11/20: Hold; Otherwise 5 mg on Mon, Wed, Fri; 2.5 mg all other days   Weekly warfarin total 25 mg   Plan last modified Shagufta Bell, RN (7/17/2018)   Next INR check 11/27/2018   Target end date Indefinite    Indications   Atrial fibrillation (H) (Resolved) [I48.91]         Anticoagulation Episode Summary     INR check location     Preferred lab     Send INR reminders to [facility]    Comments       Anticoagulation Care Providers     Provider Role Specialty Phone number    Estela Davila MD  Internal Medicine [phone]            See the Encounter Report to view Anticoagulation Flowsheet and Dosing Calendar (Go to Encounters tab in chart review, and find the Anticoagulation Therapy Visit)    Dosage adjustment made based on physician directed care plan.    Shagufta Bell, KYLIE

## 2018-11-20 NOTE — MR AVS SNAPSHOT
Clementina Cooper   11/20/2018 12:00 PM   Anticoagulation Therapy Visit    Description:  78 year old female   Provider:  CP ANTI COAG   Department:  Cp Nurse           INR as of 11/20/2018     Today's INR 3.3!      Anticoagulation Summary as of 11/20/2018     INR goal 2.0-3.0   Today's INR 3.3!   Full warfarin instructions 11/20: Hold; Otherwise 5 mg on Mon, Wed, Fri; 2.5 mg all other days   Next INR check 11/27/2018    Indications   Atrial fibrillation (H) (Resolved) [I48.91]         Your next Anticoagulation Clinic appointment(s)     Nov 27, 2018 10:00 AM CST   [facility] with CP ANTI COAG   [facility]    [address] [facility] [id number] [phone]            Dec 03, 2018  3:00 PM CST   Anticoagulation Visit with CP ANTI COAG   [facility]    [address] [address] [phone]              Contact Numbers     [facility]  Please call [phone] or [phone]  to cancel and/or reschedule your appointment.   Please call [phone] with any problems or questions regarding your therapy          November 2018 Details    Sun Mon Tue Wed Thu Fri Sat         1               2               3                 4               5               6               7               8               9               10                 11               12               13               14               15               16               17                 18               19               20      Hold   See details      21      5 mg         22      2.5 mg         23      5 mg         24      2.5 mg           25      2.5 mg         26      5 mg         27            28               29               30                 Date Details   11/20 This INR check       Date of next INR:  11/27/2018         How to take your  warfarin dose     To take:  2.5 mg Take 0.5 of a 5 mg tablet.    To take:  5 mg Take 1 of the 5 mg tablets.    Hold Do not take your warfarin dose. See the Details table to the right for additional instructions.

## 2018-11-27 ENCOUNTER — ANTICOAGULATION THERAPY VISIT (OUTPATIENT)
Dept: NURSING | Facility: CLINIC | Age: 78
End: 2018-11-27
Payer: COMMERCIAL

## 2018-11-27 LAB — INR POINT OF CARE: 3.2 (ref 0.86–1.14)

## 2018-11-27 PROCEDURE — 36416 COLLJ CAPILLARY BLOOD SPEC: CPT

## 2018-11-27 PROCEDURE — 99207 ZZC NO CHARGE NURSE ONLY: CPT

## 2018-11-27 PROCEDURE — 85610 PROTHROMBIN TIME: CPT | Mod: QW

## 2018-11-27 NOTE — PROGRESS NOTES
ANTICOAGULATION FOLLOW-UP CLINIC VISIT    Patient Name:  Clementina Cooper  Date:  11/27/2018  Contact Type:  Face to Face    SUBJECTIVE:     Patient Findings     Positives No Problem Findings           OBJECTIVE    INR Protime   Date Value Ref Range Status   11/27/2018 3.2 (A) 0.86 - 1.14 Final       ASSESSMENT / PLAN  INR assessment SUPRA    Recheck INR In: 1 WEEK    INR Location Clinic      Anticoagulation Summary as of 11/27/2018     INR goal 2.0-3.0   Today's INR 3.2!   Warfarin maintenance plan 5 mg (5 mg x 1) on Mon, Wed, Fri; 2.5 mg (5 mg x 0.5) all other days   Full warfarin instructions 11/28: 2.5 mg; 11/30: 2.5 mg; Otherwise 5 mg on Mon, Wed, Fri; 2.5 mg all other days   Weekly warfarin total 25 mg   Plan last modified Shagufta Bell RN (7/17/2018)   Next INR check 12/3/2018   Target end date Indefinite    Indications   Atrial fibrillation (H) (Resolved) [I48.91]         Anticoagulation Episode Summary     INR check location     Preferred lab     Send INR reminders to [facility]    Comments       Anticoagulation Care Providers     Provider Role Specialty Phone number    Estela Davila MD  Internal Medicine [phone]            See the Encounter Report to view Anticoagulation Flowsheet and Dosing Calendar (Go to Encounters tab in chart review, and find the Anticoagulation Therapy Visit)    Patients INR is stills supra therapeutic even thought 2.5 mg was held last week.  Reduced coumadin dosing for this week.  Recheck in 1 week.  [facility]  Patient denies increased signs of bruising or bleeding.  Dosing based on [facility] Protocol and Provider directed care plan.      Nicole Gallardo, RN

## 2018-11-27 NOTE — MR AVS SNAPSHOT
Clementina Cooper   11/27/2018 10:00 AM   Anticoagulation Therapy Visit    Description:  78 year old female   Provider:  [facility]   Department:  Cp Nurse           INR as of 11/27/2018     Today's INR 3.2!      Anticoagulation Summary as of 11/27/2018     INR goal 2.0-3.0   Today's INR 3.2!   Full warfarin instructions 11/28: 2.5 mg; 11/30: 2.5 mg; Otherwise 5 mg on Mon, Wed, Fri; 2.5 mg all other days   Next INR check 12/3/2018    Indications   Atrial fibrillation (H) (Resolved) [I48.91]         Your next Anticoagulation Clinic appointment(s)     Dec 03, 2018  3:00 PM CST   [facility] with [facility]   [facility]    [address] [id number] [phone]            Dec 10, 2018 10:00 AM CST   Anticoagulation Visit with [facility]   [facility]    [address] [place] [id number] [phone]              Contact Numbers     [facility]  Please call [phone] or [phone]  to cancel and/or reschedule your appointment.   Please call [phone] with any problems or questions regarding your therapy          November 2018 Details    Sun Mon Tue Wed Thu Fri Sat         1               2               3                 4               5               6               7               8               9               10                 11               12               13               14               15               16               17                 18               19               20               21               22               23               24                 25               26               27      2.5 mg   See details      28      2.5 mg         29      2.5 mg         30      2.5 mg           Date Details   11/27 This INR check               How to take your warfarin dose     To  take:  2.5 mg Take 0.5 of a 5 mg tablet.           December 2018 Details    Sun Mon Tue Wed Thu Fri Sat           1      2.5 mg           2      2.5 mg         3            4               5               6               7               8                 9               10               11               12               13               14               15                 16               17               18               19               20               21               22                 23               24               25               26               27               28               29                 30               31                     Date Details   No additional details    Date of next INR:  12/3/2018         How to take your warfarin dose     To take:  2.5 mg Take 0.5 of a 5 mg tablet.    To take:  5 mg Take 1 of the 5 mg tablets.

## 2018-12-03 ENCOUNTER — ANTICOAGULATION THERAPY VISIT (OUTPATIENT)
Dept: NURSING | Facility: CLINIC | Age: 78
End: 2018-12-03
Payer: COMMERCIAL

## 2018-12-03 LAB — INR POINT OF CARE: 2.3 (ref 0.86–1.14)

## 2018-12-03 PROCEDURE — 36416 COLLJ CAPILLARY BLOOD SPEC: CPT

## 2018-12-03 PROCEDURE — 85610 PROTHROMBIN TIME: CPT | Mod: QW

## 2018-12-03 PROCEDURE — 99207 ZZC NO CHARGE NURSE ONLY: CPT

## 2018-12-03 NOTE — MR AVS SNAPSHOT
Clementina Cooper   12/3/2018 3:00 PM   Anticoagulation Therapy Visit    Description:  78 year old female   Provider:  [facility]   Department:  [facility] Nurse           INR as of 12/3/2018     Today's INR 2.3      Anticoagulation Summary as of 12/3/2018     INR goal 2.0-3.0   Today's INR 2.3   Full warfarin instructions 12/5: 2.5 mg; Otherwise 5 mg on Mon, Wed, Fri; 2.5 mg all other days   Next INR check 12/10/2018    Indications   Atrial fibrillation (H) (Resolved) [I48.91]         Description     Results faxed to [facility]      Your next Anticoagulation Clinic appointment(s)     Dec 10, 2018 10:00 AM CST   Anticoagulation Visit with CP ANTI COAG   [facility]    [address] [place] [id number] [phone]              Contact Numbers     [facility]  Please call [phone] or [phone]  to cancel and/or reschedule your appointment.   Please call [phone] with any problems or questions regarding your therapy          December 2018 Details    Sun Mon Tue Wed Thu Fri Sat           1                 2               3      5 mg   See details      4      2.5 mg         5      2.5 mg         6      2.5 mg         7      5 mg         8      2.5 mg           9      2.5 mg         10            11               12               13               14               15                 16               17               18               19               20               21               22                 23               24               25               26               27               28               29                 30               31                     Date Details   12/03 This INR check       Date of next INR:  12/10/2018         How to take your warfarin dose     To take:  2.5 mg Take 0.5 of a 5 mg tablet.    To take:  5 mg Take 1 of the 5 mg tablets.

## 2018-12-03 NOTE — PROGRESS NOTES
ANTICOAGULATION FOLLOW-UP CLINIC VISIT    Patient Name:  Clementina Cooper  Date:  12/3/2018  Contact Type:  Face to Face    SUBJECTIVE:        OBJECTIVE    INR Protime   Date Value Ref Range Status   12/03/2018 2.3 (A) 0.86 - 1.14 Final       ASSESSMENT / PLAN  INR assessment THER    Recheck INR In: 1 WEEK    INR Location Clinic      Anticoagulation Summary as of 12/3/2018     INR goal 2.0-3.0   Today's INR 2.3   Warfarin maintenance plan 5 mg (5 mg x 1) on Mon, Wed, Fri; 2.5 mg (5 mg x 0.5) all other days   Full warfarin instructions 12/5: 2.5 mg; Otherwise 5 mg on Mon, Wed, Fri; 2.5 mg all other days   Weekly warfarin total 25 mg   Plan last modified Shagufta Bell RN (7/17/2018)   Next INR check 12/10/2018   Target end date Indefinite    Indications   Atrial fibrillation (H) (Resolved) [I48.91]         Anticoagulation Episode Summary     INR check location     Preferred lab     Send INR reminders to [facility]    Comments       Anticoagulation Care Providers     Provider Role Specialty Phone number    Estela Davila MD  Internal Medicine [phone]            See the Encounter Report to view Anticoagulation Flowsheet and Dosing Calendar (Go to Encounters tab in chart review, and find the Anticoagulation Therapy Visit)    Results faxed to [facility].  Patient denies any increased signs of bruising or bleeding. Dosing based on [facility] Protocol and Provider directed care plan.      Nicole Gallardo RN

## 2018-12-10 ENCOUNTER — ANTICOAGULATION THERAPY VISIT (OUTPATIENT)
Dept: NURSING | Facility: CLINIC | Age: 78
End: 2018-12-10
Payer: COMMERCIAL

## 2018-12-10 DIAGNOSIS — E11.9 DIABETES MELLITUS, TYPE 2 (H): ICD-10-CM

## 2018-12-10 LAB — INR POINT OF CARE: 2.4 (ref 0.86–1.14)

## 2018-12-10 PROCEDURE — 99207 ZZC NO CHARGE NURSE ONLY: CPT

## 2018-12-10 PROCEDURE — 36416 COLLJ CAPILLARY BLOOD SPEC: CPT

## 2018-12-10 PROCEDURE — 85610 PROTHROMBIN TIME: CPT | Mod: QW

## 2018-12-10 RX ORDER — LOSARTAN POTASSIUM 25 MG/1
TABLET ORAL
Qty: 45 TABLET | Refills: 1 | Status: SHIPPED | OUTPATIENT
Start: 2018-12-10 | End: 2019-09-03

## 2018-12-10 NOTE — TELEPHONE ENCOUNTER
"Requested Prescriptions   Pending Prescriptions Disp Refills     losartan (COZAAR) 25 MG tablet  Last Written Prescription Date:  9/13/18  Last Fill Quantity: 45,  # refills: 1   Last office visit: 10/9/2018 with prescribing provider:  Giovanni   Future Office Visit:     45 tablet 1    Angiotensin-II Receptors Passed - 12/10/2018 11:36 AM       Passed - Blood pressure under 140/90 in past 12 months    BP Readings from Last 3 Encounters:   10/09/18 120/70   09/27/18 100/73   05/22/18 133/60                Passed - Recent (12 mo) or future (30 days) visit within the authorizing provider's specialty    Patient had office visit in the last 12 months or has a visit in the next 30 days with authorizing provider or within the authorizing provider's specialty.  See \"Patient Info\" tab in inbasket, or \"Choose Columns\" in Meds & Orders section of the refill encounter.             Passed - Patient is age 18 or older       Passed - No active pregnancy on record       Passed - Normal serum creatinine on file in past 12 months    Recent Labs   Lab Test 09/27/18  1017   CR 0.83            Passed - Normal serum potassium on file in past 12 months    Recent Labs   Lab Test 09/27/18  1017   POTASSIUM 4.1                   Passed - No positive pregnancy test in past 12 months          "

## 2018-12-10 NOTE — PROGRESS NOTES
ANTICOAGULATION FOLLOW-UP CLINIC VISIT    Patient Name:  Clementina Cooper  Date:  12/10/2018  Contact Type:  Face to Face    SUBJECTIVE: continue weekly INR until cardioversion.     Patient Findings     Positives:   No Problem Findings           OBJECTIVE    INR Protime   Date Value Ref Range Status   12/10/2018 2.4 (A) 0.86 - 1.14 Final       ASSESSMENT / PLAN  INR assessment THER    Recheck INR In: 1 WEEK    INR Location Clinic      Anticoagulation Summary  As of 12/10/2018    INR goal:   2.0-3.0   TTR:   72.6 % (2.7 y)   INR used for dosin.4 (12/10/2018)   Warfarin maintenance plan:   5 mg (5 mg x 1) every Mon, Wed, Fri; 2.5 mg (5 mg x 0.5) all other days   Full warfarin instructions:   5 mg every Mon, Wed, Fri; 2.5 mg all other days   Weekly warfarin total:   25 mg   No change documented:   Shagufta Bell RN   Plan last modified:   Shagufta Bell RN (2018)   Next INR check:   2018   Target end date:   Indefinite    Indications    Atrial fibrillation (H) (Resolved) [I48.91]             Anticoagulation Episode Summary     INR check location:       Preferred lab:       Send INR reminders to:   [facility]    Comments:         Anticoagulation Care Providers     Provider Role Specialty Phone number    Estela Davila MD  Internal Medicine [phone]            See the Encounter Report to view Anticoagulation Flowsheet and Dosing Calendar (Go to Encounters tab in chart review, and find the Anticoagulation Therapy Visit)    Dosage adjustment made based on physician directed care plan.    Shagufta Bell RN

## 2018-12-12 ENCOUNTER — TRANSFERRED RECORDS (OUTPATIENT)
Dept: HEALTH INFORMATION MANAGEMENT | Facility: CLINIC | Age: 78
End: 2018-12-12

## 2018-12-17 ENCOUNTER — ANTICOAGULATION THERAPY VISIT (OUTPATIENT)
Dept: NURSING | Facility: CLINIC | Age: 78
End: 2018-12-17
Payer: COMMERCIAL

## 2018-12-17 DIAGNOSIS — Z51.81 ANTICOAGULATION GOAL OF INR 2 TO 3: ICD-10-CM

## 2018-12-17 DIAGNOSIS — Z79.01 ANTICOAGULATION GOAL OF INR 2 TO 3: ICD-10-CM

## 2018-12-17 LAB — INR POINT OF CARE: 3.9 (ref 0.86–1.14)

## 2018-12-17 PROCEDURE — 85610 PROTHROMBIN TIME: CPT | Mod: QW

## 2018-12-17 PROCEDURE — 99207 ZZC NO CHARGE NURSE ONLY: CPT

## 2018-12-17 PROCEDURE — 36416 COLLJ CAPILLARY BLOOD SPEC: CPT

## 2018-12-17 RX ORDER — DILTIAZEM HYDROCHLORIDE 180 MG/1
180 CAPSULE, EXTENDED RELEASE ORAL DAILY
Qty: 90 CAPSULE | Refills: 3 | COMMUNITY
Start: 2018-12-17 | End: 2019-05-29

## 2018-12-17 RX ORDER — SOTALOL HYDROCHLORIDE 80 MG/1
80 TABLET ORAL 2 TIMES DAILY
Qty: 180 TABLET | Refills: 3 | COMMUNITY
Start: 2018-12-17 | End: 2019-11-08

## 2018-12-17 NOTE — PROGRESS NOTES
ANTICOAGULATION FOLLOW-UP CLINIC VISIT    Patient Name:  Clementina Cooper  Date:  12/17/2018  Contact Type:  Face to Face    SUBJECTIVE: continue weekly INR     Patient Findings     Positives:   Change in medications (d/c metoprolol.  New Diltiazem 180 qd.  New Sotalol (Betapace) 80 bid.  change effective last Wed 12-12-18.), Inflammation (cardioversion was done last week)           OBJECTIVE    INR Protime   Date Value Ref Range Status   12/17/2018 3.9 (A) 0.86 - 1.14 Final       ASSESSMENT / PLAN  INR assessment SUPRA    Recheck INR In: 1 WEEK    INR Location Clinic      Anticoagulation Summary  As of 12/17/2018    INR goal:   2.0-3.0   TTR:   72.3 % (2.7 y)   INR used for dosing:   3.9! (12/17/2018)   Warfarin maintenance plan:   5 mg (5 mg x 1) every Mon, Wed, Fri; 2.5 mg (5 mg x 0.5) all other days   Full warfarin instructions:   12/17: Hold; Otherwise 5 mg every Mon, Wed, Fri; 2.5 mg all other days   Weekly warfarin total:   25 mg   Plan last modified:   Shagufta Bell RN (7/17/2018)   Next INR check:   12/24/2018   Target end date:   Indefinite    Indications    Atrial fibrillation (H) (Resolved) [I48.91]             Anticoagulation Episode Summary     INR check location:       Preferred lab:       Send INR reminders to:   [facility]    Comments:         Anticoagulation Care Providers     Provider Role Specialty Phone number    Estela Davila MD  Internal Medicine [phone]            See the Encounter Report to view Anticoagulation Flowsheet and Dosing Calendar (Go to Encounters tab in chart review, and find the Anticoagulation Therapy Visit)    Dosage adjustment made based on physician directed care plan.    Shagufta Bell, KYLIE

## 2018-12-17 NOTE — TELEPHONE ENCOUNTER
Requested Prescriptions   Pending Prescriptions Disp Refills     warfarin (COUMADIN) 5 MG tablet 102 tablet 3     Sig: Take as directed by [facility]. Current dose is 5 mg S,M,W,F, and 2.5 mg T,Th, and Sat.    There is no refill protocol information for this order   Last Written Prescription Date:  5-22-18  Last Fill Quantity: 102,  # refills: 3   Last office visit: 10/9/2018 with prescribing provider:  10-9-18   Future Office Visit:

## 2018-12-18 ENCOUNTER — TRANSFERRED RECORDS (OUTPATIENT)
Dept: HEALTH INFORMATION MANAGEMENT | Facility: CLINIC | Age: 78
End: 2018-12-18

## 2018-12-18 RX ORDER — WARFARIN SODIUM 5 MG/1
TABLET ORAL
Qty: 102 TABLET | Refills: 3 | Status: SHIPPED | OUTPATIENT
Start: 2018-12-18 | End: 2020-02-21

## 2018-12-18 NOTE — TELEPHONE ENCOUNTER
Routed to INR nurse, INR not at therapeutic range.    Anna Bruno RN  [facility]

## 2018-12-18 NOTE — TELEPHONE ENCOUNTER
"Requested Prescriptions   Pending Prescriptions Disp Refills     warfarin (COUMADIN) 5 MG tablet 102 tablet 3     Sig: Take as directed by [facility]. Current dose is 5 mg S,M,W,F, and 2.5 mg T,Th, and Sat.    Vitamin K Antagonists Failed - 12/17/2018  3:24 PM       Failed - INR is within goal in the past 6 weeks    Confirm INR is within goal in the past 6 weeks.     Recent Labs   Lab Test 12/17/18   INR 3.9*                      Passed - Recent (12 mo) or future (30 days) visit within the authorizing provider's specialty    Patient had office visit in the last 12 months or has a visit in the next 30 days with authorizing provider or within the authorizing provider's specialty.  See \"Patient Info\" tab in inbasket, or \"Choose Columns\" in Meds & Orders section of the refill encounter.             Passed - Patient is 18 years of age or older       Passed - Patient is not pregnant       Passed - No positive pregnancy on file in past 12 months          "

## 2018-12-26 ENCOUNTER — TELEPHONE (OUTPATIENT)
Dept: FAMILY MEDICINE | Facility: CLINIC | Age: 78
End: 2018-12-26

## 2018-12-26 NOTE — TELEPHONE ENCOUNTER
Rissa gr [facility] returned call - left message on voice mail:  Needs the INR for 4 weeks starting with last week, may call [phone] if questions.    Crista Kay RN  [facility]

## 2018-12-26 NOTE — TELEPHONE ENCOUNTER
Called Rissa and let her know the 12/17/18 INR result was faxed over. She requested that the patients next 3 INRs be faxed to them at [phone]. Routed to INR team for future INRs.     Shelbi Corea RN

## 2018-12-26 NOTE — TELEPHONE ENCOUNTER
Reason for Call:  Other     Detailed comments: Rissa from [facility] would like INR result faxed to them at [phone]. She needs results starting the week of Dec. 17th for 4 weekly inrs.    Phone Number Patient can be reached at: Other phone number:  [phone]    Best Time: anytime with questions    Can we leave a detailed message on this number? YES    Call taken on 12/26/2018 at 10:26 AM by Margot Donohue

## 2018-12-26 NOTE — TELEPHONE ENCOUNTER
Called Rissa to clarify which INR results she was looking to get. The chart noted the last INR was 12/17/18 and has not had one done since.     INR results from 12/17/18 printed and faxed to number provided.   Advised to call nurse triage line back to clarify at [phone].    Shelbi Corea RN

## 2018-12-28 ENCOUNTER — ANTICOAGULATION THERAPY VISIT (OUTPATIENT)
Dept: NURSING | Facility: CLINIC | Age: 78
End: 2018-12-28
Payer: COMMERCIAL

## 2018-12-28 LAB — INR POINT OF CARE: 3.2 (ref 0.86–1.14)

## 2018-12-28 PROCEDURE — 99207 ZZC NO CHARGE NURSE ONLY: CPT

## 2018-12-28 PROCEDURE — 36416 COLLJ CAPILLARY BLOOD SPEC: CPT

## 2018-12-28 PROCEDURE — 85610 PROTHROMBIN TIME: CPT | Mod: QW

## 2018-12-28 NOTE — PROGRESS NOTES
ANTICOAGULATION FOLLOW-UP CLINIC VISIT    Patient Name:  Clementina Cooper  Date:  12/28/2018  Contact Type:  Face to Face    SUBJECTIVE: continue weekly INR's     Patient Findings     Positives:   Change in medications (betapace new), Other complaints (upcoming cardioversion (2nd time) with weekly INR - fax results each week)           OBJECTIVE    INR Protime   Date Value Ref Range Status   12/28/2018 3.2 (A) 0.86 - 1.14 Final       ASSESSMENT / PLAN  INR assessment THER    Recheck INR In: 1 WEEK    INR Location Clinic      Anticoagulation Summary  As of 12/28/2018    INR goal:   2.0-3.0   TTR:   71.5 % (2.7 y)   INR used for dosing:   3.2! (12/28/2018)   Warfarin maintenance plan:   5 mg (5 mg x 1) every Mon, Wed, Fri; 2.5 mg (5 mg x 0.5) all other days   Full warfarin instructions:   12/28: 2.5 mg; Otherwise 5 mg every Mon, Wed, Fri; 2.5 mg all other days   Weekly warfarin total:   25 mg   Plan last modified:   Shagufta Bell RN (7/17/2018)   Next INR check:   1/4/2019   Target end date:   Indefinite    Indications    Atrial fibrillation (H) (Resolved) [I48.91]             Anticoagulation Episode Summary     INR check location:       Preferred lab:       Send INR reminders to:   [facility]    Comments:         Anticoagulation Care Providers     Provider Role Specialty Phone number    Estela Davila MD  Internal Medicine [phone]            See the Encounter Report to view Anticoagulation Flowsheet and Dosing Calendar (Go to Encounters tab in chart review, and find the Anticoagulation Therapy Visit)    Dosage adjustment made based on physician directed care plan.    Shagufta Bell RN

## 2019-01-04 ENCOUNTER — ANTICOAGULATION THERAPY VISIT (OUTPATIENT)
Dept: NURSING | Facility: CLINIC | Age: 79
End: 2019-01-04
Payer: COMMERCIAL

## 2019-01-04 LAB — INR POINT OF CARE: 3.3 (ref 0.86–1.14)

## 2019-01-04 PROCEDURE — 99207 ZZC NO CHARGE NURSE ONLY: CPT

## 2019-01-04 PROCEDURE — 85610 PROTHROMBIN TIME: CPT | Mod: QW

## 2019-01-04 PROCEDURE — 36416 COLLJ CAPILLARY BLOOD SPEC: CPT

## 2019-01-04 NOTE — PROGRESS NOTES
ANTICOAGULATION FOLLOW-UP CLINIC VISIT    Patient Name:  Clementina Cooper  Date:  1/4/2019  Contact Type:  Face to Face    SUBJECTIVE: wkly INR for upcoming cardioversion.  Faxed results today.     Patient Findings     Positives:   Change in medications (soltalol antiarrhymic.  w/wkly INR for cardioversion.)           OBJECTIVE    INR Protime   Date Value Ref Range Status   01/04/2019 3.3 (A) 0.86 - 1.14 Final       ASSESSMENT / PLAN  INR assessment THER    Recheck INR In: 1 WEEK    INR Location Clinic      Anticoagulation Summary  As of 1/4/2019    INR goal:   2.0-3.0   TTR:   71.3 % (2.8 y)   INR used for dosing:   3.3! (1/4/2019)   Warfarin maintenance plan:   5 mg (5 mg x 1) every Mon, Wed, Fri; 2.5 mg (5 mg x 0.5) all other days   Full warfarin instructions:   1/4: Hold; Otherwise 5 mg every Mon, Wed, Fri; 2.5 mg all other days   Weekly warfarin total:   25 mg   Plan last modified:   Shagufta Bell RN (7/17/2018)   Next INR check:   1/10/2019   Target end date:   Indefinite    Indications    Atrial fibrillation (H) (Resolved) [I48.91]             Anticoagulation Episode Summary     INR check location:       Preferred lab:       Send INR reminders to:    [facility]    Comments:         Anticoagulation Care Providers     Provider Role Specialty Phone number    Estela Davila MD  Internal Medicine [phone]            See the Encounter Report to view Anticoagulation Flowsheet and Dosing Calendar (Go to Encounters tab in chart review, and find the Anticoagulation Therapy Visit)    Dosage adjustment made based on physician directed care plan.    Shagufta Bell RN

## 2019-01-10 ENCOUNTER — ANTICOAGULATION THERAPY VISIT (OUTPATIENT)
Dept: NURSING | Facility: CLINIC | Age: 79
End: 2019-01-10
Payer: COMMERCIAL

## 2019-01-10 LAB — INR POINT OF CARE: 3.1 (ref 0.86–1.14)

## 2019-01-10 PROCEDURE — 85610 PROTHROMBIN TIME: CPT | Mod: QW

## 2019-01-10 PROCEDURE — 36416 COLLJ CAPILLARY BLOOD SPEC: CPT

## 2019-01-10 PROCEDURE — 99207 ZZC NO CHARGE NURSE ONLY: CPT

## 2019-01-10 NOTE — PROGRESS NOTES
ANTICOAGULATION FOLLOW-UP CLINIC VISIT    Patient Name:  Clementina Cooper  Date:  1/10/2019  Contact Type:  Face to Face    SUBJECTIVE: weekly INR for upcoming cardioversion     Patient Findings     Positives:   No Problem Findings           OBJECTIVE    INR Protime   Date Value Ref Range Status   01/10/2019 3.1 (A) 0.86 - 1.14 Final       ASSESSMENT / PLAN  INR assessment THER    Recheck INR In: 1 WEEK    INR Location Clinic      Anticoagulation Summary  As of 1/10/2019    INR goal:   2.0-3.0   TTR:   70.9 % (2.8 y)   INR used for dosing:   3.1! (1/10/2019)   Warfarin maintenance plan:   5 mg (5 mg x 1) every Mon, Wed, Fri; 2.5 mg (5 mg x 0.5) all other days   Full warfarin instructions:   1/10: Hold; Otherwise 5 mg every Mon, Wed, Fri; 2.5 mg all other days   Weekly warfarin total:   25 mg   Plan last modified:   Shagufta Bell RN (7/17/2018)   Next INR check:   1/17/2019   Target end date:   Indefinite    Indications    Atrial fibrillation (H) (Resolved) [I48.91]             Anticoagulation Episode Summary     INR check location:       Preferred lab:       Send INR reminders to:   [facility]    Comments:         Anticoagulation Care Providers     Provider Role Specialty Phone number    Estela Davlia MD  Internal Medicine [phone]            See the Encounter Report to view Anticoagulation Flowsheet and Dosing Calendar (Go to Encounters tab in chart review, and find the Anticoagulation Therapy Visit)    Dosage adjustment made based on physician directed care plan.    Shagufta Bell RN

## 2019-01-17 ENCOUNTER — ANTICOAGULATION THERAPY VISIT (OUTPATIENT)
Dept: NURSING | Facility: CLINIC | Age: 79
End: 2019-01-17
Payer: COMMERCIAL

## 2019-01-17 LAB — INR POINT OF CARE: 3.7 (ref 0.86–1.14)

## 2019-01-17 PROCEDURE — 36416 COLLJ CAPILLARY BLOOD SPEC: CPT

## 2019-01-17 PROCEDURE — 85610 PROTHROMBIN TIME: CPT | Mod: QW

## 2019-01-17 PROCEDURE — 99207 ZZC NO CHARGE NURSE ONLY: CPT

## 2019-01-17 NOTE — PROGRESS NOTES
ANTICOAGULATION FOLLOW-UP CLINIC VISIT    Patient Name:  Clementina Cooper  Date:  1/17/2019  Contact Type:  Face to Face    SUBJECTIVE: corina after next card visit     Patient Findings     Positives:   Inflammation (post cardioversion 2nd Tues)           OBJECTIVE    INR Protime   Date Value Ref Range Status   01/17/2019 3.7 (A) 0.86 - 1.14 Final       ASSESSMENT / PLAN  INR assessment SUPRA    Recheck INR In: 3 WEEKS    INR Location Clinic      Anticoagulation Summary  As of 1/17/2019    INR goal:   2.0-3.0   TTR:   70.4 % (2.8 y)   INR used for dosing:   3.7! (1/17/2019)   Warfarin maintenance plan:   5 mg (5 mg x 1) every Mon, Fri; 2.5 mg (5 mg x 0.5) all other days   Full warfarin instructions:   1/17: Hold; Otherwise 5 mg every Mon, Fri; 2.5 mg all other days   Weekly warfarin total:   22.5 mg   Plan last modified:   Shagufta Bell RN (1/17/2019)   Next INR check:   2/7/2019   Target end date:   Indefinite    Indications    Atrial fibrillation (H) (Resolved) [I48.91]             Anticoagulation Episode Summary     INR check location:       Preferred lab:       Send INR reminders to:    [facility]    Comments:         Anticoagulation Care Providers     Provider Role Specialty Phone number    Estela Davila MD  Internal Medicine [phone]            See the Encounter Report to view Anticoagulation Flowsheet and Dosing Calendar (Go to Encounters tab in chart review, and find the Anticoagulation Therapy Visit)    Dosage adjustment made based on physician directed care plan.    Shagufta Bell RN

## 2019-01-28 ENCOUNTER — TRANSFERRED RECORDS (OUTPATIENT)
Dept: HEALTH INFORMATION MANAGEMENT | Facility: CLINIC | Age: 79
End: 2019-01-28

## 2019-01-31 ENCOUNTER — TRANSFERRED RECORDS (OUTPATIENT)
Dept: HEALTH INFORMATION MANAGEMENT | Facility: CLINIC | Age: 79
End: 2019-01-31

## 2019-02-04 ENCOUNTER — ANCILLARY PROCEDURE (OUTPATIENT)
Dept: GENERAL RADIOLOGY | Facility: CLINIC | Age: 79
End: 2019-02-04
Payer: COMMERCIAL

## 2019-02-04 ENCOUNTER — ANTICOAGULATION THERAPY VISIT (OUTPATIENT)
Dept: NURSING | Facility: CLINIC | Age: 79
End: 2019-02-04
Payer: COMMERCIAL

## 2019-02-04 ENCOUNTER — OFFICE VISIT (OUTPATIENT)
Dept: FAMILY MEDICINE | Facility: CLINIC | Age: 79
End: 2019-02-04
Payer: COMMERCIAL

## 2019-02-04 VITALS
OXYGEN SATURATION: 96 % | BODY MASS INDEX: 34.28 KG/M2 | DIASTOLIC BLOOD PRESSURE: 69 MMHG | HEART RATE: 101 BPM | TEMPERATURE: 97.5 F | SYSTOLIC BLOOD PRESSURE: 114 MMHG | WEIGHT: 206 LBS

## 2019-02-04 DIAGNOSIS — G47.00 INSOMNIA, UNSPECIFIED TYPE: ICD-10-CM

## 2019-02-04 DIAGNOSIS — R00.0 TACHYCARDIA: ICD-10-CM

## 2019-02-04 DIAGNOSIS — I48.92 ATRIAL FLUTTER, UNSPECIFIED TYPE (H): ICD-10-CM

## 2019-02-04 DIAGNOSIS — M25.511 ACUTE PAIN OF RIGHT SHOULDER: ICD-10-CM

## 2019-02-04 DIAGNOSIS — L23.9 ALLERGIC DERMATITIS: Primary | ICD-10-CM

## 2019-02-04 LAB — INR POINT OF CARE: 2.1 (ref 0.86–1.14)

## 2019-02-04 PROCEDURE — 99215 OFFICE O/P EST HI 40 MIN: CPT | Performed by: NURSE PRACTITIONER

## 2019-02-04 PROCEDURE — 99207 ZZC NO CHARGE NURSE ONLY: CPT

## 2019-02-04 PROCEDURE — 36416 COLLJ CAPILLARY BLOOD SPEC: CPT

## 2019-02-04 PROCEDURE — 73030 X-RAY EXAM OF SHOULDER: CPT | Mod: RT

## 2019-02-04 PROCEDURE — 85610 PROTHROMBIN TIME: CPT | Mod: QW

## 2019-02-04 PROCEDURE — 93005 ELECTROCARDIOGRAM TRACING: CPT | Performed by: NURSE PRACTITIONER

## 2019-02-04 RX ORDER — TRAZODONE HYDROCHLORIDE 50 MG/1
50-100 TABLET, FILM COATED ORAL
Qty: 60 TABLET | Refills: 1 | Status: SHIPPED | OUTPATIENT
Start: 2019-02-04 | End: 2019-03-12

## 2019-02-04 RX ORDER — AMIODARONE HYDROCHLORIDE 200 MG/1
200 TABLET ORAL DAILY
Qty: 90 TABLET | Refills: 3 | COMMUNITY
Start: 2019-02-04 | End: 2019-05-29

## 2019-02-04 RX ORDER — TRIAMCINOLONE ACETONIDE 1 MG/G
OINTMENT TOPICAL
Qty: 80 G | Refills: 1 | Status: SHIPPED | OUTPATIENT
Start: 2019-02-04 | End: 2019-09-16

## 2019-02-04 ASSESSMENT — PAIN SCALES - GENERAL: PAINLEVEL: MODERATE PAIN (5)

## 2019-02-04 NOTE — PATIENT INSTRUCTIONS
I recommend eliminating your afternoon cup of coffee as this may be contributing to your insomnia. You can switch to decaf  Try 1 tablet Trazodone 30 minutes before bedtime. You can take 2 if needed, but try 1 tablet at first    Apply Kenalog ointment to rash three times daily for 2 weeks  Do not use the hydrocortisone at the same time    Take an extra amiodraone when you get home. You can expect a call from either our clinic or [facility] regarding further instructions

## 2019-02-04 NOTE — PROGRESS NOTES
SUBJECTIVE:   Clementina Cooper is a 78 year old female who presents to clinic today for the following health issues:      Musculoskeletal problem/pain      Duration: 2 weeks    Description  Location: right arm and shoulder    Intensity:  Moderate  to severe    Accompanying signs and symptoms: none    History  Previous similar problem: no   Previous evaluation:  none    Precipitating or alleviating factors:  Trauma or overuse: YES- Moving boxes in her basement and slipped and fell into other boxes injuring her right arm.    Aggravating factors include: lifting and moving her arm, sleeping is worse when she turns over.    Therapies tried and outcome: nothing    Rash in groin region  Some improvement with hydrocortisone but very itchy    Hospital followup. Discharged 2/1  Outside records reviewed  History of Afib, CAD, PAD, DM type 2 and PAD  Present with fatigue and tachycardia  Cardioverted with Amiodarone. Underwent ablation  She was started on omeprazole during hospitalization. To take for 1 month  She will be on amiodarone for 3 months  She denies GERD symptoms  Denies chest pain, heart burn, palpitations      She complains of insomnia, ongoing for years  Worse since hospitalization  No difficulty falling asleep, will sleep for an hour, then wake frequently   Denies nocturia  Tylenol PM does not help  Takes a few daytime naps, just 30 minutes naps  Does not snore  Drinks 2 cups of coffee in the morning, and 1 cup around 2 or 3    She fell 2 1/2 weeks ago  Slipped and fell onto right shoulder into the boxes  Pain is not improving  Anterior shoulder and radiates to elbow  No relief with tylenol  Denies paresthesias or weakness        Problem list and histories reviewed & adjusted, as indicated.  Additional history: as documented    Patient Active Problem List   Diagnosis     PVD (peripheral vascular disease) (H)     Family history of colon cancer     HYPERLIPIDEMIA LDL GOAL <100     Open-angle glaucoma,  mild-mod, ou     Advanced directives, counseling/discussion     Essential tremor     Pseudophakia, ou; Yag Caps, od     S/P iridectomy, ou     Anticoagulation goal of INR 2 to 3     Hypothyroidism due to acquired atrophy of thyroid     Chronic atrial fibrillation (H)     overweight  HCC     Long-term (current) use of anticoagulants [Z79.01]     Type 2 diabetes mellitus without complication, without long-term current use of insulin (H)     Glaucoma suspect, bilateral     Past Surgical History:   Procedure Laterality Date     ANGIOPLASTY      right leg     C NONSPECIFIC PROCEDURE      neck surgery/trauma     C STOMACH SURGERY PROCEDURE UNLISTED       CATARACT IOL, RT/LT       HC TRABECULOPLASTY BY LASER SURGERY       HC VASCULAR SURGERY PROCEDURE UNLIST       HYSTERECTOMY, CERVIX STATUS UNKNOWN      uterine bleeding     HYSTERECTOMY, PAP NO LONGER INDICATED       LASER YAG CAPSULOTOMY Right 2018    right eye     LASER YAG IRIDOTOMY  remotely    both eyes (elsewhere)     PHACOEMULSIFICATION WITH STANDARD INTRAOCULAR LENS IMPLANT  2012; 2012    right eye; left eye       Social History     Tobacco Use     Smoking status: Former Smoker     Last attempt to quit: 1999     Years since quittin.1     Smokeless tobacco: Never Used   Substance Use Topics     Alcohol use: Yes     Comment: occasional     Family History   Problem Relation Age of Onset     Diabetes Mother      Heart Disease Mother      Heart Disease Father      Heart Disease Son      Cancer Brother      Heart Disease Sister      Cancer Brother      Cancer Brother      Heart Disease Sister      Macular Degeneration No family hx of      Glaucoma No family hx of          Current Outpatient Medications   Medication Sig Dispense Refill     ACE NOT PRESCRIBED, INTENTIONAL, 1 each At Bedtime ACE Inhibitor not prescribed due to Other:  Neck swelling.  Also, BP is on low normal side with the betablocker 0 each 0     atorvastatin (LIPITOR) 20 MG  tablet TAKE 1 TABLET (20 MG) BY MOUTH DAILY 90 tablet 3     Biotin 5000 MCG CAPS Take  by mouth.       CENTRUM SILVER OR one daily       Cyanocobalamin (B-12) 1000 MCG TBCR Take 1 tablet by mouth daily       diltiazem ER (DILT-XR) 180 MG 24 hr capsule Take 1 capsule (180 mg) by mouth daily 90 capsule 3     Evening Primrose Oil 1000 MG CAPS Take by mouth daily       levothyroxine (SYNTHROID/LEVOTHROID) 100 MCG tablet TAKE 1 TABLET (100 MCG) BY MOUTH DAILY 90 tablet 3     losartan (COZAAR) 25 MG tablet TAKE 0.5 TABLETS (12.5 MG) BY MOUTH DAILY 45 tablet 1     metFORMIN (GLUCOPHAGE) 500 MG tablet TAKE 1 TABLET (500 MG) BY MOUTH DAILY (WITH DINNER) 90 tablet 3     omeprazole (PRILOSEC) 20 MG DR capsule Take 20 mg by mouth daily       ONETOUCH ULTRA test strip USE TO TEST BLOOD SUGAR TWICE A DAY OR AS DIRECTED 200 strip 0     ORDER FOR DME Equipment being ordered:  One Touch strips to be used daily 90 days 3     ORDER FOR DME Equipment being ordered:  chemstrips for daily blood sugar checks. 1 Box 3     traZODone (DESYREL) 50 MG tablet Take 1-2 tablets ( mg) by mouth nightly as needed for sleep 60 tablet 1     triamcinolone (KENALOG) 0.1 % external ointment Apply to rash in groin region three times daily for 2 weeks 80 g 1     VITAMIN D 1000 UNIT OR TABS 1 TABLET DAILY       warfarin (COUMADIN) 5 MG tablet Take 1 tablet (5mg) by mouth Mon,Wed,Fri & 1/2 tablet (2.5mg) all other days of the week 102 tablet 3     aspirin 81 MG tablet Take 1 tablet by mouth daily.  3     propranolol (INDERAL) 10 MG tablet TAKE 1 TABLET (10 MG) BY MOUTH 2 TIMES DAILY AS NEEDED (Patient not taking: Reported on 9/27/2018) 180 tablet 3     sotalol (BETAPACE) 80 MG tablet Take 1 tablet (80 mg) by mouth 2 times daily 180 tablet 3     BP Readings from Last 3 Encounters:   02/04/19 114/69   10/09/18 120/70   09/27/18 100/73    Wt Readings from Last 3 Encounters:   02/04/19 93.4 kg (206 lb)   10/09/18 92.1 kg (203 lb)   09/27/18 92.1 kg (203  lb)                    Reviewed and updated as needed this visit by clinical staff  Tobacco  Allergies  Meds  Med Hx  Surg Hx  Fam Hx  Soc Hx      Reviewed and updated as needed this visit by Provider         ROS:  Constitutional, HEENT, cardiovascular, pulmonary, gi and gu systems are negative, except as otherwise noted.    OBJECTIVE:     /69 (BP Location: Right arm, Patient Position: Chair, Cuff Size: Adult Regular)   Pulse 101   Temp 97.5  F (36.4  C) (Oral)   Wt 93.4 kg (206 lb)   SpO2 96%   Breastfeeding? No   BMI 34.28 kg/m    Body mass index is 34.28 kg/m .  GENERAL: healthy, alert and no distress  RESP: lungs clear to auscultation - no rales, rhonchi or wheezes  CV: regular rate and rhythm, normal S1 S2, no S3 or S4, no murmur, click or rub, no peripheral edema and peripheral pulses strong  ABDOMEN: soft, nontender, no hepatosplenomegaly, no masses and bowel sounds normal  MS: Tenderness to anterior right shoulder without guarding. Able to flex to 150 degrees. Negative drop arm and empty can test. Abduction to 150 degrees.  SKIN: Dense erythematous macular rash in bilateral groin region. Skin intact  NEURO: Normal strength and tone, mentation intact and speech normal    Diagnostic Test Results:  ECG: Atrial flutter, rate 114  Xray: arthritis, no acute fracture. Pending formal radiology read    ASSESSMENT/PLAN:   1. Allergic dermatitis  - triamcinolone (KENALOG) 0.1 % external ointment; Apply to rash in groin region three times daily for 2 weeks  Dispense: 80 g; Refill: 1    2. Acute pain of right shoulder  - No fracture seen. Consider rotator cuff though she had pretty good ROM and testing negative. Contusion? Recommend ice, rest, tylenol PRN. Reviewed gentle stretching exercises to promote ROM. Recommend physical therapy if not improving. She declines referral today but will call clinic in future if receptive to physical therapy   - XR Shoulder Right G/E 3 Views; Future    3. Atrial  flutter, unspecified type (H)  - New since ablation. She is asymptomatic. I attempted to call provider at [facility] and had to leave voicemail with RN. She normally takes amiodraone in evening. Advised to take additional dose when she gets home. If develops symptoms of chest pain, fatigue, lightheadedness, palpitations, should call 911. Otherwise, will wait to hear back from [facility] regarding further instructions. Suspect she should be seen sooner than regularly scheduled f/u in 4 days    4. Tachycardia  - EKG 12-lead complete w/read - Clinics  - EKG 12-lead, tracing only    5. Insomnia, unspecified type  - Recommend decreasing caffeine consumption which is likely contributing. Will try Trazodone. Reviewed risks and benefits  - traZODone (DESYREL) 50 MG tablet; Take 1-2 tablets ( mg) by mouth nightly as needed for sleep  Dispense: 60 tablet; Refill: 1    Patient Instructions   I recommend eliminating your afternoon cup of coffee as this may be contributing to your insomnia. You can switch to decaf  Try 1 tablet Trazodone 30 minutes before bedtime. You can take 2 if needed, but try 1 tablet at first    Apply Kenalog ointment to rash three times daily for 2 weeks  Do not use the hydrocortisone at the same time    Take an extra amiodraone when you get home. You can expect a call from either our clinic or [facility] regarding further instructions    More than 45 minutes spent with patient, more than 50% of which was spent on counseling and coordination of care.     CHARLOTTE Guzman [facility]

## 2019-02-04 NOTE — PROGRESS NOTES
ANTICOAGULATION FOLLOW-UP CLINIC VISIT    Patient Name:  Clementina Cooper  Date:  2019  Contact Type:  Face to Face    SUBJECTIVE: INR sooner if Amiodarone dose changed by Cardiology.  Await update by patient.     Patient Findings     Positives:   Change in medications (trazodone new start at 50 QHS / taper up PRN.  Amiodarone raised dose x 1 today in a-fib (if permanent raise dose patient to call INR office to get appt sooner than 2 wks.  )           OBJECTIVE    INR Protime   Date Value Ref Range Status   2019 2.1 (A) 0.86 - 1.14 Final       ASSESSMENT / PLAN  INR assessment THER    Recheck INR In: 2 WEEKS    INR Location Clinic      Anticoagulation Summary  As of 2019    INR goal:   2.0-3.0   TTR:   70.2 % (2.9 y)   INR used for dosin.1 (2019)   Warfarin maintenance plan:   5 mg (5 mg x 1) every Mon, Fri; 2.5 mg (5 mg x 0.5) all other days   Full warfarin instructions:   5 mg every Mon, Fri; 2.5 mg all other days   Weekly warfarin total:   22.5 mg   No change documented:   Shagufta Bell RN   Plan last modified:   Shagufta Bell RN (2019)   Next INR check:   2019   Target end date:   Indefinite    Indications    Atrial fibrillation (H) (Resolved) [I48.91]             Anticoagulation Episode Summary     INR check location:       Preferred lab:       Send INR reminders to:   [facility]    Comments:         Anticoagulation Care Providers     Provider Role Specialty Phone number    Estela Davila MD  Internal Medicine [phone]            See the Encounter Report to view Anticoagulation Flowsheet and Dosing Calendar (Go to Encounters tab in chart review, and find the Anticoagulation Therapy Visit)    Dosage adjustment made based on physician directed care plan.    Shagufta Bell RN

## 2019-02-07 ENCOUNTER — DOCUMENTATION ONLY (OUTPATIENT)
Dept: OPHTHALMOLOGY | Facility: CLINIC | Age: 79
End: 2019-02-07

## 2019-02-08 ENCOUNTER — TRANSFERRED RECORDS (OUTPATIENT)
Dept: HEALTH INFORMATION MANAGEMENT | Facility: CLINIC | Age: 79
End: 2019-02-08

## 2019-02-13 ENCOUNTER — TRANSFERRED RECORDS (OUTPATIENT)
Dept: HEALTH INFORMATION MANAGEMENT | Facility: CLINIC | Age: 79
End: 2019-02-13

## 2019-02-13 ENCOUNTER — OFFICE VISIT (OUTPATIENT)
Dept: OPHTHALMOLOGY | Facility: CLINIC | Age: 79
End: 2019-02-13
Payer: COMMERCIAL

## 2019-02-13 DIAGNOSIS — H26.492 POSTERIOR CAPSULAR OPACIFICATION VISUALLY SIGNIFICANT OF LEFT EYE: ICD-10-CM

## 2019-02-13 DIAGNOSIS — Z01.01 ENCOUNTER FOR EXAMINATION OF EYES AND VISION WITH ABNORMAL FINDINGS: Primary | ICD-10-CM

## 2019-02-13 DIAGNOSIS — H52.4 PRESBYOPIA: ICD-10-CM

## 2019-02-13 DIAGNOSIS — E11.9 TYPE 2 DIABETES MELLITUS WITHOUT COMPLICATION, WITHOUT LONG-TERM CURRENT USE OF INSULIN (H): ICD-10-CM

## 2019-02-13 DIAGNOSIS — H40.1131 PRIMARY OPEN ANGLE GLAUCOMA (POAG) OF BOTH EYES, MILD STAGE: ICD-10-CM

## 2019-02-13 DIAGNOSIS — Z96.1 PSEUDOPHAKIA: ICD-10-CM

## 2019-02-13 LAB — RETINOPATHY: NORMAL

## 2019-02-13 PROCEDURE — 92014 COMPRE OPH EXAM EST PT 1/>: CPT | Mod: 57 | Performed by: OPHTHALMOLOGY

## 2019-02-13 PROCEDURE — 92015 DETERMINE REFRACTIVE STATE: CPT | Performed by: OPHTHALMOLOGY

## 2019-02-13 PROCEDURE — 66821 AFTER CATARACT LASER SURGERY: CPT | Mod: LT | Performed by: OPHTHALMOLOGY

## 2019-02-13 ASSESSMENT — REFRACTION_MANIFEST
OS_ADD: +2.50
OD_CYLINDER: +0.25
OS_AXIS: 035
OD_SPHERE: -0.50
OD_AXIS: 140
OS_SPHERE: -1.00
OS_CYLINDER: +0.50
OD_ADD: +2.50

## 2019-02-13 ASSESSMENT — REFRACTION_WEARINGRX
OS_AXIS: 089
OS_SPHERE: -1.50
OD_ADD: +2.75
OD_CYLINDER: +0.75
OS_CYLINDER: +0.75
OD_SPHERE: -0.25
SPECS_TYPE: BIFOCAL
OD_AXIS: 172
OS_ADD: +2.75

## 2019-02-13 ASSESSMENT — VISUAL ACUITY
OS_SC+: -1
OS_SC: 20/40
METHOD: SNELLEN - LINEAR
OS_PH_SC: 20/40
OD_SC+: -2
CORRECTION_TYPE: GLASSES
OD_SC: 20/25

## 2019-02-13 ASSESSMENT — EXTERNAL EXAM - RIGHT EYE: OD_EXAM: NORMAL

## 2019-02-13 ASSESSMENT — CONF VISUAL FIELD
OD_NORMAL: 1
OS_NORMAL: 1
METHOD: COUNTING FINGERS

## 2019-02-13 ASSESSMENT — TONOMETRY
IOP_METHOD: TONOPEN
OS_IOP_MMHG: 19
OD_IOP_MMHG: 19

## 2019-02-13 ASSESSMENT — CUP TO DISC RATIO
OD_RATIO: 0.7
OS_RATIO: 0.6

## 2019-02-13 ASSESSMENT — EXTERNAL EXAM - LEFT EYE: OS_EXAM: NORMAL

## 2019-02-13 ASSESSMENT — SLIT LAMP EXAM - LIDS
COMMENTS: 1+ DERMATOCHALASIS - UPPER LID
COMMENTS: 1+ DERMATOCHALASIS - UPPER LID

## 2019-02-13 NOTE — LETTER
2/13/2019         RE: Clementina Cooper  [address] [address]        Dear Colleague,    Thank you for referring your patient, Clementina Cooper, to the [facility]. Please see a copy of my visit note below.     Current Eye Medications:  none     Subjective:  Complete eye exam. Vision is doing ok both eyes distance and near both eyes. No eye pain or discomfort in either eye.   Diabetic for last 5 years. Blood sugar has been doing well.    Lab Results   Component Value Date    A1C 7.6 09/27/2018    A1C 7.0 05/16/2018    A1C 6.9 10/26/2017    A1C 7.0 06/26/2017    A1C 8.0 03/21/2017        Objective:  See Ophthalmology Exam.       Assessment: Visually Significant Posterior Capsule Opacification Left Eye.  No diabetic retinopathy.  Stable intraocular pressure and discs.      ICD-10-CM    1. Encounter for examination of eyes and vision with abnormal findings Z01.01 REFRACTION   2. Presbyopia H52.4 REFRACTION   3. Type 2 diabetes mellitus without complication, without long-term current use of insulin (H) E11.9 EYE EXAM (SIMPLE-NONBILLABLE)   4. Posterior capsular opacification visually significant of left eye H26.492 YAG LASER CAPSULOTOMY   5. Pseudophakia, ou; Yag Caps, od Z96.1    6. Primary open angle glaucoma (POAG) of both eyes, mild stage H40.1131          Plan: Yag Capsulotomy performed without problems left eye under Proparacaine anesthetic.  Well tolerated.  Number of applications: 24  Power of applications: 1.4 mJ  Iopidine given.    Continue same glasses.  Use artificial tears up to 4 times daily both eyes.  (Refresh Tears, Systane Ultra/Balance, or Theratears)  Possible posterior vitreous detachment (sudden onset large floater and/or flashing lights) both eyes discussed.  Your eye may be slightly red, sore, and blurry for a few days.  You may notice some floaters for a few days.  Keep scheduled return visit for a intraocular pressure check and refraction in  about one week.  Needs glaucoma testing in 6 months.    Rahul Jennings M.D.  [phone]           Again, thank you for allowing me to participate in the care of your patient.        Sincerely,        Rahul Jennings MD

## 2019-02-13 NOTE — PROGRESS NOTES
Current Eye Medications:  none     Subjective:  Complete eye exam. Vision is doing ok both eyes distance and near both eyes. No eye pain or discomfort in either eye.   Diabetic for last 5 years. Blood sugar has been doing well.    Lab Results   Component Value Date    A1C 7.6 09/27/2018    A1C 7.0 05/16/2018    A1C 6.9 10/26/2017    A1C 7.0 06/26/2017    A1C 8.0 03/21/2017        Objective:  See Ophthalmology Exam.       Assessment: Visually Significant Posterior Capsule Opacification Left Eye.  No diabetic retinopathy.  Stable intraocular pressure and discs.      ICD-10-CM    1. Encounter for examination of eyes and vision with abnormal findings Z01.01 REFRACTION   2. Presbyopia H52.4 REFRACTION   3. Type 2 diabetes mellitus without complication, without long-term current use of insulin (H) E11.9 EYE EXAM (SIMPLE-NONBILLABLE)   4. Posterior capsular opacification visually significant of left eye H26.492 YAG LASER CAPSULOTOMY   5. Pseudophakia, ou; Yag Caps, od Z96.1    6. Primary open angle glaucoma (POAG) of both eyes, mild stage H40.1131          Plan: Yag Capsulotomy performed without problems left eye under Proparacaine anesthetic.  Well tolerated.  Number of applications: 24  Power of applications: 1.4 mJ  Iopidine given.    Continue same glasses.  Use artificial tears up to 4 times daily both eyes.  (Refresh Tears, Systane Ultra/Balance, or Theratears)  Possible posterior vitreous detachment (sudden onset large floater and/or flashing lights) both eyes discussed.  Your eye may be slightly red, sore, and blurry for a few days.  You may notice some floaters for a few days.  Keep scheduled return visit for a intraocular pressure check and refraction in about one week.  Needs glaucoma testing in 6 months.    Rahul Jennings M.D.  [phone]

## 2019-02-13 NOTE — PATIENT INSTRUCTIONS
Continue same glasses.  Use artificial tears up to 4 times daily both eyes.  (Refresh Tears, Systane Ultra/Balance, or Theratears)  Possible posterior vitreous detachment (sudden onset large floater and/or flashing lights) both eyes discussed.  Your eye may be slightly red, sore, and blurry for a few days.  You may notice some floaters for a few days.  Keep scheduled return visit for a intraocular pressure check and refraction in about one week.    Rahul Jennings M.D.  [phone]      Patient Education   Diabetes weakens the blood vessels all over the body, including the eyes. Damage to the blood vessels in the eyes can cause swelling or bleeding into part of the eye (called the retina). This is called diabetic retinopathy (LUIS-tin--pu-thee). If not treated, this disease can cause vision loss or blindness.   Symptoms may include blurred or distorted vision, but many people have no symptoms. It's important to see your eye doctor regularly to check for problems.   Early treatment and good control can help protect your vision. Here are the things you can do to help prevent vision loss:      1. Keep your blood sugar levels under tight control.      2. Bring high blood pressure under control.      3. No smoking.      4. Have yearly dilated eye exams.

## 2019-02-19 ENCOUNTER — ANTICOAGULATION THERAPY VISIT (OUTPATIENT)
Dept: NURSING | Facility: CLINIC | Age: 79
End: 2019-02-19
Payer: COMMERCIAL

## 2019-02-19 LAB — INR POINT OF CARE: 3.6 (ref 0.86–1.14)

## 2019-02-19 PROCEDURE — 36416 COLLJ CAPILLARY BLOOD SPEC: CPT

## 2019-02-19 PROCEDURE — 99207 ZZC NO CHARGE NURSE ONLY: CPT

## 2019-02-19 PROCEDURE — 85610 PROTHROMBIN TIME: CPT | Mod: QW

## 2019-02-19 NOTE — PROGRESS NOTES
ANTICOAGULATION FOLLOW-UP CLINIC VISIT    Patient Name:  Clementina Cooper  Date:  2/19/2019  Contact Type:  Face to Face    SUBJECTIVE: new medicine below started two weeks ago.  States it is helpful.  She will see cardiology soon.  Recheck in 2 weeks.     Patient Findings     Positives:   Change in medications (trazodone two tabs QS new / raised INR )           OBJECTIVE    INR Protime   Date Value Ref Range Status   02/19/2019 3.6 (A) 0.86 - 1.14 Final       ASSESSMENT / PLAN  INR assessment SUPRA    Recheck INR In: 2 WEEKS    INR Location Clinic      Anticoagulation Summary  As of 2/19/2019    INR goal:   2.0-3.0   TTR:   70.0 % (2.9 y)   INR used for dosing:   3.6! (2/19/2019)   Warfarin maintenance plan:   2.5 mg (5 mg x 0.5) every day   Full warfarin instructions:   2/19: Hold; Otherwise 2.5 mg every day   Weekly warfarin total:   17.5 mg   Plan last modified:   Shagufta Bell RN (2/19/2019)   Next INR check:   3/5/2019   Target end date:   Indefinite    Indications    Atrial fibrillation (H) (Resolved) [I48.91]             Anticoagulation Episode Summary     INR check location:       Preferred lab:       Send INR reminders to:   [facility]    Comments:         Anticoagulation Care Providers     Provider Role Specialty Phone number    Estela Davila MD  Internal Medicine [phone]            See the Encounter Report to view Anticoagulation Flowsheet and Dosing Calendar (Go to Encounters tab in chart review, and find the Anticoagulation Therapy Visit)    Dosage adjustment made based on physician directed care plan.    Shagufta Bell RN

## 2019-02-21 ENCOUNTER — OFFICE VISIT (OUTPATIENT)
Dept: OPHTHALMOLOGY | Facility: CLINIC | Age: 79
End: 2019-02-21
Payer: COMMERCIAL

## 2019-02-21 DIAGNOSIS — Z96.1 PSEUDOPHAKIA: Primary | ICD-10-CM

## 2019-02-21 PROCEDURE — 99024 POSTOP FOLLOW-UP VISIT: CPT | Performed by: OPHTHALMOLOGY

## 2019-02-21 ASSESSMENT — TONOMETRY
OS_IOP_MMHG: 17
IOP_METHOD: APPLANATION

## 2019-02-21 ASSESSMENT — SLIT LAMP EXAM - LIDS
COMMENTS: 1+ DERMATOCHALASIS - UPPER LID
COMMENTS: 1+ DERMATOCHALASIS - UPPER LID

## 2019-02-21 ASSESSMENT — VISUAL ACUITY
METHOD: SNELLEN - LINEAR
OS_PH_SC+: +2
OS_SC+: +1
OD_SC+: -3
OD_SC: 20/20
OS_PH_SC: 20/30
OS_SC: 20/40

## 2019-02-21 ASSESSMENT — REFRACTION_MANIFEST
OS_AXIS: 033
OS_CYLINDER: +0.50
OS_SPHERE: -1.00
OS_ADD: +2.75

## 2019-02-21 ASSESSMENT — EXTERNAL EXAM - LEFT EYE: OS_EXAM: NORMAL

## 2019-02-21 ASSESSMENT — EXTERNAL EXAM - RIGHT EYE: OD_EXAM: NORMAL

## 2019-02-21 NOTE — PATIENT INSTRUCTIONS
Glasses Rx given - optional.  Call in October 2019 for an appointment in February 2020 for Complete Exam.    Dr. Jennings [phone]

## 2019-02-21 NOTE — PROGRESS NOTES
Current Eye Medications:  None.      Subjective:  Status/Post Yag Capsulotomy, left eye:  2-13-19.  Vision has improved since the laser.  Left eye is comfortable.      Objective:  See Ophthalmology Exam.       Assessment:  Doing well s/p Yag Caps left eye.      Plan:  Glasses Rx given - optional.  Call in October 2019 for an appointment in February 2020 for Complete Exam.    Dr. Jennings [phone]

## 2019-02-21 NOTE — LETTER
2/21/2019         RE: Clementina Cooper  [facility] [address] [facility] [address]        Dear Colleague,    Thank you for referring your patient, Clementina Cooper, to the [facility]. Please see a copy of my visit note below.     Current Eye Medications:  None.      Subjective:  Status/Post Yag Capsulotomy, left eye:  2-13-19.  Vision has improved since the laser.  Left eye is comfortable.      Objective:  See Ophthalmology Exam.       Assessment:  Doing well s/p Yag Caps left eye.      Plan:  Glasses Rx given - optional.  Call in October 2019 for an appointment in February 2020 for Complete Exam.    Dr. Jennings [phone]           Again, thank you for allowing me to participate in the care of your patient.        Sincerely,        Rahul Jennings MD

## 2019-02-26 ENCOUNTER — TRANSFERRED RECORDS (OUTPATIENT)
Dept: HEALTH INFORMATION MANAGEMENT | Facility: CLINIC | Age: 79
End: 2019-02-26

## 2019-02-27 ENCOUNTER — TELEPHONE (OUTPATIENT)
Dept: FAMILY MEDICINE | Facility: CLINIC | Age: 79
End: 2019-02-27

## 2019-02-27 DIAGNOSIS — M25.511 ACUTE PAIN OF RIGHT SHOULDER: Primary | ICD-10-CM

## 2019-02-27 NOTE — TELEPHONE ENCOUNTER
Reason for Call:  Request for results:    Name of test or procedure: xray of shoulder    Date of test of procedure: 2/4    Location of the test or procedure: [facility]    OK to leave the result message on voice mail or with a family member? YES    Phone number Patient can be reached at:  Home number on file [phone] (home)    Additional comments: Patient states that when she was in clinic [place] looked at her xray and didn't think anything was wrong, but she is still having the pain so she is wondering if the radiologist has had a chance to look at the xray. Please call patient sometime after 3pm today.    Call taken on 2/27/2019 at 11:25 AM by Rasheed Sam

## 2019-02-27 NOTE — TELEPHONE ENCOUNTER
Yes, the radiologist did look at the xray. No fracture.   If the pain is slow to improve, I recommend doing physical therapy. I put in a referral for her. i

## 2019-03-01 ENCOUNTER — THERAPY VISIT (OUTPATIENT)
Dept: PHYSICAL THERAPY | Facility: CLINIC | Age: 79
End: 2019-03-01
Payer: COMMERCIAL

## 2019-03-01 DIAGNOSIS — M75.41 SHOULDER IMPINGEMENT SYNDROME, RIGHT: ICD-10-CM

## 2019-03-01 DIAGNOSIS — M25.511 ACUTE PAIN OF RIGHT SHOULDER: ICD-10-CM

## 2019-03-01 PROCEDURE — 97110 THERAPEUTIC EXERCISES: CPT | Mod: GP | Performed by: PHYSICAL THERAPIST

## 2019-03-01 PROCEDURE — 97161 PT EVAL LOW COMPLEX 20 MIN: CPT | Mod: GP | Performed by: PHYSICAL THERAPIST

## 2019-03-01 PROCEDURE — G8982 BODY POS GOAL STATUS: HCPCS | Mod: GP | Performed by: PHYSICAL THERAPIST

## 2019-03-01 PROCEDURE — G8981 BODY POS CURRENT STATUS: HCPCS | Mod: GP | Performed by: PHYSICAL THERAPIST

## 2019-03-01 NOTE — PROGRESS NOTES
[facility] Initial Evaluation  Subjective:  The history is provided by the patient.   Clementina Cooper is a 78 year old female with a right shoulder condition.  Condition occurred with:  A fall.  Condition occurred: at home.  This is a new condition  MD referral 2/27/2019.  Fell Jan 2019 into pile of boxes.  .    Patient reports pain:  In the joint, anterior and lateral.    Pain is described as aching and sharp and is constant and reported as 6/10.  Associated symptoms:  Catching and painful arc. Pain is the same all the time.  Symptoms are exacerbated by lying on extremity and lifting and relieved by nothing.  Since onset symptoms are unchanged.  Special tests:  X-ray (Mod AC degen).      General health as reported by patient is good.  Pertinent medical history includes:  Diabetes, heart problems and overweight.  Medical allergies: yes (see list in chart).  Other surgeries include:  Orthopedic surgery (neck).  Current medications:  Cardiac medication, sleep medication and thyroid medication.  Current occupation is Retired..        Barriers include:  None as reported by the patient.    Red flags:  None as reported by the patient.                        Objective:  Standing Alignment:    Cervical/Thoracic:  Forward head  Shoulder/UE:  Rounded shoulders                                  Cervical/Thoracic Evaluation    AROM:  AROM Cervical:    Flexion:         Extension:        Rotation:         Left: decreased 50%     Right: decreased 50%  Side Bend:      Left:     Right:                                Shoulder Evaluation:  ROM:  AROM:  normal  Flexion:  Right:  Full with pain    Abduction:  Right:  Full with pain    Internal Rotation:  Right:  Full with pain                  PROM:  normal (end range pain)                                Strength:    Flexion: Right: 4+/5     Pain:   Extension:  Right: 5/5     Pain:-  Abduction:  Right: 4+/5      Pain:+  Adduction:  Right: 5/5      Pain:-  Internal  Rotation:  Right: 5/5      Pain:-  External Rotation:   Right:5-/5      Pain:-/+      Horizontal Adduction:  Right:/5     Pain:-        Special Tests:      Right shoulder positive for the following special tests:Impingement  Right shoulder negative for the following special tests:Labral and Rotator cuff tear  Palpation:      Left shoulder tenderness not present at: Biceps  Right shoulder tenderness present at: Deltoid                                     General     ROS    Assessment/Plan:    Patient is a 78 year old female with right side shoulder complaints.    Patient has the following significant findings with corresponding treatment plan.                Diagnosis 1:  R shoulder impingement  Pain -  manual therapy, self management, education, directional preference exercise and home program  Decreased ROM/flexibility - manual therapy and therapeutic exercise  Decreased strength - therapeutic exercise and therapeutic activities  Impaired muscle performance - neuro re-education  Decreased function - therapeutic activities    Therapy Evaluation Codes:   1) History comprised of:   Personal factors that impact the plan of care:      None.    Comorbidity factors that impact the plan of care are:      Diabetes, Heart problems and Overweight.     Medications impacting care: Cardiac and Sleep.  2) Examination of Body Systems comprised of:   Body structures and functions that impact the plan of care:      Shoulder.   Activity limitations that impact the plan of care are:      Laying down.  3) Clinical presentation characteristics are:   Stable/Uncomplicated.  4) Decision-Making    Low complexity using standardized patient assessment instrument and/or measureable assessment of functional outcome.  Cumulative Therapy Evaluation is: Low complexity.    Previous and current functional limitations:  (See Goal Flow Sheet for this information)    Short term and Long term goals: (See Goal Flow Sheet for this information)      Communication ability:  Patient appears to be able to clearly communicate and understand verbal and written communication and follow directions correctly.  Treatment Explanation - The following has been discussed with the patient:   RX ordered/plan of care  Anticipated outcomes  Possible risks and side effects  This patient would benefit from PT intervention to resume normal activities.   Rehab potential is good.    Frequency:  1 X week, once daily  Duration:  for 6 weeks  Discharge Plan:  Achieve all LTG.  Independent in home treatment program.  Reach maximal therapeutic benefit.    Please refer to the daily flowsheet for treatment today, total treatment time and time spent performing 1:1 timed codes.

## 2019-03-05 ENCOUNTER — ANTICOAGULATION THERAPY VISIT (OUTPATIENT)
Dept: NURSING | Facility: CLINIC | Age: 79
End: 2019-03-05
Payer: COMMERCIAL

## 2019-03-05 LAB — INR POINT OF CARE: 1.8 (ref 0.86–1.14)

## 2019-03-05 PROCEDURE — 85610 PROTHROMBIN TIME: CPT | Mod: QW

## 2019-03-05 PROCEDURE — 36416 COLLJ CAPILLARY BLOOD SPEC: CPT

## 2019-03-05 PROCEDURE — 99207 ZZC NO CHARGE NURSE ONLY: CPT

## 2019-03-05 NOTE — PROGRESS NOTES
ANTICOAGULATION FOLLOW-UP CLINIC VISIT    Patient Name:  Clementina Cooper  Date:  3/5/2019  Contact Type:  Face to Face    SUBJECTIVE: 2 wk corina     Patient Findings     Positives:   Change in medications (decreased Trazodone from 2 hs to 1 hs (this drug does interact with warfarin))           OBJECTIVE    INR Protime   Date Value Ref Range Status   2019 1.8 (A) 0.86 - 1.14 Final       ASSESSMENT / PLAN  INR assessment THER    Recheck INR In: 2 WEEKS    INR Location Clinic      Anticoagulation Summary  As of 3/5/2019    INR goal:   2.0-3.0   TTR:   69.9 % (3 y)   INR used for dosin.8! (3/5/2019)   Warfarin maintenance plan:   5 mg (5 mg x 1) every Wed; 2.5 mg (5 mg x 0.5) all other days   Full warfarin instructions:   5 mg every Wed; 2.5 mg all other days   Weekly warfarin total:   20 mg   Plan last modified:   Shagufta Bell RN (3/5/2019)   Next INR check:   3/19/2019   Target end date:   Indefinite    Indications    Atrial fibrillation (H) (Resolved) [I48.91]             Anticoagulation Episode Summary     INR check location:       Preferred lab:       Send INR reminders to:   [facility]    Comments:         Anticoagulation Care Providers     Provider Role Specialty Phone number    Estela Davila MD  Internal Medicine [phone]            See the Encounter Report to view Anticoagulation Flowsheet and Dosing Calendar (Go to Encounters tab in chart review, and find the Anticoagulation Therapy Visit)    Dosage adjustment made based on physician directed care plan.    Shagufta Bell RN

## 2019-03-07 DIAGNOSIS — G47.00 INSOMNIA, UNSPECIFIED TYPE: ICD-10-CM

## 2019-03-07 RX ORDER — TRAZODONE HYDROCHLORIDE 50 MG/1
50-100 TABLET, FILM COATED ORAL
Qty: 60 TABLET | Refills: 1 | Status: CANCELLED | OUTPATIENT
Start: 2019-03-07

## 2019-03-07 NOTE — TELEPHONE ENCOUNTER
"Requested Prescriptions   Pending Prescriptions Disp Refills     traZODone (DESYREL) 50 MG tablet 60 tablet 1    Last Written Prescription Date:  2/4/19  Last Fill Quantity: 60,  # refills: 1   Last office visit: 2/4/2019 with prescribing provider:     Future Office Visit:     Sig: Take 1-2 tablets ( mg) by mouth nightly as needed for sleep    Serotonin Modulators Passed - 3/7/2019  4:24 PM       Passed - Recent (12 mo) or future (30 days) visit within the authorizing provider's specialty    Patient had office visit in the last 12 months or has a visit in the next 30 days with authorizing provider or within the authorizing provider's specialty.  See \"Patient Info\" tab in inbasket, or \"Choose Columns\" in Meds & Orders section of the refill encounter.             Passed - Medication is active on med list       Passed - Patient is age 18 or older       Passed - No active pregnancy on record       Passed - No positive pregnancy test in past 12 months          "

## 2019-03-08 ENCOUNTER — THERAPY VISIT (OUTPATIENT)
Dept: PHYSICAL THERAPY | Facility: CLINIC | Age: 79
End: 2019-03-08
Payer: COMMERCIAL

## 2019-03-08 DIAGNOSIS — M75.41 SHOULDER IMPINGEMENT SYNDROME, RIGHT: ICD-10-CM

## 2019-03-08 PROCEDURE — 97112 NEUROMUSCULAR REEDUCATION: CPT | Mod: GP | Performed by: PHYSICAL THERAPIST

## 2019-03-08 PROCEDURE — 97110 THERAPEUTIC EXERCISES: CPT | Mod: GP | Performed by: PHYSICAL THERAPIST

## 2019-03-08 PROCEDURE — 97140 MANUAL THERAPY 1/> REGIONS: CPT | Mod: GP | Performed by: PHYSICAL THERAPIST

## 2019-03-08 NOTE — TELEPHONE ENCOUNTER
Should not require refill until early April?   60 tabs with 1 refill sent 2/4/19.  I called pharmacy to see if patient is requesting early refill or if they are just looking for fill for April.    Pharmacy says they have a refill available and will fill, can disregard this one.    Anna Bruno, KYLIE  [facility]

## 2019-03-12 RX ORDER — TRAZODONE HYDROCHLORIDE 50 MG/1
50-100 TABLET, FILM COATED ORAL
Qty: 180 TABLET | Refills: 1 | Status: SHIPPED | OUTPATIENT
Start: 2019-03-12 | End: 2019-09-16

## 2019-03-12 NOTE — TELEPHONE ENCOUNTER
I cued up med (trazodone) and increased dispense to 180 (for 90 day supply if taking 2 tabs nightly).    Patient was last seen 2/4/19 by Paris Lemus.    Was advised follow up in one month, not yet done.    I called patient who advises she is taking 2 trazodone nightly and this is working well for her.   She is seeing PT and has followed up with cardiology and does not feel a follow up with us is necessary.    Routed request for larger dispense to [facility] to address.      Anna Bruno RN  [facility]

## 2019-03-12 NOTE — TELEPHONE ENCOUNTER
Tiffany with [facility] calling stating that patient is requesting a 90 day supply for insurance purposes. They have a 30 day supply on file but patient is wanting to fill a 90 day supply.They are requesting that a new script with a 90 day supply be sent.

## 2019-03-21 ENCOUNTER — ANTICOAGULATION THERAPY VISIT (OUTPATIENT)
Dept: NURSING | Facility: CLINIC | Age: 79
End: 2019-03-21
Payer: COMMERCIAL

## 2019-03-21 LAB — INR POINT OF CARE: 3 (ref 0.86–1.14)

## 2019-03-21 PROCEDURE — 36416 COLLJ CAPILLARY BLOOD SPEC: CPT

## 2019-03-21 PROCEDURE — 99207 ZZC NO CHARGE NURSE ONLY: CPT

## 2019-03-21 PROCEDURE — 85610 PROTHROMBIN TIME: CPT | Mod: QW

## 2019-03-21 NOTE — PROGRESS NOTES
ANTICOAGULATION FOLLOW-UP CLINIC VISIT    Patient Name:  Clementina Cooper  Date:  3/21/2019  Contact Type:  Face to Face    SUBJECTIVE: 2 wk corina     Patient Findings     Positives:   Change in medications (stopped trazodone as it was not helping on Monday this week, sleep aid.  Started OTC [facility] generic brand sleep medicine.)           OBJECTIVE    INR Protime   Date Value Ref Range Status   03/21/2019 3.0 (A) 0.86 - 1.14 Final       ASSESSMENT / PLAN  INR assessment THER    Recheck INR In: 2 WEEKS    INR Location Clinic      Anticoagulation Summary  As of 3/21/2019    INR goal:   2.0-3.0   TTR:   70.1 % (3 y)   INR used for dosing:   3.0 (3/21/2019)   Warfarin maintenance plan:   5 mg (5 mg x 1) every Wed; 2.5 mg (5 mg x 0.5) all other days   Full warfarin instructions:   5 mg every Wed; 2.5 mg all other days   Weekly warfarin total:   20 mg   No change documented:   Shagufta Bell RN   Plan last modified:   Shagufta Bell RN (3/5/2019)   Next INR check:   4/4/2019   Target end date:   Indefinite    Indications    Atrial fibrillation (H) (Resolved) [I48.91]             Anticoagulation Episode Summary     INR check location:       Preferred lab:       Send INR reminders to:   [facility]    Comments:         Anticoagulation Care Providers     Provider Role Specialty Phone number    Estela Davila MD  Internal Medicine [phone]            See the Encounter Report to view Anticoagulation Flowsheet and Dosing Calendar (Go to Encounters tab in chart review, and find the Anticoagulation Therapy Visit)    Dosage adjustment made based on physician directed care plan.    Shagufta Bell RN

## 2019-03-26 ENCOUNTER — THERAPY VISIT (OUTPATIENT)
Dept: PHYSICAL THERAPY | Facility: CLINIC | Age: 79
End: 2019-03-26
Payer: COMMERCIAL

## 2019-03-26 DIAGNOSIS — M75.41 SHOULDER IMPINGEMENT SYNDROME, RIGHT: ICD-10-CM

## 2019-03-26 PROCEDURE — 97110 THERAPEUTIC EXERCISES: CPT | Mod: GP | Performed by: PHYSICAL THERAPIST

## 2019-03-26 PROCEDURE — 97140 MANUAL THERAPY 1/> REGIONS: CPT | Mod: GP | Performed by: PHYSICAL THERAPIST

## 2019-03-26 PROCEDURE — 97112 NEUROMUSCULAR REEDUCATION: CPT | Mod: GP | Performed by: PHYSICAL THERAPIST

## 2019-04-04 ENCOUNTER — ANTICOAGULATION THERAPY VISIT (OUTPATIENT)
Dept: NURSING | Facility: CLINIC | Age: 79
End: 2019-04-04
Payer: COMMERCIAL

## 2019-04-04 LAB — INR POINT OF CARE: 2.4 (ref 0.86–1.14)

## 2019-04-04 PROCEDURE — 85610 PROTHROMBIN TIME: CPT | Mod: QW

## 2019-04-04 PROCEDURE — 36416 COLLJ CAPILLARY BLOOD SPEC: CPT

## 2019-04-04 PROCEDURE — 99207 ZZC NO CHARGE NURSE ONLY: CPT

## 2019-04-04 NOTE — PROGRESS NOTES
ANTICOAGULATION FOLLOW-UP CLINIC VISIT    Patient Name:  Clementina Copoer  Date:  2019  Contact Type:  Face to Face    SUBJECTIVE: Same plan of care.  Patient will call with medicine changes or concerns in-between visits.     Patient Findings            OBJECTIVE    INR Protime   Date Value Ref Range Status   2019 2.4 (A) 0.86 - 1.14 Final       ASSESSMENT / PLAN  INR assessment THER    Recheck INR In: 4 WEEKS    INR Location Clinic      Anticoagulation Summary  As of 2019    INR goal:   2.0-3.0   TTR:   70.4 % (3 y)   INR used for dosin.4 (2019)   Warfarin maintenance plan:   5 mg (5 mg x 1) every Wed; 2.5 mg (5 mg x 0.5) all other days   Full warfarin instructions:   5 mg every Wed; 2.5 mg all other days   Weekly warfarin total:   20 mg   No change documented:   Shagufta Bell RN   Plan last modified:   Shagufta Bell RN (3/5/2019)   Next INR check:   2019   Target end date:   Indefinite    Indications    Atrial fibrillation (H) (Resolved) [I48.91]             Anticoagulation Episode Summary     INR check location:       Preferred lab:       Send INR reminders to:   [facility]    Comments:         Anticoagulation Care Providers     Provider Role Specialty Phone number    Estela Davila MD  Internal Medicine [phone]            See the Encounter Report to view Anticoagulation Flowsheet and Dosing Calendar (Go to Encounters tab in chart review, and find the Anticoagulation Therapy Visit)    Dosage adjustment made based on physician directed care plan.    Shagufta Bell RN

## 2019-04-10 ENCOUNTER — THERAPY VISIT (OUTPATIENT)
Dept: PHYSICAL THERAPY | Facility: CLINIC | Age: 79
End: 2019-04-10
Payer: COMMERCIAL

## 2019-04-10 DIAGNOSIS — M75.41 SHOULDER IMPINGEMENT SYNDROME, RIGHT: ICD-10-CM

## 2019-04-10 PROCEDURE — 97112 NEUROMUSCULAR REEDUCATION: CPT | Mod: GP | Performed by: PHYSICAL THERAPIST

## 2019-04-10 PROCEDURE — 97110 THERAPEUTIC EXERCISES: CPT | Mod: GP | Performed by: PHYSICAL THERAPIST

## 2019-04-10 PROCEDURE — 97140 MANUAL THERAPY 1/> REGIONS: CPT | Mod: GP | Performed by: PHYSICAL THERAPIST

## 2019-04-25 ENCOUNTER — OFFICE VISIT (OUTPATIENT)
Dept: FAMILY MEDICINE | Facility: CLINIC | Age: 79
End: 2019-04-25
Payer: COMMERCIAL

## 2019-04-25 VITALS
SYSTOLIC BLOOD PRESSURE: 108 MMHG | HEIGHT: 66 IN | BODY MASS INDEX: 32.95 KG/M2 | OXYGEN SATURATION: 94 % | TEMPERATURE: 96.5 F | HEART RATE: 70 BPM | DIASTOLIC BLOOD PRESSURE: 69 MMHG | WEIGHT: 205 LBS

## 2019-04-25 DIAGNOSIS — J22 LOWER RESPIRATORY INFECTION: Primary | ICD-10-CM

## 2019-04-25 PROCEDURE — 99213 OFFICE O/P EST LOW 20 MIN: CPT | Performed by: NURSE PRACTITIONER

## 2019-04-25 ASSESSMENT — MIFFLIN-ST. JEOR: SCORE: 1413.68

## 2019-04-25 NOTE — PROGRESS NOTES
SUBJECTIVE:   Clementina Cooper is a 78 year old female who presents to clinic today for the following   health issues:      ENT Symptoms             Symptoms: cc Present Absent Comment   Fever/Chills   x    Fatigue  x     Muscle Aches   x    Eye Irritation   x    Sneezing  x     Nasal Ej/Drg  x     Sinus Pressure/Pain   x    Loss of smell  x     Dental pain   x    Sore Throat   x    Swollen Glands   x    Ear Pain/Fullness   x    Cough  x     Wheeze  x     Chest Pain   x    Shortness of breath  x     Rash   x    Other         Symptom duration:  2 weeks   Symptom severity:  moderate   Treatments tried:  chloracidin   Contacts:       Symptoms for 2 weeks  Getting a little better but cough is bad  Wheezing, SOBE  Not sleeping well  Up at night coughing        Additional history: as documented    Reviewed  and updated as needed this visit by clinical staff  Tobacco  Allergies  Meds  Med Hx  Surg Hx  Fam Hx  Soc Hx        Reviewed and updated as needed this visit by Provider         Patient Active Problem List   Diagnosis     PVD (peripheral vascular disease) (H)     Family history of colon cancer     HYPERLIPIDEMIA LDL GOAL <100     Open-angle glaucoma, mild-mod, ou     Advanced directives, counseling/discussion     Essential tremor     Pseudophakia, Yag Caps, ou     S/P iridectomy, ou     Anticoagulation goal of INR 2 to 3     Hypothyroidism due to acquired atrophy of thyroid     Chronic atrial fibrillation (H)     overweight  HCC     Long-term (current) use of anticoagulants [Z79.01]     Type 2 diabetes mellitus without complication, without long-term current use of insulin (H)     Glaucoma suspect, bilateral     Shoulder impingement syndrome, right     Past Surgical History:   Procedure Laterality Date     ANGIOPLASTY      right leg     C NONSPECIFIC PROCEDURE  2001    neck surgery/trauma     C STOMACH SURGERY PROCEDURE UNLISTED       CARDIAC SURGERY      Atrial fibrillation ablation      CATARACT IOL,  "RT/LT       HC TRABECULOPLASTY BY LASER SURGERY       HC VASCULAR SURGERY PROCEDURE UNLIST       HYSTERECTOMY, CERVIX STATUS UNKNOWN      uterine bleeding     HYSTERECTOMY, PAP NO LONGER INDICATED       LASER YAG CAPSULOTOMY Right 2018; 2019    right eye; left eye     LASER YAG IRIDOTOMY  remotely    both eyes (elsewhere)     PHACOEMULSIFICATION WITH STANDARD INTRAOCULAR LENS IMPLANT  2012; 2012    right eye; left eye       Social History     Tobacco Use     Smoking status: Former Smoker     Last attempt to quit: 1999     Years since quittin.4     Smokeless tobacco: Never Used   Substance Use Topics     Alcohol use: Yes     Comment: occasional     Family History   Problem Relation Age of Onset     Diabetes Mother      Heart Disease Mother      Heart Disease Father      Heart Disease Son      Cancer Brother      Heart Disease Sister      Cancer Brother      Cancer Brother      Heart Disease Sister      Macular Degeneration No family hx of      Glaucoma No family hx of            ROS:  Constitutional, HEENT, cardiovascular, pulmonary, gi and gu systems are negative, except as otherwise noted.    OBJECTIVE:     /69 (BP Location: Right arm, Patient Position: Sitting, Cuff Size: Adult Regular)   Pulse 70   Temp 96.5  F (35.8  C) (Oral)   Ht 1.664 m (5' 5.5\")   Wt 93 kg (205 lb)   SpO2 94%   BMI 33.59 kg/m    Body mass index is 33.59 kg/m .  GENERAL: healthy, alert and no distress  HENT: ear canals and TM's normal, nose and mouth without ulcers or lesions  NECK: no adenopathy, no asymmetry, masses, or scars and thyroid normal to palpation  RESP: Expiratory wheeze at end of expiration, lung sounds coarse, no crackles  CV: regular rate and rhythm, normal S1 S2, no S3 or S4, no murmur, click or rub, no peripheral edema    Diagnostic Test Results:  none     ASSESSMENT/PLAN:       ICD-10-CM    1. Lower respiratory infection J22 amoxicillin-clavulanate (AUGMENTIN) 875-125 MG tablet "       Will treat with Augmentin given warfarin use  Return to clinic in 2-4 days if symptoms don't show improvement. Would obtain chest xray at that time  She has follow up with primary care provider scheduled next month for chronic disease management      CHARLOTTE Guzman CNP  [facility]

## 2019-04-29 ENCOUNTER — THERAPY VISIT (OUTPATIENT)
Dept: PHYSICAL THERAPY | Facility: CLINIC | Age: 79
End: 2019-04-29
Payer: COMMERCIAL

## 2019-04-29 DIAGNOSIS — M75.41 SHOULDER IMPINGEMENT SYNDROME, RIGHT: ICD-10-CM

## 2019-04-29 PROCEDURE — 97110 THERAPEUTIC EXERCISES: CPT | Mod: GP | Performed by: PHYSICAL THERAPIST

## 2019-04-29 PROCEDURE — 97140 MANUAL THERAPY 1/> REGIONS: CPT | Mod: GP | Performed by: PHYSICAL THERAPIST

## 2019-04-29 PROCEDURE — 97112 NEUROMUSCULAR REEDUCATION: CPT | Mod: GP | Performed by: PHYSICAL THERAPIST

## 2019-04-29 NOTE — PROGRESS NOTES
Subjective:  HPI                    Objective:  System    Physical Exam    General     ROS    Assessment/Plan:    PROGRESS  REPORT    Progress reporting period is from 3/1/2019 to 4/29/2019.       SUBJECTIVE  Subjective changes noted by patient:  Still has soreness.  Helped  out of chair last week and sx increased.      Current pain level is  3/10.     Previous pain level was  : 6/10.   Changes in function:  None  Adverse reaction to treatment or activity: activity - Recently assisted  with sit to stand and pain increased    OBJECTIVE  Objective: Full motion with pain.     Strength improving slowly.  + impingement test remains      ASSESSMENT/PLAN  Updated problem list and treatment plan: Diagnosis 1:  R shoulder impingement  Pain -  manual therapy, self management, education, directional preference exercise and home program  Decreased ROM/flexibility - manual therapy and therapeutic exercise  Decreased strength - therapeutic exercise and therapeutic activities  Impaired muscle performance - neuro re-education  Decreased function - therapeutic activities     STG/LTGs have been met or progress has been made towards goals:  None  Assessment of Progress: The patient's condition has potential to improve.  Self Management Plans:  Patient has been instructed in a home treatment program.    Clementina continues to require the following intervention to meet STG and LTG's:  Will await orders for continued therapy.    Recommendations:  This patient would benefit from further evaluation.  Possible ortho referral?    Please refer to the daily flowsheet for treatment today, total treatment time and time spent performing 1:1 timed codes.

## 2019-05-02 ENCOUNTER — ANTICOAGULATION THERAPY VISIT (OUTPATIENT)
Dept: NURSING | Facility: CLINIC | Age: 79
End: 2019-05-02
Payer: COMMERCIAL

## 2019-05-02 LAB — INR POINT OF CARE: 2.2 (ref 0.86–1.14)

## 2019-05-02 PROCEDURE — 99207 ZZC NO CHARGE NURSE ONLY: CPT

## 2019-05-02 PROCEDURE — 36416 COLLJ CAPILLARY BLOOD SPEC: CPT

## 2019-05-02 PROCEDURE — 85610 PROTHROMBIN TIME: CPT | Mod: QW

## 2019-05-02 NOTE — PROGRESS NOTES
ANTICOAGULATION FOLLOW-UP CLINIC VISIT    Patient Name:  Clementina Cooper  Date:  2019  Contact Type:  Face to Face    SUBJECTIVE: corina coordinated with her upcoming lab appointment     Patient Findings     Positives:   Change in health (URI with augmentin, feels much improved), Change in medications (restarted propranolol for hand tremors (has been off med since 18)), Other complaints (seeing PCP and Cardiology at the end of the month, trying to get off amiodarone)           OBJECTIVE    INR Protime   Date Value Ref Range Status   2019 2.2 (A) 0.86 - 1.14 Final       ASSESSMENT / PLAN  INR assessment THER    Recheck INR In: 2 WEEKS    INR Location Clinic      Anticoagulation Summary  As of 2019    INR goal:   2.0-3.0   TTR:   71.2 % (3.1 y)   INR used for dosin.2 (2019)   Warfarin maintenance plan:   5 mg (5 mg x 1) every Wed; 2.5 mg (5 mg x 0.5) all other days   Full warfarin instructions:   5 mg every Wed; 2.5 mg all other days   Weekly warfarin total:   20 mg   No change documented:   Shagufta Bell RN   Plan last modified:   Shagufta Bell RN (3/5/2019)   Next INR check:   2019   Target end date:   Indefinite    Indications    Atrial fibrillation (H) (Resolved) [I48.91]             Anticoagulation Episode Summary     INR check location:       Preferred lab:       Send INR reminders to:   [facility]    Comments:         Anticoagulation Care Providers     Provider Role Specialty Phone number    Estela Davila MD  Internal Medicine [phone]            See the Encounter Report to view Anticoagulation Flowsheet and Dosing Calendar (Go to Encounters tab in chart review, and find the Anticoagulation Therapy Visit)    Dosage adjustment made based on physician directed care plan.    Shagufta Bell, KYLIE

## 2019-05-21 ENCOUNTER — ANTICOAGULATION THERAPY VISIT (OUTPATIENT)
Dept: NURSING | Facility: CLINIC | Age: 79
End: 2019-05-21
Payer: COMMERCIAL

## 2019-05-21 DIAGNOSIS — E03.4 HYPOTHYROIDISM DUE TO ACQUIRED ATROPHY OF THYROID: ICD-10-CM

## 2019-05-21 DIAGNOSIS — E78.5 HYPERLIPIDEMIA LDL GOAL <100: ICD-10-CM

## 2019-05-21 DIAGNOSIS — I48.20 CHRONIC ATRIAL FIBRILLATION (H): ICD-10-CM

## 2019-05-21 DIAGNOSIS — E11.9 TYPE 2 DIABETES MELLITUS WITHOUT COMPLICATION, WITHOUT LONG-TERM CURRENT USE OF INSULIN (H): Primary | ICD-10-CM

## 2019-05-21 LAB
ANION GAP SERPL CALCULATED.3IONS-SCNC: 8 MMOL/L (ref 3–14)
BUN SERPL-MCNC: 17 MG/DL (ref 7–30)
CALCIUM SERPL-MCNC: 9 MG/DL (ref 8.5–10.1)
CHLORIDE SERPL-SCNC: 106 MMOL/L (ref 94–109)
CHOLEST SERPL-MCNC: 156 MG/DL
CO2 SERPL-SCNC: 27 MMOL/L (ref 20–32)
CREAT SERPL-MCNC: 1.01 MG/DL (ref 0.52–1.04)
ERYTHROCYTE [DISTWIDTH] IN BLOOD BY AUTOMATED COUNT: 13.8 % (ref 10–15)
GFR SERPL CREATININE-BSD FRML MDRD: 53 ML/MIN/{1.73_M2}
GLUCOSE SERPL-MCNC: 161 MG/DL (ref 70–99)
HBA1C MFR BLD: 7 % (ref 0–5.6)
HCT VFR BLD AUTO: 43.1 % (ref 35–47)
HDLC SERPL-MCNC: 46 MG/DL
HGB BLD-MCNC: 14.3 G/DL (ref 11.7–15.7)
INR POINT OF CARE: 2.4 (ref 0.86–1.14)
LDLC SERPL CALC-MCNC: 73 MG/DL
MCH RBC QN AUTO: 30.2 PG (ref 26.5–33)
MCHC RBC AUTO-ENTMCNC: 33.2 G/DL (ref 31.5–36.5)
MCV RBC AUTO: 91 FL (ref 78–100)
NONHDLC SERPL-MCNC: 110 MG/DL
PLATELET # BLD AUTO: 286 10E9/L (ref 150–450)
POTASSIUM SERPL-SCNC: 4.2 MMOL/L (ref 3.4–5.3)
RBC # BLD AUTO: 4.74 10E12/L (ref 3.8–5.2)
SODIUM SERPL-SCNC: 141 MMOL/L (ref 133–144)
T4 FREE SERPL-MCNC: 1.36 NG/DL (ref 0.76–1.46)
TRIGL SERPL-MCNC: 187 MG/DL
TSH SERPL DL<=0.005 MIU/L-ACNC: 4.56 MU/L (ref 0.4–4)
WBC # BLD AUTO: 6.3 10E9/L (ref 4–11)

## 2019-05-21 PROCEDURE — 84443 ASSAY THYROID STIM HORMONE: CPT | Performed by: INTERNAL MEDICINE

## 2019-05-21 PROCEDURE — 80061 LIPID PANEL: CPT | Performed by: INTERNAL MEDICINE

## 2019-05-21 PROCEDURE — 83036 HEMOGLOBIN GLYCOSYLATED A1C: CPT | Performed by: INTERNAL MEDICINE

## 2019-05-21 PROCEDURE — 84439 ASSAY OF FREE THYROXINE: CPT | Performed by: INTERNAL MEDICINE

## 2019-05-21 PROCEDURE — 99207 ZZC NO CHARGE NURSE ONLY: CPT

## 2019-05-21 PROCEDURE — 85610 PROTHROMBIN TIME: CPT | Mod: QW

## 2019-05-21 PROCEDURE — 36416 COLLJ CAPILLARY BLOOD SPEC: CPT

## 2019-05-21 PROCEDURE — 85027 COMPLETE CBC AUTOMATED: CPT | Performed by: INTERNAL MEDICINE

## 2019-05-21 PROCEDURE — 80048 BASIC METABOLIC PNL TOTAL CA: CPT | Performed by: INTERNAL MEDICINE

## 2019-05-21 NOTE — LETTER
[facility]  [address] [place]  [address]  [phone]        May 21, 2019    Clementina Cooper  [address] [address]        Dear Clementina,    Enclosed are your results.  Your labs are normal/stable at this time.     Please call  with any questions.  We can also discuss any questions regarding these labs at your next scheduled visit.    Results for orders placed or performed in visit on 05/21/19   CBC with platelets   Result Value Ref Range    WBC 6.3 4.0 - 11.0 10e9/L    RBC Count 4.74 3.8 - 5.2 10e12/L    Hemoglobin 14.3 11.7 - 15.7 g/dL    Hematocrit 43.1 35.0 - 47.0 %    MCV 91 78 - 100 fl    MCH 30.2 26.5 - 33.0 pg    MCHC 33.2 31.5 - 36.5 g/dL    RDW 13.8 10.0 - 15.0 %    Platelet Count 286 150 - 450 10e9/L   Basic metabolic panel   Result Value Ref Range    Sodium 141 133 - 144 mmol/L    Potassium 4.2 3.4 - 5.3 mmol/L    Chloride 106 94 - 109 mmol/L    Carbon Dioxide 27 20 - 32 mmol/L    Anion Gap 8 3 - 14 mmol/L    Glucose 161 (H) 70 - 99 mg/dL    Urea Nitrogen 17 7 - 30 mg/dL    Creatinine 1.01 0.52 - 1.04 mg/dL    GFR Estimate 53 (L) >60 mL/min/[1.73_m2]    GFR Estimate If Black 61 >60 mL/min/[1.73_m2]    Calcium 9.0 8.5 - 10.1 mg/dL   Hemoglobin A1c   Result Value Ref Range    Hemoglobin A1C 7.0 (H) 0 - 5.6 %   TSH with free T4 reflex   Result Value Ref Range    TSH 4.56 (H) 0.40 - 4.00 mU/L   Lipid panel reflex to direct LDL Fasting   Result Value Ref Range    Cholesterol 156 <200 mg/dL    Triglycerides 187 (H) <150 mg/dL    HDL Cholesterol 46 (L) >49 mg/dL    LDL Cholesterol Calculated 73 <100 mg/dL    Non HDL Cholesterol 110 <130 mg/dL   T4 free   Result Value Ref Range    T4 Free 1.36 0.76 - 1.46 ng/dL       If you have any questions please call the clinic at [phone].    Sincerely,    Estela RIZVI

## 2019-05-21 NOTE — PROGRESS NOTES
ANTICOAGULATION FOLLOW-UP CLINIC VISIT    Patient Name:  Clementina Cooper  Date:  2019  Contact Type:  Face to Face    SUBJECTIVE: same plan of care.    Patient Findings         Clinical Outcomes     Negatives:   Major bleeding event, Thromboembolic event, Anticoagulation-related hospital admission, Anticoagulation-related ED visit, Anticoagulation-related fatality           OBJECTIVE    INR Protime   Date Value Ref Range Status   2019 2.4 (A) 0.86 - 1.14 Final       ASSESSMENT / PLAN  INR assessment THER    Recheck INR In: 4 WEEKS    INR Location Clinic      Anticoagulation Summary  As of 2019    INR goal:   2.0-3.0   TTR:   71.6 % (3.2 y)   INR used for dosin.4 (2019)   Warfarin maintenance plan:   5 mg (5 mg x 1) every Wed; 2.5 mg (5 mg x 0.5) all other days   Full warfarin instructions:   5 mg every Wed; 2.5 mg all other days   Weekly warfarin total:   20 mg   No change documented:   Shagufta Bell RN   Plan last modified:   Shagufta Bell RN (3/5/2019)   Next INR check:   2019   Target end date:   Indefinite    Indications    Atrial fibrillation (H) (Resolved) [I48.91]             Anticoagulation Episode Summary     INR check location:       Preferred lab:       [facility]    Comments:         Anticoagulation Care Providers     Provider Role Specialty Phone number    Estela Davila MD  Internal Medicine [phone]            See the Encounter Report to view Anticoagulation Flowsheet and Dosing Calendar (Go to Encounters tab in chart review, and find the Anticoagulation Therapy Visit)    Dosage adjustment made based on physician directed care plan.    Shagufta Bell RN

## 2019-05-27 PROBLEM — Z98.890 STATUS POST CATHETER ABLATION OF ATRIAL FIBRILLATION: Status: ACTIVE | Noted: 2019-05-27

## 2019-05-28 ENCOUNTER — TRANSFERRED RECORDS (OUTPATIENT)
Dept: HEALTH INFORMATION MANAGEMENT | Facility: CLINIC | Age: 79
End: 2019-05-28

## 2019-05-28 ENCOUNTER — ANCILLARY PROCEDURE (OUTPATIENT)
Dept: GENERAL RADIOLOGY | Facility: CLINIC | Age: 79
End: 2019-05-28
Attending: INTERNAL MEDICINE
Payer: COMMERCIAL

## 2019-05-28 ENCOUNTER — OFFICE VISIT (OUTPATIENT)
Dept: FAMILY MEDICINE | Facility: CLINIC | Age: 79
End: 2019-05-28
Payer: COMMERCIAL

## 2019-05-28 VITALS
OXYGEN SATURATION: 97 % | DIASTOLIC BLOOD PRESSURE: 69 MMHG | HEART RATE: 58 BPM | BODY MASS INDEX: 32.78 KG/M2 | HEIGHT: 66 IN | SYSTOLIC BLOOD PRESSURE: 122 MMHG | TEMPERATURE: 97.6 F | WEIGHT: 204 LBS

## 2019-05-28 DIAGNOSIS — Z12.31 ENCOUNTER FOR SCREENING MAMMOGRAM FOR BREAST CANCER: ICD-10-CM

## 2019-05-28 DIAGNOSIS — I73.9 PVD (PERIPHERAL VASCULAR DISEASE) (H): ICD-10-CM

## 2019-05-28 DIAGNOSIS — E78.5 HYPERLIPIDEMIA LDL GOAL <100: ICD-10-CM

## 2019-05-28 DIAGNOSIS — Z79.01 ANTICOAGULATION GOAL OF INR 2 TO 3: ICD-10-CM

## 2019-05-28 DIAGNOSIS — M54.2 CERVICALGIA: ICD-10-CM

## 2019-05-28 DIAGNOSIS — Z51.81 ANTICOAGULATION GOAL OF INR 2 TO 3: ICD-10-CM

## 2019-05-28 DIAGNOSIS — G25.0 ESSENTIAL TREMOR: ICD-10-CM

## 2019-05-28 DIAGNOSIS — E03.4 HYPOTHYROIDISM DUE TO ACQUIRED ATROPHY OF THYROID: ICD-10-CM

## 2019-05-28 DIAGNOSIS — E11.9 TYPE 2 DIABETES MELLITUS WITHOUT COMPLICATION, WITHOUT LONG-TERM CURRENT USE OF INSULIN (H): ICD-10-CM

## 2019-05-28 DIAGNOSIS — Z00.00 ROUTINE GENERAL MEDICAL EXAMINATION AT A HEALTH CARE FACILITY: Primary | ICD-10-CM

## 2019-05-28 DIAGNOSIS — Z98.890 STATUS POST CATHETER ABLATION OF ATRIAL FIBRILLATION: ICD-10-CM

## 2019-05-28 PROCEDURE — 72040 X-RAY EXAM NECK SPINE 2-3 VW: CPT

## 2019-05-28 PROCEDURE — 99214 OFFICE O/P EST MOD 30 MIN: CPT | Mod: 25 | Performed by: INTERNAL MEDICINE

## 2019-05-28 PROCEDURE — 99397 PER PM REEVAL EST PAT 65+ YR: CPT | Performed by: INTERNAL MEDICINE

## 2019-05-28 RX ORDER — PROPRANOLOL HYDROCHLORIDE 10 MG/1
TABLET ORAL
Qty: 180 TABLET | Refills: 3 | Status: SHIPPED | OUTPATIENT
Start: 2019-05-28 | End: 2020-08-31

## 2019-05-28 RX ORDER — ATORVASTATIN CALCIUM 20 MG/1
20 TABLET, FILM COATED ORAL DAILY
Qty: 90 TABLET | Refills: 3 | Status: SHIPPED | OUTPATIENT
Start: 2019-05-28 | End: 2020-06-02

## 2019-05-28 ASSESSMENT — ACTIVITIES OF DAILY LIVING (ADL): CURRENT_FUNCTION: NO ASSISTANCE NEEDED

## 2019-05-28 ASSESSMENT — MIFFLIN-ST. JEOR: SCORE: 1409.15

## 2019-05-28 NOTE — PATIENT INSTRUCTIONS
Show cardiologist med list, consider stopping some of the Afib meds. . .    Call to schedule imaging   -- mammogram in Fall/Winter    Physical therapy for neck pain ( will call you)    Return to clinic 6 months (11/2019) for BP and Diabetes recheck ... Non fasting labs prior to visit.

## 2019-05-28 NOTE — LETTER
[facility]  [address] [place]  [address]  [phone]        May 28, 2019    Clementina Cooper  [address] [facility] [address]        Dear Clementina,    Here is a copy of the neck xray as read by our radiologist    Results for orders placed or performed in visit on 05/28/19   XR Cervical Spine 2/3 Views    Narrative    CERVICAL SPINE 2/3 VIEWS   5/28/2019 11:07 AM     HISTORY: Cervicalgia.    COMPARISON: None.    FINDINGS: Alignment is maintained. Postoperative change of C5 through  C7 anterior fusion are demonstrated without evidence of complication.  Multilevel mild to moderate facet arthropathy is present, left worse  than right. Mild degenerative disc disease is present at C4-5 and  moderate degenerative disc disease is present at C3-4. Paraspinal soft  tissues are unremarkable.      Impression    IMPRESSION:   1. Postoperative change of anterior fusion of C5 through C7.  2. Mild to moderate degenerative change.    MELISSA ZUNIGA MD       If you have any questions please call the clinic at [phone].    Sincerely,    Estela RIZVI

## 2019-05-28 NOTE — PROGRESS NOTES
"SUBJECTIVE:   Clementina Cooper is a 78 year old female who presents for Preventive Visit.    79 y/o F here for AFE   h/o PVD, DMII, chronic Afib (coumadin), hypothyroid and essential tremor   5 months ago had PVI for Afib, she has been in [facility] and feels better. . .   Recent labs show normal BMP, A1C 7.0, TSH 4.56 (T4 1.36), LDL 73, normal CBC.        Lives in one story home with basement.   with GI bleed issues.  3 supportive kids in the area.    Complains of neck pain X months.   Range of motion is limited, pain on right posterior neck.  Worse, at night, can awaken her.  No rash.  Had a fall 6 months ago and may have \"jerked\" her neck.  Did get some therapy for her right shoulder at that time, did help improve shoulder 50%.   Are you in the first 12 months of your Medicare coverage?  No    Healthy Habits:    In general, how would you rate your overall health?  Good    Frequency of exercise:  None    Do you usually eat at least 4 servings of fruit and vegetables a day, include whole grains    & fiber and avoid regularly eating high fat or \"junk\" foods?  No    Taking medications regularly:  Yes    Barriers to taking medications:  None    Medication side effects:  None    Ability to successfully perform activities of daily living:  No assistance needed    Home Safety:  No safety concerns identified    Hearing Impairment:  No hearing concerns    In the past 6 months, have you been bothered by leaking of urine?  No    In general, how would you rate your overall mental or emotional health?  Good      PHQ-2 Total Score:    Additional concerns today:  Yes (neck pain x 6 months )    Do you feel safe in your environment? Yes    Do you have a Health Care Directive? No: Advance care planning reviewed with patient; information given to patient to review.      Fall risk       Cognitive Screening   1) Repeat 3 items (Leader, Season, Table)    2) Clock draw: NORMAL  3) 3 item recall: Recalls 2 objects   Results: " NORMAL clock, 1-2 items recalled: COGNITIVE IMPAIRMENT LESS LIKELY    Mini-CogTM Copyright [employer]IN[employer] Wilson. Licensed by the author for use in [facility]; reprinted with permission (agatha@[facility]). All rights reserved.      Do you have sleep apnea, excessive snoring or daytime drowsiness?: yes-daytime drowsiness    Reviewed and updated as needed this visit by clinical staff         Reviewed and updated as needed this visit by Provider        Social History     Tobacco Use     Smoking status: Former Smoker     Last attempt to quit: 1999     Years since quittin.4     Smokeless tobacco: Never Used   Substance Use Topics     Alcohol use: Yes     Comment: occasional         Alcohol Use 2018   Prescreen: >3 drinks/day or >7 drinks/week? No   Prescreen: >3 drinks/day or >7 drinks/week? -       Neck pain x 6 months     Current providers sharing in care for this patient include:   Patient Care Team:  Estela Davila MD as PCP - General  Estela Davila MD as Assigned PCP  Paris Lemus APRN CNP as Assigned PCP    The following health maintenance items are reviewed in Epic and correct as of today:  Health Maintenance   Topic Date Due     DIABETIC FOOT EXAM  2018     COLONOSCOPY  2018     OP ANNUAL INR REFERRAL  2018     MICROALBUMIN  2019     MEDICARE ANNUAL WELLNESS VISIT  2019     A1C  2019     DEXA  2020     DIABETIC EYE EXAM  2020     FALL RISK ASSESSMENT  2020     BMP  2020     LIPID  2020     TSH W/FREE T4 REFLEX  2021     ADVANCED DIRECTIVE PLANNING  10/06/2021     DTAP/TDAP/TD IMMUNIZATION (2 - Td) 2023     PHQ-2  Completed     INFLUENZA VACCINE  Completed     PNEUMOCOCCAL IMMUNIZATION 65+ LOW/MEDIUM RISK  Completed     ZOSTER IMMUNIZATION  Completed     IPV IMMUNIZATION  Aged Out     MENINGITIS IMMUNIZATION  Aged Out     Lab work is in process  Labs reviewed in EPIC  BP Readings from Last 3 Encounters:    19 122/69   19 108/69   19 114/69    Wt Readings from Last 3 Encounters:   19 92.5 kg (204 lb)   19 93 kg (205 lb)   19 93.4 kg (206 lb)                  Patient Active Problem List   Diagnosis     PVD (peripheral vascular disease) (H)     Family history of colon cancer     HYPERLIPIDEMIA LDL GOAL <100     Open-angle glaucoma, mild-mod, ou     Advanced directives, counseling/discussion     Essential tremor     Pseudophakia, Yag Caps, ou     S/P iridectomy, ou     Anticoagulation goal of INR 2 to 3     Hypothyroidism due to acquired atrophy of thyroid     Chronic atrial fibrillation (H)     overweight  HCC     Long-term (current) use of anticoagulants [Z79.01]     Type 2 diabetes mellitus without complication, without long-term current use of insulin (H)     Glaucoma suspect, bilateral     Shoulder impingement syndrome, right     Status post catheter ablation of atrial fibrillation     Past Surgical History:   Procedure Laterality Date     ANGIOPLASTY      right leg     C NONSPECIFIC PROCEDURE      neck surgery/trauma     C STOMACH SURGERY PROCEDURE UNLISTED       CARDIAC SURGERY      Atrial fibrillation ablation      CATARACT IOL, RT/LT       HC TRABECULOPLASTY BY LASER SURGERY       HC VASCULAR SURGERY PROCEDURE UNLIST       HYSTERECTOMY, CERVIX STATUS UNKNOWN      uterine bleeding     HYSTERECTOMY, PAP NO LONGER INDICATED       LASER YAG CAPSULOTOMY Right 2018; 2019    right eye; left eye     LASER YAG IRIDOTOMY  remotely    both eyes (elsewhere)     PHACOEMULSIFICATION WITH STANDARD INTRAOCULAR LENS IMPLANT  2012; 2012    right eye; left eye       Social History     Tobacco Use     Smoking status: Former Smoker     Last attempt to quit: 1999     Years since quittin.4     Smokeless tobacco: Never Used   Substance Use Topics     Alcohol use: Yes     Comment: occasional     Family History   Problem Relation Age of Onset     Diabetes Mother       Heart Disease Mother      Heart Disease Father      Heart Disease Son      Cancer Brother      Heart Disease Sister      Cancer Brother      Cancer Brother      Heart Disease Sister      Macular Degeneration No family hx of      Glaucoma No family hx of          Current Outpatient Medications   Medication Sig Dispense Refill     ACE NOT PRESCRIBED, INTENTIONAL, 1 each At Bedtime ACE Inhibitor not prescribed due to Other:  Neck swelling.  Also, BP is on low normal side with the betablocker 0 each 0     amiodarone (PACERONE/CODARONE) 200 MG tablet Take 1 tablet (200 mg) by mouth daily 90 tablet 3     atorvastatin (LIPITOR) 20 MG tablet TAKE 1 TABLET (20 MG) BY MOUTH DAILY 90 tablet 3     Biotin 5000 MCG CAPS Take  by mouth.       CENTRUM SILVER OR one daily       Cyanocobalamin (B-12) 1000 MCG TBCR Take 1 tablet by mouth daily       diltiazem ER (DILT-XR) 180 MG 24 hr capsule Take 1 capsule (180 mg) by mouth daily 90 capsule 3     Evening Primrose Oil 1000 MG CAPS Take by mouth daily       levothyroxine (SYNTHROID/LEVOTHROID) 100 MCG tablet TAKE 1 TABLET (100 MCG) BY MOUTH DAILY 90 tablet 3     losartan (COZAAR) 25 MG tablet TAKE 0.5 TABLETS (12.5 MG) BY MOUTH DAILY 45 tablet 1     metFORMIN (GLUCOPHAGE) 500 MG tablet TAKE 1 TABLET (500 MG) BY MOUTH DAILY (WITH DINNER) 90 tablet 3     ONETOUCH ULTRA test strip USE TO TEST BLOOD SUGAR TWICE A DAY OR AS DIRECTED 200 strip 0     ORDER FOR DME Equipment being ordered:  One Touch strips to be used daily 90 days 3     ORDER FOR DME Equipment being ordered:  chemstrips for daily blood sugar checks. 1 Box 3     propranolol (INDERAL) 10 MG tablet TAKE 1 TABLET (10 MG) BY MOUTH 2 TIMES DAILY AS NEEDED 180 tablet 3     sotalol (BETAPACE) 80 MG tablet Take 1 tablet (80 mg) by mouth 2 times daily 180 tablet 3     traZODone (DESYREL) 50 MG tablet Take 1-2 tablets ( mg) by mouth nightly as needed for sleep 180 tablet 1     VITAMIN D 1000 UNIT OR TABS 1 TABLET DAILY        "warfarin (COUMADIN) 5 MG tablet Take 1 tablet (5mg) by mouth Mon,Wed,Fri & 1/2 tablet (2.5mg) all other days of the week 102 tablet 3     aspirin 81 MG tablet Take 1 tablet by mouth daily.  3     omeprazole (PRILOSEC) 20 MG DR capsule Take 20 mg by mouth daily       triamcinolone (KENALOG) 0.1 % external ointment Apply to rash in groin region three times daily for 2 weeks (Patient not taking: Reported on 5/28/2019) 80 g 1     Allergies   Allergen Reactions     Benzocaine      Betadine [Povidone Iodine]      Rash     Pravastatin      Muscle aches     Vagisil [Kdc:Benzocaine+Cetyl Alcohol+Edetic Acid+Methylparaben+Propylene Glycol+...]      It is the benzocaine       Xarelto [Rivaroxaban]      Daily headache     Zocor [Hmg-Coa-R Inhibitors]      Muscle aches     Recent Labs   Lab Test 05/21/19  0734 09/27/18  1017 05/16/18  0729  03/21/17  0728  03/29/16  0738   A1C 7.0* 7.6* 7.0*   < > 8.0*   < > 7.0*   LDL 73  --  81  --  72  --  87   HDL 46*  --  44*  --  42*  --  39*   TRIG 187*  --  113  --  202*  --  213*   ALT  --   --  31  --  29  --  32   CR 1.01 0.83 0.84  --  0.82   < > 0.80   GFRESTIMATED 53* 66 66  --  68   < > 69   GFRESTBLACK 61 80 80  --  82   < > 84   POTASSIUM 4.2 4.1 4.2  --  4.0   < > 4.1   TSH 4.56* 2.37 2.61   < > 5.43*   < > 6.31*    < > = values in this interval not displayed.           Review of Systems  Constitutional, HEENT, cardiovascular, pulmonary, GI, , musculoskeletal, neuro, skin, endocrine and psych systems are negative, except as otherwise noted.    OBJECTIVE:   /69 (BP Location: Right arm, Patient Position: Sitting, Cuff Size: Adult Large)   Pulse 58   Temp 97.6  F (36.4  C) (Oral)   Ht 1.664 m (5' 5.5\")   Wt 92.5 kg (204 lb)   SpO2 97%   BMI 33.43 kg/m   Estimated body mass index is 33.59 kg/m  as calculated from the following:    Height as of 4/25/19: 1.664 m (5' 5.5\").    Weight as of 4/25/19: 93 kg (205 lb).     Physical Exam  GENERAL APPEARANCE: healthy, alert and " no distress  EYES: Eyes grossly normal to inspection, PERRL and conjunctivae and sclerae normal  HENT: ear canals and TM's normal, nose and mouth without ulcers or lesions, oropharynx clear and oral mucous membranes moist  NECK: no adenopathy, no asymmetry, masses, or scars and thyroid normal to palpation  NECK: scar at left anterior neck due to surgery after a fall (20 yrs ago)  RESP: lungs clear to auscultation - no rales, rhonchi or wheezes  BREAST: normal without masses, tenderness or nipple discharge and no palpable axillary masses or adenopathy  CV: regular rate and rhythm, normal S1 S2, no S3 or S4, no murmur, click or rub, no peripheral edema and peripheral pulses strong  ABDOMEN: soft, nontender, no hepatosplenomegaly, no masses and bowel sounds normal  MS: no musculoskeletal defects are noted and gait is age appropriate without ataxia  SKIN: no suspicious lesions or rashes  NEURO: Normal strength and tone, sensory exam grossly normal, mentation intact and speech normal  DIABETIC FOOT EXAM: normal DP and PT pulses, no trophic changes or ulcerative lesions and sensory exam=slight diminished vibration sense at toes.   PSYCH: mentation appears normal and affect normal/bright    Diagnostic Test Results:  Labs reviewed in Epic  Results for orders placed or performed in visit on 05/21/19   INR point of care   Result Value Ref Range    INR Protime 2.4 (A) 0.86 - 1.14       ASSESSMENT / PLAN:       ICD-10-CM    1. Routine general medical examination at a health care facility Z00.00    2. Type 2 diabetes mellitus without complication, without long-term current use of insulin (H) E11.9 metFORMIN (GLUCOPHAGE) 500 MG tablet     OFFICE/OUTPT VISIT,EST,LEVL IV     **Vitamin B12 FUTURE 2mo     Basic metabolic panel   3. Hypothyroidism due to acquired atrophy of thyroid E03.4 OFFICE/OUTPT VISIT,EST,LEVL IV     TSH with free T4 reflex   4. PVD (peripheral vascular disease) (H) I73.9 atorvastatin (LIPITOR) 20 MG tablet   5.  "Anticoagulation goal of INR 2 to 3 Z51.81     Z79.01    6. Hyperlipidemia LDL goal <100 E78.5 atorvastatin (LIPITOR) 20 MG tablet   7. Essential tremor G25.0 propranolol (INDERAL) 10 MG tablet   8. Status post catheter ablation of atrial fibrillation Z98.890 OFFICE/OUTPT VISIT,EST,LEVL IV     INR CLINIC REFERRAL   9. Cervicalgia M54.2 XR Cervical Spine 2/3 Views     [facility]     OFFICE/OUTPT VISIT,EST,LEVL IV   10. Encounter for screening mammogram for breast cancer Z12.31 *MA Screening Digital Bilateral       Physical Therapy for neck pain  DM well controlled, continue current management  Bradycardic and fatigued, will see cardiology today.  Has both amiodarone and sotalol on board, s/p PVI 5 months ago.    Get mammogram in Fall/Winter this year.     End of Life Planning:  Patient currently has an advanced directive: No.  I have verified the patient's ablity to prepare an advanced directive/make health care decisions.  Literature was provided to assist patient in preparing an advanced directive.    COUNSELING:  Reviewed preventive health counseling, as reflected in patient instructions       Regular exercise    Estimated body mass index is 33.59 kg/m  as calculated from the following:    Height as of 4/25/19: 1.664 m (5' 5.5\").    Weight as of 4/25/19: 93 kg (205 lb).         reports that she quit smoking about 19 years ago. She has never used smokeless tobacco.      Appropriate preventive services were discussed with this patient, including applicable screening as appropriate for cardiovascular disease, diabetes, osteopenia/osteoporosis, and glaucoma.  As appropriate for age/gender, discussed screening for colorectal cancer, prostate cancer, breast cancer, and cervical cancer. Checklist reviewing preventive services available has been given to the patient.    Reviewed patients plan of care and provided an AVS. The Basic Care Plan (routine screening as documented in Health " Maintenance) for Clementina meets the Care Plan requirement. This Care Plan has been established and reviewed with the Patient.    Counseling Resources:  ATP IV Guidelines  Pooled Cohorts Equation Calculator  Breast Cancer Risk Calculator  FRAX Risk Assessment  ICSI Preventive Guidelines  Dietary Guidelines for Americans, 2010  USDA's MyPlate  ASA Prophylaxis  Lung CA Screening    Estela Davila MD  [facility]    Identified Health Risks:

## 2019-05-28 NOTE — RESULT ENCOUNTER NOTE
Clementina Cooper    Here is a copy of the neck xray as read by our radiologist    Sincerely,     TAISHA MORROW M.D.

## 2019-05-29 ENCOUNTER — TELEPHONE (OUTPATIENT)
Dept: FAMILY MEDICINE | Facility: CLINIC | Age: 79
End: 2019-05-29

## 2019-05-29 NOTE — TELEPHONE ENCOUNTER
Reason for Call:  Other     Detailed comments: patient seen her cardiologist and was taken off of two medication Amiodarone hc1 and Diltiazem hc1 patient just wanted Dr Frey to know     Phone Number Patient can be reached at: Home number on file [phone] (home)    Best Time: any    Can we leave a detailed message on this number? YES    Call taken on 5/29/2019 at 8:50 AM by Tawny Ugalde

## 2019-06-11 ENCOUNTER — THERAPY VISIT (OUTPATIENT)
Dept: PHYSICAL THERAPY | Facility: CLINIC | Age: 79
End: 2019-06-11
Attending: INTERNAL MEDICINE
Payer: COMMERCIAL

## 2019-06-11 DIAGNOSIS — M75.41 SHOULDER IMPINGEMENT SYNDROME, RIGHT: ICD-10-CM

## 2019-06-11 DIAGNOSIS — M54.2 NECK PAIN: ICD-10-CM

## 2019-06-11 DIAGNOSIS — M54.2 CERVICALGIA: ICD-10-CM

## 2019-06-11 PROCEDURE — 97110 THERAPEUTIC EXERCISES: CPT | Mod: GP | Performed by: PHYSICAL THERAPIST

## 2019-06-11 PROCEDURE — 97161 PT EVAL LOW COMPLEX 20 MIN: CPT | Mod: GP | Performed by: PHYSICAL THERAPIST

## 2019-06-11 NOTE — PROGRESS NOTES
[facility] Initial Evaluation  Subjective:  The history is provided by the patient.   Clementina Cooper is a 78 year old female with a cervical spine condition.  Condition occurred with:  Insidious onset.  Condition occurred: for unknown reasons.  This is a chronic condition  Pain began about 6 months ago.    Patient reports pain:  Cervical right side, cervical left side, lower cervical spine, mid cervical spine and upper cervical spine.  Radiates to:  None.  Pain is described as aching and is intermittent and reported as 7/10.  Associated symptoms:  Loss of motion/stiffness and loss of strength. Worse during: no pattern.  Symptoms are exacerbated by looking up or down, rotating head, sitting and lying down and relieved by heat and NSAID's.  Since onset symptoms are gradually worsening.  Special tests:  X-ray (ant. neck fusion C5-7, mild to mod degenerative changes).      General health as reported by patient is good.  Pertinent medical history includes:  Diabetes, heart problems and overweight.  Medical allergies: no.  Other surgeries include:  Orthopedic surgery (Neck fusion C5-C7).  Current medications:  Heparin/coumadin, high blood pressure medication and thyroid medication.  Current occupation is Retired.        Barriers include:  None as reported by the patient.    Red flags:  None as reported by the patient.      Objective:  Standing Alignment:    Cervical/Thoracic:  Forward head  Shoulder/UE:  Rounded shoulders                  Flexibility/Screens:         Spine:      Decreased right spine flexibility:  Upper Trap and Levator                  Cervical/Thoracic Evaluation    AROM:  AROM Cervical:    Flexion:            WNL * Pain  Extension:       WNL  Rotation:         Left: decreased 25% *pain     Right: decreased 25% *pain  Side Bend:      Left: decreased 50%     Right:  Decreased 50%      Headaches: none  Cervical Myotomes:  normal                        Cervical Palpation:     Tenderness present at Left:    Rhomboids; Upper Trap; Levator and Suboccipitals  Tenderness present at Right:    Rhomboids; Upper Trap; Levator and Suboccipitals               Shoulder Evaluation:  ROM:  AROM:  normal                                                                             General     ROS    Assessment/Plan:    Patient is a 78 year old female with cervical complaints.    Patient has the following significant findings with corresponding treatment plan.                Diagnosis 1:  Neck pain  Pain -  hot/cold therapy, manual therapy, self management, education and home program  Decreased ROM/flexibility - manual therapy, therapeutic exercise and home program  Decreased strength - therapeutic exercise, therapeutic activities and home program    Therapy Evaluation Codes:   1) History comprised of:   Personal factors that impact the plan of care:      None.    Comorbidity factors that impact the plan of care are:      None.     Medications impacting care: None.  2) Examination of Body Systems comprised of:   Body structures and functions that impact the plan of care:      Cervical spine, Shoulder and Thoracic Spine.   Activity limitations that impact the plan of care are:      Bathing, Reading/Computer work, Sitting, Walking, Sleeping and Laying down.  3) Clinical presentation characteristics are:   Stable/Uncomplicated.  4) Decision-Making    Low complexity using standardized patient assessment instrument and/or measureable assessment of functional outcome.  Cumulative Therapy Evaluation is: Low complexity.    Previous and current functional limitations:  (See Goal Flow Sheet for this information)    Short term and Long term goals: (See Goal Flow Sheet for this information)     Communication ability:  Patient appears to be able to clearly communicate and understand verbal and written communication and follow directions correctly.  Treatment Explanation - The following has been discussed with the  patient:   RX ordered/plan of care  Anticipated outcomes  Possible risks and side effects  This patient would benefit from PT intervention to resume normal activities.   Rehab potential is good.    Frequency:  1 X week, once daily  Duration:  for 6 weeks  Discharge Plan:  Achieve all LTG.  Independent in home treatment program.  Reach maximal therapeutic benefit.    Please refer to the daily flowsheet for treatment today, total treatment time and time spent performing 1:1 timed codes.

## 2019-06-18 ENCOUNTER — ANTICOAGULATION THERAPY VISIT (OUTPATIENT)
Dept: NURSING | Facility: CLINIC | Age: 79
End: 2019-06-18
Payer: COMMERCIAL

## 2019-06-18 LAB — INR POINT OF CARE: 3 (ref 0.86–1.14)

## 2019-06-18 PROCEDURE — 85610 PROTHROMBIN TIME: CPT | Mod: QW

## 2019-06-18 PROCEDURE — 36416 COLLJ CAPILLARY BLOOD SPEC: CPT

## 2019-06-18 NOTE — PROGRESS NOTES
ANTICOAGULATION FOLLOW-UP CLINIC VISIT    Patient Name:  Clementina Cooper  Date:  6/18/2019  Contact Type:  Face to Face    SUBJECTIVE: recheck 2 weeks per below medicine change.    Patient Findings     Positives:   Missed doses (missed saturday warfarin dose), Change in medications (d/c amiodarone 5-29)             OBJECTIVE    INR Protime   Date Value Ref Range Status   06/18/2019 3.0 (A) 0.86 - 1.14 Final       ASSESSMENT / PLAN  INR assessment THER    Recheck INR In: 2 WEEKS    INR Location Clinic      Anticoagulation Summary  As of 6/18/2019    INR goal:   2.0-3.0   TTR:   72.3 % (3.2 y)   INR used for dosing:   3.0 (6/18/2019)   Warfarin maintenance plan:   5 mg (5 mg x 1) every Wed; 2.5 mg (5 mg x 0.5) all other days   Full warfarin instructions:   5 mg every Wed; 2.5 mg all other days   Weekly warfarin total:   20 mg   No change documented:   Shagufta Bell RN   Plan last modified:   Shagufta Bell RN (3/5/2019)   Next INR check:   7/9/2019   Target end date:   Indefinite    Indications    Atrial fibrillation (H) (Resolved) [I48.91]             Anticoagulation Episode Summary     INR check location:       Preferred lab:       Send INR reminders to:   [facility]    Comments:         Anticoagulation Care Providers     Provider Role Specialty Phone number    Estela Davila MD  Internal Medicine [phone]            See the Encounter Report to view Anticoagulation Flowsheet and Dosing Calendar (Go to Encounters tab in chart review, and find the Anticoagulation Therapy Visit)    Dosage adjustment made based on physician directed care plan.    Shagufta Bell RN

## 2019-06-20 ENCOUNTER — THERAPY VISIT (OUTPATIENT)
Dept: PHYSICAL THERAPY | Facility: CLINIC | Age: 79
End: 2019-06-20
Payer: COMMERCIAL

## 2019-06-20 DIAGNOSIS — M54.2 NECK PAIN: ICD-10-CM

## 2019-06-20 PROCEDURE — 97140 MANUAL THERAPY 1/> REGIONS: CPT | Mod: GP | Performed by: PHYSICAL THERAPIST

## 2019-06-20 PROCEDURE — 97110 THERAPEUTIC EXERCISES: CPT | Mod: GP | Performed by: PHYSICAL THERAPIST

## 2019-07-01 DIAGNOSIS — L40.9 PSORIASIS: ICD-10-CM

## 2019-07-01 PROBLEM — M75.41 SHOULDER IMPINGEMENT SYNDROME, RIGHT: Status: RESOLVED | Noted: 2019-03-01 | Resolved: 2019-07-01

## 2019-07-01 RX ORDER — CLOBETASOL PROPIONATE 0.5 MG/G
CREAM TOPICAL
Qty: 180 G | Refills: 2 | Status: SHIPPED | OUTPATIENT
Start: 2019-07-01 | End: 2020-09-21

## 2019-07-02 ENCOUNTER — THERAPY VISIT (OUTPATIENT)
Dept: PHYSICAL THERAPY | Facility: CLINIC | Age: 79
End: 2019-07-02
Payer: COMMERCIAL

## 2019-07-02 DIAGNOSIS — M54.2 NECK PAIN: ICD-10-CM

## 2019-07-02 PROCEDURE — 97110 THERAPEUTIC EXERCISES: CPT | Mod: GP | Performed by: PHYSICAL THERAPIST

## 2019-07-02 PROCEDURE — 97140 MANUAL THERAPY 1/> REGIONS: CPT | Mod: GP | Performed by: PHYSICAL THERAPIST

## 2019-07-02 PROCEDURE — 97112 NEUROMUSCULAR REEDUCATION: CPT | Mod: GP | Performed by: PHYSICAL THERAPIST

## 2019-07-09 ENCOUNTER — ANTICOAGULATION THERAPY VISIT (OUTPATIENT)
Dept: NURSING | Facility: CLINIC | Age: 79
End: 2019-07-09
Payer: COMMERCIAL

## 2019-07-09 LAB — INR POINT OF CARE: 2.8 (ref 0.86–1.14)

## 2019-07-09 PROCEDURE — 85610 PROTHROMBIN TIME: CPT | Mod: QW

## 2019-07-09 PROCEDURE — 36416 COLLJ CAPILLARY BLOOD SPEC: CPT

## 2019-07-09 PROCEDURE — 99207 ZZC NO CHARGE NURSE ONLY: CPT

## 2019-07-09 NOTE — PROGRESS NOTES
ANTICOAGULATION FOLLOW-UP CLINIC VISIT    Patient Name:  Clementina Cooper  Date:  2019  Contact Type:  Face to Face    SUBJECTIVE: same plan of care.  Patient Findings     Positives:   Change in medications (patient has been off amiodarone a little over 1 month)        Clinical Outcomes     Negatives:   Major bleeding event, Thromboembolic event, Anticoagulation-related hospital admission, Anticoagulation-related ED visit, Anticoagulation-related fatality           OBJECTIVE    INR Protime   Date Value Ref Range Status   2019 2.8 (A) 0.86 - 1.14 Final       ASSESSMENT / PLAN  INR assessment THER    Recheck INR In: 4 WEEKS    INR Location Clinic      Anticoagulation Summary  As of 2019    INR goal:   2.0-3.0   TTR:   72.8 % (3.3 y)   INR used for dosin.8 (2019)   Warfarin maintenance plan:   5 mg (5 mg x 1) every Wed; 2.5 mg (5 mg x 0.5) all other days   Full warfarin instructions:   5 mg every Wed; 2.5 mg all other days   Weekly warfarin total:   20 mg   No change documented:   Shagufta Bell RN   Plan last modified:   Shagufta Bell RN (3/5/2019)   Next INR check:   2019   Target end date:   Indefinite    Indications    Atrial fibrillation (H) (Resolved) [I48.91]             Anticoagulation Episode Summary     INR check location:       Preferred lab:       Send INR reminders to:   [facility]    Comments:         Anticoagulation Care Providers     Provider Role Specialty Phone number    Estela Davila MD  Internal Medicine [phone]            See the Encounter Report to view Anticoagulation Flowsheet and Dosing Calendar (Go to Encounters tab in chart review, and find the Anticoagulation Therapy Visit)    Dosage adjustment made based on physician directed care plan.    Shagufta Bell RN

## 2019-07-16 ENCOUNTER — THERAPY VISIT (OUTPATIENT)
Dept: PHYSICAL THERAPY | Facility: CLINIC | Age: 79
End: 2019-07-16
Payer: COMMERCIAL

## 2019-07-16 DIAGNOSIS — M54.2 NECK PAIN: ICD-10-CM

## 2019-07-16 PROCEDURE — 97110 THERAPEUTIC EXERCISES: CPT | Mod: GP | Performed by: PHYSICAL THERAPIST

## 2019-07-16 PROCEDURE — 97140 MANUAL THERAPY 1/> REGIONS: CPT | Mod: GP | Performed by: PHYSICAL THERAPIST

## 2019-07-16 PROCEDURE — 97112 NEUROMUSCULAR REEDUCATION: CPT | Mod: GP | Performed by: PHYSICAL THERAPIST

## 2019-08-06 ENCOUNTER — ANTICOAGULATION THERAPY VISIT (OUTPATIENT)
Dept: NURSING | Facility: CLINIC | Age: 79
End: 2019-08-06
Payer: COMMERCIAL

## 2019-08-06 DIAGNOSIS — I48.20 CHRONIC ATRIAL FIBRILLATION (H): ICD-10-CM

## 2019-08-06 DIAGNOSIS — Z98.890 STATUS POST CATHETER ABLATION OF ATRIAL FIBRILLATION: ICD-10-CM

## 2019-08-06 DIAGNOSIS — I73.9 PVD (PERIPHERAL VASCULAR DISEASE) (H): ICD-10-CM

## 2019-08-06 LAB — INR POINT OF CARE: 3.2 (ref 0.86–1.14)

## 2019-08-06 PROCEDURE — 99207 ZZC NO CHARGE NURSE ONLY: CPT

## 2019-08-06 PROCEDURE — 85610 PROTHROMBIN TIME: CPT | Mod: QW

## 2019-08-06 PROCEDURE — 36416 COLLJ CAPILLARY BLOOD SPEC: CPT

## 2019-08-06 NOTE — PROGRESS NOTES
ANTICOAGULATION FOLLOW-UP CLINIC VISIT    Patient Name:  Clementina Cooper  Date:  8/6/2019  Contact Type:  Face to Face    SUBJECTIVE: corina 2 wks  Patient Findings     Positives:   Change in diet/appetite (reports she likely did not have as many green veg / salads than usual.)    Comments:   Assessment negative for bleeding or clotting symptoms today.        Clinical Outcomes     Negatives:   Major bleeding event, Thromboembolic event, Anticoagulation-related hospital admission, Anticoagulation-related ED visit, Anticoagulation-related fatality    Comments:   Assessment negative for bleeding or clotting symptoms today.           OBJECTIVE    INR Protime   Date Value Ref Range Status   08/06/2019 3.2 (A) 0.86 - 1.14 Final       ASSESSMENT / PLAN  INR assessment SUPRA    Recheck INR In: 2 WEEKS    INR Location Clinic      Anticoagulation Summary  As of 8/6/2019    INR goal:   2.0-3.0   TTR:   72.3 % (3.4 y)   INR used for dosing:   3.2! (8/6/2019)   Warfarin maintenance plan:   5 mg (5 mg x 1) every Wed; 2.5 mg (5 mg x 0.5) all other days   Full warfarin instructions:   8/6: Hold; Otherwise 5 mg every Wed; 2.5 mg all other days   Weekly warfarin total:   20 mg   Plan last modified:   Shagufta Bell, RN (3/5/2019)   Next INR check:   8/22/2019   Target end date:   Indefinite    Indications    Chronic atrial fibrillation (H) [I48.2]  Status post catheter ablation of atrial fibrillation [Z98.890]  PVD (peripheral vascular disease) (H) [I73.9]             Anticoagulation Episode Summary     INR check location:       Preferred lab:       Send INR reminders to:   [facility]    Comments:         Anticoagulation Care Providers     Provider Role Specialty Phone number    Estela Davila MD  Internal Medicine [phone]            See the Encounter Report to view Anticoagulation Flowsheet and Dosing Calendar (Go to Encounters tab in chart review, and find the Anticoagulation Therapy Visit)    Dosage  adjustment made based on physician directed care plan.    Shagufta Bell RN

## 2019-08-20 ENCOUNTER — THERAPY VISIT (OUTPATIENT)
Dept: PHYSICAL THERAPY | Facility: CLINIC | Age: 79
End: 2019-08-20
Payer: COMMERCIAL

## 2019-08-20 DIAGNOSIS — M54.2 NECK PAIN: ICD-10-CM

## 2019-08-20 PROCEDURE — 97110 THERAPEUTIC EXERCISES: CPT | Mod: GP | Performed by: PHYSICAL THERAPIST

## 2019-08-20 PROCEDURE — 97140 MANUAL THERAPY 1/> REGIONS: CPT | Mod: GP | Performed by: PHYSICAL THERAPIST

## 2019-08-20 NOTE — PROGRESS NOTES
"Subjective:  HPI                    Objective:  System    Physical Exam    General     ROS    Assessment/Plan:    PROGRESS  REPORT    Progress reporting period is from 6/11/19 to 8/20/19.     SUBJECTIVE  Pt returns after month delay. States that her neck feels about the same, \"maybe slightly better.\" Hasn't done the exercises very consistently.  Current Pain level: 4/10   Initial Pain level: 6/10   Changes in function: (yes see goal flow sheet)     OBJECTIVE  Tender to palpation at L cervical paraspinals, extensors, levator and sub occ muscles  Hypomobile upper thoracic spine and cervical spine  Cervical AROM: decreased 25% rotation bilat, decreased 25% side bend bilat, flexion/extension WNL   Cervical AROM is pain free in all planes  Pt continues to require many cues for home exercises at each session.    ASSESSMENT/PLAN  Diagnosis 1:  Neck pain  Pain -  hot/cold therapy, manual therapy, self management, education and home program  Decreased ROM/flexibility - manual therapy, therapeutic exercise and home program  Decreased strength - therapeutic exercise, therapeutic activities and home program   [id number]}  STG/LTGs have been met or progress has been made towards goals:  Yes (See Goal flow sheet completed today.)  Assessment of Progress: The patient's condition has potential to improve.  The patient has had set backs in their progress.  Patient is meeting short term goals and is progressing towards long term goals.  Self Management Plans:  Patient has been instructed in a home treatment program.  Patient  has been instructed in self management of symptoms.  I have re-evaluated this patient and find that the nature, scope, duration and intensity of the therapy is appropriate for the medical condition of the patient.  Clementina continues to require the following intervention to meet STG and LTG's: PT    Recommendations:  This patient would benefit from continued therapy.     Frequency:  1 X week, once daily  Duration:  " for 4 weeks tapering to 2 X a month over 1 month    Please refer to the daily flowsheet for treatment today, total treatment time and time spent performing 1:1 timed codes.

## 2019-08-22 ENCOUNTER — ANTICOAGULATION THERAPY VISIT (OUTPATIENT)
Dept: NURSING | Facility: CLINIC | Age: 79
End: 2019-08-22
Payer: COMMERCIAL

## 2019-08-22 DIAGNOSIS — Z98.890 STATUS POST CATHETER ABLATION OF ATRIAL FIBRILLATION: ICD-10-CM

## 2019-08-22 DIAGNOSIS — I73.9 PVD (PERIPHERAL VASCULAR DISEASE) (H): ICD-10-CM

## 2019-08-22 DIAGNOSIS — I48.20 CHRONIC ATRIAL FIBRILLATION (H): ICD-10-CM

## 2019-08-22 LAB — INR POINT OF CARE: 2.5 (ref 0.86–1.14)

## 2019-08-22 PROCEDURE — 85610 PROTHROMBIN TIME: CPT | Mod: QW

## 2019-08-22 PROCEDURE — 36416 COLLJ CAPILLARY BLOOD SPEC: CPT

## 2019-08-22 PROCEDURE — 99207 ZZC NO CHARGE NURSE ONLY: CPT

## 2019-08-22 NOTE — PROGRESS NOTES
ANTICOAGULATION FOLLOW-UP CLINIC VISIT    Patient Name:  Clementina Cooper  Date:  2019  Contact Type:  Face to Face    SUBJECTIVE:  Same plan of care.    Patient Findings     Comments:   Assessment negative for symptoms of bleeding or clotting this visit.        Clinical Outcomes     Negatives:   Major bleeding event, Thromboembolic event, Anticoagulation-related hospital admission, Anticoagulation-related ED visit, Anticoagulation-related fatality    Comments:   Assessment negative for symptoms of bleeding or clotting this visit.           OBJECTIVE    INR Protime   Date Value Ref Range Status   2019 2.5 (A) 0.86 - 1.14 Final       ASSESSMENT / PLAN  INR assessment THER    Recheck INR In: 4 WEEKS    INR Location Clinic      Anticoagulation Summary  As of 2019    INR goal:   2.0-3.0   TTR:   72.3 % (3.4 y)   INR used for dosin.5 (2019)   Warfarin maintenance plan:   5 mg (5 mg x 1) every Wed; 2.5 mg (5 mg x 0.5) all other days   Full warfarin instructions:   5 mg every Wed; 2.5 mg all other days   Weekly warfarin total:   20 mg   No change documented:   Shagufta Bell RN   Plan last modified:   Shagufta Bell RN (3/5/2019)   Next INR check:   2019   Target end date:   Indefinite    Indications    Chronic atrial fibrillation (H) [I48.2]  Status post catheter ablation of atrial fibrillation [Z98.890]  PVD (peripheral vascular disease) (H) [I73.9]             Anticoagulation Episode Summary     INR check location:       Preferred lab:       Send INR reminders to:   [facility]    Comments:         Anticoagulation Care Providers     Provider Role Specialty Phone number    Estela Davila MD  Internal Medicine [phone]            See the Encounter Report to view Anticoagulation Flowsheet and Dosing Calendar (Go to Encounters tab in chart review, and find the Anticoagulation Therapy Visit)    Dosage adjustment made based on physician directed care plan.    Shagufta  KYLIE Bell

## 2019-08-29 ENCOUNTER — TELEPHONE (OUTPATIENT)
Dept: FAMILY MEDICINE | Facility: CLINIC | Age: 79
End: 2019-08-29

## 2019-08-29 ENCOUNTER — THERAPY VISIT (OUTPATIENT)
Dept: PHYSICAL THERAPY | Facility: CLINIC | Age: 79
End: 2019-08-29
Payer: COMMERCIAL

## 2019-08-29 DIAGNOSIS — M70.61 TROCHANTERIC BURSITIS OF RIGHT HIP: ICD-10-CM

## 2019-08-29 DIAGNOSIS — M25.551 HIP PAIN, RIGHT: Primary | ICD-10-CM

## 2019-08-29 PROCEDURE — 97161 PT EVAL LOW COMPLEX 20 MIN: CPT | Mod: GP | Performed by: PHYSICAL THERAPIST

## 2019-08-29 PROCEDURE — 97110 THERAPEUTIC EXERCISES: CPT | Mod: GP | Performed by: PHYSICAL THERAPIST

## 2019-08-29 NOTE — PROGRESS NOTES
[facility] Initial Evaluation  Subjective:  The history is provided by the patient.   Type of problem:  Right hip   Condition occurred with:  Insidious onset. This is a new condition   Problem details: Pain for about 4 months.  MD referral 8/29/2019..   Patient reports pain:  Greater trochanter and lateral. Radiates to:  Thigh and knee. Associated symptoms:  Loss of motion/stiffness. Symptoms are exacerbated by lying on extremity (prolong sit to stand) and relieved by nothing.      and reported as 7/10 on pain scale. General health as reported by patient is good. Pertinent medical history includes:  Diabetes, heart problems, overweight and thyroid problems.  Medical allergies: see list in chart.  Surgeries include:  Heart surgery.  Current medications:  Hormone replacement therapy, high blood pressure medication and thyroid medication.   Primary job tasks: housework.  Pain is described as aching and is intermittent. Pain is worse during the night. Since onset symptoms are unchanged.      Occupation: Retired.   Barriers include:  None as reported by patient.  Red flags:  None as reported by patient.                      Objective:    Gait:    Gait Type:  Normal                                                      Hip Evaluation  HIP AROM:  AROM:   Left Hip:        Right Hip:   Normal (slight end range pain with IR/ ER)                    Hip Strength:  Hip Strength:   Left:     Right:  Normal (slight pain with resisted abd)                          Hip Special Testing:       Right hip positive for the following special tests:  Bre (R hip)Right hip negative for the following special tests:  Piriformis; Fadir/Labrum; SLR or Teddy's    Hip Palpation:      Right hip tenderness present at:  Greater Trachanter and IT Band  Right hip tenderness not present at:  hip flexors or Piriformis             General     ROS    Assessment/Plan:    Patient is a 79 year old female with right side hip complaints.     Patient has the following significant findings with corresponding treatment plan.                Diagnosis 1:  R GT bursitis  Pain -  manual therapy, self management, education, directional preference exercise and home program  Decreased ROM/flexibility - manual therapy and therapeutic exercise  Inflammation - cold therapy  Impaired muscle performance - neuro re-education  Decreased function - therapeutic activities    Therapy Evaluation Codes:   1) History comprised of:   Personal factors that impact the plan of care:      None.    Comorbidity factors that impact the plan of care are:      Diabetes, Heart problems and Overweight.     Medications impacting care: High blood pressure.  2) Examination of Body Systems comprised of:   Body structures and functions that impact the plan of care:      Hip.   Activity limitations that impact the plan of care are:      Laying down.  3) Clinical presentation characteristics are:   Stable/Uncomplicated.  4) Decision-Making    Low complexity using standardized patient assessment instrument and/or measureable assessment of functional outcome.  Cumulative Therapy Evaluation is: Low complexity.    Previous and current functional limitations:  (See Goal Flow Sheet for this information)    Short term and Long term goals: (See Goal Flow Sheet for this information)     Communication ability:  Patient appears to be able to clearly communicate and understand verbal and written communication and follow directions correctly.  Treatment Explanation - The following has been discussed with the patient:   RX ordered/plan of care  Anticipated outcomes  Possible risks and side effects  This patient would benefit from PT intervention to resume normal activities.   Rehab potential is good.    Frequency:  1 X week, once daily  Duration:  for 8 weeks  Discharge Plan:  Achieve all LTG.  Independent in home treatment program.  Reach maximal therapeutic benefit.    Please refer to the daily flowsheet  for treatment today, total treatment time and time spent performing 1:1 timed codes.

## 2019-08-29 NOTE — TELEPHONE ENCOUNTER
Per phys therapy:   Lynda Cooper would like physical therapy for some hip pain she is having (we are currently seeing her for neck pain). Would you like to see her for this first? She has an appointment with us tomorrow and we could switch it to a hip eval if needed    Referral placed.

## 2019-09-03 DIAGNOSIS — E11.9 TYPE 2 DIABETES MELLITUS WITHOUT COMPLICATION, WITHOUT LONG-TERM CURRENT USE OF INSULIN (H): Primary | ICD-10-CM

## 2019-09-03 NOTE — TELEPHONE ENCOUNTER
"Requested Prescriptions   Pending Prescriptions Disp Refills     losartan (COZAAR) 25 MG tablet [Pharmacy Med Name: LOSARTAN POTASSIUM 25 MG TAB] 45 tablet 1     Sig: TAKE 0.5 TABLETS (12.5 MG) BY MOUTH DAILY   Last Written Prescription Date:  12-10-18  Last Fill Quantity: 45,  # refills: 1   Last office visit: 5/28/2019 with prescribing provider:  5-28-19   Future Office Visit:        Angiotensin-II Receptors Passed - 9/3/2019  8:16 AM        Passed - Last blood pressure under 140/90 in past 12 months     BP Readings from Last 3 Encounters:   05/28/19 122/69   04/25/19 108/69   02/04/19 114/69                 Passed - Recent (12 mo) or future (30 days) visit within the authorizing provider's specialty     Patient had office visit in the last 12 months or has a visit in the next 30 days with authorizing provider or within the authorizing provider's specialty.  See \"Patient Info\" tab in inbasket, or \"Choose Columns\" in Meds & Orders section of the refill encounter.              Passed - Medication is active on med list        Passed - Patient is age 18 or older        Passed - No active pregnancy on record        Passed - Normal serum creatinine on file in past 12 months     Recent Labs   Lab Test 05/21/19  0734   CR 1.01             Passed - Normal serum potassium on file in past 12 months     Recent Labs   Lab Test 05/21/19  0734   POTASSIUM 4.2                    Passed - No positive pregnancy test in past 12 months          "

## 2019-09-05 RX ORDER — LOSARTAN POTASSIUM 25 MG/1
TABLET ORAL
Qty: 45 TABLET | Refills: 3 | Status: SHIPPED | OUTPATIENT
Start: 2019-09-05 | End: 2020-08-31

## 2019-09-16 ENCOUNTER — OFFICE VISIT (OUTPATIENT)
Dept: FAMILY MEDICINE | Facility: CLINIC | Age: 79
End: 2019-09-16
Payer: COMMERCIAL

## 2019-09-16 VITALS
HEART RATE: 73 BPM | TEMPERATURE: 96.9 F | WEIGHT: 201.75 LBS | DIASTOLIC BLOOD PRESSURE: 70 MMHG | BODY MASS INDEX: 33.06 KG/M2 | SYSTOLIC BLOOD PRESSURE: 125 MMHG

## 2019-09-16 DIAGNOSIS — M70.61 TROCHANTERIC BURSITIS OF RIGHT HIP: ICD-10-CM

## 2019-09-16 DIAGNOSIS — L03.818 CELLULITIS OF OTHER SPECIFIED SITE: Primary | ICD-10-CM

## 2019-09-16 PROCEDURE — 99213 OFFICE O/P EST LOW 20 MIN: CPT | Performed by: FAMILY MEDICINE

## 2019-09-16 RX ORDER — CEPHALEXIN 500 MG/1
500 CAPSULE ORAL 3 TIMES DAILY
Qty: 21 CAPSULE | Refills: 0 | Status: SHIPPED | OUTPATIENT
Start: 2019-09-16 | End: 2019-11-08

## 2019-09-16 NOTE — PROGRESS NOTES
Subjective     Clementina Cooper is a 79 year old female who presents to clinic today for the following health issues:    HPI     Possible reaction to her earrings.  Both ears swollen    Patient has been using triple antibiotic and that seemed to make it worse     She has redness and weeping now     She is getting therapy for her neck and now her right hip hurts also   physical therapy thinks it is bursitis     O: /70 (BP Location: Left arm, Patient Position: Sitting, Cuff Size: Adult Large)   Pulse 73   Temp 96.9  F (36.1  C) (Oral)   Wt 91.5 kg (201 lb 12 oz)   Breastfeeding? No   BMI 33.06 kg/m      Results for orders placed or performed in visit on 08/22/19   INR point of care   Result Value Ref Range    INR Protime 2.5 (A) 0.86 - 1.14      Head: Normocephalic, atraumatic.  Eyes: Conjunctiva clear, non icteric. PERRLA.  Ears: External ears and TMs normal BL.  Nose: Septum midline, nasal mucosa pink and moist. No discharge.  Mouth / Throat: Normal dentition.  No oral lesions. Pharynx non erythematous, tonsils without hypertrophy.  Neck: Supple, no enlarged LN, trachea midline.    Red earlobes where the earrings would go in   Mild clear weeping   Mostly inflammatory reaction but patient is diabetic and they are red so would cover with an antibiotic     Tenderness over right trochanteric bursa   Pain with external rotation       She has swelling and redness of the ear lobes   Some weeping       ICD-10-CM    1. Cellulitis of other specified site L03.818 cephALEXin (KEFLEX) 500 MG capsule   2. Trochanteric bursitis of right hip M70.61      Get otc pain cream or patches   Ice 10 minutes 2 x a day   Recheck prn

## 2019-09-24 ENCOUNTER — ANTICOAGULATION THERAPY VISIT (OUTPATIENT)
Dept: NURSING | Facility: CLINIC | Age: 79
End: 2019-09-24
Payer: COMMERCIAL

## 2019-09-24 DIAGNOSIS — I73.9 PVD (PERIPHERAL VASCULAR DISEASE) (H): ICD-10-CM

## 2019-09-24 DIAGNOSIS — Z98.890 STATUS POST CATHETER ABLATION OF ATRIAL FIBRILLATION: ICD-10-CM

## 2019-09-24 DIAGNOSIS — I48.20 CHRONIC ATRIAL FIBRILLATION (H): ICD-10-CM

## 2019-09-24 LAB — INR POINT OF CARE: 1.5 (ref 0.86–1.14)

## 2019-09-24 PROCEDURE — 36416 COLLJ CAPILLARY BLOOD SPEC: CPT

## 2019-09-24 PROCEDURE — 99207 ZZC NO CHARGE NURSE ONLY: CPT

## 2019-09-24 PROCEDURE — 85610 PROTHROMBIN TIME: CPT | Mod: QW

## 2019-09-24 NOTE — PROGRESS NOTES
ANTICOAGULATION FOLLOW-UP CLINIC VISIT    Patient Name:  Clementina Cooper  Date:  2019  Contact Type:  Face to Face    SUBJECTIVE: 1 - 2 wk corina per protocol  Patient Findings     Positives:   Missed doses (missed warfarin dose on Friday last week), Change in medications (10 day course of keflex for ear cellulitis.  symptoms gone.)    Comments:   Assessment negative for bleeding or clotting symptoms this visit        Clinical Outcomes     Negatives:   Major bleeding event, Thromboembolic event, Anticoagulation-related hospital admission, Anticoagulation-related ED visit, Anticoagulation-related fatality    Comments:   Assessment negative for bleeding or clotting symptoms this visit           OBJECTIVE    INR Protime   Date Value Ref Range Status   2019 1.5 (A) 0.86 - 1.14 Final       ASSESSMENT / PLAN  INR assessment SUB    Recheck INR In: 1 WEEK    INR Location Clinic      Anticoagulation Summary  As of 2019    INR goal:   2.0-3.0   TTR:   71.7 % (3.5 y)   INR used for dosin.5! (2019)   Warfarin maintenance plan:   5 mg (5 mg x 1) every Wed; 2.5 mg (5 mg x 0.5) all other days   Full warfarin instructions:   : 5 mg; Otherwise 5 mg every Wed; 2.5 mg all other days   Weekly warfarin total:   20 mg   Plan last modified:   Shagufta Bell RN (3/5/2019)   Next INR check:   10/1/2019   Target end date:   Indefinite    Indications    Chronic atrial fibrillation (H) [I48.2]  Status post catheter ablation of atrial fibrillation [Z98.890]  PVD (peripheral vascular disease) (H) [I73.9]             Anticoagulation Episode Summary     INR check location:       Preferred lab:       Send INR reminders to:   [facility]    Comments:         Anticoagulation Care Providers     Provider Role Specialty Phone number    Estela Davila MD  Internal Medicine [phone]            See the Encounter Report to view Anticoagulation Flowsheet and Dosing Calendar (Go to Encounters tab in  chart review, and find the Anticoagulation Therapy Visit)    Dosage adjustment made based on physician directed care plan.    Shagufta Bell RN

## 2019-09-25 ENCOUNTER — TRANSFERRED RECORDS (OUTPATIENT)
Dept: HEALTH INFORMATION MANAGEMENT | Facility: CLINIC | Age: 79
End: 2019-09-25

## 2019-10-01 ENCOUNTER — ANTICOAGULATION THERAPY VISIT (OUTPATIENT)
Dept: NURSING | Facility: CLINIC | Age: 79
End: 2019-10-01
Payer: COMMERCIAL

## 2019-10-01 DIAGNOSIS — I73.9 PVD (PERIPHERAL VASCULAR DISEASE) (H): ICD-10-CM

## 2019-10-01 DIAGNOSIS — Z98.890 STATUS POST CATHETER ABLATION OF ATRIAL FIBRILLATION: ICD-10-CM

## 2019-10-01 DIAGNOSIS — I48.20 CHRONIC ATRIAL FIBRILLATION (H): ICD-10-CM

## 2019-10-01 LAB — INR POINT OF CARE: 2 (ref 0.86–1.14)

## 2019-10-01 PROCEDURE — 36416 COLLJ CAPILLARY BLOOD SPEC: CPT

## 2019-10-01 PROCEDURE — 99207 ZZC NO CHARGE NURSE ONLY: CPT

## 2019-10-01 PROCEDURE — 85610 PROTHROMBIN TIME: CPT | Mod: QW

## 2019-10-01 NOTE — PROGRESS NOTES
ANTICOAGULATION FOLLOW-UP CLINIC VISIT    Patient Name:  Clementina Cooper  Date:  10/1/2019  Contact Type:  Face to Face    SUBJECTIVE: 2 wk corina  Patient Findings     Comments:   Assessment negative for symptoms of bleeding or clotting this visit        Clinical Outcomes     Negatives:   Major bleeding event, Thromboembolic event, Anticoagulation-related hospital admission, Anticoagulation-related ED visit, Anticoagulation-related fatality    Comments:   Assessment negative for symptoms of bleeding or clotting this visit           OBJECTIVE    INR Protime   Date Value Ref Range Status   10/01/2019 2.0 (A) 0.86 - 1.14 Final       ASSESSMENT / PLAN  INR assessment THER    Recheck INR In: 2 WEEKS    INR Location Clinic      Anticoagulation Summary  As of 10/1/2019    INR goal:   2.0-3.0   TTR:   71.3 % (3.5 y)   INR used for dosin.0 (10/1/2019)   Warfarin maintenance plan:   5 mg (5 mg x 1) every Wed, Fri; 2.5 mg (5 mg x 0.5) all other days   Full warfarin instructions:   5 mg every Wed, Fri; 2.5 mg all other days   Weekly warfarin total:   22.5 mg   Plan last modified:   Shagufta Bell RN (10/1/2019)   Next INR check:   10/17/2019   Target end date:   Indefinite    Indications    Chronic atrial fibrillation [I48.20]  Status post catheter ablation of atrial fibrillation [Z98.890]  PVD (peripheral vascular disease) (H) [I73.9]             Anticoagulation Episode Summary     INR check location:       Preferred lab:       Send INR reminders to:   [facility]    Comments:         Anticoagulation Care Providers     Provider Role Specialty Phone number    Estela Davila MD  Internal Medicine [phone]            See the Encounter Report to view Anticoagulation Flowsheet and Dosing Calendar (Go to Encounters tab in chart review, and find the Anticoagulation Therapy Visit)    Dosage adjustment made based on physician directed care plan.    Shagufta Bell RN

## 2019-10-17 ENCOUNTER — ANTICOAGULATION THERAPY VISIT (OUTPATIENT)
Dept: NURSING | Facility: CLINIC | Age: 79
End: 2019-10-17
Payer: COMMERCIAL

## 2019-10-17 DIAGNOSIS — I73.9 PVD (PERIPHERAL VASCULAR DISEASE) (H): ICD-10-CM

## 2019-10-17 DIAGNOSIS — I48.20 CHRONIC ATRIAL FIBRILLATION (H): ICD-10-CM

## 2019-10-17 DIAGNOSIS — Z98.890 STATUS POST CATHETER ABLATION OF ATRIAL FIBRILLATION: ICD-10-CM

## 2019-10-17 LAB — INR POINT OF CARE: 2.3 (ref 0.86–1.14)

## 2019-10-17 PROCEDURE — 99207 ZZC NO CHARGE NURSE ONLY: CPT

## 2019-10-17 PROCEDURE — 36416 COLLJ CAPILLARY BLOOD SPEC: CPT

## 2019-10-17 PROCEDURE — 85610 PROTHROMBIN TIME: CPT | Mod: QW

## 2019-10-17 NOTE — PROGRESS NOTES
ANTICOAGULATION FOLLOW-UP CLINIC VISIT    Patient Name:  Clementina Cooper  Date:  10/17/2019  Contact Type:  Face to Face    SUBJECTIVE: same plan of care  Patient Findings     Comments:   Assessment negative for symptoms of bleeding or clotting this visit        Clinical Outcomes     Negatives:   Major bleeding event, Thromboembolic event, Anticoagulation-related hospital admission, Anticoagulation-related ED visit, Anticoagulation-related fatality    Comments:   Assessment negative for symptoms of bleeding or clotting this visit           OBJECTIVE    INR Protime   Date Value Ref Range Status   10/17/2019 2.3 (A) 0.86 - 1.14 Final       ASSESSMENT / PLAN  INR assessment THER    Recheck INR In: 4 WEEKS    INR Location Clinic      Anticoagulation Summary  As of 10/17/2019    INR goal:   2.0-3.0   TTR:   71.7 % (3.6 y)   INR used for dosin.3 (10/17/2019)   Warfarin maintenance plan:   5 mg (5 mg x 1) every Wed, Fri; 2.5 mg (5 mg x 0.5) all other days   Full warfarin instructions:   5 mg every Wed, Fri; 2.5 mg all other days   Weekly warfarin total:   22.5 mg   No change documented:   Shagufta Bell RN   Plan last modified:   Shagufta Bell RN (10/1/2019)   Next INR check:   2019   Target end date:   Indefinite    Indications    Chronic atrial fibrillation [I48.20]  Status post catheter ablation of atrial fibrillation [Z98.890]  PVD (peripheral vascular disease) (H) [I73.9]             Anticoagulation Episode Summary     INR check location:       Preferred lab:       Send INR reminders to:   [facility]    Comments:         Anticoagulation Care Providers     Provider Role Specialty Phone number    Estela Davila MD  Internal Medicine [phone]            See the Encounter Report to view Anticoagulation Flowsheet and Dosing Calendar (Go to Encounters tab in chart review, and find the Anticoagulation Therapy Visit)    Dosage adjustment made based on physician directed care  plan.    Shagufta Bell RN

## 2019-10-18 DIAGNOSIS — E03.4 HYPOTHYROIDISM DUE TO ACQUIRED ATROPHY OF THYROID: ICD-10-CM

## 2019-10-18 RX ORDER — LEVOTHYROXINE SODIUM 100 UG/1
TABLET ORAL
Qty: 90 TABLET | Refills: 0 | Status: SHIPPED | OUTPATIENT
Start: 2019-10-18 | End: 2020-01-13

## 2019-10-18 NOTE — TELEPHONE ENCOUNTER
"Requested Prescriptions   Pending Prescriptions Disp Refills     levothyroxine (SYNTHROID/LEVOTHROID) 100 MCG tablet [Pharmacy Med Name: LEVOTHYROXINE 100 MCG TABLET] 90 tablet 3     Sig: TAKE 1 TABLET (100 MCG) BY MOUTH DAILY   Last Written Prescription Date:  10/15/18  Last Fill Quantity: 90,  # refills: 3   Last office visit: 9/16/2019 with prescribing provider:     Future Office Visit:   Next 5 appointments (look out 90 days)    Nov 08, 2019  9:00 AM CST  SHORT with Estela Davila MD  [facility] [address] [place] [id number]  [phone]             Thyroid Protocol Failed - 10/18/2019  8:46 AM        Failed - Normal TSH on file in past 12 months     Recent Labs   Lab Test 05/21/19  0734   TSH 4.56*              Passed - Patient is 12 years or older        Passed - Recent (12 mo) or future (30 days) visit within the authorizing provider's specialty     Patient has had an office visit with the authorizing provider or a provider within the authorizing providers department within the previous 12 mos or has a future within next 30 days. See \"Patient Info\" tab in inbasket, or \"Choose Columns\" in Meds & Orders section of the refill encounter.              Passed - Medication is active on med list        Passed - No active pregnancy on record     If patient is pregnant or has had a positive pregnancy test, please check TSH.          Passed - No positive pregnancy test in past 12 months     If patient is pregnant or has had a positive pregnancy test, please check TSH.            "

## 2019-10-18 NOTE — TELEPHONE ENCOUNTER
Routing refill request to provider for review/approval because:  Labs out of range:  TSH    Kylie Grubbs RN, BSN, PHN  [facility]: [place]

## 2019-11-05 DIAGNOSIS — E03.4 HYPOTHYROIDISM DUE TO ACQUIRED ATROPHY OF THYROID: ICD-10-CM

## 2019-11-05 DIAGNOSIS — E11.9 TYPE 2 DIABETES MELLITUS WITHOUT COMPLICATION, WITHOUT LONG-TERM CURRENT USE OF INSULIN (H): ICD-10-CM

## 2019-11-05 LAB
ANION GAP SERPL CALCULATED.3IONS-SCNC: 8 MMOL/L (ref 3–14)
BUN SERPL-MCNC: 18 MG/DL (ref 7–30)
CALCIUM SERPL-MCNC: 9 MG/DL (ref 8.5–10.1)
CHLORIDE SERPL-SCNC: 108 MMOL/L (ref 94–109)
CO2 SERPL-SCNC: 24 MMOL/L (ref 20–32)
CREAT SERPL-MCNC: 0.86 MG/DL (ref 0.52–1.04)
CREAT UR-MCNC: 124 MG/DL
GFR SERPL CREATININE-BSD FRML MDRD: 64 ML/MIN/{1.73_M2}
GLUCOSE SERPL-MCNC: 174 MG/DL (ref 70–99)
MICROALBUMIN UR-MCNC: 10 MG/L
MICROALBUMIN/CREAT UR: 8.47 MG/G CR (ref 0–25)
POTASSIUM SERPL-SCNC: 4.2 MMOL/L (ref 3.4–5.3)
SODIUM SERPL-SCNC: 140 MMOL/L (ref 133–144)
T4 FREE SERPL-MCNC: 1.08 NG/DL (ref 0.76–1.46)
TSH SERPL DL<=0.005 MIU/L-ACNC: 6.41 MU/L (ref 0.4–4)
VIT B12 SERPL-MCNC: 648 PG/ML (ref 193–986)

## 2019-11-05 PROCEDURE — 82043 UR ALBUMIN QUANTITATIVE: CPT | Performed by: INTERNAL MEDICINE

## 2019-11-05 PROCEDURE — 36415 COLL VENOUS BLD VENIPUNCTURE: CPT | Performed by: INTERNAL MEDICINE

## 2019-11-05 PROCEDURE — 80048 BASIC METABOLIC PNL TOTAL CA: CPT | Performed by: INTERNAL MEDICINE

## 2019-11-05 PROCEDURE — 82607 VITAMIN B-12: CPT | Performed by: INTERNAL MEDICINE

## 2019-11-05 PROCEDURE — 84443 ASSAY THYROID STIM HORMONE: CPT | Performed by: INTERNAL MEDICINE

## 2019-11-05 PROCEDURE — 84439 ASSAY OF FREE THYROXINE: CPT | Performed by: INTERNAL MEDICINE

## 2019-11-05 NOTE — LETTER
[facility]  [address] [place]  [address]  [phone]        November 6, 2019    Clementina Cooper  [address] [address]        Dear Clementina,    Enclosed are your results.  Your labs are normal/stable at this time.     Please call  with any questions.  We can also discuss any questions regarding these labs at your next scheduled visit.     Results for orders placed or performed in visit on 11/05/19   TSH with free T4 reflex     Status: Abnormal   Result Value Ref Range    TSH 6.41 (H) 0.40 - 4.00 mU/L   Basic metabolic panel     Status: Abnormal   Result Value Ref Range    Sodium 140 133 - 144 mmol/L    Potassium 4.2 3.4 - 5.3 mmol/L    Chloride 108 94 - 109 mmol/L    Carbon Dioxide 24 20 - 32 mmol/L    Anion Gap 8 3 - 14 mmol/L    Glucose 174 (H) 70 - 99 mg/dL    Urea Nitrogen 18 7 - 30 mg/dL    Creatinine 0.86 0.52 - 1.04 mg/dL    GFR Estimate 64 >60 mL/min/[1.73_m2]    GFR Estimate If Black 74 >60 mL/min/[1.73_m2]    Calcium 9.0 8.5 - 10.1 mg/dL   **Vitamin B12 FUTURE 2mo     Status: None   Result Value Ref Range    Vitamin B12 648 193 - 986 pg/mL   Albumin Random Urine Quantitative with Creat Ratio     Status: None   Result Value Ref Range    Creatinine Urine 124 mg/dL    Albumin Urine mg/L 10 mg/L    Albumin Urine mg/g Cr 8.47 0 - 25 mg/g Cr   T4 free     Status: None   Result Value Ref Range    T4 Free 1.08 0.76 - 1.46 ng/dL       If you have any questions please call the clinic at [phone].    Sincerely,    Estela RIZVI

## 2019-11-06 DIAGNOSIS — E11.9 TYPE 2 DIABETES MELLITUS WITHOUT COMPLICATION, WITHOUT LONG-TERM CURRENT USE OF INSULIN (H): Primary | ICD-10-CM

## 2019-11-06 NOTE — RESULT ENCOUNTER NOTE
Clementina Cooper    Enclosed are your results.  Your labs are normal/stable at this time.     Please call  with any questions.  We can also discuss any questions regarding these labs at your next scheduled visit.    Sincerely,    Estela Davila M.D.   [id number] Exp Problem Focused - Low Complex

## 2019-11-07 PROBLEM — I48.91 ATRIAL FIBRILLATION (H): Status: RESOLVED | Noted: 2019-11-07 | Resolved: 2019-11-07

## 2019-11-07 PROBLEM — I48.91 ATRIAL FIBRILLATION (H): Status: ACTIVE | Noted: 2019-11-07

## 2019-11-08 ENCOUNTER — OFFICE VISIT (OUTPATIENT)
Dept: FAMILY MEDICINE | Facility: CLINIC | Age: 79
End: 2019-11-08
Payer: COMMERCIAL

## 2019-11-08 VITALS
HEART RATE: 79 BPM | OXYGEN SATURATION: 99 % | DIASTOLIC BLOOD PRESSURE: 69 MMHG | WEIGHT: 203 LBS | TEMPERATURE: 97.4 F | BODY MASS INDEX: 33.27 KG/M2 | SYSTOLIC BLOOD PRESSURE: 113 MMHG

## 2019-11-08 DIAGNOSIS — I48.91 ATRIAL FIBRILLATION, UNSPECIFIED TYPE (H): ICD-10-CM

## 2019-11-08 DIAGNOSIS — I48.20 CHRONIC ATRIAL FIBRILLATION (H): ICD-10-CM

## 2019-11-08 DIAGNOSIS — Z98.890 STATUS POST CATHETER ABLATION OF ATRIAL FIBRILLATION: ICD-10-CM

## 2019-11-08 DIAGNOSIS — Z79.01 LONG TERM CURRENT USE OF ANTICOAGULANT THERAPY: ICD-10-CM

## 2019-11-08 DIAGNOSIS — H90.3 SENSORINEURAL HEARING LOSS (SNHL) OF BOTH EARS: ICD-10-CM

## 2019-11-08 DIAGNOSIS — R09.82 POST-NASAL DRIP: ICD-10-CM

## 2019-11-08 DIAGNOSIS — M70.61 TROCHANTERIC BURSITIS OF RIGHT HIP: ICD-10-CM

## 2019-11-08 DIAGNOSIS — E03.4 HYPOTHYROIDISM DUE TO ACQUIRED ATROPHY OF THYROID: ICD-10-CM

## 2019-11-08 DIAGNOSIS — E11.9 TYPE 2 DIABETES MELLITUS WITHOUT COMPLICATION, WITHOUT LONG-TERM CURRENT USE OF INSULIN (H): Primary | ICD-10-CM

## 2019-11-08 PROCEDURE — 99214 OFFICE O/P EST MOD 30 MIN: CPT | Mod: 25 | Performed by: INTERNAL MEDICINE

## 2019-11-08 PROCEDURE — 20610 DRAIN/INJ JOINT/BURSA W/O US: CPT | Mod: RT | Performed by: INTERNAL MEDICINE

## 2019-11-08 PROCEDURE — 99207 C PAF COMPLETED  NO CHARGE: CPT | Mod: 25 | Performed by: INTERNAL MEDICINE

## 2019-11-08 RX ORDER — TRIAMCINOLONE ACETONIDE 40 MG/ML
40 INJECTION, SUSPENSION INTRA-ARTICULAR; INTRAMUSCULAR ONCE
Status: COMPLETED | OUTPATIENT
Start: 2019-11-08 | End: 2019-11-08

## 2019-11-08 RX ADMIN — TRIAMCINOLONE ACETONIDE 40 MG: 40 INJECTION, SUSPENSION INTRA-ARTICULAR; INTRAMUSCULAR at 16:15

## 2019-11-08 NOTE — PATIENT INSTRUCTIONS
[facility] [phone]  -- Audiology    Return to clinic 3 - 6 months:  Follow up hip pain and diabetes    Consider orthopedic evaluation if symptoms don't improve over next couple weeks.     Trial Flonase/fluticasone nasal spray daily, see if this improves the post nasal drip.... consider ENT evaluation in future

## 2019-11-08 NOTE — PROGRESS NOTES
Subjective     Clementina Cooper is a 79 year old female who presents to clinic today for the following health issues:    78 y/o F here for f/u.   H/o PVD, DMII, chronic Afib (coumadin), hypothyroid and essential tremor     Last year,  had PVI for Afib, she has been in [facility] and feels better...       .11/6/19: Forgot A1C?    Her psoriasis more active lately.     She is very achey, especially right hip.   Walking/resting is okay, but transitioning/lifting right leg is very painful.  Tried ice packs and Ibuprofen, not helping.     Having chronic PND, back of throat.  No sinus pain,  But the discharge burns back of throat sometimes    HPI     Diabetes Follow-up    How often are you checking your blood sugar? One time daily  What time of day are you checking your blood sugars (select all that apply)?  Before meals  Have you had any blood sugars above 200?  Yes 204 one time   Have you had any blood sugars below 70?  No    What symptoms do you notice when your blood sugar is low?  None    What concerns do you have today about your diabetes? None     Do you have any of these symptoms? (Select all that apply)  No numbness or tingling in feet.  No redness, sores or blisters on feet.  No complaints of excessive thirst.  No reports of blurry vision.  No significant changes to weight.     Have you had a diabetic eye exam in the last 12 months? Yes- Date of last eye exam: 2/13/19    BP Readings from Last 2 Encounters:   11/08/19 113/69   09/16/19 125/70     Hemoglobin A1C (%)   Date Value   05/21/2019 7.0 (H)   09/27/2018 7.6 (H)     LDL Cholesterol Calculated (mg/dL)   Date Value   05/21/2019 73   05/16/2018 81       Diabetes Management Resources      How many servings of fruits and vegetables do you eat daily?  0-1    On average, how many sweetened beverages do you drink each day (soda, juice, sweet tea, etc)?   0    How many days per week do you miss taking your medication? 0    Joint Pain    Onset: 6 months      Description:   Location: right hip  Character: Sharp    Intensity: severe    Progression of Symptoms: intermittent    Accompanying Signs & Symptoms:  Other symptoms: none    History:   Previous similar pain: no       Precipitating factors:   Trauma or overuse: no     Alleviating factors:  Improved by: nothing    Therapies Tried and outcome: ice, ibuprofen       Check psoriasis on scalp            Patient Active Problem List   Diagnosis     PVD (peripheral vascular disease) (H)     Family history of colon cancer     HYPERLIPIDEMIA LDL GOAL <100     Open-angle glaucoma, mild-mod, ou     Advanced directives, counseling/discussion     Essential tremor     Pseudophakia, Yag Caps, ou     S/P iridectomy, ou     Anticoagulation goal of INR 2 to 3     Hypothyroidism due to acquired atrophy of thyroid     Chronic atrial fibrillation     overweight  HCC     Long-term (current) use of anticoagulants [Z79.01]     Type 2 diabetes mellitus without complication, without long-term current use of insulin (H)     Glaucoma suspect, bilateral     Status post catheter ablation of atrial fibrillation     Neck pain     Trochanteric bursitis of right hip     Past Surgical History:   Procedure Laterality Date     ANGIOPLASTY      right leg     C NONSPECIFIC PROCEDURE  2001    neck surgery/trauma     C STOMACH SURGERY PROCEDURE UNLISTED       CARDIAC SURGERY      Atrial fibrillation ablation      CATARACT IOL, RT/LT       HC TRABECULOPLASTY BY LASER SURGERY       HC VASCULAR SURGERY PROCEDURE UNLIST       HYSTERECTOMY, CERVIX STATUS UNKNOWN  1989    uterine bleeding     HYSTERECTOMY, PAP NO LONGER INDICATED       LASER YAG CAPSULOTOMY Right 02/06/2018; 2/2019    right eye; left eye     LASER YAG IRIDOTOMY  remotely    both eyes (elsewhere)     PHACOEMULSIFICATION WITH STANDARD INTRAOCULAR LENS IMPLANT  7/2012; 8/2012    right eye; left eye       Social History     Tobacco Use     Smoking status: Former Smoker     Last attempt to quit:  1999     Years since quittin.9     Smokeless tobacco: Never Used   Substance Use Topics     Alcohol use: Yes     Comment: occasional     Family History   Problem Relation Age of Onset     Diabetes Mother      Heart Disease Mother      Heart Disease Father      Heart Disease Son      Cancer Brother      Heart Disease Sister      Cancer Brother      Cancer Brother      Heart Disease Sister      Macular Degeneration No family hx of      Glaucoma No family hx of          Current Outpatient Medications   Medication Sig Dispense Refill     ACE NOT PRESCRIBED, INTENTIONAL, 1 each At Bedtime ACE Inhibitor not prescribed due to Other:  Neck swelling.  Also, BP is on low normal side with the betablocker 0 each 0     aspirin 81 MG tablet Take 1 tablet by mouth daily.  3     atorvastatin (LIPITOR) 20 MG tablet Take 1 tablet (20 mg) by mouth daily 90 tablet 3     Biotin 5000 MCG CAPS Take  by mouth.       CENTRUM SILVER OR one daily       clobetasol (TEMOVATE) 0.05 % external cream APPLY 1 GRAM TOPICALLY 2 TIMES DAILY AS NEEDED 180 g 2     Evening Primrose Oil 1000 MG CAPS Take by mouth daily       levothyroxine (SYNTHROID/LEVOTHROID) 100 MCG tablet TAKE 1 TABLET (100 MCG) BY MOUTH DAILY 90 tablet 0     losartan (COZAAR) 25 MG tablet TAKE 0.5 TABLETS (12.5 MG) BY MOUTH DAILY 45 tablet 3     metFORMIN (GLUCOPHAGE) 500 MG tablet TAKE 1 TABLET (500 MG) BY MOUTH DAILY (WITH DINNER) 90 tablet 3     ONETOUCH ULTRA test strip USE TO TEST BLOOD SUGAR TWICE A DAY OR AS DIRECTED 200 strip 0     ORDER FOR DME Equipment being ordered:  One Touch strips to be used daily 90 days 3     ORDER FOR DME Equipment being ordered:  chemstrips for daily blood sugar checks. 1 Box 3     propranolol (INDERAL) 10 MG tablet TAKE 1 TABLET (10 MG) BY MOUTH 2 TIMES DAILY AS NEEDED 180 tablet 3     VITAMIN D 1000 UNIT OR TABS 1 TABLET DAILY       warfarin (COUMADIN) 5 MG tablet Take 1 tablet (5mg) by mouth Mon,Wed,Fri & 1/2 tablet (2.5mg) all other  days of the week 102 tablet 3     Cyanocobalamin (B-12) 1000 MCG TBCR Take 1 tablet by mouth daily       Allergies   Allergen Reactions     Benzocaine      Betadine [Povidone Iodine]      Rash     Pravastatin      Muscle aches     Vagisil [Kdc:Benzocaine+Cetyl Alcohol+Edetic Acid+Methylparaben+Propylene Glycol+...]      It is the benzocaine       Xarelto [Rivaroxaban]      Daily headache     Zocor [Hmg-Coa-R Inhibitors]      Muscle aches     Recent Labs   Lab Test 11/05/19  0730 05/21/19  0734 09/27/18  1017 05/16/18  0729  03/21/17  0728  03/29/16  0738   A1C  --  7.0* 7.6* 7.0*   < > 8.0*   < > 7.0*   LDL  --  73  --  81  --  72  --  87   HDL  --  46*  --  44*  --  42*  --  39*   TRIG  --  187*  --  113  --  202*  --  213*   ALT  --   --   --  31  --  29  --  32   CR 0.86 1.01 0.83 0.84  --  0.82   < > 0.80   GFRESTIMATED 64 53* 66 66  --  68   < > 69   GFRESTBLACK 74 61 80 80  --  82   < > 84   POTASSIUM 4.2 4.2 4.1 4.2  --  4.0   < > 4.1   TSH 6.41* 4.56* 2.37 2.61   < > 5.43*   < > 6.31*    < > = values in this interval not displayed.      BP Readings from Last 3 Encounters:   11/08/19 113/69   09/16/19 125/70   05/28/19 122/69    Wt Readings from Last 3 Encounters:   11/08/19 92.1 kg (203 lb)   09/16/19 91.5 kg (201 lb 12 oz)   05/28/19 92.5 kg (204 lb)                      Reviewed and updated as needed this visit by Provider         Review of Systems   ROS COMP: Constitutional, HEENT, cardiovascular, pulmonary, gi and gu systems are negative, except as otherwise noted.      Objective    /69 (BP Location: Left arm, Patient Position: Sitting, Cuff Size: Adult Large)   Pulse 79   Temp 97.4  F (36.3  C) (Oral)   Wt 92.1 kg (203 lb)   SpO2 99%   BMI 33.27 kg/m    Body mass index is 33.27 kg/m .  Physical Exam   GENERAL: healthy, alert and no distress  EYES: Eyes grossly normal to inspection, PERRL and conjunctivae and sclerae normal  NECK: no adenopathy, no asymmetry, masses, or scars and thyroid  normal to palpation  RESP: lungs clear to auscultation - no rales, rhonchi or wheezes  CV: regular rate and rhythm, normal S1 S2, no S3 or S4, no murmur, click or rub, no peripheral edema and peripheral pulses strong  ABDOMEN: soft, nontender, no hepatosplenomegaly, no masses and bowel sounds normal  MS: no gross musculoskeletal defects noted, no edema  MS: pain to palpation right trochanter.  Normal hip range of motion bilaterally. No SI joint pain to palpation.  Antalgic gait, favors right.    SKIN: no suspicious lesions or rashes  NEURO: Normal strength and tone, mentation intact and speech normal  BACK: no CVA tenderness, no paralumbar tenderness  PSYCH: mentation appears normal, affect normal/bright      This procedure has been fully reviewed with the patient and written informed consent has been obtained.  After cleaning area with betadine, a steroid injection was performed at Right hip trochanter using 1% plain Lidocaine (1.  cc) and  40 mg of K40 (1  cc). This was well tolerated.    Diagnostic Test Results:  Labs reviewed in Epic  No results found for this or any previous visit (from the past 24 hour(s)).        Assessment & Plan       ICD-10-CM    1. Type 2 diabetes mellitus without complication, without long-term current use of insulin (H) E11.9 CANCELED: **A1C FUTURE anytime   2. Atrial fibrillation, unspecified type (H) I48.91    3. Status post catheter ablation of atrial fibrillation Z98.890    4. Long-term (current) use of anticoagulants [Z79.01] Z79.01    5. Chronic atrial fibrillation I48.20    6. Hypothyroidism due to acquired atrophy of thyroid E03.4    7. Trochanteric bursitis of right hip M70.61 PAF COMPLETED     DRAIN/INJECT LARGE JOINT/BURSA     triamcinolone (KENALOG-40) injection 40 mg   8. Post-nasal drip R09.82    9. Sensorineural hearing loss (SNHL) of both ears H90.3 AUDIOLOGY ADULT REFERRAL        BMI:   Estimated body mass index is 33.27 kg/m  as calculated from the following:    Height  "as of 5/28/19: 1.664 m (5' 5.5\").    Weight as of this encounter: 92.1 kg (203 lb).           Patient Instructions   [facility] [place] [phone]  -- Audiology  Patient request    Return to clinic 3 - 6 months:  Follow up hip pain and diabetes check A1C then.  Forgot to order it for this visit!      Consider orthopedic evaluation if symptoms don't improve over next couple weeks.   S/p R trochanter injection    Trial Flonase/fluticasone nasal spray daily, see if this improves the post nasal drip.... consider ENT evaluation in future  Symptoms of PND       Return in about 3 months (around 2/8/2020).    Estela Davila MD  [facility]      "

## 2019-11-14 ENCOUNTER — ANTICOAGULATION THERAPY VISIT (OUTPATIENT)
Dept: NURSING | Facility: CLINIC | Age: 79
End: 2019-11-14
Payer: COMMERCIAL

## 2019-11-14 DIAGNOSIS — Z98.890 STATUS POST CATHETER ABLATION OF ATRIAL FIBRILLATION: ICD-10-CM

## 2019-11-14 DIAGNOSIS — I48.20 CHRONIC ATRIAL FIBRILLATION (H): ICD-10-CM

## 2019-11-14 DIAGNOSIS — I73.9 PVD (PERIPHERAL VASCULAR DISEASE) (H): ICD-10-CM

## 2019-11-14 PROBLEM — M70.61 TROCHANTERIC BURSITIS OF RIGHT HIP: Status: RESOLVED | Noted: 2019-08-29 | Resolved: 2019-11-14

## 2019-11-14 LAB — INR POINT OF CARE: 1.7 (ref 0.86–1.14)

## 2019-11-14 PROCEDURE — 85610 PROTHROMBIN TIME: CPT | Mod: QW

## 2019-11-14 PROCEDURE — 36416 COLLJ CAPILLARY BLOOD SPEC: CPT

## 2019-11-14 PROCEDURE — 99207 ZZC NO CHARGE NURSE ONLY: CPT

## 2019-11-14 NOTE — PROGRESS NOTES
ANTICOAGULATION FOLLOW-UP CLINIC VISIT    Patient Name:  Clementina Cooper  Date:  2019  Contact Type:  Face to Face    SUBJECTIVE: corina 2 wks  Patient Findings     Comments:   Assessment negative for symptoms of bleeding or clotting this visit        Clinical Outcomes     Negatives:   Major bleeding event, Thromboembolic event, Anticoagulation-related hospital admission, Anticoagulation-related ED visit, Anticoagulation-related fatality    Comments:   Assessment negative for symptoms of bleeding or clotting this visit           OBJECTIVE    INR Protime   Date Value Ref Range Status   2019 1.7 (A) 0.86 - 1.14 Final       ASSESSMENT / PLAN  INR assessment SUB    Recheck INR In: 2 WEEKS    INR Location Clinic      Anticoagulation Summary  As of 2019    INR goal:   2.0-3.0   TTR:   71.2 % (3.6 y)   INR used for dosin.7! (2019)   Warfarin maintenance plan:   5 mg (5 mg x 1) every Wed, Fri; 2.5 mg (5 mg x 0.5) all other days   Full warfarin instructions:   : 5 mg; Otherwise 5 mg every Wed, Fri; 2.5 mg all other days   Weekly warfarin total:   22.5 mg   Plan last modified:   Shagufta Bell RN (10/1/2019)   Next INR check:   2019   Target end date:   Indefinite    Indications    Chronic atrial fibrillation [I48.20]  Status post catheter ablation of atrial fibrillation [Z98.890]  PVD (peripheral vascular disease) (H) [I73.9]             Anticoagulation Episode Summary     INR check location:       Preferred lab:       Send INR reminders to:   [facility]    Comments:         Anticoagulation Care Providers     Provider Role Specialty Phone number    Estela Davila MD  Internal Medicine [phone]            See the Encounter Report to view Anticoagulation Flowsheet and Dosing Calendar (Go to Encounters tab in chart review, and find the Anticoagulation Therapy Visit)    Dosage adjustment made based on physician directed care plan.    Shagufta Bell RN

## 2019-11-26 ENCOUNTER — TELEPHONE (OUTPATIENT)
Dept: FAMILY MEDICINE | Facility: CLINIC | Age: 79
End: 2019-11-26

## 2019-11-26 DIAGNOSIS — I48.20 CHRONIC ATRIAL FIBRILLATION (H): ICD-10-CM

## 2019-11-26 DIAGNOSIS — I73.9 PVD (PERIPHERAL VASCULAR DISEASE) (H): ICD-10-CM

## 2019-11-26 DIAGNOSIS — Z98.890 STATUS POST CATHETER ABLATION OF ATRIAL FIBRILLATION: ICD-10-CM

## 2019-11-26 DIAGNOSIS — I48.20 CHRONIC ATRIAL FIBRILLATION (H): Primary | ICD-10-CM

## 2019-11-26 LAB
CAPILLARY BLOOD COLLECTION: NORMAL
INR PPP: 2.2 (ref 0.86–1.14)

## 2019-11-26 PROCEDURE — 36416 COLLJ CAPILLARY BLOOD SPEC: CPT | Performed by: INTERNAL MEDICINE

## 2019-11-26 PROCEDURE — 85610 PROTHROMBIN TIME: CPT | Performed by: INTERNAL MEDICINE

## 2019-11-26 NOTE — TELEPHONE ENCOUNTER
Attempted to contact pt x1. Voicemail is full, no alternative phone number.  Will attempt to reach again today.  Jes Marie RN on 11/26/2019 at 1:34 PM

## 2019-11-26 NOTE — TELEPHONE ENCOUNTER
ANTICOAGULATION MANAGEMENT     Patient Name:  Clementina Cooper  Date:  2019    ASSESSMENT /SUBJECTIVE:      Today's INR result of 2.20 is therapeutic. Goal INR of 2.0-3.0      Warfarin dose taken: Warfarin taken as previously instructed    Diet: No new diet changes affecting INR    Medication changes/ interactions: No new medications/supplements affecting INR    Previous INR: Subtherapeutic     S/S of bleeding or thromboembolism: No    New injury or illness:  No    Upcoming surgery, procedure or cardioversion:  No    Additional findings: None    PLAN:    Spoke with Clementina regarding INR result and instructed:     Warfarin Dosing Instructions: Continue your current warfarin dose    Instructed patient to follow up no later than: 3 weeks    Education provided: Kaity Howell verbalizes understanding and agrees to warfarin dosing plan.    Instructed to call the Anticoagulation Clinic for any changes, questions or concerns. (#[phone])        OBJECTIVE:  INR   Date Value Ref Range Status   2019 2.20 (H) 0.86 - 1.14 Final     Comment:     This test is intended for monitoring Coumadin therapy.  Results are not   accurate in patients with prolonged INR due to factor deficiency.               Anticoagulation Summary  As of 2019    INR goal:   2.0-3.0   TTR:   66.1 % (1 y)   INR used for dosin.20 (2019)   Warfarin maintenance plan:   5 mg (5 mg x 1) every Wed, Fri; 2.5 mg (5 mg x 0.5) all other days   Full warfarin instructions:   5 mg every Wed, Fri; 2.5 mg all other days   Weekly warfarin total:   22.5 mg   Plan last modified:   Shagufta Bell RN (10/1/2019)   Next INR check:      Target end date:   Indefinite    Indications    Chronic atrial fibrillation [I48.20]  Status post catheter ablation of atrial fibrillation [Z98.890]  PVD (peripheral vascular disease) (H) [I73.9]             Anticoagulation Episode Summary     INR check location:       Preferred lab:       Send INR reminders  to:   [facility]    Comments:         Anticoagulation Care Providers     Provider Role Specialty Phone number    Estela Davila MD  Internal Medicine [phone]

## 2019-12-06 ENCOUNTER — TELEPHONE (OUTPATIENT)
Dept: FAMILY MEDICINE | Facility: CLINIC | Age: 79
End: 2019-12-06

## 2019-12-06 DIAGNOSIS — L98.9 SKIN LESION: ICD-10-CM

## 2019-12-06 DIAGNOSIS — M25.551 HIP PAIN, RIGHT: Primary | ICD-10-CM

## 2019-12-06 NOTE — TELEPHONE ENCOUNTER
RN relayed derm referral information. Patient verbalized understanding.     Kylie Grubbs, RN, BSN, PHN  [facility]: [place]

## 2019-12-06 NOTE — TELEPHONE ENCOUNTER
RN spoke with patient.     Mole is on left hip - states there are 4 moles together, each approximately dime sized.     RN notified patient of referral.     Routed to PCP to review and advise.     Kylie Grubbs RN, BSN, PHN  [facility]: [place]

## 2019-12-06 NOTE — TELEPHONE ENCOUNTER
Reason for call:  Patient reporting a symptom    Symptom or request: hip pain     Duration (how long have symptoms been present): on going     Have you been treated for this before? Yes    Additional comments: patient had a cortisone shot in hip and it has not helped she would like to know what the next steps would be to help with hip pain. She would also like to talk about getting a mole removed and who would be able to remove it     Phone Number patient can be reached at:  Home number on file [phone] (home)    Best Time:  any    Can we leave a detailed message on this number:  YES    Call taken on 12/6/2019 at 8:55 AM by Tawny Ugalde

## 2019-12-06 NOTE — TELEPHONE ENCOUNTER
Referral is in, she will be called by a  for orthopedics.     Ask her to remind me of location and approx size of mole?

## 2019-12-06 NOTE — TELEPHONE ENCOUNTER
11/8/19 OV note:     Consider orthopedic evaluation if symptoms don't improve over next couple weeks.   S/p R trochanter injection    Patient reports symptoms not improving. She is agreeable to seeing ortho for this.     She states Dr. Davila had recommended a provider in clinic for mole removal. RN does not see discussion of this in note.     Ortho referral cued    Routed to PCP to review and advise.     Kylie Grubbs RN, BSN, PHN  [facility]: [place]

## 2019-12-16 PROBLEM — M54.2 NECK PAIN: Status: RESOLVED | Noted: 2019-06-11 | Resolved: 2019-12-16

## 2019-12-17 ENCOUNTER — OFFICE VISIT (OUTPATIENT)
Dept: ORTHOPEDICS | Facility: CLINIC | Age: 79
End: 2019-12-17
Payer: COMMERCIAL

## 2019-12-17 ENCOUNTER — ANTICOAGULATION THERAPY VISIT (OUTPATIENT)
Dept: NURSING | Facility: CLINIC | Age: 79
End: 2019-12-17
Payer: COMMERCIAL

## 2019-12-17 ENCOUNTER — ANCILLARY PROCEDURE (OUTPATIENT)
Dept: GENERAL RADIOLOGY | Facility: CLINIC | Age: 79
End: 2019-12-17
Attending: PHYSICIAN ASSISTANT
Payer: COMMERCIAL

## 2019-12-17 VITALS
HEART RATE: 70 BPM | SYSTOLIC BLOOD PRESSURE: 126 MMHG | HEIGHT: 66 IN | DIASTOLIC BLOOD PRESSURE: 66 MMHG | BODY MASS INDEX: 32.62 KG/M2 | RESPIRATION RATE: 16 BRPM | WEIGHT: 203 LBS

## 2019-12-17 DIAGNOSIS — M25.551 PAIN OF RIGHT HIP JOINT: Primary | ICD-10-CM

## 2019-12-17 DIAGNOSIS — Z98.890 STATUS POST CATHETER ABLATION OF ATRIAL FIBRILLATION: ICD-10-CM

## 2019-12-17 DIAGNOSIS — I48.20 CHRONIC ATRIAL FIBRILLATION (H): ICD-10-CM

## 2019-12-17 DIAGNOSIS — I73.9 PVD (PERIPHERAL VASCULAR DISEASE) (H): ICD-10-CM

## 2019-12-17 DIAGNOSIS — M25.551 PAIN OF RIGHT HIP JOINT: ICD-10-CM

## 2019-12-17 LAB — INR POINT OF CARE: 1.9 (ref 0.86–1.14)

## 2019-12-17 PROCEDURE — 99207 ZZC NO CHARGE NURSE ONLY: CPT

## 2019-12-17 PROCEDURE — 85610 PROTHROMBIN TIME: CPT | Mod: QW

## 2019-12-17 PROCEDURE — 99203 OFFICE O/P NEW LOW 30 MIN: CPT | Performed by: ORTHOPAEDIC SURGERY

## 2019-12-17 PROCEDURE — 73502 X-RAY EXAM HIP UNI 2-3 VIEWS: CPT

## 2019-12-17 PROCEDURE — 36416 COLLJ CAPILLARY BLOOD SPEC: CPT

## 2019-12-17 ASSESSMENT — MIFFLIN-ST. JEOR: SCORE: 1399.61

## 2019-12-17 NOTE — PROGRESS NOTES
Clementina Cooper is a 79 year old female who is seen in consultation at the request of Dr. Estela Davila  for right hip pain.  She has had pain for about 8 months without improvement.  She is had a steroid injection into the bursa on 11/8/2019.  This did not significantly help.  She is had no other interventions.  She describes pain at the lateral aspect of the hip.  It is primarily with walking.  She has aching occasional pain rated 7 out of 10.  She is on warfarin for atrial fibrillation.  Has a history of psoriasis.  She is diabetic.  She is hypothyroid.    X-ray of the pelvis and lateral right hip were obtained today.  Images are reviewed with the patient.   This shows no significant arthritis of either hip.    Past Medical History:   Diagnosis Date     Atrial fibrillation (H) 6/10/2014     Cataract      Family history of colon cancer      Glaucoma (increased eye pressure)      Hyperlipidemia LDL goal < 100 2008     Hypothyroid      Need for prophylactic hormone replacement therapy (postmenopausal)      Obesity      PVD (peripheral vascular disease) (H)     right leg.  working with Dr Dempsey.  (Stent placed in L leg 1/2010)     Type 2 diabetes, HbA1c goal < 7% (H)     diabetes       Past Surgical History:   Procedure Laterality Date     ANGIOPLASTY      right leg     C NONSPECIFIC PROCEDURE  2001    neck surgery/trauma     C STOMACH SURGERY PROCEDURE UNLISTED       CARDIAC SURGERY      Atrial fibrillation ablation      CATARACT IOL, RT/LT       HC TRABECULOPLASTY BY LASER SURGERY       HC VASCULAR SURGERY PROCEDURE UNLIST       HYSTERECTOMY, CERVIX STATUS UNKNOWN  1989    uterine bleeding     HYSTERECTOMY, PAP NO LONGER INDICATED       LASER YAG CAPSULOTOMY Right 02/06/2018; 2/2019    right eye; left eye     LASER YAG IRIDOTOMY  remotely    both eyes (elsewhere)     PHACOEMULSIFICATION WITH STANDARD INTRAOCULAR LENS IMPLANT  7/2012; 8/2012    right eye; left eye       Family History   Problem Relation Age of  Onset     Diabetes Mother      Heart Disease Mother      Heart Disease Father      Heart Disease Son      Cancer Brother      Heart Disease Sister      Cancer Brother      Cancer Brother      Heart Disease Sister      Macular Degeneration No family hx of      Glaucoma No family hx of        Social History     Socioeconomic History     Marital status:      Spouse name: Not on file     Number of children: 3     Years of education: Not on file     Highest education level: Not on file   Occupational History     Not on file   Social Needs     Financial resource strain: Not on file     Food insecurity:     Worry: Not on file     Inability: Not on file     Transportation needs:     Medical: Not on file     Non-medical: Not on file   Tobacco Use     Smoking status: Former Smoker     Last attempt to quit: 1999     Years since quittin.0     Smokeless tobacco: Never Used   Substance and Sexual Activity     Alcohol use: Yes     Comment: occasional     Drug use: No     Sexual activity: Not Currently   Lifestyle     Physical activity:     Days per week: Not on file     Minutes per session: Not on file     Stress: Not on file   Relationships     Social connections:     Talks on phone: Not on file     Gets together: Not on file     Attends Buddhism service: Not on file     Active member of club or organization: Not on file     Attends meetings of clubs or organizations: Not on file     Relationship status: Not on file     Intimate partner violence:     Fear of current or ex partner: Not on file     Emotionally abused: Not on file     Physically abused: Not on file     Forced sexual activity: Not on file   Other Topics Concern     Parent/sibling w/ CABG, MI or angioplasty before 65F 55M? Yes     Comment: brother transplant   Social History Narrative    Lives in home with  (Caitlin Lemon) (one level with basement).  3 kids all in area (supportive).  No pets.         Current Outpatient Medications    Medication Sig Dispense Refill     ACE NOT PRESCRIBED, INTENTIONAL, 1 each At Bedtime ACE Inhibitor not prescribed due to Other:  Neck swelling.  Also, BP is on low normal side with the betablocker 0 each 0     aspirin 81 MG tablet Take 1 tablet by mouth daily.  3     atorvastatin (LIPITOR) 20 MG tablet Take 1 tablet (20 mg) by mouth daily 90 tablet 3     Biotin 5000 MCG CAPS Take  by mouth.       CENTRUM SILVER OR one daily       clobetasol (TEMOVATE) 0.05 % external cream APPLY 1 GRAM TOPICALLY 2 TIMES DAILY AS NEEDED 180 g 2     Cyanocobalamin (B-12) 1000 MCG TBCR Take 1 tablet by mouth daily       Evening Primrose Oil 1000 MG CAPS Take by mouth daily       levothyroxine (SYNTHROID/LEVOTHROID) 100 MCG tablet TAKE 1 TABLET (100 MCG) BY MOUTH DAILY 90 tablet 0     losartan (COZAAR) 25 MG tablet TAKE 0.5 TABLETS (12.5 MG) BY MOUTH DAILY 45 tablet 3     metFORMIN (GLUCOPHAGE) 500 MG tablet TAKE 1 TABLET (500 MG) BY MOUTH DAILY (WITH DINNER) 90 tablet 3     ONETOUCH ULTRA test strip USE TO TEST BLOOD SUGAR TWICE A DAY OR AS DIRECTED 200 strip 0     ORDER FOR DME Equipment being ordered:  One Touch strips to be used daily 90 days 3     ORDER FOR DME Equipment being ordered:  chemstrips for daily blood sugar checks. 1 Box 3     propranolol (INDERAL) 10 MG tablet TAKE 1 TABLET (10 MG) BY MOUTH 2 TIMES DAILY AS NEEDED 180 tablet 3     VITAMIN D 1000 UNIT OR TABS 1 TABLET DAILY       warfarin (COUMADIN) 5 MG tablet Take 1 tablet (5mg) by mouth Mon,Wed,Fri & 1/2 tablet (2.5mg) all other days of the week 102 tablet 3       Allergies   Allergen Reactions     Benzocaine      Betadine [Povidone Iodine]      Rash     Pravastatin      Muscle aches     Vagisil [Kdc:Benzocaine+Cetyl Alcohol+Edetic Acid+Methylparaben+Propylene Glycol+...]      It is the benzocaine       Xarelto [Rivaroxaban]      Daily headache     Zocor [Hmg-Coa-R Inhibitors]      Muscle aches       REVIEW OF SYSTEMS:  CONSTITUTIONAL:  NEGATIVE for fever, chills,  "change in weight, not feeling tired  SKIN:  NEGATIVE for worrisome rashes, no skin lumps, no skin ulcers and no non-healing wounds  EYES:  NEGATIVE for vision changes or irritation.  ENT/MOUTH:  NEGATIVE.  No hearing loss, no hoarseness, no difficulty swallowing.  RESP:  NEGATIVE. No cough or shortness of breath.  CV:  NEGATIVE for chest pain, palpitations or peripheral edema  GI:  NEGATIVE for nausea, abdominal pain, heartburn, or change in bowel habits  :  Negative. No dysuria, no hematuria  MUSCULOSKELETAL:  See HPI above  NEURO:  NEGATIVE . No headaches, no dizziness,  no numbness  ENDOCRINE:  NEGATIVE for temperature intolerance, skin/hair changes  HEME/ALLERGY/IMMUNE:  NEGATIVE for bleeding problems  PSYCHIATRIC:  NEGATIVE. no anxiety, no depression.     Exam:  Vitals: /66   Pulse 70   Resp 16   Ht 1.664 m (5' 5.5\")   Wt 92.1 kg (203 lb)   BMI 33.27 kg/m    BMI= Body mass index is 33.27 kg/m .  Constitutional:  healthy, alert and no distress  Neuro: Alert and Oriented x 3, Sensation grossly WNL.  Psych: Affect normal   Respiratory: Breathing not labored.  Cardiovascular: normal peripheral pulses  Lymph: no adenopathy  Skin: No rashes,worrisome lesions or skin problems  She has no tenderness over the SI joints or sciatic notch.  She does have tenderness over the right greater trochanter.  Negative on the left.  She has more pain over the trochanter with rotation of the right hip.  She did not have groin pain with right hip rotation.  No pain with rotation of the left hip.  She has equal external rotation to 60 degrees and internal rotation to 30 degrees on both hips.  She did not have significant change of her lateral hip pain with adduction across her body.  She has full motion of the knees.  Sensation, motor and circulation are intact.    Assessment: Right greater trochanteric bursitis.  No evidence of significant hip arthritis.  Plan: I have demonstrated and printed out stretching and " strengthening exercises for the trochanteric bursitis.  We will also refer to physical therapy to have her go over this.  If pain persists after 3 months we could repeat steroid injection.

## 2019-12-17 NOTE — LETTER
12/17/2019         RE: Clementina Cooper  [address] [id number]        Dear Colleague,    Thank you for referring your patient, Clementina Cooper, to the [facility]. Please see a copy of my visit note below.    Clementina Cooper is a 79 year old female who is seen in consultation at the request of Dr. Estela Davila  for right hip pain.  She has had pain for about 8 months without improvement.  She is had a steroid injection into the bursa on 11/8/2019.  This did not significantly help.  She is had no other interventions.  She describes pain at the lateral aspect of the hip.  It is primarily with walking.  She has aching occasional pain rated 7 out of 10.  She is on warfarin for atrial fibrillation.  Has a history of psoriasis.  She is diabetic.  She is hypothyroid.    X-ray of the pelvis and lateral right hip were obtained today.  Images are reviewed with the patient.   This shows no significant arthritis of either hip.    Past Medical History:   Diagnosis Date     Atrial fibrillation (H) 6/10/2014     Cataract      Family history of colon cancer      Glaucoma (increased eye pressure)      Hyperlipidemia LDL goal < 100 2008     Hypothyroid      Need for prophylactic hormone replacement therapy (postmenopausal)      Obesity      PVD (peripheral vascular disease) (H)     right leg.  working with Dr Dempsey.  (Stent placed in L leg 1/2010)     Type 2 diabetes, HbA1c goal < 7% (H)     diabetes       Past Surgical History:   Procedure Laterality Date     ANGIOPLASTY      right leg     C NONSPECIFIC PROCEDURE  2001    neck surgery/trauma     C STOMACH SURGERY PROCEDURE UNLISTED       CARDIAC SURGERY      Atrial fibrillation ablation      CATARACT IOL, RT/LT       HC TRABECULOPLASTY BY LASER SURGERY       HC VASCULAR SURGERY PROCEDURE UNLIST       HYSTERECTOMY, CERVIX STATUS UNKNOWN  1989    uterine bleeding     HYSTERECTOMY, PAP NO LONGER INDICATED       LASER  YAG CAPSULOTOMY Right 2018; 2019    right eye; left eye     LASER YAG IRIDOTOMY  remotely    both eyes (elsewhere)     PHACOEMULSIFICATION WITH STANDARD INTRAOCULAR LENS IMPLANT  2012; 2012    right eye; left eye       Family History   Problem Relation Age of Onset     Diabetes Mother      Heart Disease Mother      Heart Disease Father      Heart Disease Son      Cancer Brother      Heart Disease Sister      Cancer Brother      Cancer Brother      Heart Disease Sister      Macular Degeneration No family hx of      Glaucoma No family hx of        Social History     Socioeconomic History     Marital status:      Spouse name: Not on file     Number of children: 3     Years of education: Not on file     Highest education level: Not on file   Occupational History     Not on file   Social Needs     Financial resource strain: Not on file     Food insecurity:     Worry: Not on file     Inability: Not on file     Transportation needs:     Medical: Not on file     Non-medical: Not on file   Tobacco Use     Smoking status: Former Smoker     Last attempt to quit: 1999     Years since quittin.0     Smokeless tobacco: Never Used   Substance and Sexual Activity     Alcohol use: Yes     Comment: occasional     Drug use: No     Sexual activity: Not Currently   Lifestyle     Physical activity:     Days per week: Not on file     Minutes per session: Not on file     Stress: Not on file   Relationships     Social connections:     Talks on phone: Not on file     Gets together: Not on file     Attends Jewish service: Not on file     Active member of club or organization: Not on file     Attends meetings of clubs or organizations: Not on file     Relationship status: Not on file     Intimate partner violence:     Fear of current or ex partner: Not on file     Emotionally abused: Not on file     Physically abused: Not on file     Forced sexual activity: Not on file   Other Topics Concern     Parent/sibling w/  CABG, MI or angioplasty before 65F 55M? Yes     Comment: brother transplant   Social History Narrative    Lives in home with  (Caitlin Lemon) (one level with basement).  3 kids all in area (supportive).  No pets.         Current Outpatient Medications   Medication Sig Dispense Refill     ACE NOT PRESCRIBED, INTENTIONAL, 1 each At Bedtime ACE Inhibitor not prescribed due to Other:  Neck swelling.  Also, BP is on low normal side with the betablocker 0 each 0     aspirin 81 MG tablet Take 1 tablet by mouth daily.  3     atorvastatin (LIPITOR) 20 MG tablet Take 1 tablet (20 mg) by mouth daily 90 tablet 3     Biotin 5000 MCG CAPS Take  by mouth.       CENTRUM SILVER OR one daily       clobetasol (TEMOVATE) 0.05 % external cream APPLY 1 GRAM TOPICALLY 2 TIMES DAILY AS NEEDED 180 g 2     Cyanocobalamin (B-12) 1000 MCG TBCR Take 1 tablet by mouth daily       Evening Primrose Oil 1000 MG CAPS Take by mouth daily       levothyroxine (SYNTHROID/LEVOTHROID) 100 MCG tablet TAKE 1 TABLET (100 MCG) BY MOUTH DAILY 90 tablet 0     losartan (COZAAR) 25 MG tablet TAKE 0.5 TABLETS (12.5 MG) BY MOUTH DAILY 45 tablet 3     metFORMIN (GLUCOPHAGE) 500 MG tablet TAKE 1 TABLET (500 MG) BY MOUTH DAILY (WITH DINNER) 90 tablet 3     ONETOUCH ULTRA test strip USE TO TEST BLOOD SUGAR TWICE A DAY OR AS DIRECTED 200 strip 0     ORDER FOR DME Equipment being ordered:  One Touch strips to be used daily 90 days 3     ORDER FOR DME Equipment being ordered:  chemstrips for daily blood sugar checks. 1 Box 3     propranolol (INDERAL) 10 MG tablet TAKE 1 TABLET (10 MG) BY MOUTH 2 TIMES DAILY AS NEEDED 180 tablet 3     VITAMIN D 1000 UNIT OR TABS 1 TABLET DAILY       warfarin (COUMADIN) 5 MG tablet Take 1 tablet (5mg) by mouth Mon,Wed,Fri & 1/2 tablet (2.5mg) all other days of the week 102 tablet 3       Allergies   Allergen Reactions     Benzocaine      Betadine [Povidone Iodine]      Rash     Pravastatin      Muscle aches     Vagisil  "[Kdc:Benzocaine+Cetyl Alcohol+Edetic Acid+Methylparaben+Propylene Glycol+...]      It is the benzocaine       Xarelto [Rivaroxaban]      Daily headache     Zocor [Hmg-Coa-R Inhibitors]      Muscle aches       REVIEW OF SYSTEMS:  CONSTITUTIONAL:  NEGATIVE for fever, chills, change in weight, not feeling tired  SKIN:  NEGATIVE for worrisome rashes, no skin lumps, no skin ulcers and no non-healing wounds  EYES:  NEGATIVE for vision changes or irritation.  ENT/MOUTH:  NEGATIVE.  No hearing loss, no hoarseness, no difficulty swallowing.  RESP:  NEGATIVE. No cough or shortness of breath.  CV:  NEGATIVE for chest pain, palpitations or peripheral edema  GI:  NEGATIVE for nausea, abdominal pain, heartburn, or change in bowel habits  :  Negative. No dysuria, no hematuria  MUSCULOSKELETAL:  See HPI above  NEURO:  NEGATIVE . No headaches, no dizziness,  no numbness  ENDOCRINE:  NEGATIVE for temperature intolerance, skin/hair changes  HEME/ALLERGY/IMMUNE:  NEGATIVE for bleeding problems  PSYCHIATRIC:  NEGATIVE. no anxiety, no depression.     Exam:  Vitals: /66   Pulse 70   Resp 16   Ht 1.664 m (5' 5.5\")   Wt 92.1 kg (203 lb)   BMI 33.27 kg/m     BMI= Body mass index is 33.27 kg/m .  Constitutional:  healthy, alert and no distress  Neuro: Alert and Oriented x 3, Sensation grossly WNL.  Psych: Affect normal   Respiratory: Breathing not labored.  Cardiovascular: normal peripheral pulses  Lymph: no adenopathy  Skin: No rashes,worrisome lesions or skin problems  She has no tenderness over the SI joints or sciatic notch.  She does have tenderness over the right greater trochanter.  Negative on the left.  She has more pain over the trochanter with rotation of the right hip.  She did not have groin pain with right hip rotation.  No pain with rotation of the left hip.  She has equal external rotation to 60 degrees and internal rotation to 30 degrees on both hips.  She did not have significant change of her lateral hip pain " with adduction across her body.  She has full motion of the knees.  Sensation, motor and circulation are intact.    Assessment: Right greater trochanteric bursitis.  No evidence of significant hip arthritis.  Plan: I have demonstrated and printed out stretching and strengthening exercises for the trochanteric bursitis.  We will also refer to physical therapy to have her go over this.  If pain persists after 3 months we could repeat steroid injection.      Again, thank you for allowing me to participate in the care of your patient.        Sincerely,        Kendall Lopez MD

## 2019-12-17 NOTE — PROGRESS NOTES
ANTICOAGULATION FOLLOW-UP CLINIC VISIT    Patient Name:  Clementina Cooper  Date:  2019  Contact Type:  Face to Face    SUBJECTIVE: dose raised to one that worked in the past.  Patient asks for recheck after the holidays.  Patient Findings     Positives:   Other complaints (hip discomfort.  seeing ortho today.)    Comments:   Assessment negative for symptoms of bleeding or clotting this visit        Clinical Outcomes     Negatives:   Major bleeding event, Thromboembolic event, Anticoagulation-related hospital admission, Anticoagulation-related ED visit, Anticoagulation-related fatality    Comments:   Assessment negative for symptoms of bleeding or clotting this visit           OBJECTIVE    INR Protime   Date Value Ref Range Status   2019 1.9 (A) 0.86 - 1.14 Final       ASSESSMENT / PLAN  INR assessment SUB    Recheck INR In: 2 WEEKS    INR Location Clinic      Anticoagulation Summary  As of 2019    INR goal:   2.0-3.0   TTR:   66.0 % (1 y)   INR used for dosin.9! (2019)   Warfarin maintenance plan:   5 mg (5 mg x 1) every Mon, Wed, Fri; 2.5 mg (5 mg x 0.5) all other days   Full warfarin instructions:   5 mg every Mon, Wed, Fri; 2.5 mg all other days   Weekly warfarin total:   25 mg   Plan last modified:   Shagufta Bell RN (2019)   Next INR check:   2020   Priority:   High   Target end date:   Indefinite    Indications    Chronic atrial fibrillation [I48.20]  Status post catheter ablation of atrial fibrillation [Z98.890]  PVD (peripheral vascular disease) (H) [I73.9]             Anticoagulation Episode Summary     INR check location:       Preferred lab:       Send INR reminders to:   [facility]    Comments:         Anticoagulation Care Providers     Provider Role Specialty Phone number    Estela Davila MD  Internal Medicine [phone]            See the Encounter Report to view Anticoagulation Flowsheet and Dosing Calendar (Go to Encounters tab in  chart review, and find the Anticoagulation Therapy Visit)    Dosage adjustment made based on physician directed care plan.    Shagufta Bell RN

## 2019-12-17 NOTE — PATIENT INSTRUCTIONS
Trochanteric Bursitis           What is trochanteric bursitis?   Trochanteric bursitis is irritation or inflammation of the trochanteric bursa. A bursa is a fluid-filled sac that acts as a cushion between tendons, bones, and skin. The trochanteric bursa is located on the upper, outer area of the thigh. There is a bump on the outer side of the upper part of the thigh bone (femur) called the greater trochanter. The trochanteric bursa is located over the greater trochanter.   How does it occur?   The trochanteric bursa may be inflamed by a group of muscles or tendons rubbing over the bursa and causing friction against the thigh bone. This injury can occur with running, walking, or bicycling, especially when the bicycle seat is too high.   What are the symptoms?   You have pain on the upper outer area of your thigh or in your hip. The pain is worse when you walk, bicycle, or go up or down stairs. You have pain when you move your thigh bone and feel tenderness in the area over the greater trochanter.   How is it diagnosed?   Your healthcare provider will ask about your symptoms and examine your hip and thigh.   How is it treated?   To treat this condition:   Put an ice pack, gel pack, or package of frozen vegetables, wrapped in a cloth on the area every 3 to 4 hours, for up to 20 minutes at a time.   Sleep with a pillow between your legs.  Take an anti-inflammatory medicine such as ibuprofen, or other medicine as directed by your provider. Nonsteroidal anti-inflammatory medicines (NSAIDs) may cause stomach bleeding and other problems. These risks increase with age. Read the label and take as directed. Unless recommended by your healthcare provider, do not take for more than 10 days.   Your provider may give you an injection of a corticosteroid medicine.   While you are recovering from your injury you will need to change your sport or activity to one that does not make your condition worse. For example,  you may need to swim instead of running or bicycling. If you are bicycling, you may need to lower your bicycle seat.   How long do the effects last?   The length of recovery depends on many factors such as your age, health, and if you have had a previous injury. Recovery time also depends on the severity of the injury. A bursa that is only mildly inflamed and has just started to hurt may improve within a few weeks. A bursa that is significantly inflamed and has been painful for a long time may take up to a few months to improve. You need to stop doing the activities that cause pain until your bursa has healed. If you continue doing activities that cause pain, your symptoms will return and it will take longer to recover.   When can I return to my normal activities?   Everyone recovers from an injury at a different rate. Return to your activities depends on how soon your leg recovers, not by how many days or weeks it has been since your injury has occurred. In general, the longer you have symptoms before you start treatment, the longer it will take to get better. The goal of rehabilitation is to return to your normal activities as soon as is safely possible. If you return too soon you may worsen your injury.   You may safely return to your normal activities when, starting from the top of the list and progressing to the end, each of the following is true:   You have full range of motion in the injured leg compared to the uninjured leg.   You have full strength of the injured leg compared to the uninjured leg.   You can walk straight ahead without pain or limping.   How can I prevent trochanteric bursitis?   Trochanteric bursitis is best prevented by warming up properly and stretching the muscles on the outer side of your upper thigh.     Published by [employer].  This content is reviewed periodically and is subject to change as new health information becomes available. The information is intended to inform and educate  and is not a replacement for medical evaluation, advice, diagnosis or treatment by a healthcare professional.   Written by Paul Carlos MD, for [employer].   ? 2010 [employer] and/or its affiliates. All Rights Reserved.   Copyright   [employer] Reference Systems 2011                     Stretches lower back, side of hip, and neck  1. Sit on floor with left leg straight out in front   2. Bend right leg, cross right foot over, place outside left knee   3. Bend left elbow and rest it outside right knee   4. Place right hand behind hips on floor   5. Turn head over right shoulder, rotate upper body right   6. Hold 10 to 15 seconds   7. Repeat on other side   8. Breathe in slowly               Trochanteric Bursitis Rehabilitation Exercises   You can begin stretching the muscles that run along the outside of your hip using the first exercise. You can do the strengthening exercises when the sharp pain lessens.                    Strengthening exercises:  Start abductor strengthening and begin the exercises demonstrated today.  Leg strengthening:  Hold onto the sink with painful leg toward the sink.  Lift painful leg out to touch the wall with the heel.  Lift 10-15 times, twice a day.                                  Step-up: Stand with the foot of your injured leg on a support 3 to 5 inches high (like a small step or block of wood). Keep your other foot flat on the floor. Shift your weight onto the injured leg on the support. Straighten your injured leg as the other leg comes off the floor. Return to the starting position by bending your injured leg and slowly lowering your uninjured leg back to the floor. Do 3 sets of 10.   Clam exercise: Lie on your uninjured side with your hips and knees bent and feet together. Slowly raise your top leg toward the ceiling while keeping your heels touching each other. Hold for 2 seconds and lower slowly. Do 3 sets of 10 repetitions.   Iliotibial band stretch: Side-bending: Cross one  leg in front of the other leg and lean in the opposite direction from the front leg. Reach the arm on the side of the back leg over your head while you do this. Hold this position for 15 to 30 seconds. Return to the starting position. Repeat 3 times and then switch legs and repeat the exercise.   Published by [employer].  This content is reviewed periodically and is subject to change as new health information becomes available. The information is intended to inform and educate and is not a replacement for medical evaluation, advice, diagnosis or treatment by a healthcare professional.   Written by Casie Rosario, MS, PT, and Parvin Duran PT, [facility], [facility], for [employer].   ? 2010 [facility] and/or its affiliates. All Rights Reserved.   Copyright   Clinical Reference Systems 2011  Adult Health Advisor

## 2019-12-23 ENCOUNTER — THERAPY VISIT (OUTPATIENT)
Dept: PHYSICAL THERAPY | Facility: CLINIC | Age: 79
End: 2019-12-23
Payer: COMMERCIAL

## 2019-12-23 DIAGNOSIS — M25.551 PAIN OF RIGHT HIP JOINT: ICD-10-CM

## 2019-12-23 DIAGNOSIS — M25.551 HIP PAIN, RIGHT: ICD-10-CM

## 2019-12-23 PROCEDURE — 97161 PT EVAL LOW COMPLEX 20 MIN: CPT | Mod: GP | Performed by: PHYSICAL THERAPIST

## 2019-12-23 PROCEDURE — 97035 APP MDLTY 1+ULTRASOUND EA 15: CPT | Mod: GP | Performed by: PHYSICAL THERAPIST

## 2019-12-23 PROCEDURE — 97110 THERAPEUTIC EXERCISES: CPT | Mod: GP | Performed by: PHYSICAL THERAPIST

## 2019-12-23 NOTE — PROGRESS NOTES
[facility] Initial Evaluation  Subjective:  The history is provided by the patient.   Type of problem:  Right hip       Problem details: Pain for about 8 months.  Has had one injection which did not help.  MD referral 12/17/2019.  .   Patient reports pain:  Greater trochanter. Radiates to:  Thigh. Associated symptoms:  Loss of motion/stiffness (pain). Symptoms are exacerbated by ascending stairs, transfers and lying on extremity and relieved by NSAID's.      and reported as 7/10 on pain scale. General health as reported by patient is fair. Pertinent medical history includes:  Diabetes and overweight.  Medical allergies: See list in EPIC.    Current medications:  High blood pressure medication, heparin/coumadin, hormone replacement therapy and thyroid medication.   Primary job tasks include:  Prolonged sitting.  Pain is described as aching and is intermittent. Pain is the same all the time.  Special tests:  X-ray (hip jint degen). Past treatment: injection. There was none improvement following previous treatment.   Occupation: Retired.   Barriers include:  None as reported by patient.  Red flags:  None as reported by patient.                      Objective:    Gait:  Short stride with R with gait.                                                       Hip Evaluation  HIP AROM:    Flexion: Left:   Right:  Normal     Extension: Left:   Right:  Norml      Internal Rotation: Left:   Right: end range sx  External Rotation: Left:   Right: end range sx        Hip Strength:  Hip Strength:   Left:     Right:  Normal        Abduction:  Right: /5 ++   Pain:                    Hip Palpation:      Right hip tenderness present at:  Greater Trachanter and IT Band             General     ROS    Assessment/Plan:    Patient is a 79 year old female with right side hip complaints.    Patient has the following significant findings with corresponding treatment plan.                Diagnosis 1:  R GT bursitis  Pain -   hot/cold therapy, US, manual therapy, self management, education and home program  Decreased ROM/flexibility - manual therapy and therapeutic exercise  Decreased strength - therapeutic exercise and therapeutic activities  Inflammation - cold therapy and US  Decreased function - therapeutic activities    Therapy Evaluation Codes:   1) History comprised of:   Personal factors that impact the plan of care:      None.    Comorbidity factors that impact the plan of care are:      Diabetes and Overweight.     Medications impacting care: High blood pressure and Heparin/coumadin.  2) Examination of Body Systems comprised of:   Body structures and functions that impact the plan of care:      Hip.   Activity limitations that impact the plan of care are:      Stairs and Laying down.  3) Clinical presentation characteristics are:   Stable/Uncomplicated.  4) Decision-Making    Low complexity using standardized patient assessment instrument and/or measureable assessment of functional outcome.  Cumulative Therapy Evaluation is: Low complexity.    Previous and current functional limitations:  (See Goal Flow Sheet for this information)    Short term and Long term goals: (See Goal Flow Sheet for this information)     Communication ability:  Patient appears to be able to clearly communicate and understand verbal and written communication and follow directions correctly.  Treatment Explanation - The following has been discussed with the patient:   RX ordered/plan of care  Anticipated outcomes  Possible risks and side effects  This patient would benefit from PT intervention to resume normal activities.   Rehab potential is good.    Frequency:  1 X week, once daily  Duration:  for 4 weeks  Discharge Plan:  Achieve all LTG.  Independent in home treatment program.  Reach maximal therapeutic benefit.    Please refer to the daily flowsheet for treatment today, total treatment time and time spent performing 1:1 timed codes.

## 2019-12-23 NOTE — LETTER
[facility]  [address]  [facility] [address]  [phone]    2019    Re: Clementina Cooper   :   1940  MRN:  [id number]   REFERRING PHYSICIAN:   Lit Denis    [facility]    Date of Initial Evaluation:  19  Visits:  Rxs Used: 1  Reason for Referral:     Pain of right hip joint  Hip pain, right    EVALUATION SUMMARY    [facility] Initial Evaluation  Subjective:  The history is provided by the patient.   Type of problem:  Right hip  Problem details: Pain for about 8 months.  Has had one injection which did not help.  MD referral 2019.  .   Patient reports pain:  Greater trochanter. Radiates to:  Thigh. Associated symptoms:  Loss of motion/stiffness (pain). Symptoms are exacerbated by ascending stairs, transfers and lying on extremity and relieved by NSAID's. And reported as 7/10 on pain scale. General health as reported by patient is fair. Pertinent medical history includes:  Diabetes and overweight.  Medical allergies: See list in EPIC.    Current medications:  High blood pressure medication, heparin/coumadin, hormone replacement therapy and thyroid medication.   Primary job tasks include:  Prolonged sitting.  Pain is described as aching and is intermittent. Pain is the same all the time.  Special tests:  X-ray (hip jint degen). Past treatment: injection. There was none improvement following previous treatment.   Occupation: Retired.   Barriers include:  None as reported by patient.  Red flags:  None as reported by patient.    Objective:  Gait:  Short stride with R with gait.    Hip Evaluation  HIP AROM:    Flexion: Left:   Right:  Normal   Extension: Left:   Right:  Norml  Internal Rotation: Left:   Right: end range sx  External Rotation: Left:   Right: end range sx  Hip Strength:  Hip Strength:   Left:     Right:  Normal  Abduction:  Right: /5 ++   Pain:  Hip  Palpation:    Right hip tenderness present at:  Greater Trachanter and IT Band       Re: Clementina Cooper   :   1940  Page 2:    Assessment/Plan:    Patient is a 79 year old female with right side hip complaints.    Patient has the following significant findings with corresponding treatment plan.                Diagnosis 1:  R GT bursitis  Pain -  hot/cold therapy, US, manual therapy, self management, education and home program  Decreased ROM/flexibility - manual therapy and therapeutic exercise  Decreased strength - therapeutic exercise and therapeutic activities  Inflammation - cold therapy and US  Decreased function - therapeutic activities    Therapy Evaluation Codes:   1) History comprised of:   Personal factors that impact the plan of care:      None.    Comorbidity factors that impact the plan of care are:      Diabetes and Overweight.     Medications impacting care: High blood pressure and Heparin/coumadin.  2) Examination of Body Systems comprised of:   Body structures and functions that impact the plan of care:      Hip.   Activity limitations that impact the plan of care are:      Stairs and Laying down.  3) Clinical presentation characteristics are:   Stable/Uncomplicated.  4) Decision-Making    Low complexity using standardized patient assessment instrument and/or measureable assessment of functional outcome.  Cumulative Therapy Evaluation is: Low complexity.    Previous and current functional limitations:  (See Goal Flow Sheet for this information)    Short term and Long term goals: (See Goal Flow Sheet for this information)     Communication ability:  Patient appears to be able to clearly communicate and understand verbal and written communication and follow directions correctly.  Treatment Explanation - The following has been discussed with the patient:   RX ordered/plan of care  Anticipated outcomes  Possible risks and side effects  This patient would benefit from PT intervention to resume  normal activities.   Rehab potential is good.    Frequency:  1 X week, once daily  Duration:  for 4 weeks  Discharge Plan:  Achieve all LTG.  Independent in home treatment program.  Reach maximal therapeutic benefit.    Thank you for your referral.    INQUIRIES  Re: Clementina Cooper   :   1940  Page 3:    Therapist: Pj Helms PT  [facility] [address] [address]  Phone: [phone]  Fax: [phone]

## 2020-01-07 ENCOUNTER — ANTICOAGULATION THERAPY VISIT (OUTPATIENT)
Dept: NURSING | Facility: CLINIC | Age: 80
End: 2020-01-07
Payer: COMMERCIAL

## 2020-01-07 ENCOUNTER — THERAPY VISIT (OUTPATIENT)
Dept: PHYSICAL THERAPY | Facility: CLINIC | Age: 80
End: 2020-01-07
Payer: COMMERCIAL

## 2020-01-07 DIAGNOSIS — M25.551 HIP PAIN, RIGHT: ICD-10-CM

## 2020-01-07 DIAGNOSIS — Z98.890 STATUS POST CATHETER ABLATION OF ATRIAL FIBRILLATION: ICD-10-CM

## 2020-01-07 DIAGNOSIS — I48.20 CHRONIC ATRIAL FIBRILLATION (H): ICD-10-CM

## 2020-01-07 DIAGNOSIS — I73.9 PVD (PERIPHERAL VASCULAR DISEASE) (H): ICD-10-CM

## 2020-01-07 LAB — INR POINT OF CARE: 2.5 (ref 0.86–1.14)

## 2020-01-07 PROCEDURE — 97035 APP MDLTY 1+ULTRASOUND EA 15: CPT | Mod: GP | Performed by: PHYSICAL THERAPIST

## 2020-01-07 PROCEDURE — 85610 PROTHROMBIN TIME: CPT | Mod: QW

## 2020-01-07 PROCEDURE — 99207 ZZC NO CHARGE NURSE ONLY: CPT

## 2020-01-07 PROCEDURE — 97110 THERAPEUTIC EXERCISES: CPT | Mod: GP | Performed by: PHYSICAL THERAPIST

## 2020-01-07 PROCEDURE — 36416 COLLJ CAPILLARY BLOOD SPEC: CPT

## 2020-01-07 NOTE — PROGRESS NOTES
ANTICOAGULATION FOLLOW-UP CLINIC VISIT    Patient Name:  Clementina Cooper  Date:  2020  Contact Type:  Face to Face    SUBJECTIVE: same plan of care, corina coordinated with PJAQUI  Patient Findings     Comments:   No symptoms of concern today        Clinical Outcomes     Negatives:   Major bleeding event, Thromboembolic event, Anticoagulation-related hospital admission, Anticoagulation-related ED visit, Anticoagulation-related fatality    Comments:   No symptoms of concern today           OBJECTIVE    INR Protime   Date Value Ref Range Status   2020 2.5 (A) 0.86 - 1.14 Final       ASSESSMENT / PLAN  INR assessment THER    Recheck INR In: 4 WEEKS    INR Location Clinic      Anticoagulation Summary  As of 2020    INR goal:   2.0-3.0   TTR:   70.8 % (1 y)   INR used for dosin.5 (2020)   Warfarin maintenance plan:   5 mg (5 mg x 1) every Mon, Wed, Fri; 2.5 mg (5 mg x 0.5) all other days   Full warfarin instructions:   5 mg every Mon, Wed, Fri; 2.5 mg all other days   Weekly warfarin total:   25 mg   No change documented:   Shagufta Bell RN   Plan last modified:   Shagufta Bell RN (2019)   Next INR check:   2020   Priority:   Maintenance   Target end date:   Indefinite    Indications    Chronic atrial fibrillation [I48.20]  Status post catheter ablation of atrial fibrillation [Z98.890]  PVD (peripheral vascular disease) (H) [I73.9]             Anticoagulation Episode Summary     INR check location:       Preferred lab:       Send INR reminders to:   [facility]    Comments:         Anticoagulation Care Providers     Provider Role Specialty Phone number    Estela Davila MD  Internal Medicine [phone]            See the Encounter Report to view Anticoagulation Flowsheet and Dosing Calendar (Go to Encounters tab in chart review, and find the Anticoagulation Therapy Visit)    Dosage adjustment made based on physician directed care plan.    Shagufta Bell RN

## 2020-01-14 ENCOUNTER — THERAPY VISIT (OUTPATIENT)
Dept: PHYSICAL THERAPY | Facility: CLINIC | Age: 80
End: 2020-01-14
Payer: COMMERCIAL

## 2020-01-14 ENCOUNTER — TELEPHONE (OUTPATIENT)
Dept: FAMILY MEDICINE | Facility: CLINIC | Age: 80
End: 2020-01-14

## 2020-01-14 DIAGNOSIS — H81.10 BENIGN PAROXYSMAL POSITIONAL VERTIGO, UNSPECIFIED LATERALITY: Primary | ICD-10-CM

## 2020-01-14 DIAGNOSIS — H81.12 BENIGN PAROXYSMAL POSITIONAL VERTIGO OF LEFT EAR: ICD-10-CM

## 2020-01-14 DIAGNOSIS — H81.10 BENIGN PAROXYSMAL POSITIONAL VERTIGO, UNSPECIFIED LATERALITY: ICD-10-CM

## 2020-01-14 PROCEDURE — 95992 CANALITH REPOSITIONING PROC: CPT | Mod: GP | Performed by: PHYSICAL THERAPIST

## 2020-01-14 PROCEDURE — 97162 PT EVAL MOD COMPLEX 30 MIN: CPT | Mod: GP | Performed by: PHYSICAL THERAPIST

## 2020-01-14 NOTE — TELEPHONE ENCOUNTER
"Patient was last seen 11/8/19, advised to follow up in February (2/8/20).    I don't see vertigo on problem list nor any vertigo medications.    I see she is scheduled with PT at 9:50 today for \"vertigo\".    I called patient, she report she has not had vertigo in the past but her daughter has and told her she saw PT to treat it.     Patient reports occasional dizziness started yesterday, is not dizzy now.   Biggest issue is the room spins when she lays down.   Due to that, she was sure she could not lay down for her hip PT session; she initially cancelled that then asked if she could be seen for the vertigo instead.   PT scheduled her but told her she needs referral.    Patient denies weakness, chest pain, palpitations, fainting or falling, or shortness of breath.   Denies any signs of a-fib issues.    I advised she may need to see PCP for this first but she insists on asking for referral and is anxious to have addressed due to appointment in 50 minutes.   She does not have a cell phone so she will just show up and see if the referral was approved.    Routed to [facility], perhaps see patient herself around that time (if can be added in?).    Anna Bruno RN  [facility]        "

## 2020-01-14 NOTE — PROGRESS NOTES
[facility] Initial Evaluation  Subjective:  The history is provided by the patient.           S:  Lynda Cooper is a 79 year old patient complaining of spinning.  Onset of symptoms: 1/13/2020 in the morning      Is this a recurrent problem: none  Progression since onset: Better today due to being careful  Frequency of episodes/time of day: 4- 5  Duration of episodes: minutes?  Exacerbating factors: lay down, stand up  Relieving factors: sit up  Previous treatments:  none  Special tests/diagnostics performed: none  Significant medical hx: DM, overweight  Meds: motion sickness meds  Occupation: Retired   Work requirements: house work  General health reported by patient: fair  Red Flags: none      O:  Cervical ROM screen: Bilat ROT and ext decreased 25%;  Normal FF  Oculomotor/gaze stability screen: all tests negative   Vertebral artery test: normal   Hallpike-Liang maneuver:  R: NT  L: + X 30 seconds without visible nystagmus  Horizontal canal test:  R: NT  L: NT  Balance testing:  NT                  Objective:  System    Physical Exam    General     ROS    Assessment/Plan:    Patient is a 79 year old female with dizziness complaints.    Patient has the following significant findings with corresponding treatment plan.                Diagnosis 1:  dizziness  Decreased proprioception - neuro re-education and therapeutic activities  Decreased function - therapeutic activities    Therapy Evaluation Codes:   1) History comprised of:   Personal factors that impact the plan of care:      None.    Comorbidity factors that impact the plan of care are:      Diabetes and Overweight.     Medications impacting care: High blood pressure and Heparin/coumadin.  2) Examination of Body Systems comprised of:   Body structures and functions that impact the plan of care:      dizziness.   Activity limitations that impact the plan of care are:      Laying down.  3) Clinical presentation characteristics  are:   Evolving/Changing.  4) Decision-Making    Moderate complexity using standardized patient assessment instrument and/or measureable assessment of functional outcome.  Cumulative Therapy Evaluation is: Moderate complexity.    Previous and current functional limitations:  (See Goal Flow Sheet for this information)    Short term and Long term goals: (See Goal Flow Sheet for this information)     Communication ability:  Patient appears to be able to clearly communicate and understand verbal and written communication and follow directions correctly.  Treatment Explanation - The following has been discussed with the patient:   RX ordered/plan of care  Anticipated outcomes  Possible risks and side effects  This patient would benefit from PT intervention to resume normal activities.   Rehab potential is good.    Frequency:  1 X week, once daily  Duration:  for 4 weeks  Discharge Plan:  Achieve all LTG.  Independent in home treatment program.  Reach maximal therapeutic benefit.    Please refer to the daily flowsheet for treatment today, total treatment time and time spent performing 1:1 timed codes.

## 2020-01-14 NOTE — TELEPHONE ENCOUNTER
Reason for Call: Request for an order or referral:    Order or referral being requested: referral for vertigo     Date needed: as soon as possible    Has the patient been seen by the PCP for this problem? YES    Additional comments: patient has a physical therapy appointment  Today and asked for a referral for physical therapy to help with vertigo     Phone number Patient can be reached at:  Home number on file [phone] (home)    Best Time:  any    Can we leave a detailed message on this number?  YES    Call taken on 1/14/2020 at 8:25 AM by Tawny Ugalde

## 2020-01-14 NOTE — TELEPHONE ENCOUNTER
MD called and discussed with patient.   Her symptoms are when she lays down, otherwise no symptoms.   Referral placed.  If symptoms change/worsen of if physical therapy eval is concerning, should be seen asap.

## 2020-01-23 ENCOUNTER — THERAPY VISIT (OUTPATIENT)
Dept: PHYSICAL THERAPY | Facility: CLINIC | Age: 80
End: 2020-01-23
Payer: COMMERCIAL

## 2020-01-23 DIAGNOSIS — H81.12 BENIGN PAROXYSMAL POSITIONAL VERTIGO OF LEFT EAR: ICD-10-CM

## 2020-01-23 DIAGNOSIS — M25.551 HIP PAIN, RIGHT: ICD-10-CM

## 2020-01-23 PROCEDURE — 95992 CANALITH REPOSITIONING PROC: CPT | Mod: GP | Performed by: PHYSICAL THERAPIST

## 2020-02-04 ENCOUNTER — THERAPY VISIT (OUTPATIENT)
Dept: PHYSICAL THERAPY | Facility: CLINIC | Age: 80
End: 2020-02-04
Payer: COMMERCIAL

## 2020-02-04 DIAGNOSIS — M25.551 HIP PAIN, RIGHT: ICD-10-CM

## 2020-02-04 PROCEDURE — 97035 APP MDLTY 1+ULTRASOUND EA 15: CPT | Mod: GP | Performed by: PHYSICAL THERAPIST

## 2020-02-04 PROCEDURE — 97140 MANUAL THERAPY 1/> REGIONS: CPT | Mod: GP | Performed by: PHYSICAL THERAPIST

## 2020-02-04 PROCEDURE — 97110 THERAPEUTIC EXERCISES: CPT | Mod: GP | Performed by: PHYSICAL THERAPIST

## 2020-02-11 ENCOUNTER — ANTICOAGULATION THERAPY VISIT (OUTPATIENT)
Dept: NURSING | Facility: CLINIC | Age: 80
End: 2020-02-11
Payer: COMMERCIAL

## 2020-02-11 DIAGNOSIS — I48.20 CHRONIC ATRIAL FIBRILLATION (H): ICD-10-CM

## 2020-02-11 DIAGNOSIS — I73.9 PVD (PERIPHERAL VASCULAR DISEASE) (H): ICD-10-CM

## 2020-02-11 DIAGNOSIS — Z98.890 STATUS POST CATHETER ABLATION OF ATRIAL FIBRILLATION: ICD-10-CM

## 2020-02-11 LAB — INR POINT OF CARE: 2.3 (ref 0.86–1.14)

## 2020-02-11 PROCEDURE — 99207 ZZC NO CHARGE NURSE ONLY: CPT

## 2020-02-11 PROCEDURE — 36416 COLLJ CAPILLARY BLOOD SPEC: CPT

## 2020-02-11 PROCEDURE — 85610 PROTHROMBIN TIME: CPT | Mod: QW

## 2020-02-11 NOTE — PROGRESS NOTES
ANTICOAGULATION FOLLOW-UP CLINIC VISIT    Patient Name:  Clementina Cooper  Date:  2020  Contact Type:  Face to Face    SUBJECTIVE: same plan of care  Patient Findings     Positives:   Other complaints (physical therapy for vertigo)    Comments:   No symptoms of concern today        Clinical Outcomes     Negatives:   Major bleeding event, Thromboembolic event, Anticoagulation-related hospital admission, Anticoagulation-related ED visit, Anticoagulation-related fatality    Comments:   No symptoms of concern today           OBJECTIVE    INR Protime   Date Value Ref Range Status   2020 2.3 (A) 0.86 - 1.14 Final       ASSESSMENT / PLAN  INR assessment THER    Recheck INR In: 4 WEEKS    INR Location Clinic      Anticoagulation Summary  As of 2020    INR goal:   2.0-3.0   TTR:   75.7 % (1 y)   INR used for dosin.3 (2020)   Warfarin maintenance plan:   5 mg (5 mg x 1) every Mon, Wed, Fri; 2.5 mg (5 mg x 0.5) all other days   Full warfarin instructions:   5 mg every Mon, Wed, Fri; 2.5 mg all other days   Weekly warfarin total:   25 mg   No change documented:   Shagufta Bell RN   Plan last modified:   Shagufta Bell RN (2019)   Next INR check:   3/10/2020   Priority:   Maintenance   Target end date:   Indefinite    Indications    Chronic atrial fibrillation [I48.20]  Status post catheter ablation of atrial fibrillation [Z98.890]  PVD (peripheral vascular disease) (H) [I73.9]             Anticoagulation Episode Summary     INR check location:       Preferred lab:       Send INR reminders to:   [facility]    Comments:         Anticoagulation Care Providers     Provider Role Specialty Phone number    Estela Davila MD  Internal Medicine [phone]            See the Encounter Report to view Anticoagulation Flowsheet and Dosing Calendar (Go to Encounters tab in chart review, and find the Anticoagulation Therapy Visit)    Dosage adjustment made based on physician  directed care plan.    Shagufta Bell RN

## 2020-02-13 ENCOUNTER — THERAPY VISIT (OUTPATIENT)
Dept: PHYSICAL THERAPY | Facility: CLINIC | Age: 80
End: 2020-02-13
Payer: COMMERCIAL

## 2020-02-13 DIAGNOSIS — M25.551 HIP PAIN, RIGHT: ICD-10-CM

## 2020-02-13 PROCEDURE — 97110 THERAPEUTIC EXERCISES: CPT | Mod: GP | Performed by: PHYSICAL THERAPIST

## 2020-02-13 PROCEDURE — 97140 MANUAL THERAPY 1/> REGIONS: CPT | Mod: GP | Performed by: PHYSICAL THERAPIST

## 2020-02-13 NOTE — PROGRESS NOTES
Subjective:  HPI  Physical Exam                    Objective:  System    Physical Exam    General     ROS    Assessment/Plan:    DISCHARGE REPORT    Progress reporting period is from 12/23/2019 to 2/13/2020.       SUBJECTIVE  Subjective changes noted by patient:  Still sore in hip.  Some days are good and some are not    Current pain level is : 5/10.     Previous pain level was  : 7/10.   Changes in function:  None  Adverse reaction to treatment or activity: None    OBJECTIVE  Objective: Pain with transfers.     ASSESSMENT/PLAN  Updated problem list and treatment plan: Diagnosis 1:  R GT bursitis  Pain -  US, self management, education, directional preference exercise and home program  Decreased ROM/flexibility - manual therapy and therapeutic exercise  Decreased strength - therapeutic exercise and therapeutic activities  Impaired muscle performance - neuro re-education  Decreased function - therapeutic activities  STG/LTGs have been met or progress has been made towards goals:  None  Assessment of Progress: The patient's condition has potential to improve.  Self Management Plans:  Patient has been instructed in a home treatment program.    Clementina continues to require the following intervention to meet STG and LTG's:  PT intervention is no longer required to meet STG/LTG.    Recommendations:  Pt instructed to continue with HEP x 2 months and F/U with MD if sx persist.      Please refer to the daily flowsheet for treatment today, total treatment time and time spent performing 1:1 timed codes.

## 2020-02-21 ENCOUNTER — NURSE TRIAGE (OUTPATIENT)
Dept: FAMILY MEDICINE | Facility: CLINIC | Age: 80
End: 2020-02-21

## 2020-02-21 DIAGNOSIS — J06.9 UPPER RESPIRATORY INFECTION WITH COUGH AND CONGESTION: Primary | ICD-10-CM

## 2020-02-21 DIAGNOSIS — Z51.81 ANTICOAGULATION GOAL OF INR 2 TO 3: ICD-10-CM

## 2020-02-21 DIAGNOSIS — Z79.01 ANTICOAGULATION GOAL OF INR 2 TO 3: ICD-10-CM

## 2020-02-21 RX ORDER — BENZONATATE 100 MG/1
100 CAPSULE ORAL 3 TIMES DAILY PRN
Qty: 60 CAPSULE | Refills: 1 | Status: SHIPPED | OUTPATIENT
Start: 2020-02-21 | End: 2021-01-08

## 2020-02-21 RX ORDER — AZITHROMYCIN 250 MG/1
TABLET, FILM COATED ORAL
Qty: 6 TABLET | Refills: 0 | Status: SHIPPED | OUTPATIENT
Start: 2020-02-21 | End: 2020-03-31

## 2020-02-21 RX ORDER — WARFARIN SODIUM 5 MG/1
TABLET ORAL
Qty: 102 TABLET | Refills: 3 | Status: SHIPPED | OUTPATIENT
Start: 2020-02-21 | End: 2021-03-15

## 2020-02-21 NOTE — TELEPHONE ENCOUNTER
Patient reports cough and cold x 4 days. Reports moderate to severe cough at times and runny nose. Denies fever, wheezing, or other symptoms. States she has been in bed for 4 days, feeling weak.     She has been using coricedin and Nyquil, little relief.    Per protocol, should be able to treat at home. Patient is wondering if PCP could send in prescription for something to help? Pharmacy cued.     Routed to PCP to review and advise.     Kylie Grubbs, RN, BSN, PHN  [facility]: [place]      Additional Information    Negative: Bluish (or gray) lips or face    Negative: Severe difficulty breathing (e.g., struggling for each breath, speaks in single words)    Negative: Rapid onset of cough and has hives    Negative: Coughing started suddenly after medicine, an allergic food or bee sting    Negative: Difficulty breathing after exposure to flames, smoke, or fumes    Negative: Sounds like a life-threatening emergency to the triager    Negative: Previous asthma attacks and this feels like asthma attack    Negative: Chest pain present when not coughing    Negative: Difficulty breathing    Negative: Passed out (i.e., fainted, collapsed and was not responding)    Negative: Patient sounds very sick or weak to the triager    Negative: Coughed up > 1 tablespoon (15 ml) blood (Exception: blood-tinged sputum)    Negative: Fever > 103 F (39.4 C)    Negative: Fever > 101 F (38.3 C) and over 60 years of age    Negative: Fever > 100.0 F (37.8 C) and has diabetes mellitus or a weak immune system (e.g., HIV positive, cancer chemotherapy, organ transplant, splenectomy, chronic steroids)    Negative: Fever > 100.0 F (37.8 C) and bedridden (e.g., nursing home patient, stroke, chronic illness, recovering from surgery)    Negative: Increasing ankle swelling    Negative: Wheezing is present    Negative: SEVERE coughing spells (e.g., whooping sound after coughing, vomiting after coughing)    Negative: Coughing up matt-colored  "(reddish-brown) or blood-tinged sputum    Negative: Fever present > 3 days (72 hours)    Negative: Fever returns after gone for over 24 hours and symptoms worse or not improved    Negative: Using nasal washes and pain medicine > 24 hours and sinus pain persists    Negative: Known COPD or other severe lung disease (i.e., bronchiectasis, cystic fibrosis, lung surgery) and worsening symptoms (i.e., increased sputum purulence or amount, increased breathing difficulty)    Negative: Continuous (nonstop) coughing interferes with work or school and no improvement using cough treatment per Care Advice    Negative: Patient wants to be seen    Negative: Cough has been present for > 3 weeks    Negative: Allergy symptoms are also present (e.g., itchy eyes, clear nasal discharge, postnasal drip)    Negative: Nasal discharge present > 10 days    Negative: Exposure to TB (Tuberculosis)    Negative: Taking an ACE Inhibitor medication (e.g., benazepril/LOTENSIN, captopril/CAPOTEN, enalapril/VASOTEC, lisinopril/ZESTRIL)    Cough with no complications    Answer Assessment - Initial Assessment Questions  1. ONSET: \"When did the cough begin?\"       4 days  2. SEVERITY: \"How bad is the cough today?\"       Moderate to severe at times  3. RESPIRATORY DISTRESS: \"Describe your breathing.\"       no  4. FEVER: \"Do you have a fever?\" If so, ask: \"What is your temperature, how was it measured, and when did it start?\"      no  5. HEMOPTYSIS: \"Are you coughing up any blood?\" If so ask: \"How much?\" (flecks, streaks, tablespoons, etc.)      no  6. TREATMENT: \"What have you done so far to treat the cough?\" (e.g., meds, fluids, humidifier)      Taking coracedin and Nyquil  7. CARDIAC HISTORY: \"Do you have any history of heart disease?\" (e.g., heart attack, congestive heart failure)       no  8. LUNG HISTORY: \"Do you have any history of lung disease?\"  (e.g., pulmonary embolus, asthma, emphysema)      no  9. PE RISK FACTORS: \"Do you have a history of " "blood clots?\" (or: recent major surgery, recent prolonged travel, bedridden )      no  10. OTHER SYMPTOMS: \"Do you have any other symptoms? (e.g., runny nose, wheezing, chest pain)        Runny nose  11. PREGNANCY: \"Is there any chance you are pregnant?\" \"When was your last menstrual period?\"        no  12. TRAVEL: \"Have you traveled out of the country in the last month?\" (e.g., travel history, exposures)        no    Protocols used: COUGH-A-[place]    "

## 2020-02-21 NOTE — TELEPHONE ENCOUNTER
Reason for call:  Patient reporting a symptom    Symptom or request: Cough /Cold    Duration (how long have symptoms been present): 4 days    Have you been treated for this before? No    Additional comments: Patient has had a really bad cough for a few days now.  Her  is calling to see if there is anything she can get to help it    Phone Number patient can be reached at:  Home number on file [phone] (home)    Best Time:  Call before 9:15 if possible    Can we leave a detailed message on this number:  YES    Call taken on 2/21/2020 at 8:18 AM by Vanessa Light

## 2020-02-21 NOTE — TELEPHONE ENCOUNTER
RN called patient and relayed provider's message. Patient verbalized understanding.     Kylie Grubbs RN, BSN, PHN  [facility]: [place]

## 2020-02-21 NOTE — TELEPHONE ENCOUNTER
"Requested Prescriptions   Pending Prescriptions Disp Refills     warfarin ANTICOAGULANT 5 MG PO tablet 102 tablet 3     Sig: Take 1 tablet (5mg) by mouth Mon,Wed,Fri & 1/2 tablet (2.5mg) all other days of the week   Last Written Prescription Date:  12/18/18  Last Fill Quantity: 102,  # refills: 3   Last office visit: 11/8/2019 with prescribing provider:     Future Office Visit:        Vitamin K Antagonists Failed - 2/21/2020  1:32 PM        Failed - INR is within goal in the past 6 weeks     Confirm INR is within goal in the past 6 weeks.     Recent Labs   Lab Test 02/11/20   INR 2.3*                       Failed - Medication is active on med list        Passed - Recent (12 mo) or future (30 days) visit within the authorizing provider's specialty     Patient has had an office visit with the authorizing provider or a provider within the authorizing providers department within the previous 12 mos or has a future within next 30 days. See \"Patient Info\" tab in inbasket, or \"Choose Columns\" in Meds & Orders section of the refill encounter.              Passed - Patient is 18 years of age or older        Passed - Patient is not pregnant        Passed - No positive pregnancy on file in past 12 months          "

## 2020-03-10 ENCOUNTER — ANTICOAGULATION THERAPY VISIT (OUTPATIENT)
Dept: NURSING | Facility: CLINIC | Age: 80
End: 2020-03-10
Payer: COMMERCIAL

## 2020-03-10 ENCOUNTER — TRANSFERRED RECORDS (OUTPATIENT)
Dept: HEALTH INFORMATION MANAGEMENT | Facility: CLINIC | Age: 80
End: 2020-03-10

## 2020-03-10 DIAGNOSIS — I48.20 CHRONIC ATRIAL FIBRILLATION (H): ICD-10-CM

## 2020-03-10 DIAGNOSIS — I73.9 PVD (PERIPHERAL VASCULAR DISEASE) (H): ICD-10-CM

## 2020-03-10 DIAGNOSIS — Z98.890 STATUS POST CATHETER ABLATION OF ATRIAL FIBRILLATION: ICD-10-CM

## 2020-03-10 LAB — INR POINT OF CARE: 2.8 (ref 0.86–1.14)

## 2020-03-10 PROCEDURE — 85610 PROTHROMBIN TIME: CPT | Mod: QW

## 2020-03-10 PROCEDURE — 99207 ZZC NO CHARGE NURSE ONLY: CPT

## 2020-03-10 PROCEDURE — 36416 COLLJ CAPILLARY BLOOD SPEC: CPT

## 2020-03-10 NOTE — PROGRESS NOTES
ANTICOAGULATION FOLLOW-UP CLINIC VISIT    Patient Name:  Clementina Cooper  Date:  3/10/2020  Contact Type:  Face to Face    SUBJECTIVE: same plan of care  Patient Findings     Positives:   Change in health (sick w/cough / cold last month), Change in medications (finished abx and medicine for cough last month)    Comments:   No concerns identified today        Clinical Outcomes     Negatives:   Major bleeding event, Thromboembolic event, Anticoagulation-related hospital admission, Anticoagulation-related ED visit, Anticoagulation-related fatality    Comments:   No concerns identified today           OBJECTIVE    INR Protime   Date Value Ref Range Status   03/10/2020 2.8 (A) 0.86 - 1.14 Final       ASSESSMENT / PLAN  INR assessment THER    Recheck INR In: 4 WEEKS    INR Location Clinic      Anticoagulation Summary  As of 3/10/2020    INR goal:   2.0-3.0   TTR:   79.8 % (1 y)   INR used for dosin.8 (3/10/2020)   Warfarin maintenance plan:   5 mg (5 mg x 1) every Mon, Wed, Fri; 2.5 mg (5 mg x 0.5) all other days   Full warfarin instructions:   5 mg every Mon, Wed, Fri; 2.5 mg all other days   Weekly warfarin total:   25 mg   No change documented:   Shagufta Bell, RN   Plan last modified:   Shagufta Bell RN (2019)   Next INR check:   2020   Priority:   Maintenance   Target end date:   Indefinite    Indications    Chronic atrial fibrillation [I48.20]  Status post catheter ablation of atrial fibrillation [Z98.890]  PVD (peripheral vascular disease) (H) [I73.9]             Anticoagulation Episode Summary     INR check location:       Preferred lab:       Send INR reminders to:   [facility]    Comments:         Anticoagulation Care Providers     Provider Role Specialty Phone number    Estela Davila MD  Internal Medicine [phone]            See the Encounter Report to view Anticoagulation Flowsheet and Dosing Calendar (Go to Encounters tab in chart review, and find the  Anticoagulation Therapy Visit)    Dosage adjustment made based on physician directed care plan.    Shagufta Bell RN

## 2020-03-24 PROBLEM — H81.12 BENIGN PAROXYSMAL POSITIONAL VERTIGO OF LEFT EAR: Status: RESOLVED | Noted: 2020-01-14 | Resolved: 2020-03-24

## 2020-03-24 NOTE — PROGRESS NOTES
Subjective:  HPI  Physical Exam                    Objective:  System    Physical Exam    General     ROS    Assessment/Plan:    DISCHARGE REPORT    Progress reporting period is from 1/14/2020 to 1/23/2020.     SUBJECTIVE  Subjective: Vertigo was good until a few days ago.  Not as bad as initially       Initial Pain level: 0/10        ;   ,     Reported at next visit that sx had resolved    OBJECTIVE  Objective: + hallpik devang R > L       ASSESSMENT/PLAN  Updated problem list and treatment plan: Diagnosis 1:  dizziness  Decreased proprioception - neuro re-education and therapeutic activities  Decreased function - therapeutic activities  STG/LTGs have been met or progress has been made towards goals:  Yes (See Goal flow sheet completed today.)  Assessment of Progress: Sx resolved per pt reports  Self Management Plans:  Patient has been instructed in a home treatment program.    Clementina continues to require the following intervention to meet STG and LTG's: PT intervention is no longer required to meet STG/LTG.  We will discharge this patient from PT.  Sx resolved per reports    Recommendations:  This patient is ready to be discharged from therapy and continue their home treatment program.    Please refer to the daily flowsheet for treatment today, total treatment time and time spent performing 1:1 timed codes.

## 2020-03-30 ENCOUNTER — TELEPHONE (OUTPATIENT)
Dept: FAMILY MEDICINE | Facility: CLINIC | Age: 80
End: 2020-03-30

## 2020-03-30 DIAGNOSIS — E11.9 TYPE 2 DIABETES MELLITUS WITHOUT COMPLICATION, WITHOUT LONG-TERM CURRENT USE OF INSULIN (H): ICD-10-CM

## 2020-03-30 LAB — HBA1C MFR BLD: 9 % (ref 0–5.6)

## 2020-03-30 PROCEDURE — 83036 HEMOGLOBIN GLYCOSYLATED A1C: CPT | Performed by: INTERNAL MEDICINE

## 2020-03-30 PROCEDURE — 36415 COLL VENOUS BLD VENIPUNCTURE: CPT | Performed by: INTERNAL MEDICINE

## 2020-03-31 ENCOUNTER — VIRTUAL VISIT (OUTPATIENT)
Dept: FAMILY MEDICINE | Facility: CLINIC | Age: 80
End: 2020-03-31
Payer: COMMERCIAL

## 2020-03-31 DIAGNOSIS — E11.9 TYPE 2 DIABETES MELLITUS WITHOUT COMPLICATION, WITHOUT LONG-TERM CURRENT USE OF INSULIN (H): ICD-10-CM

## 2020-03-31 PROCEDURE — 99213 OFFICE O/P EST LOW 20 MIN: CPT | Mod: TEL | Performed by: INTERNAL MEDICINE

## 2020-03-31 NOTE — PROGRESS NOTES
"Subjective     Clementina Cooper is a 79 year old female who is being evaluated via a billable telephone visit.      The patient has been notified of following:     \"This telephone visit will be conducted via a call between you and your physician/provider. We have found that certain health care needs can be provided without the need for a physical exam.  This service lets us provide the care you need with a short phone conversation.  If a prescription is necessary we can send it directly to your pharmacy.  If lab work is needed we can place an order for that and you can then stop by our lab to have the test done at a later time.    If during the course of the call the physician/provider feels a telephone visit is not appropriate, you will not be charged for this service.\"     Patient has given verbal consent for Telephone visit?  Yes    Clementina Cooper complains of No chief complaint on file.      ALLERGIES  Benzocaine; Betadine [povidone iodine]; Pravastatin; Vagisil [kdc:benzocaine+cetyl alcohol+edetic acid+methylparaben+propylene glycol+...]; Xarelto [rivaroxaban]; and Zocor [hmg-coa-r inhibitors]    Diabetes Follow-up    How often are you checking your blood sugar? One time daily  What time of day are you checking your blood sugars (select all that apply)?  Before meals  Have you had any blood sugars above 200?  Yes 269  Have you had any blood sugars below 70?  No    What symptoms do you notice when your blood sugar is low?  None    What concerns do you have today about your diabetes? None     Do you have any of these symptoms? (Select all that apply)  No numbness or tingling in feet.  No redness, sores or blisters on feet.  No complaints of excessive thirst.  No reports of blurry vision.  No significant changes to weight.    Have you had a diabetic eye exam in the last 12 months? Yes-  Location: 02/13/19        BP Readings from Last 2 Encounters:   12/17/19 126/66   11/08/19 113/69     Hemoglobin A1C (%) "   Date Value   03/30/2020 9.0 (H)   05/21/2019 7.0 (H)     LDL Cholesterol Calculated (mg/dL)   Date Value   05/21/2019 73   05/16/2018 81           How many servings of fruits and vegetables do you eat daily?  0-1    On average, how many sweetened beverages do you drink each day (Examples: soda, juice, sweet tea, etc.  Do NOT count diet or artificially sweetened beverages)?   1    How many days per week do you exercise enough to make your heart beat faster? 3 or less    How many minutes a day do you exercise enough to make your heart beat faster? 9 or less    How many days per week do you miss taking your medication? 0             Patient Active Problem List   Diagnosis     PVD (peripheral vascular disease) (H)     Family history of colon cancer     HYPERLIPIDEMIA LDL GOAL <100     Open-angle glaucoma, mild-mod, ou     Advanced directives, counseling/discussion     Essential tremor     Pseudophakia, Yag Caps, ou     S/P iridectomy, ou     Anticoagulation goal of INR 2 to 3     Hypothyroidism due to acquired atrophy of thyroid     Chronic atrial fibrillation     overweight  HCC     Long-term (current) use of anticoagulants [Z79.01]     Type 2 diabetes mellitus without complication, without long-term current use of insulin (H)     Glaucoma suspect, bilateral     Status post catheter ablation of atrial fibrillation     Past Surgical History:   Procedure Laterality Date     ANGIOPLASTY      right leg     C NONSPECIFIC PROCEDURE  2001    neck surgery/trauma     C STOMACH SURGERY PROCEDURE UNLISTED       CARDIAC SURGERY      Atrial fibrillation ablation      CATARACT IOL, RT/LT       HC TRABECULOPLASTY BY LASER SURGERY       HC VASCULAR SURGERY PROCEDURE UNLIST       HYSTERECTOMY, CERVIX STATUS UNKNOWN  1989    uterine bleeding     HYSTERECTOMY, PAP NO LONGER INDICATED       LASER YAG CAPSULOTOMY Right 02/06/2018; 2/2019    right eye; left eye     LASER YAG IRIDOTOMY  remotely    both eyes (elsewhere)      PHACOEMULSIFICATION WITH STANDARD INTRAOCULAR LENS IMPLANT  2012; 2012    right eye; left eye       Social History     Tobacco Use     Smoking status: Former Smoker     Last attempt to quit: 1999     Years since quittin.3     Smokeless tobacco: Never Used   Substance Use Topics     Alcohol use: Yes     Comment: occasional     Family History   Problem Relation Age of Onset     Diabetes Mother      Heart Disease Mother      Heart Disease Father      Heart Disease Son      Cancer Brother      Heart Disease Sister      Cancer Brother      Cancer Brother      Heart Disease Sister      Macular Degeneration No family hx of      Glaucoma No family hx of          Current Outpatient Medications   Medication Sig Dispense Refill     ACE NOT PRESCRIBED, INTENTIONAL, 1 each At Bedtime ACE Inhibitor not prescribed due to Other:  Neck swelling.  Also, BP is on low normal side with the betablocker 0 each 0     aspirin 81 MG tablet Take 1 tablet by mouth daily.  3     atorvastatin (LIPITOR) 20 MG tablet Take 1 tablet (20 mg) by mouth daily 90 tablet 3     benzonatate 100 MG PO capsule Take 1 capsule (100 mg) by mouth 3 times daily as needed for cough 60 capsule 1     Biotin 5000 MCG CAPS Take  by mouth.       CENTRUM SILVER OR one daily       clobetasol (TEMOVATE) 0.05 % external cream APPLY 1 GRAM TOPICALLY 2 TIMES DAILY AS NEEDED 180 g 2     Cyanocobalamin (B-12) 1000 MCG TBCR Take 1 tablet by mouth daily       Evening Primrose Oil 1000 MG CAPS Take by mouth daily       levothyroxine (SYNTHROID/LEVOTHROID) 100 MCG tablet TAKE 1 TABLET (100 MCG) BY MOUTH DAILY 90 tablet 0     losartan (COZAAR) 25 MG tablet TAKE 0.5 TABLETS (12.5 MG) BY MOUTH DAILY 45 tablet 3     metFORMIN (GLUCOPHAGE) 500 MG tablet TAKE 1 TABLET (500 MG) BY MOUTH DAILY (WITH DINNER) 90 tablet 3     ONETOUCH ULTRA test strip USE TO TEST BLOOD SUGAR TWICE A DAY OR AS DIRECTED 200 strip 0     ORDER FOR DME Equipment being ordered:  One Touch strips to be  used daily 90 days 3     ORDER FOR DME Equipment being ordered:  chemstrips for daily blood sugar checks. 1 Box 3     propranolol (INDERAL) 10 MG tablet TAKE 1 TABLET (10 MG) BY MOUTH 2 TIMES DAILY AS NEEDED 180 tablet 3     VITAMIN D 1000 UNIT OR TABS 1 TABLET DAILY       warfarin ANTICOAGULANT 5 MG PO tablet Take 1 tablet (5mg) by mouth Mon,Wed,Fri & 1/2 tablet (2.5mg) all other days of the week OR as directed by INR Clinic 102 tablet 3     Allergies   Allergen Reactions     Benzocaine      Betadine [Povidone Iodine]      Rash     Pravastatin      Muscle aches     Vagisil [Kdc:Benzocaine+Cetyl Alcohol+Edetic Acid+Methylparaben+Propylene Glycol+...]      It is the benzocaine       Xarelto [Rivaroxaban]      Daily headache     Zocor [Hmg-Coa-R Inhibitors]      Muscle aches     Recent Labs   Lab Test 03/30/20  0905 11/05/19  0730 05/21/19  0734 09/27/18  1017 05/16/18  0729  03/21/17  0728  03/29/16  0738   A1C 9.0*  --  7.0* 7.6* 7.0*   < > 8.0*   < > 7.0*   LDL  --   --  73  --  81  --  72  --  87   HDL  --   --  46*  --  44*  --  42*  --  39*   TRIG  --   --  187*  --  113  --  202*  --  213*   ALT  --   --   --   --  31  --  29  --  32   CR  --  0.86 1.01 0.83 0.84  --  0.82   < > 0.80   GFRESTIMATED  --  64 53* 66 66  --  68   < > 69   GFRESTBLACK  --  74 61 80 80  --  82   < > 84   POTASSIUM  --  4.2 4.2 4.1 4.2  --  4.0   < > 4.1   TSH  --  6.41* 4.56* 2.37 2.61   < > 5.43*   < > 6.31*    < > = values in this interval not displayed.      BP Readings from Last 3 Encounters:   12/17/19 126/66   11/08/19 113/69   09/16/19 125/70    Wt Readings from Last 3 Encounters:   12/17/19 92.1 kg (203 lb)   11/08/19 92.1 kg (203 lb)   09/16/19 91.5 kg (201 lb 12 oz)                    Reviewed and updated as needed this visit by Provider         Review of Systems   ROS COMP: Constitutional, HEENT, cardiovascular, pulmonary, gi and gu systems are negative, except as otherwise noted.       Objective   Reported vitals:  There  were no vitals taken for this visit.   alert and no distress  Psych: Alert and oriented times 3; coherent speech, normal   rate and volume, able to articulate logical thoughts, able   to abstract reason, no tangential thoughts, no hallucinations   or delusions  Her affect is normal.        Diagnostic Test Results:  Labs reviewed in Epic        Assessment/Plan:    A1C 9.0.   Her BS are almost always above 200, for quite a while now    1. Type 2 diabetes mellitus without complication, without long-term current use of insulin (H)    --Will increase metformin to 1000 mg bid (currently take 500 @ hs)  --If BS > 180 most of time after this change, or if GI symptoms limit our dosing of metformin, we will add some glipizide.          - metFORMIN (GLUCOPHAGE) 500 MG tablet; Take 2 tablets (1,000 mg) by mouth 2 times daily (with meals) TAKE 1 TABLET (500 MG) BY MOUTH DAILY (WITH DINNER)  Dispense: 360 tablet; Refill: 3    No follow-ups on file.      Phone call duration:  10 minutes    Estela Davila MD

## 2020-04-07 DIAGNOSIS — E03.4 HYPOTHYROIDISM DUE TO ACQUIRED ATROPHY OF THYROID: ICD-10-CM

## 2020-04-07 RX ORDER — LEVOTHYROXINE SODIUM 100 UG/1
TABLET ORAL
Qty: 90 TABLET | Refills: 1 | Status: SHIPPED | OUTPATIENT
Start: 2020-04-07 | End: 2020-10-16

## 2020-04-21 ENCOUNTER — ANTICOAGULATION THERAPY VISIT (OUTPATIENT)
Dept: FAMILY MEDICINE | Facility: CLINIC | Age: 80
End: 2020-04-21

## 2020-04-21 DIAGNOSIS — I73.9 PVD (PERIPHERAL VASCULAR DISEASE) (H): ICD-10-CM

## 2020-04-21 DIAGNOSIS — I48.20 CHRONIC ATRIAL FIBRILLATION (H): ICD-10-CM

## 2020-04-21 DIAGNOSIS — Z98.890 STATUS POST CATHETER ABLATION OF ATRIAL FIBRILLATION: ICD-10-CM

## 2020-04-21 LAB
CAPILLARY BLOOD COLLECTION: NORMAL
INR PPP: 2 (ref 0.86–1.14)

## 2020-04-21 PROCEDURE — 36416 COLLJ CAPILLARY BLOOD SPEC: CPT | Performed by: INTERNAL MEDICINE

## 2020-04-21 PROCEDURE — 85610 PROTHROMBIN TIME: CPT | Performed by: INTERNAL MEDICINE

## 2020-04-21 PROCEDURE — 99207 ZZC NO CHARGE NURSE ONLY: CPT | Performed by: INTERNAL MEDICINE

## 2020-04-21 NOTE — PROGRESS NOTES
ANTICOAGULATION FOLLOW-UP TELEPHONE VISIT    Patient Name:  Clementina Cooper  Date:  2020  Contact Type:  Telephone/ I called patient following INR done at lab this a.m.  We reviewed below medicine changes.  Patient to call [facility] RN line in-between visits with changes or concerns.    SUBJECTIVE: same plan of care  Patient Findings     Positives:   Change in health (blood sugars are now under 200; 190's since above medication change.), Change in medications (diabetic oral medicaiton doses raised last month.)    Comments:   No symptoms of concern today.        Clinical Outcomes     Negatives:   Major bleeding event, Thromboembolic event, Anticoagulation-related hospital admission, Anticoagulation-related ED visit, Anticoagulation-related fatality    Comments:   No symptoms of concern today.           OBJECTIVE    INR   Date Value Ref Range Status   2020 2.00 (H) 0.86 - 1.14 Final     Comment:     This test is intended for monitoring Coumadin therapy.  Results are not   accurate in patients with prolonged INR due to factor deficiency.         ASSESSMENT / PLAN  INR assessment THER    Recheck INR In: 4 WEEKS    INR Location Outside lab      Anticoagulation Summary  As of 2020    INR goal:   2.0-3.0   TTR:   79.8 % (1 y)   INR used for dosin.00 (2020)   Warfarin maintenance plan:   5 mg (5 mg x 1) every Mon, Wed, Fri; 2.5 mg (5 mg x 0.5) all other days   Full warfarin instructions:   : 5 mg; Otherwise 5 mg every Mon, Wed, Fri; 2.5 mg all other days   Weekly warfarin total:   25 mg   Plan last modified:   Shagufta Bell RN (2019)   Next INR check:   2020   Priority:   Maintenance   Target end date:   Indefinite    Indications    Chronic atrial fibrillation [I48.20]  Status post catheter ablation of atrial fibrillation [Z98.890]  PVD (peripheral vascular disease) (H) [I73.9]             Anticoagulation Episode Summary     INR check location:       Preferred lab:       Send INR  reminders to:   [facility]    Comments:         Anticoagulation Care Providers     Provider Role Specialty Phone number    Estela Davila MD  Internal Medicine [phone]            See the Encounter Report to view Anticoagulation Flowsheet and Dosing Calendar (Go to Encounters tab in chart review, and find the Anticoagulation Therapy Visit)    Dosage adjustment made based on physician directed care plan.    Shagufta Bell RN

## 2020-04-30 ENCOUNTER — VIRTUAL VISIT (OUTPATIENT)
Dept: FAMILY MEDICINE | Facility: CLINIC | Age: 80
End: 2020-04-30
Payer: COMMERCIAL

## 2020-04-30 DIAGNOSIS — E11.9 TYPE 2 DIABETES MELLITUS WITHOUT COMPLICATION, WITHOUT LONG-TERM CURRENT USE OF INSULIN (H): ICD-10-CM

## 2020-04-30 DIAGNOSIS — M25.551 HIP PAIN, RIGHT: ICD-10-CM

## 2020-04-30 DIAGNOSIS — L01.00 IMPETIGO: Primary | ICD-10-CM

## 2020-04-30 PROCEDURE — 99213 OFFICE O/P EST LOW 20 MIN: CPT | Mod: 95 | Performed by: INTERNAL MEDICINE

## 2020-04-30 RX ORDER — MUPIROCIN 20 MG/G
OINTMENT TOPICAL 3 TIMES DAILY
Qty: 30 G | Refills: 0 | Status: SHIPPED | OUTPATIENT
Start: 2020-04-30 | End: 2020-05-07

## 2020-04-30 NOTE — PATIENT INSTRUCTIONS
"Mupirocin ointment.            See Dr Lopez for follow up \"failed physical therapy for right hip\".  Consider repeat injection, consider imaging.   "

## 2020-04-30 NOTE — PROGRESS NOTES
"Clementina Cooper is a 79 year old female who is being evaluated via a billable telephone visit.      The patient has been notified of following:     \"This telephone visit will be conducted via a call between you and your physician/provider. We have found that certain health care needs can be provided without the need for a physical exam.  This service lets us provide the care you need with a short phone conversation.  If a prescription is necessary we can send it directly to your pharmacy.  If lab work is needed we can place an order for that and you can then stop by our lab to have the test done at a later time.    Telephone visits are billed at different rates depending on your insurance coverage. During this emergency period, for some insurers they may be billed the same as an in-person visit.  Please reach out to your insurance provider with any questions.    If during the course of the call the physician/provider feels a telephone visit is not appropriate, you will not be charged for this service.\"    Patient has given verbal consent for Telephone visit?  Yes    How would you like to obtain your AVS? Mail a copy    Subjective     Clementina Cooper is a 79 year old female who presents to clinic today for the following health issues:    HPI  Blisters on her lips x 5 days not painful but are leaking fluid      Corner of mouth:  Left.    No itch, no pain.   No fevers.    Breaks and gets crusty..   Using aquaphor    She has been trying exercise for her hip.   Not working.    The pain in at hip waxes and wanes, but generally a lot more bothersome.           Patient Active Problem List   Diagnosis     PVD (peripheral vascular disease) (H)     Family history of colon cancer     HYPERLIPIDEMIA LDL GOAL <100     Open-angle glaucoma, mild-mod, ou     Advanced directives, counseling/discussion     Essential tremor     Pseudophakia, Yag Caps, ou     S/P iridectomy, ou     Anticoagulation goal of INR 2 to 3     " Hypothyroidism due to acquired atrophy of thyroid     Chronic atrial fibrillation     overweight  HCC     Long-term (current) use of anticoagulants [Z79.01]     Type 2 diabetes mellitus without complication, without long-term current use of insulin (H)     Glaucoma suspect, bilateral     Status post catheter ablation of atrial fibrillation     Past Surgical History:   Procedure Laterality Date     ANGIOPLASTY      right leg     C NONSPECIFIC PROCEDURE      neck surgery/trauma     C STOMACH SURGERY PROCEDURE UNLISTED       CARDIAC SURGERY      Atrial fibrillation ablation      CATARACT IOL, RT/LT       HC TRABECULOPLASTY BY LASER SURGERY       HC VASCULAR SURGERY PROCEDURE UNLIST       HYSTERECTOMY, CERVIX STATUS UNKNOWN      uterine bleeding     HYSTERECTOMY, PAP NO LONGER INDICATED       LASER YAG CAPSULOTOMY Right 2018; 2019    right eye; left eye     LASER YAG IRIDOTOMY  remotely    both eyes (elsewhere)     PHACOEMULSIFICATION WITH STANDARD INTRAOCULAR LENS IMPLANT  2012; 2012    right eye; left eye       Social History     Tobacco Use     Smoking status: Former Smoker     Last attempt to quit: 1999     Years since quittin.4     Smokeless tobacco: Never Used   Substance Use Topics     Alcohol use: Yes     Comment: occasional     Family History   Problem Relation Age of Onset     Diabetes Mother      Heart Disease Mother      Heart Disease Father      Heart Disease Son      Cancer Brother      Heart Disease Sister      Cancer Brother      Cancer Brother      Heart Disease Sister      Macular Degeneration No family hx of      Glaucoma No family hx of          Current Outpatient Medications   Medication Sig Dispense Refill     ACE NOT PRESCRIBED, INTENTIONAL, 1 each At Bedtime ACE Inhibitor not prescribed due to Other:  Neck swelling.  Also, BP is on low normal side with the betablocker 0 each 0     aspirin 81 MG tablet Take 1 tablet by mouth daily.  3     atorvastatin (LIPITOR) 20 MG  tablet Take 1 tablet (20 mg) by mouth daily 90 tablet 3     Biotin 5000 MCG CAPS Take  by mouth.       CENTRUM SILVER OR one daily       clobetasol (TEMOVATE) 0.05 % external cream APPLY 1 GRAM TOPICALLY 2 TIMES DAILY AS NEEDED 180 g 2     Evening Primrose Oil 1000 MG CAPS Take by mouth daily       levothyroxine (SYNTHROID/LEVOTHROID) 100 MCG tablet TAKE 1 TABLET BY MOUTH EVERY DAY 90 tablet 1     losartan (COZAAR) 25 MG tablet TAKE 0.5 TABLETS (12.5 MG) BY MOUTH DAILY 45 tablet 3     metFORMIN (GLUCOPHAGE) 500 MG tablet Take 2 tablets (1,000 mg) by mouth 2 times daily (with meals) TAKE 1 TABLET (500 MG) BY MOUTH DAILY (WITH DINNER) 360 tablet 3     mupirocin (BACTROBAN) 2 % external ointment Apply topically 3 times daily for 7 days 30 g 0     ONETOUCH ULTRA test strip USE TO TEST BLOOD SUGAR TWICE A DAY OR AS DIRECTED 200 strip 0     ORDER FOR DME Equipment being ordered:  One Touch strips to be used daily 90 days 3     ORDER FOR DME Equipment being ordered:  chemstrips for daily blood sugar checks. 1 Box 3     propranolol (INDERAL) 10 MG tablet TAKE 1 TABLET (10 MG) BY MOUTH 2 TIMES DAILY AS NEEDED 180 tablet 3     VITAMIN D 1000 UNIT OR TABS 1 TABLET DAILY       warfarin ANTICOAGULANT 5 MG PO tablet Take 1 tablet (5mg) by mouth Mon,Wed,Fri & 1/2 tablet (2.5mg) all other days of the week OR as directed by INR Clinic 102 tablet 3     benzonatate 100 MG PO capsule Take 1 capsule (100 mg) by mouth 3 times daily as needed for cough (Patient not taking: Reported on 4/30/2020) 60 capsule 1     Cyanocobalamin (B-12) 1000 MCG TBCR Take 1 tablet by mouth daily       Allergies   Allergen Reactions     Benzocaine      Betadine [Povidone Iodine]      Rash     Pravastatin      Muscle aches     Vagisil [Kdc:Benzocaine+Cetyl Alcohol+Edetic Acid+Methylparaben+Propylene Glycol+...]      It is the benzocaine       Xarelto [Rivaroxaban]      Daily headache     Zocor [Hmg-Coa-R Inhibitors]      Muscle aches     Recent Labs   Lab Test  03/30/20  0905 11/05/19  0730 05/21/19  0734 09/27/18  1017 05/16/18  0729  03/21/17  0728  03/29/16  0738   A1C 9.0*  --  7.0* 7.6* 7.0*   < > 8.0*   < > 7.0*   LDL  --   --  73  --  81  --  72  --  87   HDL  --   --  46*  --  44*  --  42*  --  39*   TRIG  --   --  187*  --  113  --  202*  --  213*   ALT  --   --   --   --  31  --  29  --  32   CR  --  0.86 1.01 0.83 0.84  --  0.82   < > 0.80   GFRESTIMATED  --  64 53* 66 66  --  68   < > 69   GFRESTBLACK  --  74 61 80 80  --  82   < > 84   POTASSIUM  --  4.2 4.2 4.1 4.2  --  4.0   < > 4.1   TSH  --  6.41* 4.56* 2.37 2.61   < > 5.43*   < > 6.31*    < > = values in this interval not displayed.      BP Readings from Last 3 Encounters:   12/17/19 126/66   11/08/19 113/69   09/16/19 125/70    Wt Readings from Last 3 Encounters:   12/17/19 92.1 kg (203 lb)   11/08/19 92.1 kg (203 lb)   09/16/19 91.5 kg (201 lb 12 oz)                    Reviewed and updated as needed this visit by Provider         Review of Systems   ROS COMP: Constitutional, HEENT, cardiovascular, pulmonary, gi and gu systems are negative, except as otherwise noted.       Objective   Reported vitals:  There were no vitals taken for this visit.   healthy, alert and no distress  PSYCH: Alert and oriented times 3; coherent speech, normal   rate and volume, able to articulate logical thoughts, able   to abstract reason, no tangential thoughts, no hallucinations   or delusions  Her affect is normal  RESP: No cough, no audible wheezing, able to talk in full sentences  Remainder of exam unable to be completed due to telephone visits    Diagnostic Test Results:  Labs reviewed in Epic        Assessment/Plan:  1. Impetigo  Per history, likely impetigo...   - mupirocin (BACTROBAN) 2 % external ointment; Apply topically 3 times daily for 7 days  Dispense: 30 g; Refill: 0    2. Hip pain, right  Hip bursitis injection/therapy not helpful so far.  Return to ortho and consider another injection, more imaging, etc.  "    3. Type 2 diabetes mellitus without complication, without long-term current use of insulin (H)         No follow-ups on file.    Patient Instructions   Mupirocin ointment.              See Dr Lopez for follow up \"failed physical therapy for right hip\".  Consider repeat injection, consider imaging.    Phone call duration:  15 minutes    Estela Davila MD      "

## 2020-05-13 ENCOUNTER — TELEPHONE (OUTPATIENT)
Dept: FAMILY MEDICINE | Facility: CLINIC | Age: 80
End: 2020-05-13

## 2020-05-13 DIAGNOSIS — E11.9 TYPE 2 DIABETES MELLITUS WITHOUT COMPLICATION, WITHOUT LONG-TERM CURRENT USE OF INSULIN (H): ICD-10-CM

## 2020-05-13 RX ORDER — GLIPIZIDE 2.5 MG/1
2.5 TABLET, EXTENDED RELEASE ORAL DAILY
Qty: 90 TABLET | Refills: 1 | Status: SHIPPED | OUTPATIENT
Start: 2020-05-13 | End: 2020-10-22

## 2020-05-13 NOTE — TELEPHONE ENCOUNTER
Reason for call:  Patient reporting a symptom    Symptom or request: Not feeling well, acid reflux    Duration (how long have symptoms been present): 1 week.    Have you been treated for this before? No    Additional comments: Pt would like a back [facility] to discuss. Pt states she feels the increase in her Medformin may be part of her problem    Phone Number patient can be reached at:  Home number on file [phone] (home)    Best Time:  ASAP    Can we leave a detailed message on this number:  NO    Call taken on 5/13/2020 at 11:11 AM by Nannette Mauricio

## 2020-05-13 NOTE — TELEPHONE ENCOUNTER
She could cut down her Metformin to 1 tab bid  But , her A1c is high at 9.0  So, I have sent a new medication, to take with metformin.    Gilipzide XL, one tab daily, with breakfast  thanks

## 2020-05-13 NOTE — TELEPHONE ENCOUNTER
Called patient back. She reports that she is feeling tired, having acid reflux, and some nausea. She thinks this is due to her increased dose of metformin and is wondering if she can take 1 tablet BID versus 2 tablets BID?    Routed to provider to review and advise.     hSelbi Corea RN

## 2020-05-13 NOTE — TELEPHONE ENCOUNTER
Called and informed the patient. She verbalized understanding and agreeable to the plan.    Shelbi Corea RN

## 2020-05-19 ENCOUNTER — ANTICOAGULATION THERAPY VISIT (OUTPATIENT)
Dept: FAMILY MEDICINE | Facility: CLINIC | Age: 80
End: 2020-05-19

## 2020-05-19 DIAGNOSIS — Z98.890 STATUS POST CATHETER ABLATION OF ATRIAL FIBRILLATION: ICD-10-CM

## 2020-05-19 DIAGNOSIS — I73.9 PVD (PERIPHERAL VASCULAR DISEASE) (H): ICD-10-CM

## 2020-05-19 DIAGNOSIS — I48.20 CHRONIC ATRIAL FIBRILLATION (H): ICD-10-CM

## 2020-05-19 LAB
CAPILLARY BLOOD COLLECTION: NORMAL
INR PPP: 1.9 (ref 0.86–1.14)

## 2020-05-19 PROCEDURE — 85610 PROTHROMBIN TIME: CPT | Performed by: INTERNAL MEDICINE

## 2020-05-19 PROCEDURE — 36415 COLL VENOUS BLD VENIPUNCTURE: CPT | Performed by: INTERNAL MEDICINE

## 2020-05-19 PROCEDURE — 99207 ZZC NO CHARGE NURSE ONLY: CPT | Performed by: INTERNAL MEDICINE

## 2020-05-19 NOTE — PROGRESS NOTES
ANTICOAGULATION FOLLOW-UP TELEPHONE VISIT    Patient Name:  Clementina Cooper  Date:  2020  Contact Type:  Telephone/ I spoke to patient following sub-therapeutic INR result at lab today.  We have been watching & waiting to see if medicine changes affecting INR and today we plan to raise her weekly warfarin dose 10% and recheck in 2 weeks.  Patient agreeable to plan of care.    SUBJECTIVE: 2 wk corina  Patient Findings     Positives:   Change in medications (diabetes medications changed 1-2 mo ago with INR trending down.  )    Comments:   No symptoms of concern today        Clinical Outcomes     Negatives:   Major bleeding event, Thromboembolic event, Anticoagulation-related hospital admission, Anticoagulation-related ED visit, Anticoagulation-related fatality    Comments:   No symptoms of concern today           OBJECTIVE    Recent labs: (last 7 days)     20  1028   INR 1.90*       ASSESSMENT / PLAN  INR assessment SUB    Recheck INR In: 2 WEEKS    INR Location Outside lab      Anticoagulation Summary  As of 2020    INR goal:   2.0-3.0   TTR:   72.1 % (1 y)   INR used for dosin.90! (2020)   Warfarin maintenance plan:   2.5 mg (5 mg x 0.5) every Mon, Wed, Fri; 5 mg (5 mg x 1) all other days   Full warfarin instructions:   2.5 mg every Mon, Wed, Fri; 5 mg all other days   Weekly warfarin total:   27.5 mg   Plan last modified:   Shagufta Bell, RN (2020)   Next INR check:   2020   Priority:   High   Target end date:   Indefinite    Indications    Chronic atrial fibrillation [I48.20]  Status post catheter ablation of atrial fibrillation [Z98.890]  PVD (peripheral vascular disease) (H) [I73.9]             Anticoagulation Episode Summary     INR check location:       Preferred lab:       Send INR reminders to:   [facility]    Comments:         Anticoagulation Care Providers     Provider Role Specialty Phone number    Estela Davila MD  Internal Medicine [phone]             See the Encounter Report to view Anticoagulation Flowsheet and Dosing Calendar (Go to Encounters tab in chart review, and find the Anticoagulation Therapy Visit)    Dosage adjustment made based on physician directed care plan.    Shagufta Bell RN

## 2020-05-26 ENCOUNTER — OFFICE VISIT (OUTPATIENT)
Dept: ORTHOPEDICS | Facility: CLINIC | Age: 80
End: 2020-05-26
Payer: COMMERCIAL

## 2020-05-26 VITALS — WEIGHT: 203 LBS | BODY MASS INDEX: 32.62 KG/M2 | RESPIRATION RATE: 16 BRPM | HEIGHT: 66 IN

## 2020-05-26 DIAGNOSIS — M70.61 TROCHANTERIC BURSITIS OF RIGHT HIP: Primary | ICD-10-CM

## 2020-05-26 PROCEDURE — 20610 DRAIN/INJ JOINT/BURSA W/O US: CPT | Mod: RT | Performed by: ORTHOPAEDIC SURGERY

## 2020-05-26 PROCEDURE — 99213 OFFICE O/P EST LOW 20 MIN: CPT | Mod: 25 | Performed by: ORTHOPAEDIC SURGERY

## 2020-05-26 RX ORDER — METHYLPREDNISOLONE ACETATE 80 MG/ML
80 INJECTION, SUSPENSION INTRA-ARTICULAR; INTRALESIONAL; INTRAMUSCULAR; SOFT TISSUE ONCE
Qty: 1 ML | Refills: 0 | OUTPATIENT
Start: 2020-05-26 | End: 2020-05-26

## 2020-05-26 ASSESSMENT — MIFFLIN-ST. JEOR: SCORE: 1399.61

## 2020-05-26 NOTE — LETTER
5/26/2020         RE: Clementina Cooper  [facility] [facility] [address]        Dear Colleague,    Thank you for referring your patient, Clementina Cooper, to the [facility]. Please see a copy of my visit note below.    The patient's right hip was prepped with betadine solution after verification of allergies. Area approximately 10 cm x 10 cm prepped in a sterile fashion. After injection, betadine removed with soap and water and band-aids applied.    1ml depo medrol with 1% lidocaine plain injected into patient's right hip by Dr. Kendall Lopez  LOT# [id number]  Exp. 4/21      Clementina Cooper is a 79 year old female who is seen in follow up for right hip pain.  She has had pain for about 1 year without improvement.  She has had a steroid injection into the bursa on 11/8/2019.  This did not significantly help.  She has been to Physical Therapy for exercises.  She describes pain at the lateral aspect of the hip.  It is primarily with walking.  She has aching occasional pain rated 7 out of 10.  She is on warfarin for atrial fibrillation.  Has a history of psoriasis.  She is diabetic.  She is hypothyroid.    X-ray of the pelvis and lateral right hip were obtained 12/17/2019.  Images are reviewed with the patient.   This shows no significant arthritis of either hip.    Past Medical History:   Diagnosis Date     Atrial fibrillation (H) 6/10/2014     Cataract      Family history of colon cancer      Glaucoma (increased eye pressure)      Hyperlipidemia LDL goal < 100 2008     Hypothyroid      Need for prophylactic hormone replacement therapy (postmenopausal)      Obesity      PVD (peripheral vascular disease) (H)     right leg.  working with Dr Dempsey.  (Stent placed in L leg 1/2010)     Type 2 diabetes, HbA1c goal < 7% (H)     diabetes       Past Surgical History:   Procedure Laterality Date     ANGIOPLASTY      right leg     C NONSPECIFIC PROCEDURE  2001    neck surgery/trauma      C STOMACH SURGERY PROCEDURE UNLISTED       CARDIAC SURGERY      Atrial fibrillation ablation      CATARACT IOL, RT/LT       HC TRABECULOPLASTY BY LASER SURGERY       HC VASCULAR SURGERY PROCEDURE UNLIST       HYSTERECTOMY, CERVIX STATUS UNKNOWN      uterine bleeding     HYSTERECTOMY, PAP NO LONGER INDICATED       LASER YAG CAPSULOTOMY Right 2018; 2019    right eye; left eye     LASER YAG IRIDOTOMY  remotely    both eyes (elsewhere)     PHACOEMULSIFICATION WITH STANDARD INTRAOCULAR LENS IMPLANT  2012; 2012    right eye; left eye       Family History   Problem Relation Age of Onset     Diabetes Mother      Heart Disease Mother      Heart Disease Father      Heart Disease Son      Cancer Brother      Heart Disease Sister      Cancer Brother      Cancer Brother      Heart Disease Sister      Macular Degeneration No family hx of      Glaucoma No family hx of        Social History     Socioeconomic History     Marital status:      Spouse name: Not on file     Number of children: 3     Years of education: Not on file     Highest education level: Not on file   Occupational History     Not on file   Social Needs     Financial resource strain: Not on file     Food insecurity     Worry: Not on file     Inability: Not on file     Transportation needs     Medical: Not on file     Non-medical: Not on file   Tobacco Use     Smoking status: Former Smoker     Last attempt to quit: 1999     Years since quittin.4     Smokeless tobacco: Never Used   Substance and Sexual Activity     Alcohol use: Yes     Comment: occasional     Drug use: No     Sexual activity: Not Currently   Lifestyle     Physical activity     Days per week: Not on file     Minutes per session: Not on file     Stress: Not on file   Relationships     Social connections     Talks on phone: Not on file     Gets together: Not on file     Attends Spiritism service: Not on file     Active member of club or organization: Not on file      Attends meetings of clubs or organizations: Not on file     Relationship status: Not on file     Intimate partner violence     Fear of current or ex partner: Not on file     Emotionally abused: Not on file     Physically abused: Not on file     Forced sexual activity: Not on file   Other Topics Concern     Parent/sibling w/ CABG, MI or angioplasty before 65F 55M? Yes     Comment: brother transplant   Social History Narrative    Lives in home with  (Caitlin Lemon) (one level with basement).  3 kids all in area (supportive).  No pets.         Current Outpatient Medications   Medication Sig Dispense Refill     ACE NOT PRESCRIBED, INTENTIONAL, 1 each At Bedtime ACE Inhibitor not prescribed due to Other:  Neck swelling.  Also, BP is on low normal side with the betablocker 0 each 0     aspirin 81 MG tablet Take 1 tablet by mouth daily.  3     atorvastatin (LIPITOR) 20 MG tablet Take 1 tablet (20 mg) by mouth daily 90 tablet 3     benzonatate 100 MG PO capsule Take 1 capsule (100 mg) by mouth 3 times daily as needed for cough 60 capsule 1     Biotin 5000 MCG CAPS Take  by mouth.       CENTRUM SILVER OR one daily       clobetasol (TEMOVATE) 0.05 % external cream APPLY 1 GRAM TOPICALLY 2 TIMES DAILY AS NEEDED 180 g 2     Cyanocobalamin (B-12) 1000 MCG TBCR Take 1 tablet by mouth daily       Evening Primrose Oil 1000 MG CAPS Take by mouth daily       glipiZIDE (GLUCOTROL XL) 2.5 MG 24 hr tablet Take 1 tablet (2.5 mg) by mouth daily 90 tablet 1     levothyroxine (SYNTHROID/LEVOTHROID) 100 MCG tablet TAKE 1 TABLET BY MOUTH EVERY DAY 90 tablet 1     losartan (COZAAR) 25 MG tablet TAKE 0.5 TABLETS (12.5 MG) BY MOUTH DAILY 45 tablet 3     metFORMIN (GLUCOPHAGE) 500 MG tablet Take 1 tablet (500 mg) by mouth 2 times daily (with meals) TAKE TABLET (500 MG) BY MOUTH DAILY (WITH DINNER) 360 tablet 3     ONETOUCH ULTRA test strip USE TO TEST BLOOD SUGAR TWICE A DAY OR AS DIRECTED 200 strip 0     ORDER FOR DME Equipment being  "ordered:  One Touch strips to be used daily 90 days 3     ORDER FOR DME Equipment being ordered:  chemstrips for daily blood sugar checks. 1 Box 3     propranolol (INDERAL) 10 MG tablet TAKE 1 TABLET (10 MG) BY MOUTH 2 TIMES DAILY AS NEEDED 180 tablet 3     VITAMIN D 1000 UNIT OR TABS 1 TABLET DAILY       warfarin ANTICOAGULANT 5 MG PO tablet Take 1 tablet (5mg) by mouth Mon,Wed,Fri & 1/2 tablet (2.5mg) all other days of the week OR as directed by INR Clinic 102 tablet 3       Allergies   Allergen Reactions     Benzocaine      Betadine [Povidone Iodine]      Rash     Pravastatin      Muscle aches     Vagisil [Kdc:Benzocaine+Cetyl Alcohol+Edetic Acid+Methylparaben+Propylene Glycol+...]      It is the benzocaine       Xarelto [Rivaroxaban]      Daily headache     Zocor [Hmg-Coa-R Inhibitors]      Muscle aches       REVIEW OF SYSTEMS:  CONSTITUTIONAL:  NEGATIVE for fever, chills, change in weight, not feeling tired  SKIN:  NEGATIVE for worrisome rashes, no skin lumps, no skin ulcers and no non-healing wounds  EYES:  NEGATIVE for vision changes or irritation.  ENT/MOUTH:  NEGATIVE.  No hearing loss, no hoarseness, no difficulty swallowing.  RESP:  NEGATIVE. No cough or shortness of breath.  CV:  NEGATIVE for chest pain, palpitations or peripheral edema  GI:  NEGATIVE for nausea, abdominal pain, heartburn, or change in bowel habits  :  Negative. No dysuria, no hematuria  MUSCULOSKELETAL:  See HPI above  NEURO:  NEGATIVE . No headaches, no dizziness,  no numbness  ENDOCRINE:  NEGATIVE for temperature intolerance, skin/hair changes  HEME/ALLERGY/IMMUNE:  NEGATIVE for bleeding problems  PSYCHIATRIC:  NEGATIVE. no anxiety, no depression.     Exam:  Vitals: Resp 16   Ht 1.664 m (5' 5.5\")   Wt 92.1 kg (203 lb)   BMI 33.27 kg/m    BMI= Body mass index is 33.27 kg/m .  Constitutional:  healthy, alert and no distress  Neuro: Alert and Oriented x 3, Sensation grossly WNL.  Psych: Affect normal   Respiratory: Breathing not " labored.  Cardiovascular: normal peripheral pulses  Lymph: no adenopathy  Skin: No rashes,worrisome lesions or skin problems  She has no tenderness over the SI joints or sciatic notch.  She does have tenderness over the right greater trochanter.  Negative on the left.  She has more pain over the trochanter with rotation of the right hip.  She did not have groin pain with right hip rotation.  No pain with rotation of the left hip.  She has equal external rotation to 60 degrees and internal rotation to 30 degrees on both hips.  She did not have significant change of her lateral hip pain with adduction across her body.  She has full motion of the knees.  Sensation, motor and circulation are intact.    Assessment: Right greater trochanteric bursitis.  No evidence of significant hip arthritis.  Plan: She has not improved with physical therapy exercises.  She cannot take anti-inflammatories due to Coumadin.  She would like to try steroid injection once again.  We discussed doing this in clinic versus ultrasound-guided injection.  She would like to have it done today in clinic.  Risks, benefits, potential complications and alternatives were discussed.  With the patient's consent, we injected the right hip greater trochanteric bursitis  with Depo Medrol 80 mg and lidocaine after sterile prep.    Return to clinic as needed.      Again, thank you for allowing me to participate in the care of your patient.        Sincerely,        Kendall Lopez MD

## 2020-05-26 NOTE — PROGRESS NOTES
Clementina Cooper is a 79 year old female who is seen in follow up for right hip pain.  She has had pain for about 1 year without improvement.  She has had a steroid injection into the bursa on 11/8/2019.  This did not significantly help.  She has been to Physical Therapy for exercises.  She describes pain at the lateral aspect of the hip.  It is primarily with walking.  She has aching occasional pain rated 7 out of 10.  She is on warfarin for atrial fibrillation.  Has a history of psoriasis.  She is diabetic.  She is hypothyroid.    X-ray of the pelvis and lateral right hip were obtained 12/17/2019.  Images are reviewed with the patient.   This shows no significant arthritis of either hip.    Past Medical History:   Diagnosis Date     Atrial fibrillation (H) 6/10/2014     Cataract      Family history of colon cancer      Glaucoma (increased eye pressure)      Hyperlipidemia LDL goal < 100 2008     Hypothyroid      Need for prophylactic hormone replacement therapy (postmenopausal)      Obesity      PVD (peripheral vascular disease) (H)     right leg.  working with Dr Dempsey.  (Stent placed in L leg 1/2010)     Type 2 diabetes, HbA1c goal < 7% (H)     diabetes       Past Surgical History:   Procedure Laterality Date     ANGIOPLASTY      right leg     C NONSPECIFIC PROCEDURE  2001    neck surgery/trauma     C STOMACH SURGERY PROCEDURE UNLISTED       CARDIAC SURGERY      Atrial fibrillation ablation      CATARACT IOL, RT/LT       HC TRABECULOPLASTY BY LASER SURGERY       HC VASCULAR SURGERY PROCEDURE UNLIST       HYSTERECTOMY, CERVIX STATUS UNKNOWN  1989    uterine bleeding     HYSTERECTOMY, PAP NO LONGER INDICATED       LASER YAG CAPSULOTOMY Right 02/06/2018; 2/2019    right eye; left eye     LASER YAG IRIDOTOMY  remotely    both eyes (elsewhere)     PHACOEMULSIFICATION WITH STANDARD INTRAOCULAR LENS IMPLANT  7/2012; 8/2012    right eye; left eye       Family History   Problem Relation Age of Onset     Diabetes Mother       Heart Disease Mother      Heart Disease Father      Heart Disease Son      Cancer Brother      Heart Disease Sister      Cancer Brother      Cancer Brother      Heart Disease Sister      Macular Degeneration No family hx of      Glaucoma No family hx of        Social History     Socioeconomic History     Marital status:      Spouse name: Not on file     Number of children: 3     Years of education: Not on file     Highest education level: Not on file   Occupational History     Not on file   Social Needs     Financial resource strain: Not on file     Food insecurity     Worry: Not on file     Inability: Not on file     Transportation needs     Medical: Not on file     Non-medical: Not on file   Tobacco Use     Smoking status: Former Smoker     Last attempt to quit: 1999     Years since quittin.4     Smokeless tobacco: Never Used   Substance and Sexual Activity     Alcohol use: Yes     Comment: occasional     Drug use: No     Sexual activity: Not Currently   Lifestyle     Physical activity     Days per week: Not on file     Minutes per session: Not on file     Stress: Not on file   Relationships     Social connections     Talks on phone: Not on file     Gets together: Not on file     Attends Jew service: Not on file     Active member of club or organization: Not on file     Attends meetings of clubs or organizations: Not on file     Relationship status: Not on file     Intimate partner violence     Fear of current or ex partner: Not on file     Emotionally abused: Not on file     Physically abused: Not on file     Forced sexual activity: Not on file   Other Topics Concern     Parent/sibling w/ CABG, MI or angioplasty before 65F 55M? Yes     Comment: brother transplant   Social History Narrative    Lives in home with  (Caitlin Lemon) (one level with basement).  3 kids all in area (supportive).  No pets.         Current Outpatient Medications   Medication Sig Dispense Refill     ACE  NOT PRESCRIBED, INTENTIONAL, 1 each At Bedtime ACE Inhibitor not prescribed due to Other:  Neck swelling.  Also, BP is on low normal side with the betablocker 0 each 0     aspirin 81 MG tablet Take 1 tablet by mouth daily.  3     atorvastatin (LIPITOR) 20 MG tablet Take 1 tablet (20 mg) by mouth daily 90 tablet 3     benzonatate 100 MG PO capsule Take 1 capsule (100 mg) by mouth 3 times daily as needed for cough 60 capsule 1     Biotin 5000 MCG CAPS Take  by mouth.       CENTRUM SILVER OR one daily       clobetasol (TEMOVATE) 0.05 % external cream APPLY 1 GRAM TOPICALLY 2 TIMES DAILY AS NEEDED 180 g 2     Cyanocobalamin (B-12) 1000 MCG TBCR Take 1 tablet by mouth daily       Evening Primrose Oil 1000 MG CAPS Take by mouth daily       glipiZIDE (GLUCOTROL XL) 2.5 MG 24 hr tablet Take 1 tablet (2.5 mg) by mouth daily 90 tablet 1     levothyroxine (SYNTHROID/LEVOTHROID) 100 MCG tablet TAKE 1 TABLET BY MOUTH EVERY DAY 90 tablet 1     losartan (COZAAR) 25 MG tablet TAKE 0.5 TABLETS (12.5 MG) BY MOUTH DAILY 45 tablet 3     metFORMIN (GLUCOPHAGE) 500 MG tablet Take 1 tablet (500 mg) by mouth 2 times daily (with meals) TAKE TABLET (500 MG) BY MOUTH DAILY (WITH DINNER) 360 tablet 3     ONETOUCH ULTRA test strip USE TO TEST BLOOD SUGAR TWICE A DAY OR AS DIRECTED 200 strip 0     ORDER FOR DME Equipment being ordered:  One Touch strips to be used daily 90 days 3     ORDER FOR DME Equipment being ordered:  chemstrips for daily blood sugar checks. 1 Box 3     propranolol (INDERAL) 10 MG tablet TAKE 1 TABLET (10 MG) BY MOUTH 2 TIMES DAILY AS NEEDED 180 tablet 3     VITAMIN D 1000 UNIT OR TABS 1 TABLET DAILY       warfarin ANTICOAGULANT 5 MG PO tablet Take 1 tablet (5mg) by mouth Mon,Wed,Fri & 1/2 tablet (2.5mg) all other days of the week OR as directed by INR Clinic 102 tablet 3       Allergies   Allergen Reactions     Benzocaine      Betadine [Povidone Iodine]      Rash     Pravastatin      Muscle aches     Vagisil  "[Kdc:Benzocaine+Cetyl Alcohol+Edetic Acid+Methylparaben+Propylene Glycol+...]      It is the benzocaine       Xarelto [Rivaroxaban]      Daily headache     Zocor [Hmg-Coa-R Inhibitors]      Muscle aches       REVIEW OF SYSTEMS:  CONSTITUTIONAL:  NEGATIVE for fever, chills, change in weight, not feeling tired  SKIN:  NEGATIVE for worrisome rashes, no skin lumps, no skin ulcers and no non-healing wounds  EYES:  NEGATIVE for vision changes or irritation.  ENT/MOUTH:  NEGATIVE.  No hearing loss, no hoarseness, no difficulty swallowing.  RESP:  NEGATIVE. No cough or shortness of breath.  CV:  NEGATIVE for chest pain, palpitations or peripheral edema  GI:  NEGATIVE for nausea, abdominal pain, heartburn, or change in bowel habits  :  Negative. No dysuria, no hematuria  MUSCULOSKELETAL:  See HPI above  NEURO:  NEGATIVE . No headaches, no dizziness,  no numbness  ENDOCRINE:  NEGATIVE for temperature intolerance, skin/hair changes  HEME/ALLERGY/IMMUNE:  NEGATIVE for bleeding problems  PSYCHIATRIC:  NEGATIVE. no anxiety, no depression.     Exam:  Vitals: Resp 16   Ht 1.664 m (5' 5.5\")   Wt 92.1 kg (203 lb)   BMI 33.27 kg/m    BMI= Body mass index is 33.27 kg/m .  Constitutional:  healthy, alert and no distress  Neuro: Alert and Oriented x 3, Sensation grossly WNL.  Psych: Affect normal   Respiratory: Breathing not labored.  Cardiovascular: normal peripheral pulses  Lymph: no adenopathy  Skin: No rashes,worrisome lesions or skin problems  She has no tenderness over the SI joints or sciatic notch.  She does have tenderness over the right greater trochanter.  Negative on the left.  She has more pain over the trochanter with rotation of the right hip.  She did not have groin pain with right hip rotation.  No pain with rotation of the left hip.  She has equal external rotation to 60 degrees and internal rotation to 30 degrees on both hips.  She did not have significant change of her lateral hip pain with adduction across her " body.  She has full motion of the knees.  Sensation, motor and circulation are intact.    Assessment: Right greater trochanteric bursitis.  No evidence of significant hip arthritis.  Plan: She has not improved with physical therapy exercises.  She cannot take anti-inflammatories due to Coumadin.  She would like to try steroid injection once again.  We discussed doing this in clinic versus ultrasound-guided injection.  She would like to have it done today in clinic.  Risks, benefits, potential complications and alternatives were discussed.  With the patient's consent, we injected the right hip greater trochanteric bursitis  with Depo Medrol 80 mg and lidocaine after sterile prep.    Return to clinic as needed.

## 2020-05-26 NOTE — PROGRESS NOTES
The patient's right hip was prepped with betadine solution after verification of allergies. Area approximately 10 cm x 10 cm prepped in a sterile fashion. After injection, betadine removed with soap and water and band-aids applied.    1ml depo medrol with 1% lidocaine plain injected into patient's right hip by Dr. Kendall Lopez  LOT# [id number]  Exp. 4/21

## 2020-05-26 NOTE — PATIENT INSTRUCTIONS
You have had a steroid injection today.  For the first 2 hours there will likely be some numbing in the joint from the lidocaine.  This is a good sign, indicating that the injection is in the right place.  In 2 hours the lidocaine will wear off, and the joint will hurt like you had a shot.  Each day the cortisone makes it feel better.  It reaches peak effect in 2 weeks.  We expect it to last for 3 months.  You may resume regular activity when you feel ready.  If you are diabetic, your glucoses will be quite high for several days.    Continue strengthening and stretching exercises from Physical Therapy.  Sleep with a pillow between your knees.

## 2020-06-01 DIAGNOSIS — I73.9 PVD (PERIPHERAL VASCULAR DISEASE) (H): ICD-10-CM

## 2020-06-01 DIAGNOSIS — E78.5 HYPERLIPIDEMIA LDL GOAL <100: ICD-10-CM

## 2020-06-02 RX ORDER — ATORVASTATIN CALCIUM 20 MG/1
TABLET, FILM COATED ORAL
Qty: 90 TABLET | Refills: 3 | Status: SHIPPED | OUTPATIENT
Start: 2020-06-02 | End: 2021-06-11

## 2020-06-04 ENCOUNTER — ANTICOAGULATION THERAPY VISIT (OUTPATIENT)
Dept: FAMILY MEDICINE | Facility: CLINIC | Age: 80
End: 2020-06-04

## 2020-06-04 DIAGNOSIS — I48.20 CHRONIC ATRIAL FIBRILLATION (H): ICD-10-CM

## 2020-06-04 DIAGNOSIS — Z79.01 LONG TERM CURRENT USE OF ANTICOAGULANT THERAPY: Primary | ICD-10-CM

## 2020-06-04 DIAGNOSIS — I73.9 PVD (PERIPHERAL VASCULAR DISEASE) (H): ICD-10-CM

## 2020-06-04 DIAGNOSIS — Z98.890 STATUS POST CATHETER ABLATION OF ATRIAL FIBRILLATION: ICD-10-CM

## 2020-06-04 LAB
CAPILLARY BLOOD COLLECTION: NORMAL
INR PPP: 3.2 (ref 0.86–1.14)

## 2020-06-04 PROCEDURE — 99207 ZZC NO CHARGE NURSE ONLY: CPT | Performed by: INTERNAL MEDICINE

## 2020-06-04 PROCEDURE — 85610 PROTHROMBIN TIME: CPT | Performed by: INTERNAL MEDICINE

## 2020-06-04 PROCEDURE — 36416 COLLJ CAPILLARY BLOOD SPEC: CPT | Performed by: INTERNAL MEDICINE

## 2020-06-04 NOTE — PROGRESS NOTES
ANTICOAGULATION FOLLOW-UP TELEPHONE VISIT    Patient Name:  Clementina Cooper  Date:  6/4/2020  Contact Type:  Telephone/ I spoke to Lynda following INR done at lab today.  Medicine change per below.    SUBJECTIVE: hold warfarin x 1 / resume previous schedule and recheck in 2 wks  Patient Findings     Positives:   Change in medications (5-26 steroid hip injection (pt states it was helpful to her))    Comments:   No signs or symptoms of concerns today        Clinical Outcomes     Negatives:   Major bleeding event, Thromboembolic event, Anticoagulation-related hospital admission, Anticoagulation-related ED visit, Anticoagulation-related fatality    Comments:   No signs or symptoms of concerns today           OBJECTIVE    Recent labs: (last 7 days)     06/04/20  0908   INR 3.20*       ASSESSMENT / PLAN  INR assessment SUPRA    Recheck INR In: 2 WEEKS    INR Location Outside lab      Anticoagulation Summary  As of 6/4/2020    INR goal:   2.0-3.0   TTR:   71.1 % (1 y)   INR used for dosing:   3.20! (6/4/2020)   Warfarin maintenance plan:   2.5 mg (5 mg x 0.5) every Mon, Wed, Fri; 5 mg (5 mg x 1) all other days   Full warfarin instructions:   6/4: Hold; Otherwise 2.5 mg every Mon, Wed, Fri; 5 mg all other days   Weekly warfarin total:   27.5 mg   Plan last modified:   Shagufta Bell RN (5/19/2020)   Next INR check:   6/18/2020   Priority:   High   Target end date:   Indefinite    Indications    Chronic atrial fibrillation [I48.20]  Status post catheter ablation of atrial fibrillation [Z98.890]  PVD (peripheral vascular disease) (H) [I73.9]             Anticoagulation Episode Summary     INR check location:       Preferred lab:       Send INR reminders to:   [facility]    Comments:         Anticoagulation Care Providers     Provider Role Specialty Phone number    Estela Davila MD  Internal Medicine [phone]            See the Encounter Report to view Anticoagulation Flowsheet and Dosing  Calendar (Go to Encounters tab in chart review, and find the Anticoagulation Therapy Visit)    Dosage adjustment made based on physician directed care plan.    Shagufta Bell RN

## 2020-06-08 ENCOUNTER — TELEPHONE (OUTPATIENT)
Dept: FAMILY MEDICINE | Facility: CLINIC | Age: 80
End: 2020-06-08

## 2020-06-08 DIAGNOSIS — I48.20 CHRONIC ATRIAL FIBRILLATION (H): ICD-10-CM

## 2020-06-08 DIAGNOSIS — Z79.01 ANTICOAGULATION GOAL OF INR 2 TO 3: Primary | ICD-10-CM

## 2020-06-08 DIAGNOSIS — Z51.81 ANTICOAGULATION GOAL OF INR 2 TO 3: Primary | ICD-10-CM

## 2020-06-08 NOTE — TELEPHONE ENCOUNTER
Please review and sign / pended orders for  Anticoagulation Clinic Referral and then close encounter. Thanks    Route to PCP    Shagufta Brown RN

## 2020-06-18 ENCOUNTER — ANTICOAGULATION THERAPY VISIT (OUTPATIENT)
Dept: FAMILY MEDICINE | Facility: CLINIC | Age: 80
End: 2020-06-18

## 2020-06-18 DIAGNOSIS — I48.20 CHRONIC ATRIAL FIBRILLATION (H): ICD-10-CM

## 2020-06-18 DIAGNOSIS — Z98.890 STATUS POST CATHETER ABLATION OF ATRIAL FIBRILLATION: ICD-10-CM

## 2020-06-18 DIAGNOSIS — Z79.01 ANTICOAGULATION GOAL OF INR 2 TO 3: ICD-10-CM

## 2020-06-18 DIAGNOSIS — Z51.81 ANTICOAGULATION GOAL OF INR 2 TO 3: ICD-10-CM

## 2020-06-18 DIAGNOSIS — Z79.01 LONG TERM CURRENT USE OF ANTICOAGULANT THERAPY: ICD-10-CM

## 2020-06-18 DIAGNOSIS — I73.9 PVD (PERIPHERAL VASCULAR DISEASE) (H): ICD-10-CM

## 2020-06-18 LAB — INR PPP: 3.3 (ref 0.86–1.14)

## 2020-06-18 PROCEDURE — 36416 COLLJ CAPILLARY BLOOD SPEC: CPT | Performed by: INTERNAL MEDICINE

## 2020-06-18 PROCEDURE — 99207 ZZC NO CHARGE NURSE ONLY: CPT | Performed by: INTERNAL MEDICINE

## 2020-06-18 PROCEDURE — 85610 PROTHROMBIN TIME: CPT | Performed by: INTERNAL MEDICINE

## 2020-06-18 NOTE — PROGRESS NOTES
ANTICOAGULATION FOLLOW-UP TELEPHONE VISIT    Patient Name:  Clementina Cooper  Date:  6/18/2020  Contact Type:  Telephone/ I called Lynda following INR done at lab.  Report per below.      SUBJECTIVE: 9% dose reduction / 2 wk corina  Patient Findings     Comments:   Pt states her pain is under control since injection.  Prior to that she had some medicine changes.  Currently unclear if the warfarin dose change is the reason for supra-therapeutic readings following the sub readings / medicine changes.  Therefore we will try her previous warfarin dose x 2 weeks starting today back to 5 MWF / 2.5 TuThSaSu and corina 2 wks        Clinical Outcomes     Negatives:   Major bleeding event, Thromboembolic event, Anticoagulation-related hospital admission, Anticoagulation-related ED visit, Anticoagulation-related fatality    Comments:   Pt states her pain is under control since injection.  Prior to that she had some medicine changes.  Currently unclear if the warfarin dose change is the reason for supra-therapeutic readings following the sub readings / medicine changes.  Therefore we will try her previous warfarin dose x 2 weeks starting today back to 5 MWF / 2.5 TuThSaSu and corina 2 wks           OBJECTIVE    Recent labs: (last 7 days)     06/18/20  0923   INR 3.30*       ASSESSMENT / PLAN  INR assessment SUPRA    Recheck INR In: 2 WEEKS    INR Location Outside lab      Anticoagulation Summary  As of 6/18/2020    INR goal:   2.0-3.0   TTR:   67.3 % (1 y)   INR used for dosing:   3.30! (6/18/2020)   Warfarin maintenance plan:   5 mg (5 mg x 1) every Mon, Wed, Fri; 2.5 mg (5 mg x 0.5) all other days   Full warfarin instructions:   5 mg every Mon, Wed, Fri; 2.5 mg all other days   Weekly warfarin total:   25 mg   Plan last modified:   Shagufta Bell, RN (6/18/2020)   Next INR check:   7/2/2020   Priority:   High   Target end date:   Indefinite    Indications    Chronic atrial fibrillation (H) [I48.20]  Status post catheter ablation  of atrial fibrillation [Z98.890]  PVD (peripheral vascular disease) (H) [I73.9]  Anticoagulation goal of INR 2 to 3 [Z51.81  Z79.01]             Anticoagulation Episode Summary     INR check location:       Preferred lab:       Send INR reminders to:   [facility]    Comments:         Anticoagulation Care Providers     Provider Role Specialty Phone number    Estela Davila MD Referring Internal Medicine [phone]            See the Encounter Report to view Anticoagulation Flowsheet and Dosing Calendar (Go to Encounters tab in chart review, and find the Anticoagulation Therapy Visit)    Dosage adjustment made based on physician directed care plan.    Shagufta Bell RN

## 2020-07-02 ENCOUNTER — ANTICOAGULATION THERAPY VISIT (OUTPATIENT)
Dept: FAMILY MEDICINE | Facility: CLINIC | Age: 80
End: 2020-07-02

## 2020-07-02 DIAGNOSIS — I48.20 CHRONIC ATRIAL FIBRILLATION (H): ICD-10-CM

## 2020-07-02 DIAGNOSIS — I73.9 PVD (PERIPHERAL VASCULAR DISEASE) (H): ICD-10-CM

## 2020-07-02 DIAGNOSIS — Z79.01 ANTICOAGULATION GOAL OF INR 2 TO 3: ICD-10-CM

## 2020-07-02 DIAGNOSIS — Z98.890 STATUS POST CATHETER ABLATION OF ATRIAL FIBRILLATION: ICD-10-CM

## 2020-07-02 DIAGNOSIS — Z51.81 ANTICOAGULATION GOAL OF INR 2 TO 3: ICD-10-CM

## 2020-07-02 DIAGNOSIS — Z79.01 LONG TERM CURRENT USE OF ANTICOAGULANT THERAPY: ICD-10-CM

## 2020-07-02 LAB
CAPILLARY BLOOD COLLECTION: NORMAL
INR PPP: 2.6 (ref 0.86–1.14)

## 2020-07-02 PROCEDURE — 36415 COLL VENOUS BLD VENIPUNCTURE: CPT | Performed by: INTERNAL MEDICINE

## 2020-07-02 PROCEDURE — 85610 PROTHROMBIN TIME: CPT | Performed by: INTERNAL MEDICINE

## 2020-07-02 PROCEDURE — 99207 ZZC NO CHARGE NURSE ONLY: CPT | Performed by: INTERNAL MEDICINE

## 2020-07-02 NOTE — PROGRESS NOTES
ANTICOAGULATION FOLLOW-UP TELEPHONE VISIT    Patient Name:  Clementina Cooper  Date:  2020  Contact Type:  Telephone/ I called patient in follow up to INR done at lab today.  Report per below.    SUBJECTIVE: same plan of care  Patient Findings     Comments:   Unable to speak to Lynda today.  I left a detailed v.m. to continue same warfarin dose and arranged corina in a month.  Call nurse line with changes or concerns.        Clinical Outcomes     Negatives:   Major bleeding event, Thromboembolic event, Anticoagulation-related hospital admission, Anticoagulation-related ED visit, Anticoagulation-related fatality    Comments:   Unable to speak to Lynda today.  I left a detailed v.m. to continue same warfarin dose and arranged corina in a month.  Call nurse line with changes or concerns.           OBJECTIVE    Recent labs: (last 7 days)     20  1016   INR 2.60*       ASSESSMENT / PLAN  INR assessment THER    Recheck INR In: 4 WEEKS    INR Location Outside lab      Anticoagulation Summary  As of 2020    INR goal:   2.0-3.0   TTR:   65.6 % (1 y)   INR used for dosin.60 (2020)   Warfarin maintenance plan:   5 mg (5 mg x 1) every Mon, Wed, Fri; 2.5 mg (5 mg x 0.5) all other days   Full warfarin instructions:   5 mg every Mon, Wed, Fri; 2.5 mg all other days   Weekly warfarin total:   25 mg   No change documented:   Shagufta Bell, RN   Plan last modified:   Shagufta Bell, RN (2020)   Next INR check:   2020   Priority:   Maintenance   Target end date:   Indefinite    Indications    Chronic atrial fibrillation (H) [I48.20]  Status post catheter ablation of atrial fibrillation [Z98.890]  PVD (peripheral vascular disease) (H) [I73.9]  Anticoagulation goal of INR 2 to 3 [Z51.81  Z79.01]             Anticoagulation Episode Summary     INR check location:       Preferred lab:       Send INR reminders to:   [facility]    Comments:         Anticoagulation Care Providers     Provider  Role Specialty Phone number    Estela Davila MD Referring Internal Medicine [phone]            See the Encounter Report to view Anticoagulation Flowsheet and Dosing Calendar (Go to Encounters tab in chart review, and find the Anticoagulation Therapy Visit)    Dosage adjustment made based on physician directed care plan.    Shagufta Bell RN

## 2020-08-04 ENCOUNTER — ANTICOAGULATION THERAPY VISIT (OUTPATIENT)
Dept: FAMILY MEDICINE | Facility: CLINIC | Age: 80
End: 2020-08-04

## 2020-08-04 DIAGNOSIS — Z79.01 ANTICOAGULATION GOAL OF INR 2 TO 3: ICD-10-CM

## 2020-08-04 DIAGNOSIS — I48.20 CHRONIC ATRIAL FIBRILLATION (H): ICD-10-CM

## 2020-08-04 DIAGNOSIS — Z51.81 ANTICOAGULATION GOAL OF INR 2 TO 3: ICD-10-CM

## 2020-08-04 DIAGNOSIS — Z79.01 LONG TERM CURRENT USE OF ANTICOAGULANT THERAPY: ICD-10-CM

## 2020-08-04 DIAGNOSIS — Z98.890 STATUS POST CATHETER ABLATION OF ATRIAL FIBRILLATION: ICD-10-CM

## 2020-08-04 DIAGNOSIS — I73.9 PVD (PERIPHERAL VASCULAR DISEASE) (H): ICD-10-CM

## 2020-08-04 LAB
CAPILLARY BLOOD COLLECTION: NORMAL
INR PPP: 2.1 (ref 0.86–1.14)

## 2020-08-04 PROCEDURE — 85610 PROTHROMBIN TIME: CPT | Performed by: INTERNAL MEDICINE

## 2020-08-04 PROCEDURE — 99207 ZZC NO CHARGE NURSE ONLY: CPT | Performed by: INTERNAL MEDICINE

## 2020-08-04 PROCEDURE — 36416 COLLJ CAPILLARY BLOOD SPEC: CPT | Performed by: INTERNAL MEDICINE

## 2020-08-04 NOTE — PROGRESS NOTES
ANTICOAGULATION FOLLOW-UP TELEPHONE VISIT    Patient Name:  Clementina Cooper  Date:  2020  Contact Type:  Telephone/ Report per below    SUBJECTIVE: same plan of care  Patient Findings     Comments:   I spoke to Lynda following INR done at [facility] lab.  She denies any changes or concerns.  Continue same plan of care with one month recheck.  Patient agreeable to poc.        Clinical Outcomes     Negatives:   Major bleeding event, Thromboembolic event, Anticoagulation-related hospital admission, Anticoagulation-related ED visit, Anticoagulation-related fatality    Comments:   I spoke to Lynda following INR done at [facility] lab.  She denies any changes or concerns.  Continue same plan of care with one month recheck.  Patient agreeable to poc.           OBJECTIVE    Recent labs: (last 7 days)     20  0909   INR 2.10*       ASSESSMENT / PLAN  INR assessment THER    Recheck INR In: 4 WEEKS    INR Location Outside lab      Anticoagulation Summary  As of 2020    INR goal:   2.0-3.0   TTR:   69.2 % (1 y)   INR used for dosin.10 (2020)   Warfarin maintenance plan:   5 mg (5 mg x 1) every Mon, Wed, Fri; 2.5 mg (5 mg x 0.5) all other days   Full warfarin instructions:   5 mg every Mon, Wed, Fri; 2.5 mg all other days   Weekly warfarin total:   25 mg   No change documented:   Shagufta Bell, RN   Plan last modified:   Shagufta Bell, RN (2020)   Next INR check:   2020   Priority:   Maintenance   Target end date:   Indefinite    Indications    Chronic atrial fibrillation (H) [I48.20]  Status post catheter ablation of atrial fibrillation [Z98.890]  PVD (peripheral vascular disease) (H) [I73.9]  Anticoagulation goal of INR 2 to 3 [Z51.81  Z79.01]             Anticoagulation Episode Summary     INR check location:       Preferred lab:       Send INR reminders to:   [facility]    Comments:         Anticoagulation Care Providers     Provider Role Specialty Phone number    Estela Davila  MD Referring Internal Medicine [phone]            See the Encounter Report to view Anticoagulation Flowsheet and Dosing Calendar (Go to Encounters tab in chart review, and find the Anticoagulation Therapy Visit)    Dosage adjustment made based on physician directed care plan.    Shagufta Bell RN

## 2020-08-13 ENCOUNTER — OFFICE VISIT (OUTPATIENT)
Dept: OTOLARYNGOLOGY | Facility: CLINIC | Age: 80
End: 2020-08-13
Payer: COMMERCIAL

## 2020-08-13 ENCOUNTER — OFFICE VISIT (OUTPATIENT)
Dept: AUDIOLOGY | Facility: CLINIC | Age: 80
End: 2020-08-13
Payer: COMMERCIAL

## 2020-08-13 DIAGNOSIS — H90.3 SNHL (SENSORY-NEURAL HEARING LOSS), ASYMMETRICAL: Primary | ICD-10-CM

## 2020-08-13 DIAGNOSIS — Z53.9 ERRONEOUS ENCOUNTER--DISREGARD: Primary | ICD-10-CM

## 2020-08-13 PROCEDURE — 99204 OFFICE O/P NEW MOD 45 MIN: CPT | Performed by: OTOLARYNGOLOGY

## 2020-08-13 NOTE — PROGRESS NOTES
History of Present Illness - Clementina Cooper is a 80 year old female here to see me for the first time for hearing loss.    She has an audiogram from [facility] done on 7/29/2020.  It shows a moderate to severe hearing loss bilaterally, but with a threshold shift on the LEFT side of about 10dB.    She tells me that she does not have a history of excessive noise exposure. No frequent gun use, no industrial noise exposure, no  service.  No history of chemo or radiation therapy.  She denies history of ear disease in childhood, or previous ear surgery.  No chronic ear pain, no bleeding or drainage from the ears.     She does mention that about one year ago she had an episode of spinning vertigo when she rolled over in bed. She was seen by PT and had canalith repositioning and it worked immediately.     Past Medical History -   Patient Active Problem List   Diagnosis     PVD (peripheral vascular disease) (H)     Family history of colon cancer     HYPERLIPIDEMIA LDL GOAL <100     Open-angle glaucoma, mild-mod, ou     Advanced directives, counseling/discussion     Essential tremor     Pseudophakia, Yag Caps, ou     S/P iridectomy, ou     Anticoagulation goal of INR 2 to 3     Hypothyroidism due to acquired atrophy of thyroid     Chronic atrial fibrillation (H)     overweight  HCC     Long-term (current) use of anticoagulants [Z79.01]     Type 2 diabetes mellitus without complication, without long-term current use of insulin (H)     Glaucoma suspect, bilateral     Status post catheter ablation of atrial fibrillation     Trochanteric bursitis of right hip       Current Medications -   Current Outpatient Medications:      ACE NOT PRESCRIBED, INTENTIONAL,, 1 each At Bedtime ACE Inhibitor not prescribed due to Other:  Neck swelling.  Also, BP is on low normal side with the betablocker, Disp: 0 each, Rfl: 0     aspirin 81 MG tablet, Take 1 tablet by mouth daily., Disp: , Rfl: 3     atorvastatin (LIPITOR) 20 MG tablet,  TAKE 1 TABLET BY MOUTH EVERY DAY, Disp: 90 tablet, Rfl: 3     benzonatate 100 MG PO capsule, Take 1 capsule (100 mg) by mouth 3 times daily as needed for cough, Disp: 60 capsule, Rfl: 1     Biotin 5000 MCG CAPS, Take  by mouth., Disp: , Rfl:      CENTRUM SILVER OR, one daily, Disp: , Rfl:      clobetasol (TEMOVATE) 0.05 % external cream, APPLY 1 GRAM TOPICALLY 2 TIMES DAILY AS NEEDED, Disp: 180 g, Rfl: 2     Cyanocobalamin (B-12) 1000 MCG TBCR, Take 1 tablet by mouth daily, Disp: , Rfl:      Evening Primrose Oil 1000 MG CAPS, Take by mouth daily, Disp: , Rfl:      glipiZIDE (GLUCOTROL XL) 2.5 MG 24 hr tablet, Take 1 tablet (2.5 mg) by mouth daily, Disp: 90 tablet, Rfl: 1     levothyroxine (SYNTHROID/LEVOTHROID) 100 MCG tablet, TAKE 1 TABLET BY MOUTH EVERY DAY, Disp: 90 tablet, Rfl: 1     losartan (COZAAR) 25 MG tablet, TAKE 0.5 TABLETS (12.5 MG) BY MOUTH DAILY, Disp: 45 tablet, Rfl: 3     metFORMIN (GLUCOPHAGE) 500 MG tablet, Take 1 tablet (500 mg) by mouth 2 times daily (with meals) TAKE TABLET (500 MG) BY MOUTH DAILY (WITH DINNER), Disp: 360 tablet, Rfl: 3     ONETOUCH ULTRA test strip, USE TO TEST BLOOD SUGAR TWICE A DAY OR AS DIRECTED, Disp: 200 strip, Rfl: 0     ORDER FOR DME, Equipment being ordered:  One Touch strips to be used daily, Disp: 90 days, Rfl: 3     ORDER FOR DME, Equipment being ordered:  chemstrips for daily blood sugar checks., Disp: 1 Box, Rfl: 3     propranolol (INDERAL) 10 MG tablet, TAKE 1 TABLET (10 MG) BY MOUTH 2 TIMES DAILY AS NEEDED, Disp: 180 tablet, Rfl: 3     VITAMIN D 1000 UNIT OR TABS, 1 TABLET DAILY, Disp: , Rfl:      warfarin ANTICOAGULANT 5 MG PO tablet, Take 1 tablet (5mg) by mouth Mon,Wed,Fri & 1/2 tablet (2.5mg) all other days of the week OR as directed by INR Clinic, Disp: 102 tablet, Rfl: 3    Allergies -   Allergies   Allergen Reactions     Benzocaine      Betadine [Povidone Iodine]      Rash     Pravastatin      Muscle aches     Vagisil [Kdc:Benzocaine+Cetyl Alcohol+Edetic  Acid+Methylparaben+Propylene Glycol+...]      It is the benzocaine       Xarelto [Rivaroxaban]      Daily headache     Zocor [Hmg-Coa-R Inhibitors]      Muscle aches       Social History -   Social History     Socioeconomic History     Marital status:      Spouse name: Not on file     Number of children: 3     Years of education: Not on file     Highest education level: Not on file   Occupational History     Not on file   Social Needs     Financial resource strain: Not on file     Food insecurity     Worry: Not on file     Inability: Not on file     Transportation needs     Medical: Not on file     Non-medical: Not on file   Tobacco Use     Smoking status: Former Smoker     Last attempt to quit: 1999     Years since quittin.7     Smokeless tobacco: Never Used   Substance and Sexual Activity     Alcohol use: Yes     Comment: occasional     Drug use: No     Sexual activity: Not Currently   Lifestyle     Physical activity     Days per week: Not on file     Minutes per session: Not on file     Stress: Not on file   Relationships     Social connections     Talks on phone: Not on file     Gets together: Not on file     Attends Latter day service: Not on file     Active member of club or organization: Not on file     Attends meetings of clubs or organizations: Not on file     Relationship status: Not on file     Intimate partner violence     Fear of current or ex partner: Not on file     Emotionally abused: Not on file     Physically abused: Not on file     Forced sexual activity: Not on file   Other Topics Concern     Parent/sibling w/ CABG, MI or angioplasty before 65F 55M? Yes     Comment: brother transplant   Social History Narrative    Lives in home with  (Caitlin Lemon) (one level with basement).  3 kids all in area (supportive).  No pets.         Family History -   Family History   Problem Relation Age of Onset     Diabetes Mother      Heart Disease Mother      Heart Disease Father       Heart Disease Son      Cancer Brother      Heart Disease Sister      Cancer Brother      Cancer Brother      Heart Disease Sister      Macular Degeneration No family hx of      Glaucoma No family hx of        Review of Systems - As per HPI and PMHx, otherwise 10+ system review of the head and neck, and general constitution is negative.    Physical Exam  There were no vitals taken for this visit.    General - The patient is well nourished and well developed, and appears to have good nutritional status.  Alert and oriented to person and place, answers questions and cooperates with examination appropriately.   Head and Face - Normocephalic and atraumatic, with no gross asymmetry noted of the contour of the facial features.  The facial nerve is intact, with strong symmetric movements.  Voice and Breathing - The patient was breathing comfortably without the use of accessory muscles. There was no wheezing, stridor, or stertor.  The patients voice was clear and strong, and had appropriate pitch and quality.  Ears - The tympanic membranes are normal in appearance, bony landmarks are intact.  No retraction, perforation, or masses.  No fluid or purulence was seen in the external canal or the middle ear. No evidence of infection of the middle ear or external canal, cerumen was normal in appearance.  Eyes - Extraocular movements intact, and the pupils were reactive to light.  Sclera were not icteric or injected, conjunctiva were pink and moist.  Mouth - Examination of the oral cavity showed pink, healthy oral mucosa. No lesions or ulcerations noted.  The tongue was mobile and midline, and the dentition were in good condition.    Throat - The walls of the oropharynx were smooth, pink, moist, symmetric, and had no lesions or ulcerations.  The tonsillar pillars and soft palate were symmetric.  The uvula was midline on elevation.    Neck - Normal midline excursion of the laryngotracheal complex during swallowing.  Full range of  motion on passive movement.  Palpation of the occipital, submental, submandibular, internal jugular chain, and supraclavicular nodes did not demonstrate any abnormal lymph nodes or masses.  The carotid pulse was palpable bilaterally.  Palpation of the thyroid was soft and smooth, with no nodules or goiter appreciated.  The trachea was mobile and midline.  Nose - External contour is symmetric, no gross deflection or scars.  Nasal mucosa is pink and moist with no abnormal mucus.  The septum was midline and non-obstructive, turbinates of normal size and position.  No polyps, masses, or purulence noted on examination.      A/P - Clementina Cooper is a 80 year old female  (H90.5) SNHL (sensory-neural hearing loss), asymmetrical  (primary encounter diagnosis)    Based on the history, audiogram, and ear exam, I don't find any evidence of medical or surgical disease that would have caused the hearing loss.  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    We discussed the natural history of the steady loss of human hearing, and things to help.  I certainly recommend considering hearing aids, and they have my medical clearance to look into being fitted, and information has been provided.    Finally, safety considerations such as loud smoke detectors, alarm clocks, and special aids for the hearing impaired using phones and television were also discussed.

## 2020-08-13 NOTE — LETTER
8/13/2020         RE: Clementina Cooper  [address] [address]        Dear Colleague,    Thank you for referring your patient, Clementina Cooper, to the [facility]. Please see a copy of my visit note below.    History of Present Illness - Clementina Cooper is a 80 year old female here to see me for the first time for hearing loss.    She has an audiogram from [facility] done on 7/29/2020.  It shows a moderate to severe hearing loss bilaterally, but with a threshold shift on the LEFT side of about 10dB.    She tells me that she does not have a history of excessive noise exposure. No frequent gun use, no industrial noise exposure, no  service.  No history of chemo or radiation therapy.  She denies history of ear disease in childhood, or previous ear surgery.  No chronic ear pain, no bleeding or drainage from the ears.     She does mention that about one year ago she had an episode of spinning vertigo when she rolled over in bed. She was seen by PT and had canalith repositioning and it worked immediately.     Past Medical History -   Patient Active Problem List   Diagnosis     PVD (peripheral vascular disease) (H)     Family history of colon cancer     HYPERLIPIDEMIA LDL GOAL <100     Open-angle glaucoma, mild-mod, ou     Advanced directives, counseling/discussion     Essential tremor     Pseudophakia, Yag Caps, ou     S/P iridectomy, ou     Anticoagulation goal of INR 2 to 3     Hypothyroidism due to acquired atrophy of thyroid     Chronic atrial fibrillation (H)     overweight  HCC     Long-term (current) use of anticoagulants [Z79.01]     Type 2 diabetes mellitus without complication, without long-term current use of insulin (H)     Glaucoma suspect, bilateral     Status post catheter ablation of atrial fibrillation     Trochanteric bursitis of right hip       Current Medications -   Current Outpatient Medications:      ACE NOT PRESCRIBED, INTENTIONAL,, 1 each  At Bedtime ACE Inhibitor not prescribed due to Other:  Neck swelling.  Also, BP is on low normal side with the betablocker, Disp: 0 each, Rfl: 0     aspirin 81 MG tablet, Take 1 tablet by mouth daily., Disp: , Rfl: 3     atorvastatin (LIPITOR) 20 MG tablet, TAKE 1 TABLET BY MOUTH EVERY DAY, Disp: 90 tablet, Rfl: 3     benzonatate 100 MG PO capsule, Take 1 capsule (100 mg) by mouth 3 times daily as needed for cough, Disp: 60 capsule, Rfl: 1     Biotin 5000 MCG CAPS, Take  by mouth., Disp: , Rfl:      CENTRUM SILVER OR, one daily, Disp: , Rfl:      clobetasol (TEMOVATE) 0.05 % external cream, APPLY 1 GRAM TOPICALLY 2 TIMES DAILY AS NEEDED, Disp: 180 g, Rfl: 2     Cyanocobalamin (B-12) 1000 MCG TBCR, Take 1 tablet by mouth daily, Disp: , Rfl:      Evening Primrose Oil 1000 MG CAPS, Take by mouth daily, Disp: , Rfl:      glipiZIDE (GLUCOTROL XL) 2.5 MG 24 hr tablet, Take 1 tablet (2.5 mg) by mouth daily, Disp: 90 tablet, Rfl: 1     levothyroxine (SYNTHROID/LEVOTHROID) 100 MCG tablet, TAKE 1 TABLET BY MOUTH EVERY DAY, Disp: 90 tablet, Rfl: 1     losartan (COZAAR) 25 MG tablet, TAKE 0.5 TABLETS (12.5 MG) BY MOUTH DAILY, Disp: 45 tablet, Rfl: 3     metFORMIN (GLUCOPHAGE) 500 MG tablet, Take 1 tablet (500 mg) by mouth 2 times daily (with meals) TAKE TABLET (500 MG) BY MOUTH DAILY (WITH DINNER), Disp: 360 tablet, Rfl: 3     ONETOUCH ULTRA test strip, USE TO TEST BLOOD SUGAR TWICE A DAY OR AS DIRECTED, Disp: 200 strip, Rfl: 0     ORDER FOR DME, Equipment being ordered:  One Touch strips to be used daily, Disp: 90 days, Rfl: 3     ORDER FOR DME, Equipment being ordered:  chemstrips for daily blood sugar checks., Disp: 1 Box, Rfl: 3     propranolol (INDERAL) 10 MG tablet, TAKE 1 TABLET (10 MG) BY MOUTH 2 TIMES DAILY AS NEEDED, Disp: 180 tablet, Rfl: 3     VITAMIN D 1000 UNIT OR TABS, 1 TABLET DAILY, Disp: , Rfl:      warfarin ANTICOAGULANT 5 MG PO tablet, Take 1 tablet (5mg) by mouth Mon,Wed,Fri & 1/2 tablet (2.5mg) all other  days of the week OR as directed by INR Clinic, Disp: 102 tablet, Rfl: 3    Allergies -   Allergies   Allergen Reactions     Benzocaine      Betadine [Povidone Iodine]      Rash     Pravastatin      Muscle aches     Vagisil [Kdc:Benzocaine+Cetyl Alcohol+Edetic Acid+Methylparaben+Propylene Glycol+...]      It is the benzocaine       Xarelto [Rivaroxaban]      Daily headache     Zocor [Hmg-Coa-R Inhibitors]      Muscle aches       Social History -   Social History     Socioeconomic History     Marital status:      Spouse name: Not on file     Number of children: 3     Years of education: Not on file     Highest education level: Not on file   Occupational History     Not on file   Social Needs     Financial resource strain: Not on file     Food insecurity     Worry: Not on file     Inability: Not on file     Transportation needs     Medical: Not on file     Non-medical: Not on file   Tobacco Use     Smoking status: Former Smoker     Last attempt to quit: 1999     Years since quittin.7     Smokeless tobacco: Never Used   Substance and Sexual Activity     Alcohol use: Yes     Comment: occasional     Drug use: No     Sexual activity: Not Currently   Lifestyle     Physical activity     Days per week: Not on file     Minutes per session: Not on file     Stress: Not on file   Relationships     Social connections     Talks on phone: Not on file     Gets together: Not on file     Attends Yazidism service: Not on file     Active member of club or organization: Not on file     Attends meetings of clubs or organizations: Not on file     Relationship status: Not on file     Intimate partner violence     Fear of current or ex partner: Not on file     Emotionally abused: Not on file     Physically abused: Not on file     Forced sexual activity: Not on file   Other Topics Concern     Parent/sibling w/ CABG, MI or angioplasty before 65F 55M? Yes     Comment: brother transplant   Social History Narrative    Lives in  home with  ([email or web address]) (one level with basement).  3 kids all in area (supportive).  No pets.         Family History -   Family History   Problem Relation Age of Onset     Diabetes Mother      Heart Disease Mother      Heart Disease Father      Heart Disease Son      Cancer Brother      Heart Disease Sister      Cancer Brother      Cancer Brother      Heart Disease Sister      Macular Degeneration No family hx of      Glaucoma No family hx of        Review of Systems - As per HPI and PMHx, otherwise 10+ system review of the head and neck, and general constitution is negative.    Physical Exam  There were no vitals taken for this visit.    General - The patient is well nourished and well developed, and appears to have good nutritional status.  Alert and oriented to person and place, answers questions and cooperates with examination appropriately.   Head and Face - Normocephalic and atraumatic, with no gross asymmetry noted of the contour of the facial features.  The facial nerve is intact, with strong symmetric movements.  Voice and Breathing - The patient was breathing comfortably without the use of accessory muscles. There was no wheezing, stridor, or stertor.  The patients voice was clear and strong, and had appropriate pitch and quality.  Ears - The tympanic membranes are normal in appearance, bony landmarks are intact.  No retraction, perforation, or masses.  No fluid or purulence was seen in the external canal or the middle ear. No evidence of infection of the middle ear or external canal, cerumen was normal in appearance.  Eyes - Extraocular movements intact, and the pupils were reactive to light.  Sclera were not icteric or injected, conjunctiva were pink and moist.  Mouth - Examination of the oral cavity showed pink, healthy oral mucosa. No lesions or ulcerations noted.  The tongue was mobile and midline, and the dentition were in good condition.    Throat - The walls of the oropharynx were  smooth, pink, moist, symmetric, and had no lesions or ulcerations.  The tonsillar pillars and soft palate were symmetric.  The uvula was midline on elevation.    Neck - Normal midline excursion of the laryngotracheal complex during swallowing.  Full range of motion on passive movement.  Palpation of the occipital, submental, submandibular, internal jugular chain, and supraclavicular nodes did not demonstrate any abnormal lymph nodes or masses.  The carotid pulse was palpable bilaterally.  Palpation of the thyroid was soft and smooth, with no nodules or goiter appreciated.  The trachea was mobile and midline.  Nose - External contour is symmetric, no gross deflection or scars.  Nasal mucosa is pink and moist with no abnormal mucus.  The septum was midline and non-obstructive, turbinates of normal size and position.  No polyps, masses, or purulence noted on examination.      A/P - Clementina Cooper is a 80 year old female  (H90.5) SNHL (sensory-neural hearing loss), asymmetrical  (primary encounter diagnosis)    Based on the history, audiogram, and ear exam, I don't find any evidence of medical or surgical disease that would have caused the hearing loss.  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    We discussed the natural history of the steady loss of human hearing, and things to help.  I certainly recommend considering hearing aids, and they have my medical clearance to look into being fitted, and information has been provided.    Finally, safety considerations such as loud smoke detectors, alarm clocks, and special aids for the hearing impaired using phones and television were also discussed.      Again, thank you for allowing me to participate in the care of your patient.        Sincerely,        Brandon Mcnally MD

## 2020-09-01 ENCOUNTER — ANTICOAGULATION THERAPY VISIT (OUTPATIENT)
Dept: NURSING | Facility: CLINIC | Age: 80
End: 2020-09-01

## 2020-09-01 DIAGNOSIS — Z51.81 ANTICOAGULATION GOAL OF INR 2 TO 3: ICD-10-CM

## 2020-09-01 DIAGNOSIS — I73.9 PVD (PERIPHERAL VASCULAR DISEASE) (H): ICD-10-CM

## 2020-09-01 DIAGNOSIS — Z79.01 LONG TERM CURRENT USE OF ANTICOAGULANT THERAPY: ICD-10-CM

## 2020-09-01 DIAGNOSIS — Z98.890 STATUS POST CATHETER ABLATION OF ATRIAL FIBRILLATION: ICD-10-CM

## 2020-09-01 DIAGNOSIS — Z79.01 ANTICOAGULATION GOAL OF INR 2 TO 3: ICD-10-CM

## 2020-09-01 DIAGNOSIS — I48.20 CHRONIC ATRIAL FIBRILLATION (H): ICD-10-CM

## 2020-09-01 LAB
CAPILLARY BLOOD COLLECTION: NORMAL
INR PPP: 2.4 (ref 0.86–1.14)

## 2020-09-01 PROCEDURE — 85610 PROTHROMBIN TIME: CPT | Performed by: INTERNAL MEDICINE

## 2020-09-01 PROCEDURE — 99207 ZZC NO CHARGE NURSE ONLY: CPT | Performed by: INTERNAL MEDICINE

## 2020-09-01 PROCEDURE — 36416 COLLJ CAPILLARY BLOOD SPEC: CPT | Performed by: INTERNAL MEDICINE

## 2020-09-01 NOTE — PROGRESS NOTES
Anticoagulation Management    Unable to reach Lynda today.    Today's INR result of 2.4 is therapeutic (goal INR of 2.0-3.0).  Result received from: Clinic Lab    Follow up required to confirm warfarin dose taken and assess for changes    [facility]      Anticoagulation clinic to follow up    Daniella Mendez RN  [phone]  ANTICOAGULATION FOLLOW-UP CLINIC VISIT    Patient Name:  Clementina Cooper  Date:  2020  Contact Type:  Telephone    SUBJECTIVE:  Patient Findings         Clinical Outcomes     Negatives:   Major bleeding event, Thromboembolic event, Anticoagulation-related hospital admission, Anticoagulation-related ED visit, Anticoagulation-related fatality           OBJECTIVE    Recent labs: (last 7 days)     20  0946   INR 2.40*       ASSESSMENT / PLAN  INR assessment THER    Recheck INR In: 5 WEEKS    INR Location Clinic      Anticoagulation Summary  As of 2020    INR goal:   2.0-3.0   TTR:   70.7 % (1 y)   INR used for dosin.40 (2020)   Warfarin maintenance plan:   5 mg (5 mg x 1) every Mon, Wed, Fri; 2.5 mg (5 mg x 0.5) all other days   Full warfarin instructions:   5 mg every Mon, Wed, Fri; 2.5 mg all other days   Weekly warfarin total:   25 mg   No change documented:   Daniella Mendez RN   Plan last modified:   Shagufta Bell RN (2020)   Next INR check:   10/6/2020   Priority:   Maintenance   Target end date:   Indefinite    Indications    Chronic atrial fibrillation (H) [I48.20]  Status post catheter ablation of atrial fibrillation [Z98.890]  PVD (peripheral vascular disease) (H) [I73.9]  Anticoagulation goal of INR 2 to 3 [Z51.81  Z79.01]             Anticoagulation Episode Summary     INR check location:       Preferred lab:       Send INR reminders to:   [facility]    Comments:         Anticoagulation Care Providers     Provider Role Specialty Phone number    Estela Davila MD Referring Internal Medicine [phone]            See the  Encounter Report to view Anticoagulation Flowsheet and Dosing Calendar (Go to Encounters tab in chart review, and find the Anticoagulation Therapy Visit)    Therapeutic INR 2.4. Will continue maintenance dose and recheck in 5 week(s).    Patient states negative for signs and symptoms of bleeding or blood clots, changes in medication, changes in diet, any signs of infection or antibiotic use, anything new OTC or herbal medications, any missed or extra doses of the warfarin.    Patient ask if refill of warfarin is needed. Rx sent no    Daniella Mendez RN

## 2020-09-21 ENCOUNTER — OFFICE VISIT (OUTPATIENT)
Dept: ORTHOPEDICS | Facility: CLINIC | Age: 80
End: 2020-09-21
Payer: COMMERCIAL

## 2020-09-21 VITALS
WEIGHT: 203 LBS | HEIGHT: 66 IN | BODY MASS INDEX: 32.62 KG/M2 | SYSTOLIC BLOOD PRESSURE: 128 MMHG | RESPIRATION RATE: 14 BRPM | DIASTOLIC BLOOD PRESSURE: 56 MMHG | HEART RATE: 85 BPM

## 2020-09-21 DIAGNOSIS — M70.61 TROCHANTERIC BURSITIS OF RIGHT HIP: Primary | ICD-10-CM

## 2020-09-21 PROCEDURE — 20610 DRAIN/INJ JOINT/BURSA W/O US: CPT | Mod: RT | Performed by: ORTHOPAEDIC SURGERY

## 2020-09-21 RX ORDER — TRIAMCINOLONE ACETONIDE 40 MG/ML
40 INJECTION, SUSPENSION INTRA-ARTICULAR; INTRAMUSCULAR
Status: DISCONTINUED | OUTPATIENT
Start: 2020-09-21 | End: 2024-06-13

## 2020-09-21 RX ORDER — LIDOCAINE HYDROCHLORIDE 10 MG/ML
5 INJECTION, SOLUTION INFILTRATION; PERINEURAL
Status: DISCONTINUED | OUTPATIENT
Start: 2020-09-21 | End: 2024-06-13

## 2020-09-21 RX ADMIN — LIDOCAINE HYDROCHLORIDE 5 ML: 10 INJECTION, SOLUTION INFILTRATION; PERINEURAL at 10:08

## 2020-09-21 RX ADMIN — TRIAMCINOLONE ACETONIDE 40 MG: 40 INJECTION, SUSPENSION INTRA-ARTICULAR; INTRAMUSCULAR at 10:08

## 2020-09-21 ASSESSMENT — MIFFLIN-ST. JEOR: SCORE: 1394.61

## 2020-09-21 NOTE — LETTER
9/21/2020         RE: Clementina Cooper  [facility] [address]        Dear Colleague,    Thank you for referring your patient, Clementina Cooper, to the [facility]. Please see a copy of my visit note below.      Large Joint Injection/Arthocentesis: R greater trochanteric bursa    Date/Time: 9/21/2020 10:08 AM  Performed by: Kendall Lopez MD  Authorized by: Kendall Lopez MD     Indications:  Pain  Needle Size:  22 G  Guidance: landmark guided    Location:  Hip      Site:  R greater trochanteric bursa  Medications:  40 mg triamcinolone 40 MG/ML; 5 mL lidocaine 1 %  Outcome:  Tolerated well, no immediate complications  Procedure discussed: discussed risks, benefits, and alternatives    Consent Given by:  Patient  Timeout: timeout called immediately prior to procedure    Prep: patient was prepped and draped in usual sterile fashion            Clementina Cooper is a 79 year old female who is seen in follow up for right hip pain.  She has had pain for about 1 1/2 years without improvement.  She has had a steroid injection into the bursa on 11/8/2019, 5/26/2020.  This gave some help.  She has been to Physical Therapy for exercises.  She describes pain at the lateral aspect of the hip.  It is primarily with walking.  She has aching occasional pain rated 7 out of 10.  She is on warfarin for atrial fibrillation.  Has a history of psoriasis.  She is diabetic.  She is hypothyroid.    X-ray of the pelvis and lateral right hip were obtained 12/17/2019.  Images are reviewed with the patient.   This shows no significant arthritis of either hip.    Past Medical History:   Diagnosis Date     Atrial fibrillation (H) 6/10/2014     Cataract      Family history of colon cancer      Glaucoma (increased eye pressure)      Hyperlipidemia LDL goal < 100 2008     Hypothyroid      Need for prophylactic hormone replacement therapy (postmenopausal)      Obesity      PVD  (peripheral vascular disease) (H)     right leg.  working with Dr Dempsey.  (Stent placed in L leg 2010)     Type 2 diabetes, HbA1c goal < 7% (H)     diabetes       Past Surgical History:   Procedure Laterality Date     ANGIOPLASTY      right leg     C NONSPECIFIC PROCEDURE      neck surgery/trauma     C STOMACH SURGERY PROCEDURE UNLISTED       CARDIAC SURGERY      Atrial fibrillation ablation      CATARACT IOL, RT/LT       HC TRABECULOPLASTY BY LASER SURGERY       HC VASCULAR SURGERY PROCEDURE UNLIST       HYSTERECTOMY, CERVIX STATUS UNKNOWN      uterine bleeding     HYSTERECTOMY, PAP NO LONGER INDICATED       LASER YAG CAPSULOTOMY Right 2018; 2019    right eye; left eye     LASER YAG IRIDOTOMY  remotely    both eyes (elsewhere)     PHACOEMULSIFICATION WITH STANDARD INTRAOCULAR LENS IMPLANT  2012; 2012    right eye; left eye       Family History   Problem Relation Age of Onset     Diabetes Mother      Heart Disease Mother      Heart Disease Father      Heart Disease Son      Cancer Brother      Heart Disease Sister      Cancer Brother      Cancer Brother      Heart Disease Sister      Macular Degeneration No family hx of      Glaucoma No family hx of        Social History     Socioeconomic History     Marital status:      Spouse name: Not on file     Number of children: 3     Years of education: Not on file     Highest education level: Not on file   Occupational History     Not on file   Social Needs     Financial resource strain: Not on file     Food insecurity     Worry: Not on file     Inability: Not on file     Transportation needs     Medical: Not on file     Non-medical: Not on file   Tobacco Use     Smoking status: Former Smoker     Last attempt to quit: 1999     Years since quittin.8     Smokeless tobacco: Never Used   Substance and Sexual Activity     Alcohol use: Yes     Comment: occasional     Drug use: No     Sexual activity: Not Currently   Lifestyle     Physical  activity     Days per week: Not on file     Minutes per session: Not on file     Stress: Not on file   Relationships     Social connections     Talks on phone: Not on file     Gets together: Not on file     Attends [employer] service: Not on file     Active member of club or organization: Not on file     Attends meetings of clubs or organizations: Not on file     Relationship status: Not on file     Intimate partner violence     Fear of current or ex partner: Not on file     Emotionally abused: Not on file     Physically abused: Not on file     Forced sexual activity: Not on file   Other Topics Concern     Parent/sibling w/ CABG, MI or angioplasty before 65F 55M? Yes     Comment: brother transplant   Social History Narrative    Lives in home with  (Caitlin Lemon) (one level with basement).  3 kids all in area (supportive).  No pets.         Current Outpatient Medications   Medication Sig Dispense Refill     ACE NOT PRESCRIBED, INTENTIONAL, 1 each At Bedtime ACE Inhibitor not prescribed due to Other:  Neck swelling.  Also, BP is on low normal side with the betablocker 0 each 0     aspirin 81 MG tablet Take 1 tablet by mouth daily.  3     atorvastatin (LIPITOR) 20 MG tablet TAKE 1 TABLET BY MOUTH EVERY DAY 90 tablet 3     benzonatate 100 MG PO capsule Take 1 capsule (100 mg) by mouth 3 times daily as needed for cough 60 capsule 1     Biotin 5000 MCG CAPS Take  by mouth.       CENTRUM SILVER OR one daily       clobetasol (TEMOVATE) 0.05 % external cream APPLY 1 GRAM TOPICALLY 2 TIMES DAILY AS NEEDED 180 g 2     Cyanocobalamin (B-12) 1000 MCG TBCR Take 1 tablet by mouth daily       Evening Primrose Oil 1000 MG CAPS Take by mouth daily       glipiZIDE (GLUCOTROL XL) 2.5 MG 24 hr tablet Take 1 tablet (2.5 mg) by mouth daily 90 tablet 1     levothyroxine (SYNTHROID/LEVOTHROID) 100 MCG tablet TAKE 1 TABLET BY MOUTH EVERY DAY 90 tablet 1     losartan (COZAAR) 25 MG tablet TAKE 0.5 TABLETS (12.5 MG) BY MOUTH DAILY 45  tablet 3     metFORMIN (GLUCOPHAGE) 500 MG tablet Take 1 tablet (500 mg) by mouth 2 times daily (with meals) TAKE TABLET (500 MG) BY MOUTH DAILY (WITH DINNER) 360 tablet 3     ONETOUCH ULTRA test strip USE TO TEST BLOOD SUGAR TWICE A DAY OR AS DIRECTED 200 strip 0     ORDER FOR DME Equipment being ordered:  One Touch strips to be used daily 90 days 3     ORDER FOR DME Equipment being ordered:  chemstrips for daily blood sugar checks. 1 Box 3     propranolol (INDERAL) 10 MG tablet TAKE 1 TABLET (10 MG) BY MOUTH 2 TIMES DAILY AS NEEDED 180 tablet 3     VITAMIN D 1000 UNIT OR TABS 1 TABLET DAILY       warfarin ANTICOAGULANT 5 MG PO tablet Take 1 tablet (5mg) by mouth Mon,Wed,Fri & 1/2 tablet (2.5mg) all other days of the week OR as directed by INR Clinic 102 tablet 3       Allergies   Allergen Reactions     Benzocaine      Betadine [Povidone Iodine]      Rash     Pravastatin      Muscle aches     Vagisil [Kdc:Benzocaine+Cetyl Alcohol+Edetic Acid+Methylparaben+Propylene Glycol+...]      It is the benzocaine       Xarelto [Rivaroxaban]      Daily headache     Zocor [Hmg-Coa-R Inhibitors]      Muscle aches       REVIEW OF SYSTEMS:  CONSTITUTIONAL:  NEGATIVE for fever, chills, change in weight, not feeling tired  SKIN:  NEGATIVE for worrisome rashes, no skin lumps, no skin ulcers and no non-healing wounds  EYES:  NEGATIVE for vision changes or irritation.  ENT/MOUTH:  NEGATIVE.  No hearing loss, no hoarseness, no difficulty swallowing.  RESP:  NEGATIVE. No cough or shortness of breath.  CV:  NEGATIVE for chest pain, palpitations or peripheral edema  GI:  NEGATIVE for nausea, abdominal pain, heartburn, or change in bowel habits  :  Negative. No dysuria, no hematuria  MUSCULOSKELETAL:  See HPI above  NEURO:  NEGATIVE . No headaches, no dizziness,  no numbness  ENDOCRINE:  NEGATIVE for temperature intolerance, skin/hair changes  HEME/ALLERGY/IMMUNE:  NEGATIVE for bleeding problems  PSYCHIATRIC:  NEGATIVE. no anxiety, no  "depression.     Exam:  Vitals: /56   Pulse 85   Resp 14   Ht 1.664 m (5' 5.5\")   Wt 92.1 kg (203 lb)   BMI 33.27 kg/m    BMI= Body mass index is 33.27 kg/m .  Constitutional:  healthy, alert and no distress  Neuro: Alert and Oriented x 3, Sensation grossly WNL.  Psych: Affect normal   Respiratory: Breathing not labored.  Cardiovascular: normal peripheral pulses  Lymph: no adenopathy  Skin: No rashes,worrisome lesions or skin problems  She has no tenderness over the SI joints or sciatic notch.  She does have tenderness over the right greater trochanter.  Negative on the left.  She has more pain over the trochanter with rotation of the right hip.  She did not have groin pain with right hip rotation.  No pain with rotation of the left hip.  She has equal external rotation to 60 degrees and internal rotation to 30 degrees on both hips.  She did not have significant change of her lateral hip pain with adduction across her body.  She has full motion of the knees.  Sensation, motor and circulation are intact.    Assessment: Right greater trochanteric bursitis.  No evidence of significant hip arthritis.  Plan: She has not improved with physical therapy exercises.  She cannot take anti-inflammatories due to Coumadin.  She would like to try steroid injection once again.  We discussed doing this in clinic versus ultrasound-guided injection.  She would like to have it done today in clinic.  Risks, benefits, potential complications and alternatives were discussed.  With the patient's consent, we injected the right hip greater trochanteric bursitis  with Kenolog 40 mg and lidocaine after sterile prep.    Return to clinic as needed.      Again, thank you for allowing me to participate in the care of your patient.        Sincerely,        Kendall Lopez MD    "

## 2020-09-21 NOTE — PROGRESS NOTES
Clementina Cooper is a 79 year old female who is seen in follow up for right hip pain.  She has had pain for about 1 1/2 years without improvement.  She has had a steroid injection into the bursa on 11/8/2019, 5/26/2020.  This gave some help.  She has been to Physical Therapy for exercises.  She describes pain at the lateral aspect of the hip.  It is primarily with walking.  She has aching occasional pain rated 7 out of 10.  She is on warfarin for atrial fibrillation.  Has a history of psoriasis.  She is diabetic.  She is hypothyroid.    X-ray of the pelvis and lateral right hip were obtained 12/17/2019.  Images are reviewed with the patient.   This shows no significant arthritis of either hip.    Past Medical History:   Diagnosis Date     Atrial fibrillation (H) 6/10/2014     Cataract      Family history of colon cancer      Glaucoma (increased eye pressure)      Hyperlipidemia LDL goal < 100 2008     Hypothyroid      Need for prophylactic hormone replacement therapy (postmenopausal)      Obesity      PVD (peripheral vascular disease) (H)     right leg.  working with Dr Dempsey.  (Stent placed in L leg 1/2010)     Type 2 diabetes, HbA1c goal < 7% (H)     diabetes       Past Surgical History:   Procedure Laterality Date     ANGIOPLASTY      right leg     C NONSPECIFIC PROCEDURE  2001    neck surgery/trauma     C STOMACH SURGERY PROCEDURE UNLISTED       CARDIAC SURGERY      Atrial fibrillation ablation      CATARACT IOL, RT/LT       HC TRABECULOPLASTY BY LASER SURGERY       HC VASCULAR SURGERY PROCEDURE UNLIST       HYSTERECTOMY, CERVIX STATUS UNKNOWN  1989    uterine bleeding     HYSTERECTOMY, PAP NO LONGER INDICATED       LASER YAG CAPSULOTOMY Right 02/06/2018; 2/2019    right eye; left eye     LASER YAG IRIDOTOMY  remotely    both eyes (elsewhere)     PHACOEMULSIFICATION WITH STANDARD INTRAOCULAR LENS IMPLANT  7/2012; 8/2012    right eye; left eye       Family History   Problem Relation Age of Onset     Diabetes  Mother      Heart Disease Mother      Heart Disease Father      Heart Disease Son      Cancer Brother      Heart Disease Sister      Cancer Brother      Cancer Brother      Heart Disease Sister      Macular Degeneration No family hx of      Glaucoma No family hx of        Social History     Socioeconomic History     Marital status:      Spouse name: Not on file     Number of children: 3     Years of education: Not on file     Highest education level: Not on file   Occupational History     Not on file   Social Needs     Financial resource strain: Not on file     Food insecurity     Worry: Not on file     Inability: Not on file     Transportation needs     Medical: Not on file     Non-medical: Not on file   Tobacco Use     Smoking status: Former Smoker     Last attempt to quit: 1999     Years since quittin.8     Smokeless tobacco: Never Used   Substance and Sexual Activity     Alcohol use: Yes     Comment: occasional     Drug use: No     Sexual activity: Not Currently   Lifestyle     Physical activity     Days per week: Not on file     Minutes per session: Not on file     Stress: Not on file   Relationships     Social connections     Talks on phone: Not on file     Gets together: Not on file     Attends [employer] service: Not on file     Active member of club or organization: Not on file     Attends meetings of clubs or organizations: Not on file     Relationship status: Not on file     Intimate partner violence     Fear of current or ex partner: Not on file     Emotionally abused: Not on file     Physically abused: Not on file     Forced sexual activity: Not on file   Other Topics Concern     Parent/sibling w/ CABG, MI or angioplasty before 65F 55M? Yes     Comment: brother transplant   Social History Narrative    Lives in home with  (Caitlin Lemon) (one level with basement).  3 kids all in area (supportive).  No pets.         Current Outpatient Medications   Medication Sig Dispense Refill      ACE NOT PRESCRIBED, INTENTIONAL, 1 each At Bedtime ACE Inhibitor not prescribed due to Other:  Neck swelling.  Also, BP is on low normal side with the betablocker 0 each 0     aspirin 81 MG tablet Take 1 tablet by mouth daily.  3     atorvastatin (LIPITOR) 20 MG tablet TAKE 1 TABLET BY MOUTH EVERY DAY 90 tablet 3     benzonatate 100 MG PO capsule Take 1 capsule (100 mg) by mouth 3 times daily as needed for cough 60 capsule 1     Biotin 5000 MCG CAPS Take  by mouth.       CENTRUM SILVER OR one daily       clobetasol (TEMOVATE) 0.05 % external cream APPLY 1 GRAM TOPICALLY 2 TIMES DAILY AS NEEDED 180 g 2     Cyanocobalamin (B-12) 1000 MCG TBCR Take 1 tablet by mouth daily       Evening Primrose Oil 1000 MG CAPS Take by mouth daily       glipiZIDE (GLUCOTROL XL) 2.5 MG 24 hr tablet Take 1 tablet (2.5 mg) by mouth daily 90 tablet 1     levothyroxine (SYNTHROID/LEVOTHROID) 100 MCG tablet TAKE 1 TABLET BY MOUTH EVERY DAY 90 tablet 1     losartan (COZAAR) 25 MG tablet TAKE 0.5 TABLETS (12.5 MG) BY MOUTH DAILY 45 tablet 3     metFORMIN (GLUCOPHAGE) 500 MG tablet Take 1 tablet (500 mg) by mouth 2 times daily (with meals) TAKE TABLET (500 MG) BY MOUTH DAILY (WITH DINNER) 360 tablet 3     ONETOUCH ULTRA test strip USE TO TEST BLOOD SUGAR TWICE A DAY OR AS DIRECTED 200 strip 0     ORDER FOR DME Equipment being ordered:  One Touch strips to be used daily 90 days 3     ORDER FOR DME Equipment being ordered:  chemstrips for daily blood sugar checks. 1 Box 3     propranolol (INDERAL) 10 MG tablet TAKE 1 TABLET (10 MG) BY MOUTH 2 TIMES DAILY AS NEEDED 180 tablet 3     VITAMIN D 1000 UNIT OR TABS 1 TABLET DAILY       warfarin ANTICOAGULANT 5 MG PO tablet Take 1 tablet (5mg) by mouth Mon,Wed,Fri & 1/2 tablet (2.5mg) all other days of the week OR as directed by INR Clinic 102 tablet 3       Allergies   Allergen Reactions     Benzocaine      Betadine [Povidone Iodine]      Rash     Pravastatin      Muscle aches     Vagisil  "[Kdc:Benzocaine+Cetyl Alcohol+Edetic Acid+Methylparaben+Propylene Glycol+...]      It is the benzocaine       Xarelto [Rivaroxaban]      Daily headache     Zocor [Hmg-Coa-R Inhibitors]      Muscle aches       REVIEW OF SYSTEMS:  CONSTITUTIONAL:  NEGATIVE for fever, chills, change in weight, not feeling tired  SKIN:  NEGATIVE for worrisome rashes, no skin lumps, no skin ulcers and no non-healing wounds  EYES:  NEGATIVE for vision changes or irritation.  ENT/MOUTH:  NEGATIVE.  No hearing loss, no hoarseness, no difficulty swallowing.  RESP:  NEGATIVE. No cough or shortness of breath.  CV:  NEGATIVE for chest pain, palpitations or peripheral edema  GI:  NEGATIVE for nausea, abdominal pain, heartburn, or change in bowel habits  :  Negative. No dysuria, no hematuria  MUSCULOSKELETAL:  See HPI above  NEURO:  NEGATIVE . No headaches, no dizziness,  no numbness  ENDOCRINE:  NEGATIVE for temperature intolerance, skin/hair changes  HEME/ALLERGY/IMMUNE:  NEGATIVE for bleeding problems  PSYCHIATRIC:  NEGATIVE. no anxiety, no depression.     Exam:  Vitals: /56   Pulse 85   Resp 14   Ht 1.664 m (5' 5.5\")   Wt 92.1 kg (203 lb)   BMI 33.27 kg/m    BMI= Body mass index is 33.27 kg/m .  Constitutional:  healthy, alert and no distress  Neuro: Alert and Oriented x 3, Sensation grossly WNL.  Psych: Affect normal   Respiratory: Breathing not labored.  Cardiovascular: normal peripheral pulses  Lymph: no adenopathy  Skin: No rashes,worrisome lesions or skin problems  She has no tenderness over the SI joints or sciatic notch.  She does have tenderness over the right greater trochanter.  Negative on the left.  She has more pain over the trochanter with rotation of the right hip.  She did not have groin pain with right hip rotation.  No pain with rotation of the left hip.  She has equal external rotation to 60 degrees and internal rotation to 30 degrees on both hips.  She did not have significant change of her lateral hip pain " with adduction across her body.  She has full motion of the knees.  Sensation, motor and circulation are intact.    Assessment: Right greater trochanteric bursitis.  No evidence of significant hip arthritis.  Plan: She has not improved with physical therapy exercises.  She cannot take anti-inflammatories due to Coumadin.  She would like to try steroid injection once again.  We discussed doing this in clinic versus ultrasound-guided injection.  She would like to have it done today in clinic.  Risks, benefits, potential complications and alternatives were discussed.  With the patient's consent, we injected the right hip greater trochanteric bursitis  with Kenolog 40 mg and lidocaine after sterile prep.    Return to clinic as needed.

## 2020-09-21 NOTE — PROGRESS NOTES
Large Joint Injection/Arthocentesis: R greater trochanteric bursa    Date/Time: 9/21/2020 10:08 AM  Performed by: Kendall Lopez MD  Authorized by: Kendall Lopez MD     Indications:  Pain  Needle Size:  22 G  Guidance: landmark guided    Location:  Hip      Site:  R greater trochanteric bursa  Medications:  40 mg triamcinolone 40 MG/ML; 5 mL lidocaine 1 %  Outcome:  Tolerated well, no immediate complications  Procedure discussed: discussed risks, benefits, and alternatives    Consent Given by:  Patient  Timeout: timeout called immediately prior to procedure    Prep: patient was prepped and draped in usual sterile fashion

## 2020-10-01 ENCOUNTER — DOCUMENTATION ONLY (OUTPATIENT)
Dept: LAB | Facility: CLINIC | Age: 80
End: 2020-10-01

## 2020-10-01 DIAGNOSIS — Z51.81 ANTICOAGULATION GOAL OF INR 2 TO 3: ICD-10-CM

## 2020-10-01 DIAGNOSIS — E03.4 HYPOTHYROIDISM DUE TO ACQUIRED ATROPHY OF THYROID: ICD-10-CM

## 2020-10-01 DIAGNOSIS — Z79.01 ANTICOAGULATION GOAL OF INR 2 TO 3: ICD-10-CM

## 2020-10-01 DIAGNOSIS — E11.9 TYPE 2 DIABETES MELLITUS WITHOUT COMPLICATION, WITHOUT LONG-TERM CURRENT USE OF INSULIN (H): ICD-10-CM

## 2020-10-01 DIAGNOSIS — E78.5 HYPERLIPIDEMIA LDL GOAL <100: Primary | ICD-10-CM

## 2020-10-06 ENCOUNTER — ANTICOAGULATION THERAPY VISIT (OUTPATIENT)
Dept: FAMILY MEDICINE | Facility: CLINIC | Age: 80
End: 2020-10-06

## 2020-10-06 DIAGNOSIS — I48.20 CHRONIC ATRIAL FIBRILLATION (H): ICD-10-CM

## 2020-10-06 DIAGNOSIS — Z51.81 ANTICOAGULATION GOAL OF INR 2 TO 3: ICD-10-CM

## 2020-10-06 DIAGNOSIS — Z79.01 ANTICOAGULATION GOAL OF INR 2 TO 3: ICD-10-CM

## 2020-10-06 DIAGNOSIS — I73.9 PVD (PERIPHERAL VASCULAR DISEASE) (H): ICD-10-CM

## 2020-10-06 DIAGNOSIS — Z98.890 STATUS POST CATHETER ABLATION OF ATRIAL FIBRILLATION: ICD-10-CM

## 2020-10-06 DIAGNOSIS — Z79.01 LONG TERM CURRENT USE OF ANTICOAGULANT THERAPY: ICD-10-CM

## 2020-10-06 LAB
CAPILLARY BLOOD COLLECTION: NORMAL
INR PPP: 1.9 (ref 0.86–1.14)

## 2020-10-06 PROCEDURE — 36416 COLLJ CAPILLARY BLOOD SPEC: CPT | Performed by: INTERNAL MEDICINE

## 2020-10-06 PROCEDURE — 85610 PROTHROMBIN TIME: CPT | Performed by: INTERNAL MEDICINE

## 2020-10-06 NOTE — PROGRESS NOTES
Unable to reach or leave a message mailbox is full will leave open and try again later.  Ely Sterling,Clinic Rn  [place] [place]

## 2020-10-07 ENCOUNTER — TRANSFERRED RECORDS (OUTPATIENT)
Dept: HEALTH INFORMATION MANAGEMENT | Facility: CLINIC | Age: 80
End: 2020-10-07

## 2020-10-07 NOTE — PROGRESS NOTES
ANTICOAGULATION MANAGEMENT     Patient Name:  Clementina Cooper  Date:  10/7/2020    ASSESSMENT /SUBJECTIVE:     INR result of 1.9 from 10/6/20 is subtherapeutic. Goal INR of 2.0-3.0      Warfarin dose taken: Less warfarin taken than planned which may be affecting INR    Diet: No new diet changes affecting INR    Medication changes/ interactions: No new medications/supplements affecting INR    Previous INR: Therapeutic     S/S of bleeding or thromboembolism: No    New injury or illness: No    Upcoming surgery, procedure or cardioversion: No    Additional findings: None      PLAN:    Telephone call with Clementina regarding INR result and instructed:     Warfarin Dosing Instructions: Continue your current warfarin dose 5mg Mon, Wed, & Fri; 2.5mg all other days of the week.    Instructed patient to follow up no later than: 2 weeks  Pt already had a lab appt scheduled on 10/16, INR check added to the labs requested.     Education provided: Target INR goal and significance of current INR result and Contact the anticoagulation clinic with any changes, questions or concerns at #[phone]       Lynda verbalizes understanding and agrees to warfarin dosing plan.    Instructed to call the Anticoagulation Clinic for any changes, questions or concerns. (#[phone])        Eliazar Ellis RN      OBJECTIVE:  Recent labs: (last 7 days)     10/06/20  1050   INR 1.90*         No question data found.  Anticoagulation Summary  As of 10/6/2020    INR goal:  2.0-3.0   TTR:  75.1 % (1 y)   INR used for dosin.90 (10/6/2020)   Warfarin maintenance plan:  5 mg (5 mg x 1) every Mon, Wed, Fri; 2.5 mg (5 mg x 0.5) all other days   Full warfarin instructions:  5 mg every Mon, Wed, Fri; 2.5 mg all other days   Weekly warfarin total:  25 mg   No change documented:  Caleb, Eliazar, RN   Plan last modified:  Shagufta Bell RN (2020)   Next INR check:  10/16/2020   Priority:  Maintenance   Target end date:  Indefinite    Indications     Chronic atrial fibrillation (H) [I48.20]  Status post catheter ablation of atrial fibrillation [Z98.890]  PVD (peripheral vascular disease) (H) [I73.9]  Anticoagulation goal of INR 2 to 3 [Z51.81  Z79.01]             Anticoagulation Episode Summary     INR check location:      Preferred lab:      Send INR reminders to:  [facility]    Comments:        Anticoagulation Care Providers     Provider Role Specialty Phone number    Estela Davila MD Referring Internal Medicine [phone]

## 2020-10-16 ENCOUNTER — ANTICOAGULATION THERAPY VISIT (OUTPATIENT)
Dept: NURSING | Facility: CLINIC | Age: 80
End: 2020-10-16

## 2020-10-16 DIAGNOSIS — Z79.01 ANTICOAGULATION GOAL OF INR 2 TO 3: ICD-10-CM

## 2020-10-16 DIAGNOSIS — E03.4 HYPOTHYROIDISM DUE TO ACQUIRED ATROPHY OF THYROID: ICD-10-CM

## 2020-10-16 DIAGNOSIS — Z51.81 ANTICOAGULATION GOAL OF INR 2 TO 3: ICD-10-CM

## 2020-10-16 DIAGNOSIS — E11.9 TYPE 2 DIABETES MELLITUS WITHOUT COMPLICATION, WITHOUT LONG-TERM CURRENT USE OF INSULIN (H): ICD-10-CM

## 2020-10-16 DIAGNOSIS — Z98.890 STATUS POST CATHETER ABLATION OF ATRIAL FIBRILLATION: ICD-10-CM

## 2020-10-16 DIAGNOSIS — I48.20 CHRONIC ATRIAL FIBRILLATION (H): ICD-10-CM

## 2020-10-16 DIAGNOSIS — I73.9 PVD (PERIPHERAL VASCULAR DISEASE) (H): ICD-10-CM

## 2020-10-16 DIAGNOSIS — Z79.01 LONG TERM CURRENT USE OF ANTICOAGULANT THERAPY: ICD-10-CM

## 2020-10-16 DIAGNOSIS — E78.5 HYPERLIPIDEMIA LDL GOAL <100: ICD-10-CM

## 2020-10-16 LAB
ANION GAP SERPL CALCULATED.3IONS-SCNC: 4 MMOL/L (ref 3–14)
BUN SERPL-MCNC: 19 MG/DL (ref 7–30)
CALCIUM SERPL-MCNC: 8.9 MG/DL (ref 8.5–10.1)
CHLORIDE SERPL-SCNC: 106 MMOL/L (ref 94–109)
CHOLEST SERPL-MCNC: 148 MG/DL
CO2 SERPL-SCNC: 31 MMOL/L (ref 20–32)
CREAT SERPL-MCNC: 0.81 MG/DL (ref 0.52–1.04)
CREAT UR-MCNC: 71 MG/DL
ERYTHROCYTE [DISTWIDTH] IN BLOOD BY AUTOMATED COUNT: 13.7 % (ref 10–15)
GFR SERPL CREATININE-BSD FRML MDRD: 68 ML/MIN/{1.73_M2}
GLUCOSE SERPL-MCNC: 180 MG/DL (ref 70–99)
HBA1C MFR BLD: 8.3 % (ref 0–5.6)
HCT VFR BLD AUTO: 44 % (ref 35–47)
HDLC SERPL-MCNC: 51 MG/DL
HGB BLD-MCNC: 14.5 G/DL (ref 11.7–15.7)
INR PPP: 1.9 (ref 0.86–1.14)
LDLC SERPL CALC-MCNC: 72 MG/DL
MCH RBC QN AUTO: 30.1 PG (ref 26.5–33)
MCHC RBC AUTO-ENTMCNC: 33 G/DL (ref 31.5–36.5)
MCV RBC AUTO: 91 FL (ref 78–100)
MICROALBUMIN UR-MCNC: 6 MG/L
MICROALBUMIN/CREAT UR: 8.49 MG/G CR (ref 0–25)
NONHDLC SERPL-MCNC: 97 MG/DL
PLATELET # BLD AUTO: 349 10E9/L (ref 150–450)
POTASSIUM SERPL-SCNC: 4.1 MMOL/L (ref 3.4–5.3)
RBC # BLD AUTO: 4.82 10E12/L (ref 3.8–5.2)
SODIUM SERPL-SCNC: 141 MMOL/L (ref 133–144)
T4 FREE SERPL-MCNC: 1.16 NG/DL (ref 0.76–1.46)
TRIGL SERPL-MCNC: 124 MG/DL
TSH SERPL DL<=0.005 MIU/L-ACNC: 5.52 MU/L (ref 0.4–4)
WBC # BLD AUTO: 8.9 10E9/L (ref 4–11)

## 2020-10-16 PROCEDURE — 99207 PR NO CHARGE NURSE ONLY: CPT | Performed by: INTERNAL MEDICINE

## 2020-10-16 PROCEDURE — 80048 BASIC METABOLIC PNL TOTAL CA: CPT | Performed by: INTERNAL MEDICINE

## 2020-10-16 PROCEDURE — 82043 UR ALBUMIN QUANTITATIVE: CPT | Performed by: INTERNAL MEDICINE

## 2020-10-16 PROCEDURE — 84439 ASSAY OF FREE THYROXINE: CPT | Performed by: INTERNAL MEDICINE

## 2020-10-16 PROCEDURE — 83036 HEMOGLOBIN GLYCOSYLATED A1C: CPT | Performed by: INTERNAL MEDICINE

## 2020-10-16 PROCEDURE — 85027 COMPLETE CBC AUTOMATED: CPT | Performed by: INTERNAL MEDICINE

## 2020-10-16 PROCEDURE — 36415 COLL VENOUS BLD VENIPUNCTURE: CPT | Performed by: INTERNAL MEDICINE

## 2020-10-16 PROCEDURE — 84443 ASSAY THYROID STIM HORMONE: CPT | Performed by: INTERNAL MEDICINE

## 2020-10-16 PROCEDURE — 80061 LIPID PANEL: CPT | Performed by: INTERNAL MEDICINE

## 2020-10-16 PROCEDURE — 85610 PROTHROMBIN TIME: CPT | Performed by: INTERNAL MEDICINE

## 2020-10-16 RX ORDER — LEVOTHYROXINE SODIUM 100 UG/1
TABLET ORAL
Qty: 90 TABLET | Refills: 1 | Status: SHIPPED | OUTPATIENT
Start: 2020-10-16 | End: 2021-04-13

## 2020-10-16 NOTE — RESULT ENCOUNTER NOTE
Clementina Cooper    Enclosed are your results.  Your labs are normal/stable at this time. HgbA1C has improved, but not yet quite at goal.   I look forward to seeing you soon!    Please call  with any questions.  We can also discuss any questions regarding these labs at your next scheduled visit.    Sincerely,    Estela Davila M.D.

## 2020-10-16 NOTE — LETTER
[facility]   [address] [place]  [address]  [phone]                                   October 16, 2020    Clementina Cooper  [address] [facility] [address]        Dear Clementina,    Enclosed are your results.  Your labs are normal/stable at this time. HgbA1C has improved, but not yet quite at goal.   I look forward to seeing you soon!     Please call  with any questions.  We can also discuss any questions regarding these labs at your next scheduled visit.     Results for orders placed or performed in visit on 10/16/20   Lipid panel reflex to direct LDL Fasting     Status: None   Result Value Ref Range    Cholesterol 148 <200 mg/dL    Triglycerides 124 <150 mg/dL    HDL Cholesterol 51 >49 mg/dL    LDL Cholesterol Calculated 72 <100 mg/dL    Non HDL Cholesterol 97 <130 mg/dL   CBC with platelets     Status: None   Result Value Ref Range    WBC 8.9 4.0 - 11.0 10e9/L    RBC Count 4.82 3.8 - 5.2 10e12/L    Hemoglobin 14.5 11.7 - 15.7 g/dL    Hematocrit 44.0 35.0 - 47.0 %    MCV 91 78 - 100 fl    MCH 30.1 26.5 - 33.0 pg    MCHC 33.0 31.5 - 36.5 g/dL    RDW 13.7 10.0 - 15.0 %    Platelet Count 349 150 - 450 10e9/L   TSH with free T4 reflex     Status: Abnormal   Result Value Ref Range    TSH 5.52 (H) 0.40 - 4.00 mU/L   Hemoglobin A1c     Status: Abnormal   Result Value Ref Range    Hemoglobin A1C 8.3 (H) 0 - 5.6 %   Basic metabolic panel     Status: Abnormal   Result Value Ref Range    Sodium 141 133 - 144 mmol/L    Potassium 4.1 3.4 - 5.3 mmol/L    Chloride 106 94 - 109 mmol/L    Carbon Dioxide 31 20 - 32 mmol/L    Anion Gap 4 3 - 14 mmol/L    Glucose 180 (H) 70 - 99 mg/dL    Urea Nitrogen 19 7 - 30 mg/dL    Creatinine 0.81 0.52 - 1.04 mg/dL    GFR Estimate 68 >60 mL/min/[1.73_m2]    GFR Estimate If Black 79 >60 mL/min/[1.73_m2]    Calcium 8.9 8.5 - 10.1 mg/dL   INR     Status: Abnormal   Result Value Ref Range    INR 1.90 (H) 0.86 - 1.14   T4  free     Status: None   Result Value Ref Range    T4 Free 1.16 0.76 - 1.46 ng/dL       If you have any questions please call the clinic at [phone]    Sincerely,    Estela Davila MD   bmd

## 2020-10-16 NOTE — PROGRESS NOTES
Anticoagulation Management    Unable to reach Lynda today.    Today's INR result of 1.9 is subtherapeutic (goal INR of 2.0-3.0).  Result received from: Clinic Lab    Follow up required to confirm warfarin dose taken and assess for changes    Left message to call Pearl      Anticoagulation clinic to follow up    Daniella Mendez RN  [phone]  ANTICOAGULATION FOLLOW-UP CLINIC VISIT    Patient Name:  Clementina Cooper  Date:  10/16/2020  Contact Type:  Telephone    SUBJECTIVE:  Patient Findings         Clinical Outcomes     Negatives:  Major bleeding event, Thromboembolic event, Anticoagulation-related hospital admission, Anticoagulation-related ED visit, Anticoagulation-related fatality           OBJECTIVE    Recent labs: (last 7 days)     10/16/20  0726   INR 1.90*       ASSESSMENT / PLAN  INR assessment SUB    Recheck INR In: 2 WEEKS    INR Location Clinic      Anticoagulation Summary  As of 10/16/2020    INR goal:  2.0-3.0   TTR:  72.5 % (1 y)   INR used for dosin.90 (10/16/2020)   Warfarin maintenance plan:  2.5 mg (5 mg x 0.5) every Sun, Tue, Thu; 5 mg (5 mg x 1) all other days   Full warfarin instructions:  2.5 mg every Sun, Tue, Thu; 5 mg all other days   Weekly warfarin total:  27.5 mg   Plan last modified:  Daniella Mendez RN (10/16/2020)   Next INR check:  10/30/2020   Priority:  Maintenance   Target end date:  Indefinite    Indications    Chronic atrial fibrillation (H) [I48.20]  Status post catheter ablation of atrial fibrillation [Z98.890]  PVD (peripheral vascular disease) (H) [I73.9]  Anticoagulation goal of INR 2 to 3 [Z51.81  Z79.01]             Anticoagulation Episode Summary     INR check location:      Preferred lab:      Send INR reminders to:  [facility]    Comments:        Anticoagulation Care Providers     Provider Role Specialty Phone number    Estela Davila MD Referring Internal Medicine [phone]            See the Encounter Report to view  Anticoagulation Flowsheet and Dosing Calendar (Go to Encounters tab in chart review, and find the Anticoagulation Therapy Visit)    Dosage adjustment made based on physician directed care plan. Per protocol and since two sub therapeutic in a row, increased weekly dose by 10%. Lowest amount with the tablet size patient has available. Recheck in 2 weeks.    Patient states negative for signs and symptoms of bleeding or blood clots, changes in medication, changes in diet, any signs of infection or antibiotic use, anything new OTC or herbal medications, any missed or extra doses of the warfarin.    Patient ask if refill of warfarin is needed. Rx sent no      Daniella Mendez RN

## 2020-10-22 ENCOUNTER — OFFICE VISIT (OUTPATIENT)
Dept: FAMILY MEDICINE | Facility: CLINIC | Age: 80
End: 2020-10-22
Payer: COMMERCIAL

## 2020-10-22 VITALS
HEART RATE: 72 BPM | OXYGEN SATURATION: 95 % | TEMPERATURE: 97.4 F | DIASTOLIC BLOOD PRESSURE: 77 MMHG | WEIGHT: 197 LBS | BODY MASS INDEX: 32.28 KG/M2 | SYSTOLIC BLOOD PRESSURE: 127 MMHG

## 2020-10-22 DIAGNOSIS — I48.20 CHRONIC ATRIAL FIBRILLATION (H): ICD-10-CM

## 2020-10-22 DIAGNOSIS — E03.4 HYPOTHYROIDISM DUE TO ACQUIRED ATROPHY OF THYROID: ICD-10-CM

## 2020-10-22 DIAGNOSIS — E11.9 TYPE 2 DIABETES MELLITUS WITHOUT COMPLICATION, WITHOUT LONG-TERM CURRENT USE OF INSULIN (H): Primary | ICD-10-CM

## 2020-10-22 DIAGNOSIS — Z79.01 LONG TERM CURRENT USE OF ANTICOAGULANT THERAPY: ICD-10-CM

## 2020-10-22 DIAGNOSIS — M70.61 TROCHANTERIC BURSITIS OF RIGHT HIP: ICD-10-CM

## 2020-10-22 DIAGNOSIS — Z78.0 ASYMPTOMATIC POSTMENOPAUSAL STATUS: ICD-10-CM

## 2020-10-22 PROCEDURE — 99214 OFFICE O/P EST MOD 30 MIN: CPT | Performed by: INTERNAL MEDICINE

## 2020-10-22 RX ORDER — GLIPIZIDE 2.5 MG/1
TABLET, EXTENDED RELEASE ORAL
Qty: 270 TABLET | Refills: 3 | Status: SHIPPED | OUTPATIENT
Start: 2020-10-22 | End: 2020-11-23

## 2020-10-22 NOTE — PROGRESS NOTES
Subjective     Clementina Cooper is a 80 year old female who presents to clinic today for the following health issues:    81 y/o F here for DM f/u.   H/o SNHL, PVD, DMII, chronic Afib (coumadin), s/p Pulm Vein Isolation for Afib 2018,  hypothyroid and essential tremor   Recent A1C is 8.3, which is improved from 3/2020.   Metformin had to be reduced due to GI symptoms and low dose glipizide was introduced.          HPI         Diabetes Follow-up    How often are you checking your blood sugar? One time daily  What time of day are you checking your blood sugars (select all that apply)?  Before meals  Have you had any blood sugars above 200?  Yes 204  Have you had any blood sugars below 70?  no    What symptoms do you notice when your blood sugar is low?  sweaty    What concerns do you have today about your diabetes? None     Do you have any of these symptoms? (Select all that apply)  No numbness or tingling in feet.  No redness, sores or blisters on feet.  No complaints of excessive thirst.  No reports of blurry vision.  No significant changes to weight.    Have you had a diabetic eye exam in the last 12 months? No        BP Readings from Last 2 Encounters:   10/22/20 127/77   09/21/20 128/56     Hemoglobin A1C (%)   Date Value   10/16/2020 8.3 (H)   03/30/2020 9.0 (H)     LDL Cholesterol Calculated (mg/dL)   Date Value   10/16/2020 72   05/21/2019 73            How many servings of fruits and vegetables do you eat daily?  0-1    On average, how many sweetened beverages do you drink each day (Examples: soda, juice, sweet tea, etc.  Do NOT count diet or artificially sweetened beverages)?   1    How many days per week do you exercise enough to make your heart beat faster? 3 or less    How many minutes a day do you exercise enough to make your heart beat faster? 20 - 29    How many days per week do you miss taking your medication? 0         Review of Systems   Constitutional, HEENT, cardiovascular, pulmonary, gi and gu  systems are negative, except as otherwise noted.  Hip trochanter bothers her.  The injections really help.  She was told by ortho to stop injections.  In past, physical therapy did not work.  She gets hip pain when walking a couple of blocks.          Objective    /77 (BP Location: Right arm, Patient Position: Sitting, Cuff Size: Adult Regular)   Pulse 72   Temp 97.4  F (36.3  C) (Oral)   Wt 89.4 kg (197 lb)   SpO2 95%   BMI 32.28 kg/m    Body mass index is 32.28 kg/m .  Physical Exam   GENERAL: healthy, alert and no distress  NECK: no adenopathy, no asymmetry, masses, or scars and thyroid normal to palpation  RESP: lungs clear to auscultation - no rales, rhonchi or wheezes  CV: regular rate and rhythm, normal S1 S2, no S3 or S4, no murmur, click or rub, no peripheral edema and peripheral pulses strong  ABDOMEN: soft, nontender, no hepatosplenomegaly, no masses and bowel sounds normal  MS: no gross musculoskeletal defects noted, no edema  Diabetic foot exam: normal DP and PT pulses, no trophic changes or ulcerative lesions and slightly diminishe sensory exam bilaterally to vibr sense.              Assessment & Plan     Clementina was seen today for diabetes.    Diagnoses and all orders for this visit:    Type 2 diabetes mellitus without complication, without long-term current use of insulin (H)  -     Hemoglobin A1c; Future  -     glipiZIDE (GLUCOTROL XL) 2.5 MG 24 hr tablet; Two in the morning, one at supper    Chronic atrial fibrillation (H)    Long-term (current) use of anticoagulants [Z79.01]    Hypothyroidism due to acquired atrophy of thyroid  -     TSH with free T4 reflex; Future    Trochanteric bursitis of right hip    Asymptomatic postmenopausal status  -     DX Hip/Pelvis/Spine; Future         Agreeable to dexa.   Patient Instructions   Return to clinic 6 months (April 2021) with labs prior.     Increase glipizide to 2 tabs (5mg) in the morning, and 1 tab (2.5 mg) at supper    Call to schedule  "imaging   --Bone Density      She might want hip bursa injection at next visit.          BMI:   Estimated body mass index is 32.28 kg/m  as calculated from the following:    Height as of 9/21/20: 1.664 m (5' 5.5\").    Weight as of this encounter: 89.4 kg (197 lb).     She has lost weight.        See Patient Instructions    No follow-ups on file.    Estela Davila MD  [facility]    "

## 2020-10-30 ENCOUNTER — ANTICOAGULATION THERAPY VISIT (OUTPATIENT)
Dept: NURSING | Facility: CLINIC | Age: 80
End: 2020-10-30

## 2020-10-30 DIAGNOSIS — I73.9 PVD (PERIPHERAL VASCULAR DISEASE) (H): ICD-10-CM

## 2020-10-30 DIAGNOSIS — Z79.01 LONG TERM CURRENT USE OF ANTICOAGULANT THERAPY: ICD-10-CM

## 2020-10-30 DIAGNOSIS — Z51.81 ANTICOAGULATION GOAL OF INR 2 TO 3: ICD-10-CM

## 2020-10-30 DIAGNOSIS — Z79.01 ANTICOAGULATION GOAL OF INR 2 TO 3: ICD-10-CM

## 2020-10-30 DIAGNOSIS — I48.20 CHRONIC ATRIAL FIBRILLATION (H): ICD-10-CM

## 2020-10-30 DIAGNOSIS — Z98.890 STATUS POST CATHETER ABLATION OF ATRIAL FIBRILLATION: ICD-10-CM

## 2020-10-30 LAB
CAPILLARY BLOOD COLLECTION: NORMAL
INR PPP: 1.8 (ref 0.86–1.14)

## 2020-10-30 PROCEDURE — 36415 COLL VENOUS BLD VENIPUNCTURE: CPT | Performed by: INTERNAL MEDICINE

## 2020-10-30 PROCEDURE — 85610 PROTHROMBIN TIME: CPT | Performed by: INTERNAL MEDICINE

## 2020-10-30 NOTE — PROGRESS NOTES
ANTICOAGULATION MANAGEMENT     Patient Name:  Clementina Cooper  Date:  10/30/2020    ASSESSMENT /SUBJECTIVE:    Today's INR result of 1.8 is subtherapeutic. Goal INR of 2.0-3.0      Warfarin dose taken: Warfarin taken as instructed    Diet: No new diet changes affecting INR    Medication changes/ interactions: No interaction expected between glipizide and warfarin    Previous INR: Subtherapeutic     S/S of bleeding or thromboembolism: No    New injury or illness: No    Upcoming surgery, procedure or cardioversion: No    Additional findings: None      PLAN:    Telephone call with Clementina regarding INR result and instructed:     Warfarin Dosing Instructions: Change your warfarin dose to 2.5 mg Sun/Thu and 5 mg ROW    Instructed patient to follow up no later than: 2 weeks  Lab visit scheduled    Education provided: Target INR goal and significance of current INR result      Lynda verbalizes understanding and agrees to warfarin dosing plan.    Instructed to call the Anticoagulation Clinic for any changes, questions or concerns. (#[phone])        Lou Santiago RN      OBJECTIVE:  Recent labs: (last 7 days)     10/30/20  0927   INR 1.80*         No question data found.  Anticoagulation Summary  As of 10/30/2020    INR goal:  2.0-3.0   TTR:  68.7 % (1 y)   INR used for dosin.80 (10/30/2020)   Warfarin maintenance plan:  2.5 mg (5 mg x 0.5) every Sun, Thu; 5 mg (5 mg x 1) all other days   Full warfarin instructions:  10/30: 7.5 mg; Otherwise 2.5 mg every Sun, Thu; 5 mg all other days   Weekly warfarin total:  30 mg   Plan last modified:  Lou Santiago, RN (10/30/2020)   Next INR check:  2020   Priority:  Maintenance   Target end date:  Indefinite    Indications    Chronic atrial fibrillation (H) [I48.20]  Status post catheter ablation of atrial fibrillation [Z98.890]  PVD (peripheral vascular disease) (H) [I73.9]  Anticoagulation goal of INR 2 to 3 [Z51.81  Z79.01]             Anticoagulation Episode  Summary     INR check location:      Preferred lab:      Send INR reminders to:  [facility]    Comments:        Anticoagulation Care Providers     Provider Role Specialty Phone number    Estela Davila MD Referring Internal Medicine [phone]

## 2020-11-12 ENCOUNTER — TELEPHONE (OUTPATIENT)
Dept: FAMILY MEDICINE | Facility: CLINIC | Age: 80
End: 2020-11-12

## 2020-11-12 DIAGNOSIS — E11.9 TYPE 2 DIABETES MELLITUS WITHOUT COMPLICATION, WITHOUT LONG-TERM CURRENT USE OF INSULIN (H): ICD-10-CM

## 2020-11-12 NOTE — TELEPHONE ENCOUNTER
I called patient and advised her of [facility]' advice.    Patient verbalized understanding of and agreement with plan.    Postponing encounter a week to follow up and make sure she called.    Anna Bruno RN  [facility]

## 2020-11-12 NOTE — TELEPHONE ENCOUNTER
Patient was seen 10/22/20 by [facility] for diabetes.    Patient Instructions   Return to clinic 6 months (April 2021) with labs prior.      Increase glipizide to 2 tabs (5mg) in the morning, and 1 tab (2.5 mg) at supper    Appears she was on only 2.5 mg once daily prior to visit.    I called and spoke to patient, says the fatigue and nausea started shortly after increasing the glipizide; she says she cut her dosing back to 1 tab BID a few days ago and still nauseated, she did not take her glipizide this AM, is still taking the daily metformin.    Denies vomiting or diarrhea or fever.   States blood sugars have been 150's to 180's.    Routed to [facility] to advise on issue.    Anna Bruno RN  [facility]

## 2020-11-12 NOTE — TELEPHONE ENCOUNTER
Reason for Call:  Other     Detailed comments: Patient said that since taking glipizide she is experiencing fatigue and nausea.  Please advise [facility] pharmacy      Phone Number Patient can be reached at: Home number on file [phone] (home)    Best Time:     Can we leave a detailed message on this number? YES    Call taken on 11/12/2020 at 11:46 AM by Martha Park

## 2020-11-12 NOTE — TELEPHONE ENCOUNTER
Trial hold metformin    Continue current management glipizide 2.5 mg (2 tabs [5mg] in am, 1 tab [2.5 mg] in pm)      Update next week.

## 2020-11-13 ENCOUNTER — ANTICOAGULATION THERAPY VISIT (OUTPATIENT)
Dept: NURSING | Facility: CLINIC | Age: 80
End: 2020-11-13

## 2020-11-13 DIAGNOSIS — Z79.01 LONG TERM CURRENT USE OF ANTICOAGULANT THERAPY: ICD-10-CM

## 2020-11-13 DIAGNOSIS — Z79.01 ANTICOAGULATION GOAL OF INR 2 TO 3: ICD-10-CM

## 2020-11-13 DIAGNOSIS — Z51.81 ANTICOAGULATION GOAL OF INR 2 TO 3: ICD-10-CM

## 2020-11-13 DIAGNOSIS — Z98.890 STATUS POST CATHETER ABLATION OF ATRIAL FIBRILLATION: ICD-10-CM

## 2020-11-13 DIAGNOSIS — I48.20 CHRONIC ATRIAL FIBRILLATION (H): ICD-10-CM

## 2020-11-13 DIAGNOSIS — I73.9 PVD (PERIPHERAL VASCULAR DISEASE) (H): ICD-10-CM

## 2020-11-13 LAB
CAPILLARY BLOOD COLLECTION: NORMAL
INR PPP: 3.7 (ref 0.86–1.14)

## 2020-11-13 PROCEDURE — 36416 COLLJ CAPILLARY BLOOD SPEC: CPT | Performed by: INTERNAL MEDICINE

## 2020-11-13 PROCEDURE — 85610 PROTHROMBIN TIME: CPT | Performed by: INTERNAL MEDICINE

## 2020-11-13 NOTE — PROGRESS NOTES
Anticoagulation Management    Unable to reach Lynda today.    Today's INR result of 3.7 is supratherapeutic (goal INR of 2.0-3.0).  Result received from: Clinic Lab    Follow up required to confirm warfarin dose taken and assess for changes    Left message to call [phone]     Tentative plan: hold today then change maintenance dose to 2.5 mg Sun/Tue/Thu and 5 mg ROW and recheck INR in 2 weeks    Anticoagulation clinic to follow up    Lou Santiago RN

## 2020-11-13 NOTE — PROGRESS NOTES
Anticoagulation Management    Unable to reach Lynda (2nd attempt) today.    Left message to Hold today, take 5 mg Sat and 2.5 mg Sun this weekend.      Anticoagulation clinic to follow up    Lou Santiago RN

## 2020-11-16 NOTE — PROGRESS NOTES
ANTICOAGULATION MANAGEMENT     Patient Name:  Clementina Cooper  Date:  11/16/2020    ASSESSMENT /SUBJECTIVE:    Friday's INR result of 3.7 is supratherapeutic. Goal INR of 2.0-3.0      Warfarin dose taken: Warfarin taken as instructed, and patient confirms she did hold warfarin on Friday as instructed.     Diet: No new diet changes affecting INR    Medication changes/ interactions: No new medications/supplements affecting INR    Previous INR: Subtherapeutic     S/S of bleeding or thromboembolism: No    New injury or illness: No    Upcoming surgery, procedure or cardioversion: No    Additional findings: None      PLAN:    Telephone call with Clementina regarding INR result and instructed:     Warfarin Dosing Instructions: Change your warfarin dose to      2.5 mg every Sun, Tue, Thu; 5 mg all other days         Instructed patient to follow up no later than: 2 weeks  Lab visit scheduled    Education provided: Target INR goal and significance of current INR result      Lynda verbalizes understanding and agrees to warfarin dosing plan.    Instructed to call the Anticoagulation Clinic for any changes, questions or concerns. (#[phone])        Shelby Sanford RN      OBJECTIVE:  Recent labs: (last 7 days)     11/13/20  0913   INR 3.70*         No question data found.  Anticoagulation Summary  As of 11/13/2020    INR goal:  2.0-3.0   TTR:  70.7 % (1 y)   INR used for dosing:  3.70 (11/13/2020)   Warfarin maintenance plan:  2.5 mg (5 mg x 0.5) every Sun, Tue, Thu; 5 mg (5 mg x 1) all other days   Full warfarin instructions:  11/13: Hold; Otherwise 2.5 mg every Sun, Tue, Thu; 5 mg all other days   Weekly warfarin total:  27.5 mg   Plan last modified:  Lou Santiago RN (11/13/2020)   Next INR check:  11/30/2020   Priority:  Maintenance   Target end date:  Indefinite    Indications    Chronic atrial fibrillation (H) [I48.20]  Status post catheter ablation of atrial fibrillation [Z98.890]  PVD (peripheral vascular  disease) (H) [I73.9]  Anticoagulation goal of INR 2 to 3 [Z51.81  Z79.01]             Anticoagulation Episode Summary     INR check location:      Preferred lab:      Send INR reminders to:  [facility]    Comments:        Anticoagulation Care Providers     Provider Role Specialty Phone number    Estela Davila MD Referring Internal Medicine [phone]

## 2020-11-19 NOTE — TELEPHONE ENCOUNTER
Patient left voice mail - states something wrong with her voice mail and could not hear message. Please return call.      Crista Kay RN  Triage Nurse  [facility]  Appointment line: [phone]  [facility], 24 hour nurse line, available by calling clinic at [phone] and following prompts.

## 2020-11-19 NOTE — TELEPHONE ENCOUNTER
Attempted to call patient at home/mobile number, left message on voicemail; patient was instructed to return call to [facility] RN directly on the RN call back line at [phone]   Anna Bruno RN  [facility]

## 2020-11-20 NOTE — TELEPHONE ENCOUNTER
"I called and spoke to patient, she says she feels \"fine\" now that she is off the metformin.    She says her AM blood sugar today was 190.   Says it runs about 180-190 routinely, even on the metformin.    Routed to [facility] to address any further changes or concerns.    Anna Bruno RN  [facility]      "

## 2020-11-23 RX ORDER — GLIPIZIDE 5 MG/1
5 TABLET, FILM COATED, EXTENDED RELEASE ORAL
Qty: 180 TABLET | Refills: 1 | Status: SHIPPED | OUTPATIENT
Start: 2020-11-23 | End: 2020-12-08

## 2020-11-23 NOTE — TELEPHONE ENCOUNTER
Patient returned call to RN line and left message for call back to her.    I called her back, she is willing to increase the glipizide dosing and will take 2.5 mg tabs as 2 in the AM and 2 in the PM.    Routed to [facility] to send new Rx.  I advised patient would likely be 5 mg and 5 mg, call pharmacy when running out of the 2.5 mg tabs.      Patient verbalized understanding of and agreement with plan.      Anna Bruno RN  [facility]

## 2020-11-23 NOTE — TELEPHONE ENCOUNTER
Attempted to call patient at home/mobile number, left message on voicemail; patient was instructed to return call to [facility] RN directly on the RN call back line at [phone]   Anna Bruno RN  [facility]

## 2020-12-01 ENCOUNTER — ANTICOAGULATION THERAPY VISIT (OUTPATIENT)
Dept: FAMILY MEDICINE | Facility: CLINIC | Age: 80
End: 2020-12-01

## 2020-12-01 DIAGNOSIS — Z79.01 LONG TERM CURRENT USE OF ANTICOAGULANT THERAPY: ICD-10-CM

## 2020-12-01 DIAGNOSIS — Z98.890 STATUS POST CATHETER ABLATION OF ATRIAL FIBRILLATION: ICD-10-CM

## 2020-12-01 DIAGNOSIS — Z79.01 ANTICOAGULATION GOAL OF INR 2 TO 3: ICD-10-CM

## 2020-12-01 DIAGNOSIS — Z51.81 ANTICOAGULATION GOAL OF INR 2 TO 3: ICD-10-CM

## 2020-12-01 DIAGNOSIS — I73.9 PVD (PERIPHERAL VASCULAR DISEASE) (H): ICD-10-CM

## 2020-12-01 DIAGNOSIS — I48.20 CHRONIC ATRIAL FIBRILLATION (H): ICD-10-CM

## 2020-12-01 LAB
CAPILLARY BLOOD COLLECTION: NORMAL
INR PPP: 2.4 (ref 0.86–1.14)

## 2020-12-01 PROCEDURE — 85610 PROTHROMBIN TIME: CPT | Performed by: INTERNAL MEDICINE

## 2020-12-01 PROCEDURE — 36416 COLLJ CAPILLARY BLOOD SPEC: CPT | Performed by: INTERNAL MEDICINE

## 2020-12-01 NOTE — PROGRESS NOTES
ANTICOAGULATION MANAGEMENT     Patient Name:  Clementina Cooper  Date:  2020    ASSESSMENT /SUBJECTIVE:    Today's INR result of 2.40 is therapeutic. Goal INR of 2.0-3.0      Warfarin dose taken: Less warfarin taken than planned which may be affecting INR - Patient took 2.5mg on Sat vs 5mg, reports she had previously taken this dose, adjustment made to maintenance plan to reflect what the pt has been taking and will continue with that dosing plan as she is therapeutic.     Diet: No new diet changes affecting INR    Medication changes/ interactions: No new medications/supplements affecting INR    Previous INR: Supratherapeutic     S/S of bleeding or thromboembolism: No    New injury or illness: No    Upcoming surgery, procedure or cardioversion: No    Additional findings: None      PLAN:    Telephone call with  Lynda regarding INR result and instructed:     Warfarin Dosing Instructions: Adjustment made to maintence plan to reflect what the pt has been taking over the last 3 weeks. Pt will continue to take 5mg every Mon, Wed, & Fri; 2.5mg all other days of the week.    Instructed patient to follow up no later than: 3 weeks  Lab visit scheduled    Education provided: Target INR goal and significance of current INR result, Importance of therapeutic range and Importance of taking warfarin as instructed      Lynda  verbalizes understanding and agrees to warfarin dosing plan.    Instructed to call the Anticoagulation Clinic for any changes, questions or concerns. (#[phone])        Eliazar Peterson RN      OBJECTIVE:  Recent labs: (last 7 days)     20  1042   INR 2.40*         No question data found.  Anticoagulation Summary  As of 2020    INR goal:  2.0-3.0   TTR:  70.1 % (1 y)   INR used for dosin.40 (2020)   Warfarin maintenance plan:  5 mg (5 mg x 1) every Mon, Wed, Fri; 2.5 mg (5 mg x 0.5) all other days   Full warfarin instructions:  5 mg every Mon, Wed, Fri; 2.5 mg all other days   Weekly  warfarin total:  25 mg   Plan last modified:  Eliazar Peterson, RN (12/1/2020)   Next INR check:  12/22/2020   Priority:  Maintenance   Target end date:  Indefinite    Indications    Chronic atrial fibrillation (H) [I48.20]  Status post catheter ablation of atrial fibrillation [Z98.890]  PVD (peripheral vascular disease) (H) [I73.9]  Anticoagulation goal of INR 2 to 3 [Z51.81  Z79.01]             Anticoagulation Episode Summary     INR check location:      Preferred lab:      Send INR reminders to:  [facility]    Comments:        Anticoagulation Care Providers     Provider Role Specialty Phone number    Estela Davila MD Referring Internal Medicine [phone]

## 2020-12-08 ENCOUNTER — TELEPHONE (OUTPATIENT)
Dept: INTERNAL MEDICINE | Facility: CLINIC | Age: 80
End: 2020-12-08

## 2020-12-08 DIAGNOSIS — E11.9 TYPE 2 DIABETES MELLITUS WITHOUT COMPLICATION, WITHOUT LONG-TERM CURRENT USE OF INSULIN (H): ICD-10-CM

## 2020-12-08 RX ORDER — GLIPIZIDE 5 MG/1
TABLET, FILM COATED, EXTENDED RELEASE ORAL
Qty: 270 TABLET | Refills: 3 | Status: SHIPPED | OUTPATIENT
Start: 2020-12-08 | End: 2021-01-21

## 2020-12-22 ENCOUNTER — ANTICOAGULATION THERAPY VISIT (OUTPATIENT)
Dept: FAMILY MEDICINE | Facility: CLINIC | Age: 80
End: 2020-12-22

## 2020-12-22 DIAGNOSIS — Z51.81 ANTICOAGULATION GOAL OF INR 2 TO 3: ICD-10-CM

## 2020-12-22 DIAGNOSIS — I48.20 CHRONIC ATRIAL FIBRILLATION (H): ICD-10-CM

## 2020-12-22 DIAGNOSIS — I73.9 PVD (PERIPHERAL VASCULAR DISEASE) (H): ICD-10-CM

## 2020-12-22 DIAGNOSIS — Z98.890 STATUS POST CATHETER ABLATION OF ATRIAL FIBRILLATION: ICD-10-CM

## 2020-12-22 DIAGNOSIS — Z79.01 LONG TERM CURRENT USE OF ANTICOAGULANT THERAPY: ICD-10-CM

## 2020-12-22 DIAGNOSIS — Z79.01 ANTICOAGULATION GOAL OF INR 2 TO 3: ICD-10-CM

## 2020-12-22 LAB
CAPILLARY BLOOD COLLECTION: NORMAL
INR PPP: 2.5 (ref 0.86–1.14)

## 2020-12-22 PROCEDURE — 85610 PROTHROMBIN TIME: CPT | Performed by: INTERNAL MEDICINE

## 2020-12-22 PROCEDURE — 36416 COLLJ CAPILLARY BLOOD SPEC: CPT | Performed by: INTERNAL MEDICINE

## 2020-12-22 NOTE — PROGRESS NOTES
Anticoagulation Management    Unable to reach Lynda today.    Today's INR result of 2.50 is therapeutic (goal INR of 2.0-3.0).  Result received from: Clinic Lab    Follow up required to confirm warfarin dose taken and assess for changes    Left message to continue current dose of warfarin 2.5 mg tonight.      Anticoagulation clinic to follow up    Eliazar Peterson RN

## 2020-12-22 NOTE — PROGRESS NOTES
ANTICOAGULATION MANAGEMENT     Patient Name:  Clementina Cooper  Date:  2020    ASSESSMENT /SUBJECTIVE:    Today's INR result of 2.50 is therapeutic. Goal INR of 2.0-3.0      Warfarin dose taken: Warfarin taken as instructed    Diet: No new diet changes affecting INR    Medication changes/ interactions: No new medications/supplements affecting INR    Previous INR: Therapeutic     S/S of bleeding or thromboembolism: No    New injury or illness: No    Upcoming surgery, procedure or cardioversion: No    Additional findings: None      PLAN:    Telephone call with Clementina regarding INR result and instructed:     Warfarin Dosing Instructions: Continue your current warfarin dose 5mg every Mon, Wed, & Fri; 2.5mg all other days of the week.     Instructed patient to follow up no later than: 4 weeks  Lab visit scheduled    Education provided: None required      Lynda verbalizes understanding and agrees to warfarin dosing plan.    Instructed to call the Anticoagulation Clinic for any changes, questions or concerns. (#[phone])        Eliazar Peterson RN      OBJECTIVE:  Recent labs: (last 7 days)     20  1002   INR 2.50*         No question data found.  Anticoagulation Summary  As of 2020    INR goal:  2.0-3.0   TTR:  72.9 % (1 y)   INR used for dosin.50 (2020)   Warfarin maintenance plan:  5 mg (5 mg x 1) every Mon, Wed, Fri; 2.5 mg (5 mg x 0.5) all other days   Full warfarin instructions:  5 mg every Mon, Wed, Fri; 2.5 mg all other days   Weekly warfarin total:  25 mg   Plan last modified:  Eliazar Peterson RN (2020)   Next INR check:  2021   Priority:  Maintenance   Target end date:  Indefinite    Indications    Chronic atrial fibrillation (H) [I48.20]  Status post catheter ablation of atrial fibrillation [Z98.890]  PVD (peripheral vascular disease) (H) [I73.9]  Anticoagulation goal of INR 2 to 3 [Z51.81  Z79.01]             Anticoagulation Episode Summary     INR check location:       Preferred lab:      Send INR reminders to:  [facility]    Comments:        Anticoagulation Care Providers     Provider Role Specialty Phone number    Estela Davila MD Referring Internal Medicine [phone]

## 2020-12-22 NOTE — PROGRESS NOTES
Patient returned call to [facility], unable to reach [facility] nurse.    Please followup with patient when able.    Thanks,   Mary Hunter RN

## 2021-01-08 ENCOUNTER — OFFICE VISIT (OUTPATIENT)
Dept: FAMILY MEDICINE | Facility: CLINIC | Age: 81
End: 2021-01-08
Payer: COMMERCIAL

## 2021-01-08 VITALS
HEART RATE: 72 BPM | BODY MASS INDEX: 32.78 KG/M2 | OXYGEN SATURATION: 96 % | SYSTOLIC BLOOD PRESSURE: 124 MMHG | WEIGHT: 204 LBS | TEMPERATURE: 97.8 F | HEIGHT: 66 IN | DIASTOLIC BLOOD PRESSURE: 68 MMHG

## 2021-01-08 DIAGNOSIS — I48.20 CHRONIC ATRIAL FIBRILLATION (H): ICD-10-CM

## 2021-01-08 DIAGNOSIS — M25.562 LEFT KNEE PAIN, UNSPECIFIED CHRONICITY: Primary | ICD-10-CM

## 2021-01-08 DIAGNOSIS — E11.9 TYPE 2 DIABETES MELLITUS WITHOUT COMPLICATION, WITHOUT LONG-TERM CURRENT USE OF INSULIN (H): ICD-10-CM

## 2021-01-08 DIAGNOSIS — E66.811 OBESITY (BMI 30.0-34.9): ICD-10-CM

## 2021-01-08 PROCEDURE — 99214 OFFICE O/P EST MOD 30 MIN: CPT | Performed by: FAMILY MEDICINE

## 2021-01-08 ASSESSMENT — PAIN SCALES - GENERAL: PAINLEVEL: EXTREME PAIN (9)

## 2021-01-08 ASSESSMENT — MIFFLIN-ST. JEOR: SCORE: 1399.15

## 2021-01-08 NOTE — PROGRESS NOTES
Assessment & Plan     .    ICD-10-CM    1. Left knee pain, unspecified chronicity  M25.562 [facility]   2. Chronic atrial fibrillation (H)  I48.20    3. Type 2 diabetes mellitus without complication, without long-term current use of insulin (H)  E11.9    4. Obesity (BMI 30.0-34.9)  E66.9      Her left knee pain is likely primarily due to underlying osteoarthritis  We discussed options for treatment and elected to have her go to physical therapy for instruction on some range of motion and strengthening exercises  We will avoid NSAIDs because of her use of warfarin and her other comorbidities  I recommended Tylenol as needed for pain  She would be a candidate for a cortisone injection if she does not respond to more conservative treatment above    Her diabetes has not been very well controlled and we discussed the importance of regular exercise and healthy eating for that, along with her medication    Continue same baseline meds and follow-up with PCP prn    Return for a recheck if symptoms worsen or fail to improve.    Kendall Contreras MD  [facility] [place] is a 80 year old who presents to clinic today for the following health issues     HPI       Musculoskeletal problem/pain  Onset/Duration: Couple of weeks  Description  Location: knee - left  Joint Swelling: YES  Redness: no  Pain: YES  Warmth: no  Intensity:  9/10  Progression of Symptoms:  worsening  Accompanying signs and symptoms:   Fevers: no  Numbness/tingling/weakness: no  History  Trauma to the area: no  Recent illness:  no  Previous similar problem: YES  Previous evaluation:  no  Precipitating or alleviating factors:  Aggravating factors include: standing, walking, climbing stairs and bending  Therapies tried and outcome: Tylenol Arthritis helps slightly    She has a known history of osteoarthritis in various areas of her body including neck, knees, and hips.  She had hip and pelvis  x-rays done a little over a year ago and had knee x-rays done in June 2016.  She had C-spine x-rays done in May 2019 and a right shoulder x-ray done in 2/19.  All of these x-rays have showed degenerative osteoarthritic changes.  She has had no recent injury to the left knee.    She is on warfarin for atrial fibrillation.  She is on other medication as below.    Patient Active Problem List   Diagnosis     PVD (peripheral vascular disease) (H)     Family history of colon cancer     HYPERLIPIDEMIA LDL GOAL <100     Open-angle glaucoma, mild-mod, ou     Advanced directives, counseling/discussion     Essential tremor     Pseudophakia, Yag Caps, ou     S/P iridectomy, ou     Anticoagulation goal of INR 2 to 3     Hypothyroidism due to acquired atrophy of thyroid     Chronic atrial fibrillation (H)     overweight  HCC     Long-term (current) use of anticoagulants [Z79.01]     Type 2 diabetes mellitus without complication, without long-term current use of insulin (H)     Glaucoma suspect, bilateral     Status post catheter ablation of atrial fibrillation     Trochanteric bursitis of right hip     SNHL (sensory-neural hearing loss), asymmetrical     Current Outpatient Medications   Medication     ACE NOT PRESCRIBED, INTENTIONAL,     atorvastatin (LIPITOR) 20 MG tablet     Biotin 5000 MCG CAPS     CENTRUM SILVER OR     clobetasol (TEMOVATE) 0.05 % external cream     Evening Primrose Oil 1000 MG CAPS     glipiZIDE (GLUCOTROL XL) 5 MG 24 hr tablet     levothyroxine (SYNTHROID/LEVOTHROID) 100 MCG tablet     losartan (COZAAR) 25 MG tablet     ONETOUCH ULTRA test strip     propranolol (INDERAL) 10 MG tablet     VITAMIN D 1000 UNIT OR TABS     warfarin ANTICOAGULANT 5 MG PO tablet     Current Facility-Administered Medications   Medication     lidocaine 1 % injection 5 mL     triamcinolone (KENALOG-40) injection 40 mg         Review of Systems   Mainly significant for the above      Objective    /68 (BP Location: Right arm,  "Patient Position: Sitting, Cuff Size: Adult Large)   Pulse 72   Temp 97.8  F (36.6  C) (Oral)   Ht 1.664 m (5' 5.5\")   Wt 92.5 kg (204 lb)   SpO2 96%   BMI 33.43 kg/m    Body mass index is 33.43 kg/m .  Physical Exam   GENERAL: alert, no distress, over weight and elderly  MS: Minimal swelling of the left knee.  No warmth or erythema.  She has restricted range of motion with flexion and extension.  She has some generalized discomfort to palpation, but no specific localizing point tenderness.    Previous x-ray results were reviewed, as well as various lab results.            "

## 2021-01-13 ENCOUNTER — OFFICE VISIT (OUTPATIENT)
Dept: OPHTHALMOLOGY | Facility: CLINIC | Age: 81
End: 2021-01-13
Payer: COMMERCIAL

## 2021-01-13 DIAGNOSIS — Z98.890 S/P IRIDECTOMY: ICD-10-CM

## 2021-01-13 DIAGNOSIS — Z96.1 PSEUDOPHAKIA: ICD-10-CM

## 2021-01-13 DIAGNOSIS — Z01.01 ENCOUNTER FOR EXAMINATION OF EYES AND VISION WITH ABNORMAL FINDINGS: Primary | ICD-10-CM

## 2021-01-13 DIAGNOSIS — E11.9 TYPE 2 DIABETES MELLITUS WITHOUT COMPLICATION, WITHOUT LONG-TERM CURRENT USE OF INSULIN (H): ICD-10-CM

## 2021-01-13 DIAGNOSIS — H52.4 PRESBYOPIA: ICD-10-CM

## 2021-01-13 PROCEDURE — 92015 DETERMINE REFRACTIVE STATE: CPT | Performed by: OPHTHALMOLOGY

## 2021-01-13 PROCEDURE — 92014 COMPRE OPH EXAM EST PT 1/>: CPT | Performed by: OPHTHALMOLOGY

## 2021-01-13 ASSESSMENT — REFRACTION_MANIFEST
OD_ADD: +2.75
OS_ADD: +2.75
OD_CYLINDER: SPHERE
OS_CYLINDER: +0.75
OD_SPHERE: -0.50
OS_AXIS: 017
OS_SPHERE: -0.75

## 2021-01-13 ASSESSMENT — SLIT LAMP EXAM - LIDS
COMMENTS: 1+ DERMATOCHALASIS - UPPER LID
COMMENTS: 1+ DERMATOCHALASIS - UPPER LID

## 2021-01-13 ASSESSMENT — VISUAL ACUITY
OD_SC: 20/40
OS_SC+: -1
OS_SC: J3
METHOD: SNELLEN - LINEAR
OD_SC: J10
OS_SC: 20/40

## 2021-01-13 ASSESSMENT — CONF VISUAL FIELD
OD_NORMAL: 1
OS_NORMAL: 1

## 2021-01-13 ASSESSMENT — TONOMETRY
IOP_METHOD: APPLANATION
OS_IOP_MMHG: 17
OD_IOP_MMHG: 17

## 2021-01-13 ASSESSMENT — EXTERNAL EXAM - LEFT EYE: OS_EXAM: NORMAL

## 2021-01-13 ASSESSMENT — EXTERNAL EXAM - RIGHT EYE: OD_EXAM: NORMAL

## 2021-01-13 ASSESSMENT — CUP TO DISC RATIO
OD_RATIO: 0.7
OS_RATIO: 0.6

## 2021-01-13 NOTE — PROGRESS NOTES
Current Eye Medications:  none     Subjective:  Complete eye exam   Patient report: both eye are fine, no problem. She got prescription reading glasses, the glasses not with her today.    Lab Results   Component Value Date    A1C 8.3 10/16/2020    A1C 9.0 03/30/2020    A1C 7.0 05/21/2019    A1C 7.6 09/27/2018    A1C 7.0 05/16/2018       She wears her glasses primarily for reading, only.     Objective:  See Ophthalmology Exam.       Assessment:  Stable eye exam.  Stable intraocular pressures and discs off Latanoprost.  No diabetic retinopathy.      ICD-10-CM    1. Encounter for examination of eyes and vision with abnormal findings  Z01.01 REFRACTIVE STATUS     EYE EXAM (SIMPLE-NONBILLABLE)   2. Presbyopia  H52.4 REFRACTIVE STATUS   3. Type 2 diabetes mellitus without complication, without long-term current use of insulin (H)  E11.9 EYE EXAM (SIMPLE-NONBILLABLE)   4. Pseudophakia, Yag Caps, ou  Z96.1    5. S/P iridectomy, ou  Z98.890         Plan:  Possible posterior vitreous detachment (sudden onset large floater and/or flashing lights) both eyes discussed.   Glasses Rx given - optional   Call in September 2021 for an appointment in January 2022 for Complete Exam    Dr. Jennings [phone]

## 2021-01-13 NOTE — PATIENT INSTRUCTIONS
Possible posterior vitreous detachment (sudden onset large floater and/or flashing lights) both eyes discussed.   Glasses Rx given - optional   Call in September 2021 for an appointment in January 2022 for Complete Exam    Dr. Jennings [phone]    Patient Education   Diabetes weakens the blood vessels all over the body, including the eyes. Damage to the blood vessels in the eyes can cause swelling or bleeding into part of the eye (called the retina). This is called diabetic retinopathy (LUIS-tin--[facility]-thee). If not treated, this disease can cause vision loss or blindness.   Symptoms may include blurred or distorted vision, but many people have no symptoms. It's important to see your eye doctor regularly to check for problems.   Early treatment and good control can help protect your vision. Here are the things you can do to help prevent vision loss:      1. Keep your blood sugar levels under tight control.      2. Bring high blood pressure under control.      3. No smoking.      4. Have yearly dilated eye exams.

## 2021-01-13 NOTE — LETTER
1/13/2021         RE: Clementina Cooper  [address] [address]        Dear Colleague,    Thank you for referring your patient, Clementina Cooper, to the [facility]. Please see a copy of my visit note below.     Current Eye Medications:  none     Subjective:  Complete eye exam   Patient report: both eye are fine, no problem. She got prescription reading glasses, the glasses not with her today.    Lab Results   Component Value Date    A1C 8.3 10/16/2020    A1C 9.0 03/30/2020    A1C 7.0 05/21/2019    A1C 7.6 09/27/2018    A1C 7.0 05/16/2018       She wears her glasses primarily for reading, only.     Objective:  See Ophthalmology Exam.       Assessment:  Stable eye exam.  Stable intraocular pressures and discs off Latanoprost.  No diabetic retinopathy.      ICD-10-CM    1. Encounter for examination of eyes and vision with abnormal findings  Z01.01 REFRACTIVE STATUS     EYE EXAM (SIMPLE-NONBILLABLE)   2. Presbyopia  H52.4 REFRACTIVE STATUS   3. Type 2 diabetes mellitus without complication, without long-term current use of insulin (H)  E11.9 EYE EXAM (SIMPLE-NONBILLABLE)   4. Pseudophakia, Yag Caps, ou  Z96.1    5. S/P iridectomy, ou  Z98.890         Plan:  Possible posterior vitreous detachment (sudden onset large floater and/or flashing lights) both eyes discussed.   Glasses Rx given - optional   Call in September 2021 for an appointment in January 2022 for Complete Exam    Dr. Jennings [phone]           Again, thank you for allowing me to participate in the care of your patient.        Sincerely,        Rahul Jennings MD

## 2021-01-14 ENCOUNTER — THERAPY VISIT (OUTPATIENT)
Dept: PHYSICAL THERAPY | Facility: CLINIC | Age: 81
End: 2021-01-14
Attending: FAMILY MEDICINE
Payer: COMMERCIAL

## 2021-01-14 DIAGNOSIS — M25.562 LEFT KNEE PAIN, UNSPECIFIED CHRONICITY: ICD-10-CM

## 2021-01-14 DIAGNOSIS — M25.562 LEFT KNEE PAIN: ICD-10-CM

## 2021-01-14 PROCEDURE — 97161 PT EVAL LOW COMPLEX 20 MIN: CPT | Mod: GP | Performed by: PHYSICAL THERAPIST

## 2021-01-14 PROCEDURE — 97530 THERAPEUTIC ACTIVITIES: CPT | Mod: GP | Performed by: PHYSICAL THERAPIST

## 2021-01-14 PROCEDURE — 97110 THERAPEUTIC EXERCISES: CPT | Mod: GP | Performed by: PHYSICAL THERAPIST

## 2021-01-14 ASSESSMENT — ACTIVITIES OF DAILY LIVING (ADL)
PAIN: THE SYMPTOM AFFECTS MY ACTIVITY SEVERELY
HOW_WOULD_YOU_RATE_THE_OVERALL_FUNCTION_OF_YOUR_KNEE_DURING_YOUR_USUAL_DAILY_ACTIVITIES?: SEVERELY ABNORMAL
GO DOWN STAIRS: ACTIVITY IS VERY DIFFICULT
STAND: ACTIVITY IS FAIRLY DIFFICULT
WEAKNESS: THE SYMPTOM AFFECTS MY ACTIVITY MODERATELY
RAW_SCORE: 17
KNEE_ACTIVITY_OF_DAILY_LIVING_SCORE: 24.29
LIMPING: THE SYMPTOM AFFECTS MY ACTIVITY SEVERELY
SWELLING: THE SYMPTOM AFFECTS MY ACTIVITY SEVERELY
KNEE_ACTIVITY_OF_DAILY_LIVING_SUM: 17
AS_A_RESULT_OF_YOUR_KNEE_INJURY,_HOW_WOULD_YOU_RATE_YOUR_CURRENT_LEVEL_OF_DAILY_ACTIVITY?: SEVERELY ABNORMAL
SQUAT: I AM UNABLE TO DO THE ACTIVITY
STIFFNESS: THE SYMPTOM AFFECTS MY ACTIVITY SEVERELY
GO UP STAIRS: ACTIVITY IS VERY DIFFICULT
HOW_WOULD_YOU_RATE_THE_CURRENT_FUNCTION_OF_YOUR_KNEE_DURING_YOUR_USUAL_DAILY_ACTIVITIES_ON_A_SCALE_FROM_0_TO_100_WITH_100_BEING_YOUR_LEVEL_OF_KNEE_FUNCTION_PRIOR_TO_YOUR_INJURY_AND_0_BEING_THE_INABILITY_TO_PERFORM_ANY_OF_YOUR_USUAL_DAILY_ACTIVITIES?: 50
WALK: ACTIVITY IS VERY DIFFICULT
RISE FROM A CHAIR: ACTIVITY IS VERY DIFFICULT
KNEEL ON THE FRONT OF YOUR KNEE: I AM UNABLE TO DO THE ACTIVITY
GIVING WAY, BUCKLING OR SHIFTING OF KNEE: THE SYMPTOM AFFECTS MY ACTIVITY SLIGHTLY
SIT WITH YOUR KNEE BENT: ACTIVITY IS FAIRLY DIFFICULT

## 2021-01-14 NOTE — LETTER
[facility]  [address]  [address]  [place] [address]  [phone]    January 15, 2021    Re: Clementina Cooper   :   1940  MRN:  [id number]   REFERRING PHYSICIAN:   MD [facility]  Date of Initial Evaluation:  2021  Visits:  Rxs Used: 1  Reason for Referral:     Left knee pain, unspecified chronicity  Left knee pain    EVALUATION SUMMARY    [facility] Initial Evaluation    KNEE EVALUATION  Physical Therapy Initial Examination/Evaluation 2021   Clementina Cooper is a 80 year old female referred to physical therapy by Dr. Kendall Contreras for treatment of L knee pain  with Precautions/Restrictions/MD instructions E&T   Therapist Assessment:   Clinical Impression: Pt presents to PT with primary complaint of L knee pain .  Per clinical examination, pt with limited ROM, impaired muscle contraction, and swelling present.   Pt will benefit from skilled physical therapy to improve ROM and strength as well as education on red flag symptoms.      Subjective: Pt reports L knee pain just started about 1 month ago. Nothing brought it on. Pain is described as constant. Pt also reports that she has had increased pain and swelling in R middle finger around the same time. Pt has noticed swelling in whole L LE.  DOI/onset: MD order 2021   Mechanism of injury: none ; possibly after trial of some squats  DOS NA   Related PMH: OA, bursitis in the R hip  Previous treatment: PT   Imaging: NA   Chief Complaint: L knee pain    Pain: rest  /10, activity 9/10  Described as: constant Alleviated by: cream, tyelonol Exacerbated by: unsure, prolonged positioning  Progression of symptoms since initial onset: staying the same or worsening  Time of day when pain is worse: same, maybe slightly worse in the morning    Sleeping: Pain is waking her up at night    Social history:   Occupation: retired  Job duties: normal household tasks   Current  HEP/exercise regimen: has not been able to do due to pain, also was limited by R hip bursitis    Patient's goals are decrease pain, return to exericse   Re: Clementina Cooper   :   1940    Other pertinent PMH: diabetes, heart problems, htn, overweight, thyroid problems, pain at night/rest General health as reported by patient: Fair    Return to MD: prn      Static Posture  Foot: Pes planus/cavus: unremarkable    Knee: Varus/Valgus: unremarkable     Hip: Adduction/IR: unremarkable    Dynamic Movement Screen  Single leg stance observations: Required UE support, pain bilat but worse on the L   Double limb squat observations: hesitant to try due to pain  Gait: Decreased stance time on the L     Range of Motion      Knee Edema  15cm above  Joint line 15cm below    Left 46 cm  39.5 39   Right 47 cm 37 34     Knee AROM Extension Flexion   Left 0-12 95   Right 0 122      Knee MMT  Quad Set Decreased on the L       PALPATION  Left: Did not have increased pain with palpation to calf to assess for blood clots. Pain present throughout all of knee joint. Pain also present at pt's end range of flexion and ext  Right: WNL    Assessment/Plan:  Patient is a 80 year old female with left side knee complaints.    Patient has the following significant findings with corresponding treatment plan.                Diagnosis 1:  L knee pain   Pain -  hot/cold therapy, manual therapy, education and home program  Decreased ROM/flexibility - manual therapy, therapeutic exercise, therapeutic activity and home program  Decreased strength - therapeutic exercise, therapeutic activities and home program  Impaired muscle performance - neuro re-education and home program  Decreased function - therapeutic activities and home program    Therapy Evaluation Codes:   1) History comprised of:   Personal factors that impact the plan of care:      Age, Past/current experiences and Time since onset of symptoms.    Comorbidity factors that impact the  plan of care are:      Diabetes, Heart problems, High blood pressure, Overweight, Pain at night/rest and Thyroid problems.    Re: Clementina Cooper   :   1940     Medications impacting care: High blood pressure, Pain and thyroid, hormone replacement.  2) Examination of Body Systems comprised of:   Body structures and functions that impact the plan of care:      Knee.   Activity limitations that impact the plan of care are:      Bathing, Bending, Dressing, Lifting, Stairs, Standing, Walking and Sleeping.  3) Clinical presentation characteristics are:   Stable/Uncomplicated.  4) Decision-Making    Low complexity using standardized patient assessment instrument and/or measureable assessment of functional outcome.  Cumulative Therapy Evaluation is: Low complexity.    Previous and current functional limitations:  (See Goal Flow Sheet for this information)    Short term and Long term goals: (See Goal Flow Sheet for this information)     Communication ability:  Patient appears to be able to clearly communicate and understand verbal and written communication and follow directions correctly.  Treatment Explanation - The following has been discussed with the patient:   RX ordered/plan of care  Anticipated outcomes  Possible risks and side effects  This patient would benefit from PT intervention to resume normal activities.   Rehab potential is good.    Frequency:  1 X week, once daily  Duration:  for 6 weeks  Discharge Plan:  Achieve all LTG.  Independent in home treatment program.  Reach maximal therapeutic benefit.    Please refer to the daily flowsheet for treatment today, total treatment time and time spent performing 1:1 timed codes.       Thank you for your referral.    INQUIRIES  Therapist: Yumiko Alexis, PT, DPT   [facility]  [address] [address]  [place] [address]  Phone: [phone]  Fax: [phone]

## 2021-01-14 NOTE — PROGRESS NOTES
[facility] Initial Evaluation  Subjective:  HPI                    Objective:  System    Physical Exam    General     ROS     KNEE EVALUATION    Physical Therapy Initial Examination/Evaluation January 14, 2021   Clementina Cooper is a 80 year old female referred to physical therapy by Dr. Kendall Contreras for treatment of L knee pain  with Precautions/Restrictions/MD instructions E&T   Therapist Assessment:   Clinical Impression: Pt presents to PT with primary complaint of L knee pain .  Per clinical examination, pt with limited ROM, impaired muscle contraction, and swelling present.   Pt will benefit from skilled physical therapy to improve ROM and strength as well as education on red flag symptoms.      Subjective: Pt reports L knee pain just started about 1 month ago. Nothing brought it on. Pain is described as constant. Pt also reports that she has had increased pain and swelling in R middle finger around the same time. Pt has noticed swelling in whole L LE.  DOI/onset: MD order 1/8/2021   Mechanism of injury: none ; possibly after trial of some squats  DOS NA   Related PMH: OA, bursitis in the R hip  Previous treatment: PT   Imaging: NA   Chief Complaint: L knee pain    Pain: rest 7 /10, activity 9/10  Described as: constant Alleviated by: cream, tyelonol Exacerbated by: unsure, prolonged positioning  Progression of symptoms since initial onset: staying the same or worsening  Time of day when pain is worse: same, maybe slightly worse in the morning    Sleeping: Pain is waking her up at night    Social history:   Occupation: retired  Job duties: normal household tasks   Current HEP/exercise regimen: has not been able to do due to pain, also was limited by R hip bursitis    Patient's goals are decrease pain, return to exericse   Other pertinent PMH: diabetes, heart problems, htn, overweight, thyroid problems, pain at night/rest General health as reported by patient: Fair    Return to MD:  prn      Static Posture  Foot: Pes planus/cavus: unremarkable    Knee: Varus/Valgus: unremarkable     Hip: Adduction/IR: unremarkable      Dynamic Movement Screen  Single leg stance observations: Required UE support, pain bilat but worse on the L   Double limb squat observations: hesitant to try due to pain  Gait: Decreased stance time on the L     Range of Motion        Knee Edema  15cm above  Joint line 15cm below    Left 46 cm  39.5 39   Right 47 cm 37 34     Knee AROM Extension Flexion   Left 0-12 95   Right 0 122        Knee MMT  Quad Set Decreased on the L         PALPATION  Left: Did not have increased pain with palpation to calf to assess for blood clots. Pain present throughout all of knee joint. Pain also present at pt's end range of flexion and ext  Right: WNL      Assessment/Plan:  Patient is a 80 year old female with left side knee complaints.    Patient has the following significant findings with corresponding treatment plan.                Diagnosis 1:  L knee pain   Pain -  hot/cold therapy, manual therapy, education and home program  Decreased ROM/flexibility - manual therapy, therapeutic exercise, therapeutic activity and home program  Decreased strength - therapeutic exercise, therapeutic activities and home program  Impaired muscle performance - neuro re-education and home program  Decreased function - therapeutic activities and home program    Therapy Evaluation Codes:   1) History comprised of:   Personal factors that impact the plan of care:      Age, Past/current experiences and Time since onset of symptoms.    Comorbidity factors that impact the plan of care are:      Diabetes, Heart problems, High blood pressure, Overweight, Pain at night/rest and Thyroid problems.     Medications impacting care: High blood pressure, Pain and thyroid, hormone replacement.  2) Examination of Body Systems comprised of:   Body structures and functions that impact the plan of care:      Knee.   Activity  limitations that impact the plan of care are:      Bathing, Bending, Dressing, Lifting, Stairs, Standing, Walking and Sleeping.  3) Clinical presentation characteristics are:   Stable/Uncomplicated.  4) Decision-Making    Low complexity using standardized patient assessment instrument and/or measureable assessment of functional outcome.  Cumulative Therapy Evaluation is: Low complexity.    Previous and current functional limitations:  (See Goal Flow Sheet for this information)    Short term and Long term goals: (See Goal Flow Sheet for this information)     Communication ability:  Patient appears to be able to clearly communicate and understand verbal and written communication and follow directions correctly.  Treatment Explanation - The following has been discussed with the patient:   RX ordered/plan of care  Anticipated outcomes  Possible risks and side effects  This patient would benefit from PT intervention to resume normal activities.   Rehab potential is good.    Frequency:  1 X week, once daily  Duration:  for 6 weeks  Discharge Plan:  Achieve all LTG.  Independent in home treatment program.  Reach maximal therapeutic benefit.    Please refer to the daily flowsheet for treatment today, total treatment time and time spent performing 1:1 timed codes.

## 2021-01-19 ENCOUNTER — ANTICOAGULATION THERAPY VISIT (OUTPATIENT)
Dept: FAMILY MEDICINE | Facility: CLINIC | Age: 81
End: 2021-01-19

## 2021-01-19 ENCOUNTER — OFFICE VISIT (OUTPATIENT)
Dept: LAB | Facility: CLINIC | Age: 81
End: 2021-01-19
Payer: COMMERCIAL

## 2021-01-19 DIAGNOSIS — Z98.890 STATUS POST CATHETER ABLATION OF ATRIAL FIBRILLATION: ICD-10-CM

## 2021-01-19 DIAGNOSIS — I73.9 PVD (PERIPHERAL VASCULAR DISEASE) (H): ICD-10-CM

## 2021-01-19 DIAGNOSIS — Z79.01 LONG TERM CURRENT USE OF ANTICOAGULANT THERAPY: ICD-10-CM

## 2021-01-19 DIAGNOSIS — Z51.81 ANTICOAGULATION GOAL OF INR 2 TO 3: ICD-10-CM

## 2021-01-19 DIAGNOSIS — I48.20 CHRONIC ATRIAL FIBRILLATION (H): ICD-10-CM

## 2021-01-19 DIAGNOSIS — Z79.01 ANTICOAGULATION GOAL OF INR 2 TO 3: ICD-10-CM

## 2021-01-19 LAB
CAPILLARY BLOOD COLLECTION: NORMAL
INR PPP: 3.1 (ref 0.86–1.14)

## 2021-01-19 PROCEDURE — 36416 COLLJ CAPILLARY BLOOD SPEC: CPT | Performed by: INTERNAL MEDICINE

## 2021-01-19 PROCEDURE — 85610 PROTHROMBIN TIME: CPT | Performed by: INTERNAL MEDICINE

## 2021-01-19 NOTE — PROGRESS NOTES
Anticoagulation Management    Unable to reach Kira today.    Today's INR result of 3.1 is supratherapeutic (goal INR of 2.0-3.0).  Result received from: Clinic Lab    Follow up required to assess for changes     left message to make apt in 4 weeks and resume current dose and eat more greens      Anticoagulation clinic to follow up    Ely Sterling RN

## 2021-01-21 ENCOUNTER — ANCILLARY PROCEDURE (OUTPATIENT)
Dept: GENERAL RADIOLOGY | Facility: CLINIC | Age: 81
End: 2021-01-21
Attending: INTERNAL MEDICINE
Payer: COMMERCIAL

## 2021-01-21 ENCOUNTER — OFFICE VISIT (OUTPATIENT)
Dept: INTERNAL MEDICINE | Facility: CLINIC | Age: 81
End: 2021-01-21
Payer: COMMERCIAL

## 2021-01-21 ENCOUNTER — THERAPY VISIT (OUTPATIENT)
Dept: PHYSICAL THERAPY | Facility: CLINIC | Age: 81
End: 2021-01-21
Payer: COMMERCIAL

## 2021-01-21 VITALS
HEART RATE: 86 BPM | SYSTOLIC BLOOD PRESSURE: 138 MMHG | TEMPERATURE: 97.4 F | WEIGHT: 197 LBS | BODY MASS INDEX: 32.82 KG/M2 | DIASTOLIC BLOOD PRESSURE: 83 MMHG | OXYGEN SATURATION: 94 % | HEIGHT: 65 IN

## 2021-01-21 DIAGNOSIS — G89.29 CHRONIC PAIN OF LEFT KNEE: Primary | ICD-10-CM

## 2021-01-21 DIAGNOSIS — M25.562 CHRONIC PAIN OF LEFT KNEE: Primary | ICD-10-CM

## 2021-01-21 DIAGNOSIS — E11.9 TYPE 2 DIABETES MELLITUS WITHOUT COMPLICATION, WITHOUT LONG-TERM CURRENT USE OF INSULIN (H): ICD-10-CM

## 2021-01-21 DIAGNOSIS — M25.562 LEFT KNEE PAIN: ICD-10-CM

## 2021-01-21 DIAGNOSIS — R60.0 LEG EDEMA, LEFT: ICD-10-CM

## 2021-01-21 DIAGNOSIS — R22.31 LOCALIZED SWELLING OF FINGER OF RIGHT HAND: ICD-10-CM

## 2021-01-21 DIAGNOSIS — R79.89 ELEVATED D-DIMER: ICD-10-CM

## 2021-01-21 DIAGNOSIS — M25.60 MORNING STIFFNESS OF JOINTS: ICD-10-CM

## 2021-01-21 LAB
D DIMER PPP FEU-MCNC: 2 UG/ML FEU (ref 0–0.5)
ERYTHROCYTE [SEDIMENTATION RATE] IN BLOOD BY WESTERGREN METHOD: 15 MM/H (ref 0–30)
HBA1C MFR BLD: 9.2 % (ref 0–5.6)
URATE SERPL-MCNC: 3.3 MG/DL (ref 2.6–6)

## 2021-01-21 PROCEDURE — 99214 OFFICE O/P EST MOD 30 MIN: CPT | Mod: 25 | Performed by: INTERNAL MEDICINE

## 2021-01-21 PROCEDURE — 36415 COLL VENOUS BLD VENIPUNCTURE: CPT | Performed by: INTERNAL MEDICINE

## 2021-01-21 PROCEDURE — 73130 X-RAY EXAM OF HAND: CPT | Mod: LT | Performed by: RADIOLOGY

## 2021-01-21 PROCEDURE — 85379 FIBRIN DEGRADATION QUANT: CPT | Performed by: INTERNAL MEDICINE

## 2021-01-21 PROCEDURE — 73560 X-RAY EXAM OF KNEE 1 OR 2: CPT | Mod: LT | Performed by: RADIOLOGY

## 2021-01-21 PROCEDURE — 84550 ASSAY OF BLOOD/URIC ACID: CPT | Performed by: INTERNAL MEDICINE

## 2021-01-21 PROCEDURE — 83036 HEMOGLOBIN GLYCOSYLATED A1C: CPT | Performed by: INTERNAL MEDICINE

## 2021-01-21 PROCEDURE — 85652 RBC SED RATE AUTOMATED: CPT | Performed by: INTERNAL MEDICINE

## 2021-01-21 PROCEDURE — 97110 THERAPEUTIC EXERCISES: CPT | Mod: GP | Performed by: PHYSICAL THERAPIST

## 2021-01-21 PROCEDURE — 86431 RHEUMATOID FACTOR QUANT: CPT | Performed by: INTERNAL MEDICINE

## 2021-01-21 PROCEDURE — 86038 ANTINUCLEAR ANTIBODIES: CPT | Performed by: INTERNAL MEDICINE

## 2021-01-21 PROCEDURE — 20610 DRAIN/INJ JOINT/BURSA W/O US: CPT | Mod: LT | Performed by: INTERNAL MEDICINE

## 2021-01-21 RX ORDER — TRIAMCINOLONE ACETONIDE 40 MG/ML
40 INJECTION, SUSPENSION INTRA-ARTICULAR; INTRAMUSCULAR ONCE
Status: COMPLETED | OUTPATIENT
Start: 2021-01-21 | End: 2021-01-21

## 2021-01-21 RX ORDER — GLIPIZIDE 5 MG/1
10 TABLET, FILM COATED, EXTENDED RELEASE ORAL 2 TIMES DAILY
Qty: 360 TABLET | Refills: 3 | Status: SHIPPED | OUTPATIENT
Start: 2021-01-21 | End: 2022-03-16

## 2021-01-21 RX ADMIN — TRIAMCINOLONE ACETONIDE 40 MG: 40 INJECTION, SUSPENSION INTRA-ARTICULAR; INTRAMUSCULAR at 18:07

## 2021-01-21 ASSESSMENT — MIFFLIN-ST. JEOR: SCORE: 1359.47

## 2021-01-21 NOTE — PATIENT INSTRUCTIONS
Continue physical therapy as you are    Consider finger injection      Increase glipizide to 10 mg (two tabs) twice daily      Rx printed for the Januvia 50 mg daily:  Add this to your meds if your sugars continue to generally run above 150 by Groundhog day, 2/2/21    Later entry:  D dimer positive, will get US.

## 2021-01-21 NOTE — Clinical Note
Call patient:  one of her labs is elevated, called D dimer.  Due to elevation we need to screen for blood clot in that L leg  PLEASE HELP DARIA BAPTISTE LLE US, ordered.  THANK YOU!!

## 2021-01-21 NOTE — PROGRESS NOTES
Assessment & Plan     Chronic pain of left knee   likely dejenerative joint arthritis flare  Injection given today, watchful waiting during phys therapy.     - XR Knee Left 1/2 Views  - XR Hand Bilateral G/E 3 Views  - Anti Nuclear Xiomara IgG by IFA with Reflex  - Rheumatoid factor  - D dimer, quantitative  - triamcinolone (KENALOG-40) injection 40 mg  - DRAIN/INJECT LARGE JOINT/BURSA    1/29/2021, Later Entry:  Patient would like to see Dr Lopez (see tel call), conservative management seems to be failing.  Ortho referral placed.     Localized swelling of finger of right hand    xrays and ESR imply non-inflammatory.   Likely dejenerative joint arthritis.  I coached her to call if she wants trial finger injection.   - XR Hand Bilateral G/E 3 Views  - Anti Nuclear Xiomara IgG by IFA with Reflex  - Rheumatoid factor    Morning stiffness of joints   watchful waiting   - Uric acid  - ESR: Erythrocyte sedimentation rate  - XR Hand Bilateral G/E 3 Views  - Anti Nuclear Xiomara IgG by IFA with Reflex  - Rheumatoid factor    Type 2 diabetes mellitus without complication, without long-term current use of insulin (H)     DM control is poor.  Will increase glipizide to maximum... if still elevated will add Januvia (printed Rx given in case she fills it). .. I will see her in 2 months (4/2021)  - Hemoglobin A1c = 9.2 today  - glipiZIDE (GLUCOTROL XL) 5 MG 24 hr tablet; Take 2 tablets (10 mg) by mouth 2 times daily  - sitagliptin (JANUVIA) 50 MG tablet; Take 1 tablet (50 mg) by mouth daily  - blood glucose (NO BRAND SPECIFIED) test strip; Use to test blood sugar 1 times daily  .    LATER ENTRY:  Ddimer elevated.  Will have her get LLE vv US to rule out DVT.     Review of external notes as documented above   Review of prior external note(s) from - xrays reviewed for djd changes in knees or hands at [facility]  Review of the result(s) of each unique test - serial Hgb A1Cs.            30 minutes spent on the date of the encounter  "doing chart review, review of outside records, review of test results, interpretation of tests, patient visit and documentation                  No follow-ups on file.    Estela Davila MD  [facility] NANCY Howell is a 80 year old who presents to clinic today for the following health issues     HPI       Left knee pain x 1 month   Right middle finger is swollen and painful x 2 weeks     Seen by therapist today who was impressed with degree of symptoms patient is having.      Review of Systems   Constitutional, HEENT, cardiovascular, pulmonary, gi and gu systems are negative, except as otherwise noted.      Objective    /83 (BP Location: Right arm, Patient Position: Sitting, Cuff Size: Adult Large)   Pulse 86   Temp 97.4  F (36.3  C) (Oral)   Ht 1.651 m (5' 5\")   Wt 89.4 kg (197 lb)   SpO2 94%   BMI 32.78 kg/m    Body mass index is 32.78 kg/m .  Physical Exam   GENERAL: healthy, alert and no distress  MS: no gross musculoskeletal defects noted, no edema  MS: L knee is swollen, has normal range of motion, but mild crepitus, antialgic gait, using a cane.  She has swelling at R ring finder at MCP joint, affecting range of motion.   SKIN: no suspicious lesions or rashes  PSYCH: mentation appears normal, affect normal/bright    Results for orders placed or performed in visit on 01/21/21   XR Knee Left 1/2 Views    Impression    IMPRESSION: No acute fracture or malalignment. Moderate medial  compartment joint space narrowing. Small knee joint effusion.    AKASH JAUREGUI MD   XR Hand Bilateral G/E 3 Views    Impression    IMPRESSION: No acute fracture or malalignment. Moderate left and mild  right first CMC joint degenerative changes. No erosive change.    AKASH JAUREGUI MD     I read and reviewed the above xray with patient face to face during visit.       This procedure has been fully reviewed with the patient and written informed consent has been obtained.  After " cleaning area with betadine, a steroid injection was performed at  L knee, medial aspect  using 1% plain Lidocaine (1.  cc) and  40 mg of K40 (1  cc). This was well tolerated.

## 2021-01-22 LAB
ANA SER QL IF: NEGATIVE
RHEUMATOID FACT SER NEPH-ACNC: <7 IU/ML (ref 0–20)

## 2021-01-22 NOTE — RESULT ENCOUNTER NOTE
Clementina Cooper    Inflammation screening is negative/normal.     Uric acid (gout) is normal    Ddimer is elevated, so we should screen for blood clot.      As discussed, A1C is elevated.  We discussed plans for medication change and titration    I look forward to seeing you late March or early April 2021 (or sooner if needed)    I hope your knee is improved after that shot!    Best,       TAISHA MORROW M.D.

## 2021-01-25 ENCOUNTER — ANCILLARY PROCEDURE (OUTPATIENT)
Dept: ULTRASOUND IMAGING | Facility: CLINIC | Age: 81
End: 2021-01-25
Attending: INTERNAL MEDICINE
Payer: COMMERCIAL

## 2021-01-25 DIAGNOSIS — R79.89 ELEVATED D-DIMER: ICD-10-CM

## 2021-01-25 DIAGNOSIS — R60.0 LEG EDEMA, LEFT: ICD-10-CM

## 2021-01-25 DIAGNOSIS — M25.562 CHRONIC PAIN OF LEFT KNEE: ICD-10-CM

## 2021-01-25 DIAGNOSIS — G89.29 CHRONIC PAIN OF LEFT KNEE: ICD-10-CM

## 2021-01-28 ENCOUNTER — THERAPY VISIT (OUTPATIENT)
Dept: PHYSICAL THERAPY | Facility: CLINIC | Age: 81
End: 2021-01-28
Payer: COMMERCIAL

## 2021-01-28 DIAGNOSIS — M25.562 LEFT KNEE PAIN: ICD-10-CM

## 2021-01-28 PROCEDURE — 97530 THERAPEUTIC ACTIVITIES: CPT | Mod: GP | Performed by: PHYSICAL THERAPIST

## 2021-01-28 PROCEDURE — 97110 THERAPEUTIC EXERCISES: CPT | Mod: GP | Performed by: PHYSICAL THERAPIST

## 2021-01-29 ENCOUNTER — TELEPHONE (OUTPATIENT)
Dept: FAMILY MEDICINE | Facility: CLINIC | Age: 81
End: 2021-01-29

## 2021-01-29 NOTE — TELEPHONE ENCOUNTER
Called and spoke with Lynda. She understood. She is already scheduled.    [facility] Adria Tapia,

## 2021-01-29 NOTE — TELEPHONE ENCOUNTER
Reason for Call:  Other referral    Detailed comments: Pt is calling asking for a referral for Dr Angeles for her knee pain.  Pt states this was discussed with Dr Davila.    Phone Number Patient can be reached at: Home number on file [phone] (home)    Best Time: any    Can we leave a detailed message on this number? YES    Call taken on 1/29/2021 at 10:36 AM by Wanda Power

## 2021-02-01 ENCOUNTER — OFFICE VISIT (OUTPATIENT)
Dept: ORTHOPEDICS | Facility: CLINIC | Age: 81
End: 2021-02-01
Payer: COMMERCIAL

## 2021-02-01 VITALS
DIASTOLIC BLOOD PRESSURE: 75 MMHG | SYSTOLIC BLOOD PRESSURE: 118 MMHG | HEART RATE: 81 BPM | WEIGHT: 197 LBS | RESPIRATION RATE: 14 BRPM | BODY MASS INDEX: 32.82 KG/M2 | HEIGHT: 65 IN

## 2021-02-01 DIAGNOSIS — M17.12 PRIMARY OSTEOARTHRITIS OF LEFT KNEE: ICD-10-CM

## 2021-02-01 DIAGNOSIS — G89.29 CHRONIC PAIN OF LEFT KNEE: ICD-10-CM

## 2021-02-01 DIAGNOSIS — M25.562 CHRONIC PAIN OF LEFT KNEE: ICD-10-CM

## 2021-02-01 PROCEDURE — 20610 DRAIN/INJ JOINT/BURSA W/O US: CPT | Mod: LT | Performed by: ORTHOPAEDIC SURGERY

## 2021-02-01 RX ORDER — METHYLPREDNISOLONE ACETATE 80 MG/ML
80 INJECTION, SUSPENSION INTRA-ARTICULAR; INTRALESIONAL; INTRAMUSCULAR; SOFT TISSUE
Status: DISCONTINUED | OUTPATIENT
Start: 2021-02-01 | End: 2024-06-13

## 2021-02-01 RX ORDER — LIDOCAINE HYDROCHLORIDE 10 MG/ML
5 INJECTION, SOLUTION INFILTRATION; PERINEURAL
Status: DISCONTINUED | OUTPATIENT
Start: 2021-02-01 | End: 2024-06-13

## 2021-02-01 RX ADMIN — METHYLPREDNISOLONE ACETATE 80 MG: 80 INJECTION, SUSPENSION INTRA-ARTICULAR; INTRALESIONAL; INTRAMUSCULAR; SOFT TISSUE at 11:10

## 2021-02-01 RX ADMIN — LIDOCAINE HYDROCHLORIDE 5 ML: 10 INJECTION, SOLUTION INFILTRATION; PERINEURAL at 11:10

## 2021-02-01 ASSESSMENT — MIFFLIN-ST. JEOR: SCORE: 1359.47

## 2021-02-01 NOTE — PROGRESS NOTES
Follow up left knee primary osteoarthritis.  X-ray 1/21/21 shows moderate medial narrowing.  Also has small popliteal cyst by ultrasound.    Last injection 1/21/21 was not effective and was 40 mg Kenolog..  Range of motion 0-125.    Positive medial joint line tenderness.      She  desires injection today of left knee(s).  Since last injection was a small dose and has been ineffective, we will repeat this.  Risks, benefits, potential complications and alternatives were discussed.   With the patient's consent, sterile prep was performed of left knee(s).  Left knee was injected with Depo Medrol 80 mg and lidocaine at anteromedial site.  Return to clinic as needed.     Large Joint Injection/Arthocentesis: L knee joint    Date/Time: 2/1/2021 11:10 AM  Performed by: Kendall Lopez MD  Authorized by: Kendall Lopez MD     Indications:  Pain and osteoarthritis  Needle Size:  22 G  Guidance: landmark guided    Approach:  Anteromedial  Location:  Knee      Medications:  80 mg methylPREDNISolone 80 MG/ML; 5 mL lidocaine 1 %  Outcome:  Tolerated well, no immediate complications  Procedure discussed: discussed risks, benefits, and alternatives    Consent Given by:  Patient  Timeout: timeout called immediately prior to procedure    Prep: patient was prepped and draped in usual sterile fashion

## 2021-02-01 NOTE — LETTER
2/1/2021         RE: Clementina Cooper  [facility] [facility] [address]        Dear Colleague,    Thank you for referring your patient, Clementina Cooper, to the [facility]. Please see a copy of my visit note below.    Follow up left knee primary osteoarthritis.  X-ray 1/21/21 shows moderate medial narrowing.  Also has small popliteal cyst by ultrasound.    Last injection 1/21/21 was not effective and was 40 mg Kenolog..  Range of motion 0-125.    Positive medial joint line tenderness.      She  desires injection today of left knee(s).  Since last injection was a small dose and has been ineffective, we will repeat this.  Risks, benefits, potential complications and alternatives were discussed.   With the patient's consent, sterile prep was performed of left knee(s).  Left knee was injected with Depo Medrol 80 mg and lidocaine at anteromedial site.  Return to clinic as needed.     Large Joint Injection/Arthocentesis: L knee joint    Date/Time: 2/1/2021 11:10 AM  Performed by: Kendall Lopez MD  Authorized by: Kendall Lopez MD     Indications:  Pain and osteoarthritis  Needle Size:  22 G  Guidance: landmark guided    Approach:  Anteromedial  Location:  Knee      Medications:  80 mg methylPREDNISolone 80 MG/ML; 5 mL lidocaine 1 %  Outcome:  Tolerated well, no immediate complications  Procedure discussed: discussed risks, benefits, and alternatives    Consent Given by:  Patient  Timeout: timeout called immediately prior to procedure    Prep: patient was prepped and draped in usual sterile fashion              Again, thank you for allowing me to participate in the care of your patient.        Sincerely,        Kendall Lopez MD

## 2021-02-15 ENCOUNTER — IMMUNIZATION (OUTPATIENT)
Dept: NURSING | Facility: CLINIC | Age: 81
End: 2021-02-15
Payer: COMMERCIAL

## 2021-02-15 PROCEDURE — 91300 PR COVID VAC PFIZER DIL RECON 30 MCG/0.3 ML IM: CPT

## 2021-02-15 PROCEDURE — 0001A PR COVID VAC PFIZER DIL RECON 30 MCG/0.3 ML IM: CPT

## 2021-02-24 ENCOUNTER — ANTICOAGULATION THERAPY VISIT (OUTPATIENT)
Dept: FAMILY MEDICINE | Facility: CLINIC | Age: 81
End: 2021-02-24

## 2021-02-24 DIAGNOSIS — I48.20 CHRONIC ATRIAL FIBRILLATION (H): ICD-10-CM

## 2021-02-24 DIAGNOSIS — Z98.890 STATUS POST CATHETER ABLATION OF ATRIAL FIBRILLATION: ICD-10-CM

## 2021-02-24 DIAGNOSIS — Z79.01 LONG TERM CURRENT USE OF ANTICOAGULANT THERAPY: ICD-10-CM

## 2021-02-24 DIAGNOSIS — Z51.81 ANTICOAGULATION GOAL OF INR 2 TO 3: ICD-10-CM

## 2021-02-24 DIAGNOSIS — Z79.01 ANTICOAGULATION GOAL OF INR 2 TO 3: ICD-10-CM

## 2021-02-24 DIAGNOSIS — I73.9 PVD (PERIPHERAL VASCULAR DISEASE) (H): ICD-10-CM

## 2021-02-24 LAB
CAPILLARY BLOOD COLLECTION: NORMAL
INR PPP: 2.1 (ref 0.86–1.14)

## 2021-02-24 PROCEDURE — 85610 PROTHROMBIN TIME: CPT | Performed by: INTERNAL MEDICINE

## 2021-02-24 PROCEDURE — 36416 COLLJ CAPILLARY BLOOD SPEC: CPT | Performed by: INTERNAL MEDICINE

## 2021-02-24 NOTE — PROGRESS NOTES
ANTICOAGULATION MANAGEMENT     Patient Name:  Clementina Cooper  Date:  2021    ASSESSMENT /SUBJECTIVE:    Today's INR result of 2.1 is therapeutic. Goal INR of 2.0-3.0      Warfarin dose taken: Warfarin taken as instructed    Diet: No new diet changes affecting INR    Medication changes/ interactions: No interaction expected between glipizide and Januvia and warfarin    Previous INR: Therapeutic     S/S of bleeding or thromboembolism: No    New injury or illness: No    Upcoming surgery, procedure or cardioversion: No    Additional findings: None      PLAN:    Telephone call with Clementina regarding INR result and instructed:     Warfarin Dosing Instructions: Continue your current warfarin dose 5 mg Mon/Wed/Fri and 2.5 mg ROW    Instructed patient to follow up no later than: 6 weeks  Lab visit scheduled    Education provided: Target INR goal and significance of current INR result      Lynda verbalizes understanding and agrees to warfarin dosing plan.    Instructed to call the Anticoagulation Clinic for any changes, questions or concerns. (#[phone])        Lou Santiago RN      OBJECTIVE:  Recent labs: (last 7 days)     21  1127   INR 2.10*         INR assessment THER    Recheck INR In: 6 WEEKS    INR Location Clinic      Anticoagulation Summary  As of 2021    INR goal:  2.0-3.0   TTR:  70.6 % (1 y)   INR used for dosin.10 (2021)   Warfarin maintenance plan:  5 mg (5 mg x 1) every Mon, Wed, Fri; 2.5 mg (5 mg x 0.5) all other days   Full warfarin instructions:  5 mg every Mon, Wed, Fri; 2.5 mg all other days   Weekly warfarin total:  25 mg   Plan last modified:  Eliazar Peterson RN (2020)   Next INR check:  2021   Priority:  Maintenance   Target end date:  Indefinite    Indications    Chronic atrial fibrillation (H) [I48.20]  Status post catheter ablation of atrial fibrillation [Z98.890]  PVD (peripheral vascular disease) (H) [I73.9]  Anticoagulation goal of INR 2 to 3  [Z51.81  Z79.01]             Anticoagulation Episode Summary     INR check location:      Preferred lab:      Send INR reminders to:  RON CRUZ    Comments:        Anticoagulation Care Providers     Provider Role Specialty Phone number    Estela Davila MD Referring Internal Medicine [phone]

## 2021-02-24 NOTE — PROGRESS NOTES
Anticoagulation Management    Unable to reach Lynda today.    Today's INR result of 2.1 is therapeutic (goal INR of 2.0-3.0).  Result received from: Clinic Lab    Follow up required to confirm warfarin dose taken and assess for changes    Left message to call [phone]     Plan: continue maintenance dose and recheck INR in 6 weeks    Anticoagulation clinic to follow up    Lou Santiago RN

## 2021-02-26 ENCOUNTER — OFFICE VISIT (OUTPATIENT)
Dept: FAMILY MEDICINE | Facility: CLINIC | Age: 81
End: 2021-02-26
Payer: COMMERCIAL

## 2021-02-26 ENCOUNTER — THERAPY VISIT (OUTPATIENT)
Dept: PHYSICAL THERAPY | Facility: CLINIC | Age: 81
End: 2021-02-26
Attending: FAMILY MEDICINE
Payer: COMMERCIAL

## 2021-02-26 VITALS
OXYGEN SATURATION: 95 % | TEMPERATURE: 97.4 F | BODY MASS INDEX: 33.15 KG/M2 | SYSTOLIC BLOOD PRESSURE: 143 MMHG | DIASTOLIC BLOOD PRESSURE: 81 MMHG | WEIGHT: 199.2 LBS | HEART RATE: 71 BPM

## 2021-02-26 DIAGNOSIS — R42 VERTIGO: ICD-10-CM

## 2021-02-26 DIAGNOSIS — H81.12 BENIGN PAROXYSMAL POSITIONAL VERTIGO, LEFT: ICD-10-CM

## 2021-02-26 DIAGNOSIS — R42 VERTIGO: Primary | ICD-10-CM

## 2021-02-26 DIAGNOSIS — R29.818 NEUROLOGICAL SIGNS: ICD-10-CM

## 2021-02-26 PROCEDURE — 95992 CANALITH REPOSITIONING PROC: CPT | Mod: GP | Performed by: PHYSICAL THERAPIST

## 2021-02-26 PROCEDURE — 97161 PT EVAL LOW COMPLEX 20 MIN: CPT | Mod: GP | Performed by: PHYSICAL THERAPIST

## 2021-02-26 PROCEDURE — 99214 OFFICE O/P EST MOD 30 MIN: CPT | Performed by: FAMILY MEDICINE

## 2021-02-26 RX ORDER — MECLIZINE HYDROCHLORIDE 25 MG/1
25 TABLET ORAL 3 TIMES DAILY PRN
Qty: 30 TABLET | Refills: 2 | Status: SHIPPED | OUTPATIENT
Start: 2021-02-26 | End: 2022-04-08

## 2021-02-26 NOTE — PROGRESS NOTES
[facility] Initial Evaluation  Subjective:  The history is provided by the patient. No  was used.   Patient Health History  Clementina Cooper being seen for vertigo.     Problem began: 2/26/2021.   Problem occurred: unknown   Pain is reported as 0/10 on pain scale.  General health as reported by patient is fair.  Pertinent medical history includes: diabetes, high blood pressure, overweight and rheumatoid arthritis.   Red flags:  None as reported by patient.  Medical allergies: none.   Surgeries include:  None.    Current medications:  High blood pressure medication, hormone replacement therapy, heparin/coumadin and thyroid medication.    Current occupation is retired.   Primary job tasks include:  Prolonged sitting.                S:  Lynda is a 80 year old patient complaining of vertigo.  Onset of symptoms: Woke up this morning with sxs.  Was able to wait them out and then got up and bent over and the whole world was spinning again.  Denies any head trauma or changes in meds      Is this a recurrent problem: yes- 1 year ago had similar sxs  Progression since onset: no change  Frequency of episodes/time of day: movement based  Duration of episodes: 30 seconds  Exacerbating factors: bending over, laying down  Relieving factors: stay very still  Previous treatments:  PT- helpful  Special tests/diagnostics performed: none    O:  Cervical ROM screen: General loss of AROM in all directions likely from DJD  Oculomotor/gaze stability screen: Smooth pursuits, VOR x1, saccades all WNL  Vertebral artery test: negative  Hallpike-[place] maneuver:  R: negative  L: positive for geotropic nystagmus  Horizontal canal test:  R: not formally tested due to (+) [place]-Hallpike test but during CRMs when pt was in R S/L she had severe geotropic nystagmus  L: not tested  Gait: mildly unsteady, arrives holding onto 's arm                  Objective:  System    Physical Exam    General      ROS    Assessment/Plan:    Patient is a 80 year old female with vestibular complaints.    Patient has the following significant findings with corresponding treatment plan.                Diagnosis 1:  BPPV  Dizziness- CRMs    Cumulative Therapy Evaluation is: Low complexity.    Previous and current functional limitations:  (See Goal Flow Sheet for this information)    Short term and Long term goals: (See Goal Flow Sheet for this information)     Communication ability:  Patient appears to be able to clearly communicate and understand verbal and written communication and follow directions correctly.  Treatment Explanation - The following has been discussed with the patient:   RX ordered/plan of care  Anticipated outcomes  Possible risks and side effects  This patient would benefit from PT intervention to resume normal activities.   Rehab potential is good.    Frequency:  1 X week, once daily  Duration:  for 4 weeks  Discharge Plan:  Achieve all LTG.  Independent in home treatment program.  Reach maximal therapeutic benefit.    Please refer to the daily flowsheet for treatment today, total treatment time and time spent performing 1:1 timed codes.

## 2021-02-26 NOTE — PROGRESS NOTES
Assessment & Plan       ICD-10-CM    1. Vertigo  R[facility] REFERR[facility]     meclizine (ANTIVERT) 25 MG tablet     PT referral, trial of antivert as needed    Review of external notes as documented elsewhere in note             Return in about 1 week (around 3/5/2021) for With Specialist.    Florian Pena MD  [facility] NANCY Howell is a 80 year old who presents for the following health issues     HPI       Dizziness  Onset/Duration: This morning  Description:   Do you feel faint: no  Does it feel like the surroundings (bed, room) are moving: no  Unsteady/off balance: YES only when bending over  Have you passed out or fallen: YES- fallen after bending over  Intensity: moderate  Progression of Symptoms: same  Accompanying Signs & Symptoms:  Heart palpitations or chest pain: no  Nausea, vomiting: YES- nausea  Weakness or lack of coordination in arms or legs: no  Vision or speech changes: no  Numbness or tingling: no  Ringing in ears (Tinnitus): no  Hearing Loss: no  History:   Head trauma/concussion history: no  Previous similar symptoms: YES  Recent bleeding history: no  Any new medications (BP?): no  Precipitating factors:   Worse with activity: YES  Worse with head movement: YES  Alleviating factors:   Does staying in a fixed position give relief: YES  Therapies tried and outcome: Stayed in fixed position, took motion sickness     Bent down to get something out of the bottom cupboard and got dizzy.  Previous diagnosis of vertigo, PT was helpful for her in the past        Review of Systems   Constitutional, HEENT, cardiovascular, pulmonary, gi and gu systems are negative, except as otherwise noted.      Objective    BP (!) 143/81   Pulse 71   Temp 97.4  F (36.3  C) (Oral)   Wt 90.4 kg (199 lb 3.2 oz)   SpO2 95%   BMI 33.15 kg/m    Body mass index is 33.15 kg/m .  Physical Exam   GENERAL: healthy, alert and no distress  EYES: Eyes grossly normal to  inspection, PERRL and conjunctivae and sclerae normal  NECK: no adenopathy, no asymmetry, masses, or scars and thyroid normal to palpation  SKIN: no suspicious lesions or rashes  PSYCH: mentation appears normal, affect normal/bright

## 2021-03-01 ENCOUNTER — THERAPY VISIT (OUTPATIENT)
Dept: PHYSICAL THERAPY | Facility: CLINIC | Age: 81
End: 2021-03-01
Payer: COMMERCIAL

## 2021-03-01 DIAGNOSIS — H81.12 BENIGN PAROXYSMAL POSITIONAL VERTIGO, LEFT: ICD-10-CM

## 2021-03-01 DIAGNOSIS — R29.818 NEUROLOGICAL SIGNS: ICD-10-CM

## 2021-03-01 PROCEDURE — 97530 THERAPEUTIC ACTIVITIES: CPT | Mod: GP | Performed by: PHYSICAL THERAPIST

## 2021-03-01 NOTE — PROGRESS NOTES
DISCHARGE REPORT    Progress reporting period is from 2/26/21 to 3/1/21.       SUBJECTIVE  Subjective: Felt great after last treatment, then had some lighter dizziness the next day and now feels really good again today    Current Pain level: 0/10.     Initial Pain level: 0/10.   Changes in function:  Yes (See Goal flowsheet attached for changes in current functional level)  Adverse reaction to treatment or activity: None    OBJECTIVE  Objective: [place]-Hallpike negative bliat.  Horiz canal testing neg bilat     ASSESSMENT/PLAN  Updated problem list and treatment plan: Diagnosis 1:  BPPV    STG/LTGs have been met or progress has been made towards goals:  Yes (See Goal flow sheet completed today.)  Assessment of Progress: The patient's condition is improving.  The patient has met all of their long term goals.  Self Management Plans:  Patient is independent in a home treatment program.  Clementina continues to require the following intervention to meet STG and LTG's:  PT intervention is no longer required to meet STG/LTG.    Recommendations:  This patient is ready to be discharged from therapy and continue their home treatment program.  Due to negative testing today we decided pt could be discharged but any return of sxs she should contact PT ASAP    Please refer to the daily flowsheet for treatment today, total treatment time and time spent performing 1:1 timed codes.

## 2021-03-08 ENCOUNTER — IMMUNIZATION (OUTPATIENT)
Dept: NURSING | Facility: CLINIC | Age: 81
End: 2021-03-08
Attending: FAMILY MEDICINE
Payer: COMMERCIAL

## 2021-03-08 PROCEDURE — 91300 PR COVID VAC PFIZER DIL RECON 30 MCG/0.3 ML IM: CPT

## 2021-03-08 PROCEDURE — 0002A PR COVID VAC PFIZER DIL RECON 30 MCG/0.3 ML IM: CPT

## 2021-03-15 DIAGNOSIS — Z79.01 ANTICOAGULATION GOAL OF INR 2 TO 3: ICD-10-CM

## 2021-03-15 DIAGNOSIS — Z51.81 ANTICOAGULATION GOAL OF INR 2 TO 3: ICD-10-CM

## 2021-03-15 RX ORDER — WARFARIN SODIUM 5 MG/1
TABLET ORAL
Qty: 65 TABLET | Refills: 6 | Status: SHIPPED | OUTPATIENT
Start: 2021-03-15 | End: 2022-06-15

## 2021-03-15 NOTE — TELEPHONE ENCOUNTER
Prescription approved per [facility] Refill Protocol.  Reymundo Rosas Rn  [facility]

## 2021-04-07 ENCOUNTER — ANTICOAGULATION THERAPY VISIT (OUTPATIENT)
Dept: FAMILY MEDICINE | Facility: CLINIC | Age: 81
End: 2021-04-07

## 2021-04-07 DIAGNOSIS — Z98.890 STATUS POST CATHETER ABLATION OF ATRIAL FIBRILLATION: ICD-10-CM

## 2021-04-07 DIAGNOSIS — Z51.81 ANTICOAGULATION GOAL OF INR 2 TO 3: ICD-10-CM

## 2021-04-07 DIAGNOSIS — Z79.01 LONG TERM CURRENT USE OF ANTICOAGULANT THERAPY: ICD-10-CM

## 2021-04-07 DIAGNOSIS — I48.20 CHRONIC ATRIAL FIBRILLATION (H): ICD-10-CM

## 2021-04-07 DIAGNOSIS — I73.9 PVD (PERIPHERAL VASCULAR DISEASE) (H): ICD-10-CM

## 2021-04-07 DIAGNOSIS — Z79.01 ANTICOAGULATION GOAL OF INR 2 TO 3: ICD-10-CM

## 2021-04-07 LAB
CAPILLARY BLOOD COLLECTION: NORMAL
INR PPP: 3.7 (ref 0.86–1.14)

## 2021-04-07 PROCEDURE — 85610 PROTHROMBIN TIME: CPT | Performed by: INTERNAL MEDICINE

## 2021-04-07 PROCEDURE — 36416 COLLJ CAPILLARY BLOOD SPEC: CPT | Performed by: INTERNAL MEDICINE

## 2021-04-07 NOTE — PROGRESS NOTES
ANTICOAGULATION MANAGEMENT     Patient Name:  Clementina Cooper  Date:  4/7/2021    ASSESSMENT /SUBJECTIVE:    Today's INR result of 3.70 is supratherapeutic. Goal INR of 2.0-3.0      Warfarin dose taken: Warfarin taken as instructed    Diet: No new diet changes affecting INR    Medication changes/ interactions: No new medications/supplements affecting INR    Previous INR: Therapeutic     S/S of bleeding or thromboembolism: No    New injury or illness: No    Upcoming surgery, procedure or cardioversion: No    Additional findings: 2nd Dose of COVID19 vaccine on 3/8    PLAN:    Telephone call with Clementina regarding INR result and instructed:     Warfarin Dosing Instructions: Hold tonight then continue your current warfarin dose of 5mg every Mon, Wed, & Fri; 2.5mg all other days of the week.     Instructed patient to follow up no later than: 2 weeks  Patient had prior lab appt scheduled, added to check INR in lab comments.     Education provided: Target INR goal and significance of current INR result and Importance of therapeutic range      Lynda verbalizes understanding and agrees to warfarin dosing plan.    Instructed to call the Anticoagulation Clinic for any changes, questions or concerns. (#[phone])        Eliazar Peterson, KYLIE      OBJECTIVE:  Recent labs: (last 7 days)     04/07/21  0958   INR 3.70*         No question data found.  Anticoagulation Summary  As of 4/7/2021    INR goal:  2.0-3.0   TTR:  65.6 % (1 y)   INR used for dosing:     Plan last modified:  Eliazar Peterson, RN (12/1/2020)   Next INR check:     Target end date:  Indefinite    Indications    Chronic atrial fibrillation (H) [I48.20]  Status post catheter ablation of atrial fibrillation [Z98.890]  PVD (peripheral vascular disease) (H) [I73.9]  Anticoagulation goal of INR 2 to 3 [Z51.81  Z79.01]             Anticoagulation Episode Summary     INR check location:      Preferred lab:      Send INR reminders to:  RON CRUZ    Comments:         Anticoagulation Care Providers     Provider Role Specialty Phone number    Estela Davila MD Referring Internal Medicine [phone]

## 2021-04-13 DIAGNOSIS — E03.4 HYPOTHYROIDISM DUE TO ACQUIRED ATROPHY OF THYROID: ICD-10-CM

## 2021-04-13 RX ORDER — LEVOTHYROXINE SODIUM 100 UG/1
TABLET ORAL
Qty: 90 TABLET | Refills: 0 | Status: SHIPPED | OUTPATIENT
Start: 2021-04-13 | End: 2021-07-13

## 2021-04-19 ENCOUNTER — ANTICOAGULATION THERAPY VISIT (OUTPATIENT)
Dept: FAMILY MEDICINE | Facility: CLINIC | Age: 81
End: 2021-04-19

## 2021-04-19 DIAGNOSIS — I73.9 PVD (PERIPHERAL VASCULAR DISEASE) (H): ICD-10-CM

## 2021-04-19 DIAGNOSIS — Z98.890 STATUS POST CATHETER ABLATION OF ATRIAL FIBRILLATION: ICD-10-CM

## 2021-04-19 DIAGNOSIS — I48.20 CHRONIC ATRIAL FIBRILLATION (H): ICD-10-CM

## 2021-04-19 DIAGNOSIS — E11.9 TYPE 2 DIABETES MELLITUS WITHOUT COMPLICATION, WITHOUT LONG-TERM CURRENT USE OF INSULIN (H): ICD-10-CM

## 2021-04-19 DIAGNOSIS — Z51.81 ANTICOAGULATION GOAL OF INR 2 TO 3: ICD-10-CM

## 2021-04-19 DIAGNOSIS — Z79.01 LONG TERM CURRENT USE OF ANTICOAGULANT THERAPY: ICD-10-CM

## 2021-04-19 DIAGNOSIS — E03.4 HYPOTHYROIDISM DUE TO ACQUIRED ATROPHY OF THYROID: ICD-10-CM

## 2021-04-19 DIAGNOSIS — Z79.01 ANTICOAGULATION GOAL OF INR 2 TO 3: ICD-10-CM

## 2021-04-19 LAB
HBA1C MFR BLD: 8.2 % (ref 0–5.6)
INR PPP: 2.8 (ref 0.86–1.14)
TSH SERPL DL<=0.005 MIU/L-ACNC: 2.51 MU/L (ref 0.4–4)

## 2021-04-19 PROCEDURE — 85610 PROTHROMBIN TIME: CPT | Performed by: INTERNAL MEDICINE

## 2021-04-19 PROCEDURE — 84443 ASSAY THYROID STIM HORMONE: CPT | Performed by: INTERNAL MEDICINE

## 2021-04-19 PROCEDURE — 36415 COLL VENOUS BLD VENIPUNCTURE: CPT | Performed by: INTERNAL MEDICINE

## 2021-04-19 PROCEDURE — 99207 PR NO CHARGE NURSE ONLY: CPT | Performed by: INTERNAL MEDICINE

## 2021-04-19 PROCEDURE — 83036 HEMOGLOBIN GLYCOSYLATED A1C: CPT | Performed by: INTERNAL MEDICINE

## 2021-04-19 NOTE — LETTER
April 20, 2021      Clementina Cooper  [address] [address]        Dear ,    We are writing to inform you of your test results.    Here are results:  Will review with you soon (at our 4/26/21 visit).  I look forward to seeing you then!      Resulted Orders   Hemoglobin A1c   Result Value Ref Range    Hemoglobin A1C 8.2 (H) 0 - 5.6 %      Comment:      Normal <5.7% Prediabetes 5.7-6.4%  Diabetes 6.5% or higher - adopted from ADA   consensus guidelines.     TSH with free T4 reflex   Result Value Ref Range    TSH 2.51 0.40 - 4.00 mU/L       If you have any questions or concerns, please call the clinic at the number listed above.       Sincerely,      Estela Davila MD/pascale

## 2021-04-19 NOTE — RESULT ENCOUNTER NOTE
Clementina Cooper    Here are results:  Will review with you soon (at our 4/26/21 visit).  I look forward to seeing you then!    Sincerely,       TAISHA MORROW M.D.

## 2021-04-19 NOTE — PROGRESS NOTES
ANTICOAGULATION FOLLOW-UP CLINIC VISIT    Patient Name:  Clementina Cooper  Date:  2021  Contact Type:  Telephone    SUBJECTIVE:  Patient Findings     Comments:    Assessed for S/S bleeding, clotting, medication, diet, health, activity and alcohol changes          Clinical Outcomes     Negatives:  Major bleeding event, Thromboembolic event, Anticoagulation-related hospital admission, Anticoagulation-related ED visit, Anticoagulation-related fatality    Comments:    Assessed for S/S bleeding, clotting, medication, diet, health, activity and alcohol changes             OBJECTIVE    Recent labs: (last 7 days)     21  0913   INR 2.80*       ASSESSMENT / PLAN  INR assessment THER    Recheck INR In: 4 WEEKS    INR Location Clinic      Anticoagulation Summary  As of 2021    INR goal:  2.0-3.0   TTR:  63.1 % (1 y)   INR used for dosin.80 (2021)   Warfarin maintenance plan:  5 mg (5 mg x 1) every Mon, Wed, Fri; 2.5 mg (5 mg x 0.5) all other days   Full warfarin instructions:  5 mg every Mon, Wed, Fri; 2.5 mg all other days   Weekly warfarin total:  25 mg   No change documented:  Ely Sterling, RN   Plan last modified:  Eliazar Peterson, RN (2020)   Next INR check:  2021   Priority:  Maintenance   Target end date:  Indefinite    Indications    Chronic atrial fibrillation (H) [I48.20]  Status post catheter ablation of atrial fibrillation [Z98.890]  PVD (peripheral vascular disease) (H) [I73.9]  Anticoagulation goal of INR 2 to 3 [Z51.81  Z79.01]             Anticoagulation Episode Summary     INR check location:      Preferred lab:      Send INR reminders to:  RON CRUZ    Comments:        Anticoagulation Care Providers     Provider Role Specialty Phone number    Estela Davila MD Referring Internal Medicine [phone]            See the Encounter Report to view Anticoagulation Flowsheet and Dosing Calendar (Go to Encounters tab in chart review, and find the Anticoagulation  Therapy Visit)        Ely Sterling RN

## 2021-04-26 ENCOUNTER — OFFICE VISIT (OUTPATIENT)
Dept: INTERNAL MEDICINE | Facility: CLINIC | Age: 81
End: 2021-04-26
Payer: COMMERCIAL

## 2021-04-26 VITALS
TEMPERATURE: 97.3 F | WEIGHT: 198 LBS | OXYGEN SATURATION: 94 % | HEART RATE: 74 BPM | SYSTOLIC BLOOD PRESSURE: 115 MMHG | BODY MASS INDEX: 32.95 KG/M2 | DIASTOLIC BLOOD PRESSURE: 70 MMHG

## 2021-04-26 DIAGNOSIS — G89.29 CHRONIC PAIN OF LEFT KNEE: ICD-10-CM

## 2021-04-26 DIAGNOSIS — I48.20 CHRONIC ATRIAL FIBRILLATION (H): ICD-10-CM

## 2021-04-26 DIAGNOSIS — M25.562 CHRONIC PAIN OF LEFT KNEE: ICD-10-CM

## 2021-04-26 DIAGNOSIS — E03.4 HYPOTHYROIDISM DUE TO ACQUIRED ATROPHY OF THYROID: ICD-10-CM

## 2021-04-26 DIAGNOSIS — Z79.01 LONG TERM CURRENT USE OF ANTICOAGULANT THERAPY: ICD-10-CM

## 2021-04-26 DIAGNOSIS — E11.9 TYPE 2 DIABETES MELLITUS WITHOUT COMPLICATION, WITHOUT LONG-TERM CURRENT USE OF INSULIN (H): Primary | ICD-10-CM

## 2021-04-26 DIAGNOSIS — H81.10 BENIGN PAROXYSMAL POSITIONAL VERTIGO, UNSPECIFIED LATERALITY: ICD-10-CM

## 2021-04-26 PROCEDURE — 99214 OFFICE O/P EST MOD 30 MIN: CPT | Performed by: INTERNAL MEDICINE

## 2021-04-26 NOTE — PROGRESS NOTES
"    Assessment & Plan     Type 2 diabetes mellitus without complication, without long-term current use of insulin (H)  Taking Glipizide and sitagliptin (added since last A1c), hemoglobin A1c is 8.2 (was 9.3 in January). Will continue current regimen and follow-up in three months.     Activity is becoming difficult due to dejenerative joint arthritis   Asked her to look into a form of water exercise.     Chronic pain of left knee  Encouraged movement as much as possible.  Injection via ortho helped.     Hypothyroidism due to acquired atrophy of thyroid  Stable, continue levothyroxine 100mcg daily.     Chronic atrial fibrillation (H)  Stable on current regimen.     Long-term (current) use of anticoagulants [Z79.01]  Taking for chronic atrial fibrillation. Last INR 2.8.     Benign paroxysmal positional vertigo, unspecified laterality  Increased positional vertigo since January 2021. She has been successfully treated for BPPV with physical therapy in the past.   Did not perform provocative tests here due to patient fear it may bring out the worst symptoms   - [facility]; Future      30 minutes spent on the date of the encounter doing chart review, history and exam, documentation and further activities per the note        BMI:   Estimated body mass index is 32.95 kg/m  as calculated from the following:    Height as of 2/1/21: 1.651 m (5' 5\").    Weight as of this encounter: 89.8 kg (198 lb).            Return in about 3 months (around 7/26/2021).    Estela Davila MD  [facility]    =============================================  ============================================    Subjective   Lynda is a 80 year old who presents for the following health issues      81 y/o F here for DM f/u.   H/o SNHL, PVD, DMII, chronic Afib (coumadin), s/p Pulm Vein Isolation for Afib 2018,  hypothyroid and essential tremor   Last year, metformin stopped for GI sx, Glipizide introduced.   She has " "been working with orthopedics for L knee djd.   Her A1C is 8.2. which is down from  9.2 in January 2021. She was started on sitagliptin in January. Takes blood sugars at home every morning, they have been running in the 150s.      She has been unable to increase exercise due to chronic knee pain.     She is complaining of new dizziness that started in January. She is wondering if it could be related to sitagliptin. She describes the sensation as the \"room-spinning\" and notices most when she is laying down and night or getting ready to stand. If she rests or turns to the side the dizziness subsides. She does feel off balanced when first moving but the sensation goes away. She has not fallen. She does not feel like this vertigo, she had vertigo a year ago and that resolved with physical therapy.     More apprehension about exercise, has not been exercising.  She gets aches and pains ... or dizziness and this keeps her from being active.        HPI     Diabetes Follow-up    How often are you checking your blood sugar? One time daily  What time of day are you checking your blood sugars (select all that apply)?  Before meals  Have you had any blood sugars above 200?  No  Have you had any blood sugars below 70?  No    What symptoms do you notice when your blood sugar is low?  None    What concerns do you have today about your diabetes? None     Do you have any of these symptoms? (Select all that apply)  No numbness or tingling in feet.  No redness, sores or blisters on feet.  No complaints of excessive thirst.  No reports of blurry vision.  No significant changes to weight.      BP Readings from Last 2 Encounters:   02/26/21 (!) 143/81   02/01/21 118/75     Hemoglobin A1C (%)   Date Value   04/19/2021 8.2 (H)   01/21/2021 9.2 (H)     LDL Cholesterol Calculated (mg/dL)   Date Value   10/16/2020 72   05/21/2019 73           How many servings of fruits and vegetables do you eat daily?  0-1    On average, how many sweetened " beverages do you drink each day (Examples: soda, juice, sweet tea, etc.  Do NOT count diet or artificially sweetened beverages)?   1    How many days per week do you exercise enough to make your heart beat faster? 3 or less    How many minutes a day do you exercise enough to make your heart beat faster? 9 or less    How many days per week do you miss taking your medication? 0        Review of Systems   Constitutional, HEENT, cardiovascular, pulmonary, gi and gu systems are negative, except as otherwise noted.      Objective    /70 (BP Location: Right arm, Patient Position: Sitting, Cuff Size: Adult Large)   Pulse 74   Temp 97.3  F (36.3  C) (Oral)   Wt 89.8 kg (198 lb)   SpO2 94%   BMI 32.95 kg/m        Physical Exam   GENERAL: healthy, alert and no distress  NECK: no adenopathy, no asymmetry, masses, or scars and thyroid normal to palpation  RESP: lungs clear to auscultation - no rales, rhonchi or wheezes  CV: regular rate and rhythm, normal S1 S2, no S3 or S4, no murmur, click or rub, no peripheral edema and peripheral pulses strong  ABDOMEN: soft, nontender, no hepatosplenomegaly, no masses and bowel sounds normal  MS: no gross musculoskeletal defects noted, no edema  PSYCH: mentation appears normal, affect normal/bright    We did forego Baranay/Hallpike maneuver due to fear of bringing out severe symptoms

## 2021-04-26 NOTE — PATIENT INSTRUCTIONS
See about getting any kind of water exercise/pool therapy      Physical therapy will call you to resume vertigo therapy      Return to clinic 3 months (late July) with labs prior     Hold the Lipitor (atorvastatin) for a couple of weeks to see if some of your aches and pains.

## 2021-05-04 ENCOUNTER — TELEPHONE (OUTPATIENT)
Dept: FAMILY MEDICINE | Facility: CLINIC | Age: 81
End: 2021-05-04

## 2021-05-04 DIAGNOSIS — E11.9 TYPE 2 DIABETES MELLITUS WITHOUT COMPLICATION, WITHOUT LONG-TERM CURRENT USE OF INSULIN (H): Primary | ICD-10-CM

## 2021-05-04 NOTE — TELEPHONE ENCOUNTER
Called patient informed her of message below rescheduled her lab appointment to 7/20 at 940   Thank you   LS

## 2021-05-04 NOTE — TELEPHONE ENCOUNTER
Reason for Call: Request for an order or referral:    Order or referral being requested: Labs for Diabetic Check on 7/27    Date needed: within the next several days    Has the patient been seen by the PCP for this problem? YES    Additional comments: PT states gets lab work 1 week prior to appt and would like call when order in.  PT concerned about no orders other than INR so the sooner the better.    Phone number Patient can be reached at:  Home number on file [phone] (home)    Best Time:  Anytime    Can we leave a detailed message on this number?  YES    Call taken on 5/4/2021 at 8:35 AM by Ivonne Rivera

## 2021-05-17 ENCOUNTER — ANTICOAGULATION THERAPY VISIT (OUTPATIENT)
Dept: FAMILY MEDICINE | Facility: CLINIC | Age: 81
End: 2021-05-17

## 2021-05-17 DIAGNOSIS — Z98.890 STATUS POST CATHETER ABLATION OF ATRIAL FIBRILLATION: ICD-10-CM

## 2021-05-17 DIAGNOSIS — Z79.01 LONG TERM CURRENT USE OF ANTICOAGULANT THERAPY: ICD-10-CM

## 2021-05-17 DIAGNOSIS — I73.9 PVD (PERIPHERAL VASCULAR DISEASE) (H): ICD-10-CM

## 2021-05-17 DIAGNOSIS — Z79.01 ANTICOAGULATION GOAL OF INR 2 TO 3: ICD-10-CM

## 2021-05-17 DIAGNOSIS — Z51.81 ANTICOAGULATION GOAL OF INR 2 TO 3: ICD-10-CM

## 2021-05-17 DIAGNOSIS — I48.20 CHRONIC ATRIAL FIBRILLATION (H): ICD-10-CM

## 2021-05-17 LAB
CAPILLARY BLOOD COLLECTION: NORMAL
INR PPP: 2.7 (ref 0.86–1.14)

## 2021-05-17 PROCEDURE — 85610 PROTHROMBIN TIME: CPT | Performed by: INTERNAL MEDICINE

## 2021-05-17 PROCEDURE — 36416 COLLJ CAPILLARY BLOOD SPEC: CPT | Performed by: INTERNAL MEDICINE

## 2021-05-17 NOTE — PROGRESS NOTES
ANTICOAGULATION MANAGEMENT     Patient Name:  Clementina Cooper  Date:  2021    ASSESSMENT /SUBJECTIVE:    Today's INR result of 2.7 is therapeutic. Goal INR of 2.0-3.0      Warfarin dose taken: Warfarin taken as instructed    Diet: No new diet changes affecting INR    Medication changes/ interactions: No new medications/supplements affecting INR    Previous INR: Therapeutic     S/S of bleeding or thromboembolism: No    New injury or illness: No    Upcoming surgery, procedure or cardioversion: No    Additional findings: None      PLAN:    Telephone call with Clementina regarding INR result and instructed:     Warfarin Dosing Instructions: Continue your current warfarin dose 5 mg Mon, Wed, Fri and 2.5 mg all other days    Instructed patient to follow up no later than: 5 weeks  Lab visit scheduled    Education provided: None required      Pt verbalizes understanding and agrees to warfarin dosing plan.    Instructed to call the Anticoagulation Clinic for any changes, questions or concerns. (#[phone])        Jes Lemus RN      OBJECTIVE:  Recent labs: (last 7 days)     21  1040   INR 2.70*         No question data found.  Anticoagulation Summary  As of 2021    INR goal:  2.0-3.0   TTR:  70.1 % (1 y)   INR used for dosin.70 (2021)   Warfarin maintenance plan:  5 mg (5 mg x 1) every Mon, Wed, Fri; 2.5 mg (5 mg x 0.5) all other days   Full warfarin instructions:  5 mg every Mon, Wed, Fri; 2.5 mg all other days   Weekly warfarin total:  25 mg   No change documented:  Jes Lemus RN   Plan last modified:  Eliazar Peterson RN (2020)   Next INR check:  2021   Priority:  Maintenance   Target end date:  Indefinite    Indications    Chronic atrial fibrillation (H) [I48.20]  Status post catheter ablation of atrial fibrillation [Z98.890]  PVD (peripheral vascular disease) (H) [I73.9]  Anticoagulation goal of INR 2 to 3 [Z51.81  Z79.01]             Anticoagulation Episode Summary     INR  check location:      Preferred lab:      Send INR reminders to:  RON CRUZ    Comments:        Anticoagulation Care Providers     Provider Role Specialty Phone number    Estela Davila MD Referring Internal Medicine [phone]

## 2021-05-18 ENCOUNTER — THERAPY VISIT (OUTPATIENT)
Dept: PHYSICAL THERAPY | Facility: CLINIC | Age: 81
End: 2021-05-18
Attending: INTERNAL MEDICINE
Payer: COMMERCIAL

## 2021-05-18 DIAGNOSIS — H81.10 BENIGN PAROXYSMAL POSITIONAL VERTIGO, UNSPECIFIED LATERALITY: ICD-10-CM

## 2021-05-18 DIAGNOSIS — H81.11 BENIGN PAROXYSMAL POSITIONAL VERTIGO, RIGHT: Primary | ICD-10-CM

## 2021-05-18 PROCEDURE — 97112 NEUROMUSCULAR REEDUCATION: CPT | Mod: GP | Performed by: PHYSICAL THERAPIST

## 2021-05-18 PROCEDURE — 95992 CANALITH REPOSITIONING PROC: CPT | Mod: GP | Performed by: PHYSICAL THERAPIST

## 2021-05-19 ENCOUNTER — TELEPHONE (OUTPATIENT)
Dept: FAMILY MEDICINE | Facility: CLINIC | Age: 81
End: 2021-05-19

## 2021-05-19 DIAGNOSIS — E11.9 TYPE 2 DIABETES MELLITUS WITHOUT COMPLICATION, WITHOUT LONG-TERM CURRENT USE OF INSULIN (H): Primary | ICD-10-CM

## 2021-05-19 NOTE — TELEPHONE ENCOUNTER
Called patient. She states that she hadn't been feeling good for a long time and decided that it must be her sitagliptin (JANUVIA) 50 MG tablet. She stopped taking it about a week ago and felt better right away. Asked patient what symptoms she was experiencing on Januvia, she replied that she just did not feel good. States that she also felt like that when she was on Metformin. She has been off medication for about a week now and is wondering what she should do going forward? She asked if she should try taking only half a tablet? Please advise.

## 2021-05-19 NOTE — TELEPHONE ENCOUNTER
Yes, the januvia can have GI side effects like the metformin can...   Would she be willing to try Jardiance?  Once daily tab.  Does not affect the GI tract at all.  Possible side effects = mild weight loss, increased urination, yeast infections.  Similar tier on her insurance (Tier 3)

## 2021-05-19 NOTE — TELEPHONE ENCOUNTER
Called patient. Notified her of message from Dr. Davila. She verbalized understanding and states that she would like to try the Jardiance instead of Januvia. Prescription sent to pharmacy.

## 2021-05-19 NOTE — TELEPHONE ENCOUNTER
Reason for Call:  Other call back    Detailed comments: Patient is calling back to care team to update provider. Patient has stopped medication of sitagliptin (JANUVIA) 50 MG tablet for 1 week, and feeling a lot better wanted to speak with team about medication going forward.     Phone Number Patient can be reached at: Home number on file [phone] (home)    Best Time: anytime    Can we leave a detailed message on this number? YES    Call taken on 5/19/2021 at 8:21 AM by Zaina Azevedo

## 2021-05-21 ENCOUNTER — TELEPHONE (OUTPATIENT)
Dept: INTERNAL MEDICINE | Facility: CLINIC | Age: 81
End: 2021-05-21

## 2021-05-21 NOTE — TELEPHONE ENCOUNTER
Received call from Rosario at [facility]. She stated that they have received 2 different rx for Jardiance and wanted further clarification on which one patient should take.    empagliflozin (JARDIANCE) 10 MG TABS tablet  OR  empagliflozin (JARDIANCE) 25 MG TABS tablet    Routing to PCP to please advise.    Kendra Michael, ALYSAN KYLIE  [facility], [place]

## 2021-05-26 NOTE — TELEPHONE ENCOUNTER
Sorry for the confusion, intent is to start at 10 mg Jardiance per day.   I will send clarification order to pharmacy    I spoke with patient,  Side effects discussed and questions answered.

## 2021-05-26 NOTE — TELEPHONE ENCOUNTER
The [facility] pharmacist called the clinic.  She is requesting clarification on the dosing for the empagliflozin (JARDIANCE) tablet    What dose does the provider want patient to take?    empagliflozin (JARDIANCE) 10 MG TABS tablet: sent 90 tablets with no refills 5/19/21.    OR    empagliflozin (JARDIANCE) 25 MG TABS tablet: sent 90 tablets with 1 refill 5/20/21    OR  Both.     Pharmacist does not recommend both as that is a high dose especially if patient recently started this medication.    Pharmacist is requesting this to be addresses as soon as possible as it have been a week.

## 2021-05-28 ENCOUNTER — TELEPHONE (OUTPATIENT)
Dept: FAMILY MEDICINE | Facility: CLINIC | Age: 81
End: 2021-05-28

## 2021-05-28 DIAGNOSIS — M25.561 CHRONIC PAIN OF RIGHT KNEE: Primary | ICD-10-CM

## 2021-05-28 DIAGNOSIS — G89.29 CHRONIC PAIN OF RIGHT KNEE: Primary | ICD-10-CM

## 2021-05-28 NOTE — TELEPHONE ENCOUNTER
Left message on answering machine as requested. Patient notified of provider message as written.     Jossy Lemus RN

## 2021-05-28 NOTE — TELEPHONE ENCOUNTER
"Routing to provider to review and advise.    Spoke with Lynda, pt is now having pain in her right knee. Pt was seen by Dr. Lopez for left knee pain earlier this year and would like a referral to see him again.     She said she is unable to get into see Dr. Davila until \"July\" per pt report? Wondering if she could get a referral without seeing Dr. Davila first.    Pt will be out of town until Tuesday next week but ok to leave detailed message on phone so she knows whether referral was approved or if she needs an appointment first.    Riri LIZAMA RN, BSN  [facility]        "

## 2021-06-01 ENCOUNTER — OFFICE VISIT (OUTPATIENT)
Dept: ORTHOPEDICS | Facility: OTHER | Age: 81
End: 2021-06-01
Payer: COMMERCIAL

## 2021-06-01 ENCOUNTER — ANCILLARY PROCEDURE (OUTPATIENT)
Dept: GENERAL RADIOLOGY | Facility: OTHER | Age: 81
End: 2021-06-01
Attending: ORTHOPAEDIC SURGERY
Payer: COMMERCIAL

## 2021-06-01 VITALS
DIASTOLIC BLOOD PRESSURE: 74 MMHG | RESPIRATION RATE: 18 BRPM | WEIGHT: 198 LBS | HEIGHT: 65 IN | BODY MASS INDEX: 32.99 KG/M2 | SYSTOLIC BLOOD PRESSURE: 117 MMHG

## 2021-06-01 DIAGNOSIS — G89.29 CHRONIC PAIN OF RIGHT KNEE: ICD-10-CM

## 2021-06-01 DIAGNOSIS — M17.11 PRIMARY OSTEOARTHRITIS OF RIGHT KNEE: Primary | ICD-10-CM

## 2021-06-01 DIAGNOSIS — M25.561 CHRONIC PAIN OF RIGHT KNEE: ICD-10-CM

## 2021-06-01 DIAGNOSIS — M25.561 RIGHT KNEE PAIN, UNSPECIFIED CHRONICITY: ICD-10-CM

## 2021-06-01 PROCEDURE — 20610 DRAIN/INJ JOINT/BURSA W/O US: CPT | Mod: RT | Performed by: ORTHOPAEDIC SURGERY

## 2021-06-01 PROCEDURE — 99213 OFFICE O/P EST LOW 20 MIN: CPT | Mod: 25 | Performed by: ORTHOPAEDIC SURGERY

## 2021-06-01 PROCEDURE — 73562 X-RAY EXAM OF KNEE 3: CPT | Mod: RT | Performed by: RADIOLOGY

## 2021-06-01 RX ORDER — METHYLPREDNISOLONE ACETATE 80 MG/ML
80 INJECTION, SUSPENSION INTRA-ARTICULAR; INTRALESIONAL; INTRAMUSCULAR; SOFT TISSUE
Status: DISCONTINUED | OUTPATIENT
Start: 2021-06-01 | End: 2024-06-13

## 2021-06-01 RX ORDER — LIDOCAINE HYDROCHLORIDE 10 MG/ML
5 INJECTION, SOLUTION INFILTRATION; PERINEURAL
Status: DISCONTINUED | OUTPATIENT
Start: 2021-06-01 | End: 2024-06-13

## 2021-06-01 RX ADMIN — METHYLPREDNISOLONE ACETATE 80 MG: 80 INJECTION, SUSPENSION INTRA-ARTICULAR; INTRALESIONAL; INTRAMUSCULAR; SOFT TISSUE at 11:36

## 2021-06-01 RX ADMIN — LIDOCAINE HYDROCHLORIDE 5 ML: 10 INJECTION, SOLUTION INFILTRATION; PERINEURAL at 11:36

## 2021-06-01 ASSESSMENT — MIFFLIN-ST. JEOR: SCORE: 1364

## 2021-06-01 NOTE — PROGRESS NOTES
Clementina Cooper is a 80 year old female who is seen as self referral for right knee pain.  She has had intermittent problems with both knees in the past.  Most recently in January 2021 she had left knee pain from arthritis and had steroid injection, which helped.  Her left knee now is feeling fairly good.  She has had constant aching pain in the right knee for the past month.  She reports of burning pain in the knee.  She rates her pain up to 8 out of 10.  She has had physical therapy without relief.    X-rays of right knee today show moderate osteoarthritis of the medial joint and patellofemoral joint.  She has significant narrowing, but not bone-on-bone.  Comparing this to the left knee in January 2021, it is almost identical.    Past Medical History:   Diagnosis Date     Atrial fibrillation (H) 6/10/2014     Cataract      Family history of colon cancer      Glaucoma (increased eye pressure)      Hyperlipidemia LDL goal < 100 2008     Hypothyroid      Need for prophylactic hormone replacement therapy (postmenopausal)      Obesity      Primary osteoarthritis of left knee 2/1/2021     PVD (peripheral vascular disease) (H)     right leg.  working with Dr Dempsey.  (Stent placed in L leg 1/2010)     Type 2 diabetes, HbA1c goal < 7% (H)     diabetes       Past Surgical History:   Procedure Laterality Date     ANGIOPLASTY      right leg     C STOMACH SURGERY PROCEDURE UNLISTED       CARDIAC SURGERY      Atrial fibrillation ablation      CATARACT IOL, RT/LT       HC TRABECULOPLASTY BY LASER SURGERY       HC VASCULAR SURGERY PROCEDURE UNLIST       HYSTERECTOMY, CERVIX STATUS UNKNOWN  1989    uterine bleeding     HYSTERECTOMY, PAP NO LONGER INDICATED       LASER YAG CAPSULOTOMY Right 02/06/2018; 2/2019    right eye; left eye     LASER YAG IRIDOTOMY  remotely    both eyes (elsewhere)     PHACOEMULSIFICATION WITH STANDARD INTRAOCULAR LENS IMPLANT  7/2012; 8/2012    right eye; left eye     ZZC NONSPECIFIC PROCEDURE  2001     neck surgery/trauma       Family History   Problem Relation Age of Onset     Diabetes Mother      Heart Disease Mother      Heart Disease Father      Heart Disease Son      Cancer Brother      Heart Disease Sister      Cancer Brother      Cancer Brother      Heart Disease Sister      Macular Degeneration No family hx of      Glaucoma No family hx of        Social History     Socioeconomic History     Marital status:      Spouse name: Not on file     Number of children: 3     Years of education: Not on file     Highest education level: Not on file   Occupational History     Not on file   Social Needs     Financial resource strain: Not on file     Food insecurity     Worry: Not on file     Inability: Not on file     Transportation needs     Medical: Not on file     Non-medical: Not on file   Tobacco Use     Smoking status: Former Smoker     Quit date: 1999     Years since quittin.5     Smokeless tobacco: Never Used   Substance and Sexual Activity     Alcohol use: Yes     Comment: occasional     Drug use: No     Sexual activity: Not Currently   Lifestyle     Physical activity     Days per week: Not on file     Minutes per session: Not on file     Stress: Not on file   Relationships     Social connections     Talks on phone: Not on file     Gets together: Not on file     Attends Yazidism service: Not on file     Active member of club or organization: Not on file     Attends meetings of clubs or organizations: Not on file     Relationship status: Not on file     Intimate partner violence     Fear of current or ex partner: Not on file     Emotionally abused: Not on file     Physically abused: Not on file     Forced sexual activity: Not on file   Other Topics Concern     Parent/sibling w/ CABG, MI or angioplasty before 65F 55M? Yes     Comment: brother transplant   Social History Narrative    Lives in home with  (Caitlin Lemon) (one level with basement).  3 kids all in area (supportive).  No  pets.         Current Outpatient Medications   Medication Sig Dispense Refill     ACE NOT PRESCRIBED, INTENTIONAL, 1 each At Bedtime ACE Inhibitor not prescribed due to Other:  Neck swelling.  Also, BP is on low normal side with the betablocker 0 each 0     atorvastatin (LIPITOR) 20 MG tablet TAKE 1 TABLET BY MOUTH EVERY DAY 90 tablet 3     Biotin 5000 MCG CAPS Take  by mouth.       blood glucose (NO BRAND SPECIFIED) test strip Use to test blood sugar 1 times daily  . 100 strip 3     CENTRUM SILVER OR one daily       clobetasol (TEMOVATE) 0.05 % external cream APPLY 1 GRAM TOPICALLY 2 TIMES DAILY AS NEEDED 180 g 2     empagliflozin (JARDIANCE) 10 MG TABS tablet Take 1 tablet (10 mg) by mouth daily 90 tablet 0     Evening Primrose Oil 1000 MG CAPS Take by mouth daily       glipiZIDE (GLUCOTROL XL) 5 MG 24 hr tablet Take 2 tablets (10 mg) by mouth 2 times daily 360 tablet 3     levothyroxine (SYNTHROID/LEVOTHROID) 100 MCG tablet TAKE 1 TABLET BY MOUTH EVERY DAY 90 tablet 0     losartan (COZAAR) 25 MG tablet TAKE 0.5 TABLETS (12.5 MG) BY MOUTH DAILY 45 tablet 3     meclizine (ANTIVERT) 25 MG tablet Take 1 tablet (25 mg) by mouth 3 times daily as needed for dizziness 30 tablet 2     ONETOUCH ULTRA test strip USE TO TEST BLOOD SUGAR TWICE A DAY OR AS DIRECTED 200 strip 0     propranolol (INDERAL) 10 MG tablet TAKE 1 TABLET (10 MG) BY MOUTH 2 TIMES DAILY AS NEEDED 180 tablet 3     VITAMIN D 1000 UNIT OR TABS 1 TABLET DAILY       warfarin ANTICOAGULANT (COUMADIN) 5 MG tablet Takes 5 mg mon wed and Friday and 2.5mg all other days unless directed otherwise by ACC. 65 tablet 6       Allergies   Allergen Reactions     Benzocaine      Betadine [Povidone Iodine]      Rash     Januvia [Sitagliptin]      Not feel well   See 5/19/21 tel call     Pravastatin      Muscle aches     Vagisil [Kdc:Benzocaine+Cetyl Alcohol+Edetic Acid+Methylparaben+Propylene Glycol+...]      It is the benzocaine       Xarelto [Rivaroxaban]      Daily  "headache     Zocor [Hmg-Coa-R Inhibitors]      Muscle aches       REVIEW OF SYSTEMS:  CONSTITUTIONAL:  NEGATIVE for fever, chills, change in weight, not feeling tired  SKIN:  NEGATIVE for worrisome rashes, no skin lumps, no skin ulcers and no non-healing wounds  EYES:  NEGATIVE for vision changes or irritation.  ENT/MOUTH:  NEGATIVE.  No hearing loss, no hoarseness, no difficulty swallowing.  RESP:  NEGATIVE. No cough or shortness of breath.  CV:  NEGATIVE for chest pain, palpitations or peripheral edema  GI:  NEGATIVE for nausea, abdominal pain, heartburn, or change in bowel habits  :  Negative. No dysuria, no hematuria  MUSCULOSKELETAL:  See HPI above  NEURO:  NEGATIVE . No headaches, no dizziness,  no numbness  ENDOCRINE:  NEGATIVE for temperature intolerance, skin/hair changes  HEME/ALLERGY/IMMUNE:  NEGATIVE for bleeding problems  PSYCHIATRIC:  NEGATIVE. no anxiety, no depression.      Exam:  Vitals: /74   Resp 18   Ht 1.651 m (5' 5\")   Wt 89.8 kg (198 lb)   BMI 32.95 kg/m    BMI= Body mass index is 32.95 kg/m .  Constitutional:  healthy, alert and no distress  Neuro: Alert and Oriented x 3, Sensation grossly WNL.  HEENT:  Atraumatic, EOMI  Neck:  Neck supple with no tenderness.  Psych: Affect normal   Respiratory: Breathing not labored.  Cardiovascular: normal peripheral pulses  Lymph: no adenopathy  Skin: No rashes,worrisome lesions or skin problems  Spine: straight, no straight leg raising pain.  Hips show full range of motion.  There is no tenderness over the sacro-iliac joints, sciatic notch, or greater trochanters.   She has some pain with flexion of the right knee.  She has motion from 0 to 122 degrees.  On the left knee she has no pain with motion and has motion from 0 to 127 degrees.  She does have some patellofemoral crepitation on both knees.  She has a mild effusion of the right knee, negative on the left.  She has medial joint tenderness and patellar tenderness on the right.  There is " some medial joint tenderness on the left.  She has no evidence of Baker's cyst posteriorly, but has some posterior tenderness on the right.  Sensation, motor and circulation are intact.    Assessment:  Bilateral knee osteoarthritis with right more painful now.  Neither knee is bone-on-bone yet.  Steroid injection worked well the left, so we would like to try this on the right.  If injections become ineffective in the future she could have joint replacements.    Plan:  She  desires injection today of right knee(s).  Risks, benefits, potential complications and alternatives were discussed.   With the patient's consent, sterile prep was performed of right knee(s).  Right knee was injected with Depo Medrol 80 mg and lidocaine at anteromedial site.  Return to clinic as needed.

## 2021-06-01 NOTE — LETTER
6/1/2021         RE: Clementina Cooper  [address] [facility] [address]        Dear Colleague,    Thank you for referring your patient, Clementina Cooper, to the [facility] [place]. Please see a copy of my visit note below.    Clementina Cooper is a 80 year old female who is seen as self referral for right knee pain.  She has had intermittent problems with both knees in the past.  Most recently in January 2021 she had left knee pain from arthritis and had steroid injection, which helped.  Her left knee now is feeling fairly good.  She has had constant aching pain in the right knee for the past month.  She reports of burning pain in the knee.  She rates her pain up to 8 out of 10.  She has had physical therapy without relief.    X-rays of right knee today show moderate osteoarthritis of the medial joint and patellofemoral joint.  She has significant narrowing, but not bone-on-bone.  Comparing this to the left knee in January 2021, it is almost identical.    Past Medical History:   Diagnosis Date     Atrial fibrillation (H) 6/10/2014     Cataract      Family history of colon cancer      Glaucoma (increased eye pressure)      Hyperlipidemia LDL goal < 100 2008     Hypothyroid      Need for prophylactic hormone replacement therapy (postmenopausal)      Obesity      Primary osteoarthritis of left knee 2/1/2021     PVD (peripheral vascular disease) (H)     right leg.  working with Dr Dempsey.  (Stent placed in L leg 1/2010)     Type 2 diabetes, HbA1c goal < 7% (H)     diabetes       Past Surgical History:   Procedure Laterality Date     ANGIOPLASTY      right leg     C STOMACH SURGERY PROCEDURE UNLISTED       CARDIAC SURGERY      Atrial fibrillation ablation      CATARACT IOL, RT/LT       HC TRABECULOPLASTY BY LASER SURGERY       HC VASCULAR SURGERY PROCEDURE UNLIST       HYSTERECTOMY, CERVIX STATUS UNKNOWN  1989    uterine bleeding     HYSTERECTOMY, PAP NO LONGER  INDICATED       LASER YAG CAPSULOTOMY Right 2018; 2019    right eye; left eye     LASER YAG IRIDOTOMY  remotely    both eyes (elsewhere)     PHACOEMULSIFICATION WITH STANDARD INTRAOCULAR LENS IMPLANT  2012; 2012    right eye; left eye     ZZC NONSPECIFIC PROCEDURE      neck surgery/trauma       Family History   Problem Relation Age of Onset     Diabetes Mother      Heart Disease Mother      Heart Disease Father      Heart Disease Son      Cancer Brother      Heart Disease Sister      Cancer Brother      Cancer Brother      Heart Disease Sister      Macular Degeneration No family hx of      Glaucoma No family hx of        Social History     Socioeconomic History     Marital status:      Spouse name: Not on file     Number of children: 3     Years of education: Not on file     Highest education level: Not on file   Occupational History     Not on file   Social Needs     Financial resource strain: Not on file     Food insecurity     Worry: Not on file     Inability: Not on file     Transportation needs     Medical: Not on file     Non-medical: Not on file   Tobacco Use     Smoking status: Former Smoker     Quit date: 1999     Years since quittin.5     Smokeless tobacco: Never Used   Substance and Sexual Activity     Alcohol use: Yes     Comment: occasional     Drug use: No     Sexual activity: Not Currently   Lifestyle     Physical activity     Days per week: Not on file     Minutes per session: Not on file     Stress: Not on file   Relationships     Social connections     Talks on phone: Not on file     Gets together: Not on file     Attends Quaker service: Not on file     Active member of club or organization: Not on file     Attends meetings of clubs or organizations: Not on file     Relationship status: Not on file     Intimate partner violence     Fear of current or ex partner: Not on file     Emotionally abused: Not on file     Physically abused: Not on file     Forced sexual  activity: Not on file   Other Topics Concern     Parent/sibling w/ CABG, MI or angioplasty before 65F 55M? Yes     Comment: brother transplant   Social History Narrative    Lives in home with  (Caitlin Lemon) (one level with basement).  3 kids all in area (supportive).  No pets.         Current Outpatient Medications   Medication Sig Dispense Refill     ACE NOT PRESCRIBED, INTENTIONAL, 1 each At Bedtime ACE Inhibitor not prescribed due to Other:  Neck swelling.  Also, BP is on low normal side with the betablocker 0 each 0     atorvastatin (LIPITOR) 20 MG tablet TAKE 1 TABLET BY MOUTH EVERY DAY 90 tablet 3     Biotin 5000 MCG CAPS Take  by mouth.       blood glucose (NO BRAND SPECIFIED) test strip Use to test blood sugar 1 times daily  . 100 strip 3     CENTRUM SILVER OR one daily       clobetasol (TEMOVATE) 0.05 % external cream APPLY 1 GRAM TOPICALLY 2 TIMES DAILY AS NEEDED 180 g 2     empagliflozin (JARDIANCE) 10 MG TABS tablet Take 1 tablet (10 mg) by mouth daily 90 tablet 0     Evening Primrose Oil 1000 MG CAPS Take by mouth daily       glipiZIDE (GLUCOTROL XL) 5 MG 24 hr tablet Take 2 tablets (10 mg) by mouth 2 times daily 360 tablet 3     levothyroxine (SYNTHROID/LEVOTHROID) 100 MCG tablet TAKE 1 TABLET BY MOUTH EVERY DAY 90 tablet 0     losartan (COZAAR) 25 MG tablet TAKE 0.5 TABLETS (12.5 MG) BY MOUTH DAILY 45 tablet 3     meclizine (ANTIVERT) 25 MG tablet Take 1 tablet (25 mg) by mouth 3 times daily as needed for dizziness 30 tablet 2     ONETOUCH ULTRA test strip USE TO TEST BLOOD SUGAR TWICE A DAY OR AS DIRECTED 200 strip 0     propranolol (INDERAL) 10 MG tablet TAKE 1 TABLET (10 MG) BY MOUTH 2 TIMES DAILY AS NEEDED 180 tablet 3     VITAMIN D 1000 UNIT OR TABS 1 TABLET DAILY       warfarin ANTICOAGULANT (COUMADIN) 5 MG tablet Takes 5 mg mon wed and Friday and 2.5mg all other days unless directed otherwise by [facility]. 65 tablet 6       Allergies   Allergen Reactions     Benzocaine      Betadine  "[Povidone Iodine]      Rash     Januvia [Sitagliptin]      Not feel well   See 5/19/21 tel call     Pravastatin      Muscle aches     Vagisil [Kdc:Benzocaine+Cetyl Alcohol+Edetic Acid+Methylparaben+Propylene Glycol+...]      It is the benzocaine       Xarelto [Rivaroxaban]      Daily headache     Zocor [Hmg-Coa-R Inhibitors]      Muscle aches       REVIEW OF SYSTEMS:  CONSTITUTIONAL:  NEGATIVE for fever, chills, change in weight, not feeling tired  SKIN:  NEGATIVE for worrisome rashes, no skin lumps, no skin ulcers and no non-healing wounds  EYES:  NEGATIVE for vision changes or irritation.  ENT/MOUTH:  NEGATIVE.  No hearing loss, no hoarseness, no difficulty swallowing.  RESP:  NEGATIVE. No cough or shortness of breath.  CV:  NEGATIVE for chest pain, palpitations or peripheral edema  GI:  NEGATIVE for nausea, abdominal pain, heartburn, or change in bowel habits  :  Negative. No dysuria, no hematuria  MUSCULOSKELETAL:  See HPI above  NEURO:  NEGATIVE . No headaches, no dizziness,  no numbness  ENDOCRINE:  NEGATIVE for temperature intolerance, skin/hair changes  HEME/ALLERGY/IMMUNE:  NEGATIVE for bleeding problems  PSYCHIATRIC:  NEGATIVE. no anxiety, no depression.      Exam:  Vitals: /74   Resp 18   Ht 1.651 m (5' 5\")   Wt 89.8 kg (198 lb)   BMI 32.95 kg/m    BMI= Body mass index is 32.95 kg/m .  Constitutional:  healthy, alert and no distress  Neuro: Alert and Oriented x 3, Sensation grossly WNL.  HEENT:  Atraumatic, EOMI  Neck:  Neck supple with no tenderness.  Psych: Affect normal   Respiratory: Breathing not labored.  Cardiovascular: normal peripheral pulses  Lymph: no adenopathy  Skin: No rashes,worrisome lesions or skin problems  Spine: straight, no straight leg raising pain.  Hips show full range of motion.  There is no tenderness over the sacro-iliac joints, sciatic notch, or greater trochanters.   She has some pain with flexion of the right knee.  She has motion from 0 to 122 degrees.  On the " left knee she has no pain with motion and has motion from 0 to 127 degrees.  She does have some patellofemoral crepitation on both knees.  She has a mild effusion of the right knee, negative on the left.  She has medial joint tenderness and patellar tenderness on the right.  There is some medial joint tenderness on the left.  She has no evidence of Baker's cyst posteriorly, but has some posterior tenderness on the right.  Sensation, motor and circulation are intact.    Assessment:  Bilateral knee osteoarthritis with right more painful now.  Neither knee is bone-on-bone yet.  Steroid injection worked well the left, so we would like to try this on the right.  If injections become ineffective in the future she could have joint replacements.    Plan:  She  desires injection today of right knee(s).  Risks, benefits, potential complications and alternatives were discussed.   With the patient's consent, sterile prep was performed of right knee(s).  Right knee was injected with Depo Medrol 80 mg and lidocaine at anteromedial site.  Return to clinic as needed.      Large Joint Injection/Arthocentesis: R knee joint    Date/Time: 6/1/2021 11:36 AM  Performed by: Kendall Lopez MD  Authorized by: Kendall Lopez MD     Indications:  Pain  Needle Size:  22 G  Guidance: landmark guided    Location:  Knee      Medications:  5 mL lidocaine 1 %; 80 mg methylPREDNISolone 80 MG/ML  Outcome:  Tolerated well, no immediate complications  Procedure discussed: discussed risks, benefits, and alternatives    Consent Given by:  Patient  Timeout: timeout called immediately prior to procedure    Prep: patient was prepped and draped in usual sterile fashion              Again, thank you for allowing me to participate in the care of your patient.        Sincerely,        Kendall Lopez MD

## 2021-06-01 NOTE — PROGRESS NOTES
Large Joint Injection/Arthocentesis: R knee joint    Date/Time: 6/1/2021 11:36 AM  Performed by: Kendall Lopez MD  Authorized by: Kendall Lopez MD     Indications:  Pain  Needle Size:  22 G  Guidance: landmark guided    Location:  Knee      Medications:  5 mL lidocaine 1 %; 80 mg methylPREDNISolone 80 MG/ML  Outcome:  Tolerated well, no immediate complications  Procedure discussed: discussed risks, benefits, and alternatives    Consent Given by:  Patient  Timeout: timeout called immediately prior to procedure    Prep: patient was prepped and draped in usual sterile fashion

## 2021-06-21 ENCOUNTER — TELEPHONE (OUTPATIENT)
Dept: FAMILY MEDICINE | Facility: CLINIC | Age: 81
End: 2021-06-21

## 2021-06-21 ENCOUNTER — ANTICOAGULATION THERAPY VISIT (OUTPATIENT)
Dept: FAMILY MEDICINE | Facility: CLINIC | Age: 81
End: 2021-06-21

## 2021-06-21 DIAGNOSIS — Z79.01 ANTICOAGULATION GOAL OF INR 2 TO 3: Primary | ICD-10-CM

## 2021-06-21 DIAGNOSIS — Z98.890 STATUS POST CATHETER ABLATION OF ATRIAL FIBRILLATION: ICD-10-CM

## 2021-06-21 DIAGNOSIS — I48.20 CHRONIC ATRIAL FIBRILLATION (H): ICD-10-CM

## 2021-06-21 DIAGNOSIS — I73.9 PVD (PERIPHERAL VASCULAR DISEASE) (H): ICD-10-CM

## 2021-06-21 DIAGNOSIS — Z79.01 LONG TERM CURRENT USE OF ANTICOAGULANT THERAPY: ICD-10-CM

## 2021-06-21 DIAGNOSIS — Z79.01 ANTICOAGULATION GOAL OF INR 2 TO 3: ICD-10-CM

## 2021-06-21 DIAGNOSIS — Z51.81 ANTICOAGULATION GOAL OF INR 2 TO 3: ICD-10-CM

## 2021-06-21 DIAGNOSIS — Z51.81 ANTICOAGULATION GOAL OF INR 2 TO 3: Primary | ICD-10-CM

## 2021-06-21 LAB — INR PPP: 2.1 (ref 0.86–1.14)

## 2021-06-21 PROCEDURE — 36416 COLLJ CAPILLARY BLOOD SPEC: CPT | Performed by: INTERNAL MEDICINE

## 2021-06-21 PROCEDURE — 85610 PROTHROMBIN TIME: CPT | Performed by: INTERNAL MEDICINE

## 2021-06-21 NOTE — PROGRESS NOTES
Anticoagulation Management    Unable to reach Lynda  today.    Today's INR result of 2.1 is therapeutic (goal INR of 2.0-3.0).  Result received from: Clinic Lab    Follow up required to assess for changes     left vm to resume current dose of warfarin  5mg Mon wed and friday and 2.5mg all other days. and make apt for follow up in 6 weeks      Anticoagulation clinic to follow up    Ely Sterling RN

## 2021-06-21 NOTE — TELEPHONE ENCOUNTER
ANTICOAGULATION MANAGEMENT      Clementina Cooper due for annual renewal of referral to anticoagulation monitoring. Order pended for your review and signature.      ANTICOAGULATION SUMMARY      Warfarin indication(s)     Atrial fibrillation    Heart valve present?  NO       Current goal range   INR: 2.0-3.0     Goal appropriate for indication? Yes, INR 2-3 appropriate for hx of DVT, PE, hypercoagulable state, Afib, LVAD, or bileaflet AVR without risk factors     Current duration of therapy Indefinite/long term therapy   Time in Therapeutic Range (TTR)  (Goal > 60%) 76.3%       Office visit with referring provider's group within last year yes on 4/26/21       Ely Sterling RN

## 2021-06-22 ENCOUNTER — TELEPHONE (OUTPATIENT)
Dept: FAMILY MEDICINE | Facility: CLINIC | Age: 81
End: 2021-06-22

## 2021-06-22 NOTE — PROGRESS NOTES
Apt made and dosing of warfarin reviewed no changes to report.  Reymundo Rosas Rn  [facility]

## 2021-06-22 NOTE — CONFIDENTIAL NOTE
Patient left  at 202 pm requesting a callback to schedule an INR appt. Please call when available. Thanks! ALYSA RomeroN, RN

## 2021-07-13 DIAGNOSIS — E03.4 HYPOTHYROIDISM DUE TO ACQUIRED ATROPHY OF THYROID: ICD-10-CM

## 2021-07-13 RX ORDER — LEVOTHYROXINE SODIUM 100 UG/1
100 TABLET ORAL DAILY
Qty: 90 TABLET | Refills: 0 | Status: SHIPPED | OUTPATIENT
Start: 2021-07-13 | End: 2021-10-10

## 2021-07-16 DIAGNOSIS — I48.20 CHRONIC ATRIAL FIBRILLATION (H): Primary | ICD-10-CM

## 2021-07-19 ENCOUNTER — OFFICE VISIT (OUTPATIENT)
Dept: ORTHOPEDICS | Facility: CLINIC | Age: 81
End: 2021-07-19
Payer: COMMERCIAL

## 2021-07-19 ENCOUNTER — LAB (OUTPATIENT)
Dept: LAB | Facility: CLINIC | Age: 81
End: 2021-07-19

## 2021-07-19 VITALS
HEART RATE: 109 BPM | HEIGHT: 65 IN | DIASTOLIC BLOOD PRESSURE: 81 MMHG | SYSTOLIC BLOOD PRESSURE: 115 MMHG | WEIGHT: 198 LBS | RESPIRATION RATE: 16 BRPM | BODY MASS INDEX: 32.99 KG/M2

## 2021-07-19 DIAGNOSIS — I48.20 CHRONIC ATRIAL FIBRILLATION (H): ICD-10-CM

## 2021-07-19 DIAGNOSIS — E11.9 TYPE 2 DIABETES MELLITUS WITHOUT COMPLICATION, WITHOUT LONG-TERM CURRENT USE OF INSULIN (H): ICD-10-CM

## 2021-07-19 DIAGNOSIS — M17.12 PRIMARY OSTEOARTHRITIS OF LEFT KNEE: Primary | ICD-10-CM

## 2021-07-19 LAB — HBA1C MFR BLD: 7.5 % (ref 0–5.6)

## 2021-07-19 PROCEDURE — 20610 DRAIN/INJ JOINT/BURSA W/O US: CPT | Mod: LT | Performed by: ORTHOPAEDIC SURGERY

## 2021-07-19 PROCEDURE — 36415 COLL VENOUS BLD VENIPUNCTURE: CPT

## 2021-07-19 PROCEDURE — 83036 HEMOGLOBIN GLYCOSYLATED A1C: CPT

## 2021-07-19 RX ORDER — METHYLPREDNISOLONE ACETATE 80 MG/ML
80 INJECTION, SUSPENSION INTRA-ARTICULAR; INTRALESIONAL; INTRAMUSCULAR; SOFT TISSUE
Status: DISCONTINUED | OUTPATIENT
Start: 2021-07-19 | End: 2024-06-13

## 2021-07-19 RX ORDER — LIDOCAINE HYDROCHLORIDE 10 MG/ML
5 INJECTION, SOLUTION INFILTRATION; PERINEURAL
Status: DISCONTINUED | OUTPATIENT
Start: 2021-07-19 | End: 2024-06-13

## 2021-07-19 RX ADMIN — METHYLPREDNISOLONE ACETATE 80 MG: 80 INJECTION, SUSPENSION INTRA-ARTICULAR; INTRALESIONAL; INTRAMUSCULAR; SOFT TISSUE at 10:43

## 2021-07-19 RX ADMIN — LIDOCAINE HYDROCHLORIDE 5 ML: 10 INJECTION, SOLUTION INFILTRATION; PERINEURAL at 10:43

## 2021-07-19 ASSESSMENT — MIFFLIN-ST. JEOR: SCORE: 1359

## 2021-07-19 NOTE — LETTER
7/19/2021         RE: Clementina Cooper  [facility] [facility] [address]        Dear Colleague,    Thank you for referring your patient, Clementina Cooper, to the [facility]. Please see a copy of my visit note below.    Large Joint Injection/Arthocentesis: L knee joint    Date/Time: 7/19/2021 10:43 AM  Performed by: Kendall Lopez MD  Authorized by: Kendall Lopez MD     Indications:  Pain  Needle Size:  22 G  Guidance: landmark guided    Approach:  Anteromedial  Location:  Knee      Medications:  5 mL lidocaine 1 %; 80 mg methylPREDNISolone 80 MG/ML  Outcome:  Tolerated well, no immediate complications  Procedure discussed: discussed risks, benefits, and alternatives    Consent Given by:  Patient  Timeout: timeout called immediately prior to procedure    Prep: patient was prepped and draped in usual sterile fashion            Follow up left knee primary osteoarthritis.  X-ray 1/21/21 shows moderate medial narrowing.  Also has small popliteal cyst by ultrasound.    Last injection 2/1/21.  Range of motion 0-125.    Positive medial joint line tenderness.      She  desires injection today of left knee(s).  Since last injection was a small dose and has been ineffective, we will repeat this.  Risks, benefits, potential complications and alternatives were discussed.   With the patient's consent, sterile prep was performed of left knee(s).  Left knee was injected with Depo Medrol 80 mg and lidocaine at anteromedial site.  Return to clinic as needed.           Again, thank you for allowing me to participate in the care of your patient.        Sincerely,        Kendall Lopez MD

## 2021-07-19 NOTE — PROGRESS NOTES
Follow up left knee primary osteoarthritis.  X-ray 1/21/21 shows moderate medial narrowing.  Also has small popliteal cyst by ultrasound.    Last injection 2/1/21.  Range of motion 0-125.    Positive medial joint line tenderness.      She  desires injection today of left knee(s).  Since last injection was a small dose and has been ineffective, we will repeat this.  Risks, benefits, potential complications and alternatives were discussed.   With the patient's consent, sterile prep was performed of left knee(s).  Left knee was injected with Depo Medrol 80 mg and lidocaine at anteromedial site.  Return to clinic as needed.

## 2021-07-19 NOTE — PROGRESS NOTES
Large Joint Injection/Arthocentesis: L knee joint    Date/Time: 7/19/2021 10:43 AM  Performed by: Kendall Lopez MD  Authorized by: Kendall Lopez MD     Indications:  Pain  Needle Size:  22 G  Guidance: landmark guided    Approach:  Anteromedial  Location:  Knee      Medications:  5 mL lidocaine 1 %; 80 mg methylPREDNISolone 80 MG/ML  Outcome:  Tolerated well, no immediate complications  Procedure discussed: discussed risks, benefits, and alternatives    Consent Given by:  Patient  Timeout: timeout called immediately prior to procedure    Prep: patient was prepped and draped in usual sterile fashion

## 2021-07-27 ENCOUNTER — OFFICE VISIT (OUTPATIENT)
Dept: INTERNAL MEDICINE | Facility: CLINIC | Age: 81
End: 2021-07-27
Payer: COMMERCIAL

## 2021-07-27 VITALS
DIASTOLIC BLOOD PRESSURE: 71 MMHG | OXYGEN SATURATION: 92 % | SYSTOLIC BLOOD PRESSURE: 112 MMHG | WEIGHT: 191 LBS | BODY MASS INDEX: 31.78 KG/M2 | HEART RATE: 79 BPM | TEMPERATURE: 97.4 F

## 2021-07-27 DIAGNOSIS — E78.5 HYPERLIPIDEMIA LDL GOAL <100: ICD-10-CM

## 2021-07-27 DIAGNOSIS — E11.9 TYPE 2 DIABETES MELLITUS WITHOUT COMPLICATION, WITHOUT LONG-TERM CURRENT USE OF INSULIN (H): Primary | ICD-10-CM

## 2021-07-27 DIAGNOSIS — M17.12 PRIMARY OSTEOARTHRITIS OF LEFT KNEE: ICD-10-CM

## 2021-07-27 DIAGNOSIS — E03.4 HYPOTHYROIDISM DUE TO ACQUIRED ATROPHY OF THYROID: ICD-10-CM

## 2021-07-27 DIAGNOSIS — I48.20 CHRONIC ATRIAL FIBRILLATION (H): ICD-10-CM

## 2021-07-27 DIAGNOSIS — Z79.01 LONG TERM CURRENT USE OF ANTICOAGULANT THERAPY: ICD-10-CM

## 2021-07-27 DIAGNOSIS — G25.0 ESSENTIAL TREMOR: ICD-10-CM

## 2021-07-27 PROCEDURE — 99214 OFFICE O/P EST MOD 30 MIN: CPT | Performed by: INTERNAL MEDICINE

## 2021-07-27 NOTE — PROGRESS NOTES
"    Assessment & Plan     Type 2 diabetes mellitus without complication, without long-term current use of insulin (H)  Well controlled, now on Glipizide and Jardiance.   Weight is down 12# since med changes.   - Hemoglobin A1c; Future  - Basic metabolic panel  (Ca, Cl, CO2, Creat, Gluc, K, Na, BUN); Future  - Albumin Random Urine Quantitative with Creat Ratio; Future    Hypothyroidism due to acquired atrophy of thyroid   due 4/2022 for recheck.     Chronic atrial fibrillation (H)  Rate controlled.  Warfarin     Long-term (current) use of anticoagulants [Z79.01]  afib     Primary osteoarthritis of left knee   s/p    Essential tremor  Continue current management     Hyperlipidemia LDL goal <100     - Lipid panel reflex to direct LDL Fasting; Future      I spent a total of 30 minutes on the day of the visit.   Time spent doing chart review, history and exam, documentation and further activities per the note        BMI:   Estimated body mass index is 31.78 kg/m  as calculated from the following:    Height as of 7/19/21: 1.651 m (5' 5\").    Weight as of this encounter: 86.6 kg (191 lb).   Weight management plan: she is on weight loss trajectory         Return in about 4 months (around 11/27/2021) for Physical Exam.    Estela Davila MD  [facility] [place]    Handicap parking certificate due to dejenerative joint arthritis , age>80, unsteady gait at times.       Tremaine Howell is a 81 year old who presents for the following health issues     H/o SNHL, PVD, DMII, chronic Afib (coumadin), s/p Pulm Vein Isolation for Afib 2018,  hypothyroid and essential tremor   Last year, metformin stopped for GI sx, Glipizide introduced.  HgbA1C is 7.5   5/2021: d/c januvia due to GI SE. NOw on Jardiance trial, 10mg  -- feels great.  No issues.   Has lost 12# since med changes.      At last visit, c/o vertigo... has been much better via Physical Therapy. She pleased.      She has been working with orthopedics for " L knee djd - recent L knee injection.       HPI     Diabetes Follow-up  Recent HgbA1C is 7.5     How often are you checking your blood sugar? One time daily  What time of day are you checking your blood sugars (select all that apply)?  Before meals  Have you had any blood sugars above 200?  No  Have you had any blood sugars below 70?  No    What symptoms do you notice when your blood sugar is low?  Shaky    What concerns do you have today about your diabetes? None     Do you have any of these symptoms? (Select all that apply)  No numbness or tingling in feet.  No redness, sores or blisters on feet.  No complaints of excessive thirst.  No reports of blurry vision.  No significant changes to weight.      BP Readings from Last 2 Encounters:   07/27/21 112/71   07/19/21 115/81     Hemoglobin A1C (%)   Date Value   07/19/2021 7.5 (H)   04/19/2021 8.2 (H)   01/21/2021 9.2 (H)     LDL Cholesterol Calculated (mg/dL)   Date Value   10/16/2020 72   05/21/2019 73                 How many servings of fruits and vegetables do you eat daily?  0-1    On average, how many sweetened beverages do you drink each day (Examples: soda, juice, sweet tea, etc.  Do NOT count diet or artificially sweetened beverages)?   1    How many days per week do you exercise enough to make your heart beat faster? 3 or less    How many minutes a day do you exercise enough to make your heart beat faster? 9 or less    How many days per week do you miss taking your medication? 0        Review of Systems   Constitutional, HEENT, cardiovascular, pulmonary, gi and gu systems are negative, except as otherwise noted.      Objective    /71 (BP Location: Right arm, Patient Position: Sitting, Cuff Size: Adult Large)   Pulse 79   Temp 97.4  F (36.3  C) (Oral)   Wt 86.6 kg (191 lb)   SpO2 92%   BMI 31.78 kg/m    Body mass index is 31.78 kg/m .     Physical Exam     GENERAL: healthy, alert and no distress  EYES: Eyes grossly normal to inspection, PERRL and  conjunctivae and sclerae normal  MS: no gross musculoskeletal defects noted, no edema  MS:  Dejenerative joint arthritis changes at knees.  Gait is improved since recent knee injection.   SKIN: no suspicious lesions or rashes  NEURO: Normal strength and tone, mentation intact and speech normal  PSYCH: mentation appears normal, affect normal/bright    Lab on 07/19/2021   Component Date Value Ref Range Status     Hemoglobin A1C 07/19/2021 7.5* 0.0 - 5.6 % Final    Normal <5.7%   Prediabetes 5.7-6.4%    Diabetes 6.5% or higher     Note: Adopted from ADA consensus guidelines.

## 2021-08-02 ENCOUNTER — LAB (OUTPATIENT)
Dept: LAB | Facility: CLINIC | Age: 81
End: 2021-08-02
Payer: COMMERCIAL

## 2021-08-02 ENCOUNTER — ANTICOAGULATION THERAPY VISIT (OUTPATIENT)
Dept: FAMILY MEDICINE | Facility: CLINIC | Age: 81
End: 2021-08-02

## 2021-08-02 DIAGNOSIS — I48.20 CHRONIC ATRIAL FIBRILLATION (H): Primary | ICD-10-CM

## 2021-08-02 DIAGNOSIS — Z98.890 STATUS POST CATHETER ABLATION OF ATRIAL FIBRILLATION: ICD-10-CM

## 2021-08-02 DIAGNOSIS — Z51.81 ANTICOAGULATION GOAL OF INR 2 TO 3: ICD-10-CM

## 2021-08-02 DIAGNOSIS — I48.20 CHRONIC ATRIAL FIBRILLATION (H): ICD-10-CM

## 2021-08-02 DIAGNOSIS — I73.9 PVD (PERIPHERAL VASCULAR DISEASE) (H): ICD-10-CM

## 2021-08-02 DIAGNOSIS — Z79.01 ANTICOAGULATION GOAL OF INR 2 TO 3: ICD-10-CM

## 2021-08-02 LAB — INR BLD: 1.8 (ref 0.9–1.1)

## 2021-08-02 PROCEDURE — 85610 PROTHROMBIN TIME: CPT

## 2021-08-02 NOTE — PROGRESS NOTES
ANTICOAGULATION MANAGEMENT     Clementina ROGER Allison 81 year old female is on warfarin with therapeutic INR result. (Goal INR 2.0-3.0)    Recent labs: (last 7 days)     08/02/21  0959   INR 1.8*       ASSESSMENT     Source(s): Chart Review       Warfarin doses taken: Warfarin taken as instructed    Diet: Increased greens/vitamin K in diet; plans to resume previous intake    New illness, injury, or hospitalization: No    Medication/supplement changes: None noted    Signs or symptoms of bleeding or clotting: No    Previous INR: Therapeutic last 2(+) visits    Additional findings: None     PLAN     Recommended plan for no diet, medication or health factor changes affecting INR     Dosing Instructions: Continue your current warfarin dose with next INR in 1 week       Summary  As of 8/2/2021    Full warfarin instructions:  8/3: 5 mg; Otherwise 5 mg every Mon, Wed, Fri; 2.5 mg all other days   Next INR check:  8/30/2021             Telephone call with Clementina who verbalizes understanding and agrees to plan bolus tonight and then regular dosing     Lab visit scheduled    Education provided: Please call back if any changes to your diet, medications or how you've been taking warfarin    Plan made per [facility] anticoagulation protocol    Ely Sterling, RN  Anticoagulation Clinic  8/3/2021    _______________________________________________________________________     Anticoagulation Episode Summary     Current INR goal:  2.0-3.0   TTR:  69.5 % (1 y)   Target end date:  Indefinite   Send INR reminders to:  RON CRUZ    Indications    Chronic atrial fibrillation (H) [I48.20]  Status post catheter ablation of atrial fibrillation [Z98.890]  PVD (peripheral vascular disease) (H) [I73.9]  Anticoagulation goal of INR 2 to 3 [Z51.81  Z79.01]           Comments:           Anticoagulation Care Providers     Provider Role Specialty Phone number    Estela Davila MD Referring Internal Medicine [phone]        Second  attempt to reach patient  Reymundo Rosas Rn  [facility]

## 2021-08-02 NOTE — PROGRESS NOTES
Anticoagulation Management    Unable to reach nancy today.    Today's INR result of 1.8 is subtherapeutic (goal INR of 2.0-3.0).  Result received from: Clinic Lab    Follow up required to assess for changes     left vm to call for dosing instructions.      Anticoagulation clinic to follow up    Ely Sterling RN

## 2021-08-05 NOTE — TELEPHONE ENCOUNTER
[facility] Closing old encounter. Nyla Ronquillo, ALYSAN, RN

## 2021-08-23 DIAGNOSIS — E11.9 TYPE 2 DIABETES MELLITUS WITHOUT COMPLICATION, WITHOUT LONG-TERM CURRENT USE OF INSULIN (H): ICD-10-CM

## 2021-08-23 RX ORDER — LOSARTAN POTASSIUM 25 MG/1
TABLET ORAL
Qty: 45 TABLET | Refills: 3 | Status: SHIPPED | OUTPATIENT
Start: 2021-08-23 | End: 2022-08-24

## 2021-09-02 NOTE — PROGRESS NOTES
ANTICOAGULATION FOLLOW-UP CLINIC VISIT    Patient Name:  Clementina Cooper  Date:  3/7/2017  Contact Type:  Face to Face    SUBJECTIVE:     Patient Findings     Positives No Problem Findings           OBJECTIVE    INR Protime   Date Value Ref Range Status   03/07/2017 2.3 (A) 0.86 - 1.14 Final       ASSESSMENT / PLAN  INR assessment THER    Recheck INR In: 6 WEEKS    INR Location Clinic      Anticoagulation Summary as of 3/7/2017     INR goal 2.0-3.0   Today's INR 2.3   Maintenance plan 5 mg (5 mg x 1) on Mon, Wed, Fri; 2.5 mg (5 mg x 0.5) all other days   Full instructions 5 mg on Mon, Wed, Fri; 2.5 mg all other days   Weekly total 25 mg   No change documented Shagufta Bell RN   Plan last modified Shagufta Bell RN (3/22/2016)   Next INR check 4/18/2017   Target end date     Indications   Long-term (current) use of anticoagulants [Z79.01] [Z79.01]  Atrial fibrillation (H) (Resolved) [I48.91]  Atrial fibrillation (H) (Resolved) [I48.91]         Anticoagulation Episode Summary     INR check location     Preferred lab     Send INR reminders to  [facility]    Comments       Anticoagulation Care Providers     Provider Role Specialty Phone number    Estela Davila MD  Internal Medicine [phone]            See the Encounter Report to view Anticoagulation Flowsheet and Dosing Calendar (Go to Encounters tab in chart review, and find the Anticoagulation Therapy Visit)    Dosage adjustment made based on physician directed care plan.    Shagufta Bell RN               
No

## 2021-09-07 ENCOUNTER — ANTICOAGULATION THERAPY VISIT (OUTPATIENT)
Dept: ANTICOAGULATION | Facility: CLINIC | Age: 81
End: 2021-09-07

## 2021-09-07 ENCOUNTER — LAB (OUTPATIENT)
Dept: LAB | Facility: CLINIC | Age: 81
End: 2021-09-07
Payer: COMMERCIAL

## 2021-09-07 DIAGNOSIS — Z98.890 STATUS POST CATHETER ABLATION OF ATRIAL FIBRILLATION: ICD-10-CM

## 2021-09-07 DIAGNOSIS — Z51.81 ANTICOAGULATION GOAL OF INR 2 TO 3: ICD-10-CM

## 2021-09-07 DIAGNOSIS — I48.20 CHRONIC ATRIAL FIBRILLATION (H): ICD-10-CM

## 2021-09-07 DIAGNOSIS — I48.20 CHRONIC ATRIAL FIBRILLATION (H): Primary | ICD-10-CM

## 2021-09-07 DIAGNOSIS — Z79.01 ANTICOAGULATION GOAL OF INR 2 TO 3: ICD-10-CM

## 2021-09-07 DIAGNOSIS — I73.9 PVD (PERIPHERAL VASCULAR DISEASE) (H): ICD-10-CM

## 2021-09-07 LAB — INR BLD: 2.1 (ref 0.9–1.1)

## 2021-09-07 PROCEDURE — 85610 PROTHROMBIN TIME: CPT

## 2021-09-07 PROCEDURE — 36416 COLLJ CAPILLARY BLOOD SPEC: CPT

## 2021-09-07 NOTE — PROGRESS NOTES
ANTICOAGULATION MANAGEMENT     Clementina ROGER Allison 81 year old female is on warfarin with therapeutic INR result. (Goal INR 2.0-3.0)    Recent labs: (last 7 days)     09/07/21  1012   INR 2.1*       ASSESSMENT     Source(s): Chart Review and Patient/Caregiver Call       Warfarin doses taken: Warfarin taken as instructed    Diet: No new diet changes identified    New illness, injury, or hospitalization: No    Medication/supplement changes: None noted    Signs or symptoms of bleeding or clotting: No    Previous INR: Subtherapeutic    Additional findings: None     PLAN     Recommended plan for no diet, medication or health factor changes affecting INR     Dosing Instructions: Continue your current warfarin dose with next INR in 6 weeks       Summary  As of 9/7/2021    Full warfarin instructions:  5 mg every Mon, Wed, Fri; 2.5 mg all other days   Next INR check:  10/19/2021             Telephone call with Clementina who verbalizes understanding and agrees to plan    Lab visit scheduled    Education provided: Potential interaction between warfarin and alcohol and Goal range and significance of current result    Plan made per [facility] anticoagulation protocol    Eliazar Peterson RN  Anticoagulation Clinic  9/7/2021    _______________________________________________________________________     Anticoagulation Episode Summary     Current INR goal:  2.0-3.0   TTR:  63.0 % (1 y)   Target end date:  Indefinite   Send INR reminders to:  RON CRUZ    Indications    Chronic atrial fibrillation (H) [I48.20]  Status post catheter ablation of atrial fibrillation [Z98.890]  PVD (peripheral vascular disease) (H) [I73.9]  Anticoagulation goal of INR 2 to 3 [Z51.81  Z79.01]           Comments:           Anticoagulation Care Providers     Provider Role Specialty Phone number    Estela Davila MD Referring Internal Medicine [phone]

## 2021-09-15 DIAGNOSIS — G25.0 ESSENTIAL TREMOR: ICD-10-CM

## 2021-09-17 RX ORDER — PROPRANOLOL HYDROCHLORIDE 10 MG/1
TABLET ORAL
Qty: 180 TABLET | Refills: 0 | Status: SHIPPED | OUTPATIENT
Start: 2021-09-17 | End: 2021-12-13

## 2021-09-27 ENCOUNTER — TELEPHONE (OUTPATIENT)
Dept: FAMILY MEDICINE | Facility: CLINIC | Age: 81
End: 2021-09-27

## 2021-09-27 DIAGNOSIS — L40.9 PSORIASIS: ICD-10-CM

## 2021-09-27 RX ORDER — BETAMETHASONE DIPROPIONATE 0.5 MG/G
CREAM TOPICAL 2 TIMES DAILY
Qty: 50 G | Refills: 3 | Status: SHIPPED | OUTPATIENT
Start: 2021-09-27 | End: 2022-01-12

## 2021-09-27 NOTE — TELEPHONE ENCOUNTER
Patient says she currently has clobetasol cream but that she was told by insurance and pharmacist that Betamethasone dipropionate fluocinonide works just as good and is less expensive. Patient wants to know if Dr. Davila agrees and if so, could patient get prescription for Betamethasone dipropionate fluocinonide cream for psoriasis and vaginal itching.     Please call if no Rx called in or no change recommended [phone] okay to leave a detailed voicemail. No need to call if Rx sent.    Julia Mccarthy RN on 9/27/2021 at 9:23 AM

## 2021-10-08 DIAGNOSIS — E03.4 HYPOTHYROIDISM DUE TO ACQUIRED ATROPHY OF THYROID: ICD-10-CM

## 2021-10-10 RX ORDER — LEVOTHYROXINE SODIUM 100 UG/1
TABLET ORAL
Qty: 90 TABLET | Refills: 0 | Status: SHIPPED | OUTPATIENT
Start: 2021-10-10 | End: 2022-01-09

## 2021-10-25 ENCOUNTER — OFFICE VISIT (OUTPATIENT)
Dept: ORTHOPEDICS | Facility: CLINIC | Age: 81
End: 2021-10-25
Payer: COMMERCIAL

## 2021-10-25 ENCOUNTER — ANCILLARY PROCEDURE (OUTPATIENT)
Dept: GENERAL RADIOLOGY | Facility: CLINIC | Age: 81
End: 2021-10-25
Attending: ORTHOPAEDIC SURGERY
Payer: COMMERCIAL

## 2021-10-25 VITALS
BODY MASS INDEX: 31.32 KG/M2 | WEIGHT: 188 LBS | HEART RATE: 86 BPM | SYSTOLIC BLOOD PRESSURE: 125 MMHG | DIASTOLIC BLOOD PRESSURE: 78 MMHG | HEIGHT: 65 IN

## 2021-10-25 DIAGNOSIS — G89.29 CHRONIC ELBOW PAIN, RIGHT: ICD-10-CM

## 2021-10-25 DIAGNOSIS — M25.521 CHRONIC ELBOW PAIN, RIGHT: Primary | ICD-10-CM

## 2021-10-25 DIAGNOSIS — M77.01 MEDIAL EPICONDYLITIS OF RIGHT ELBOW: ICD-10-CM

## 2021-10-25 DIAGNOSIS — M25.521 CHRONIC ELBOW PAIN, RIGHT: ICD-10-CM

## 2021-10-25 DIAGNOSIS — M15.2 OSTEOARTHRITIS OF PROXIMAL INTERPHALANGEAL (PIP) JOINT OF RIGHT MIDDLE FINGER: ICD-10-CM

## 2021-10-25 DIAGNOSIS — G89.29 CHRONIC ELBOW PAIN, RIGHT: Primary | ICD-10-CM

## 2021-10-25 PROCEDURE — 99213 OFFICE O/P EST LOW 20 MIN: CPT | Performed by: ORTHOPAEDIC SURGERY

## 2021-10-25 PROCEDURE — 73080 X-RAY EXAM OF ELBOW: CPT | Mod: RT | Performed by: RADIOLOGY

## 2021-10-25 ASSESSMENT — PAIN SCALES - GENERAL: PAINLEVEL: EXTREME PAIN (8)

## 2021-10-25 ASSESSMENT — MIFFLIN-ST. JEOR: SCORE: 1313.64

## 2021-10-25 NOTE — LETTER
10/25/2021         RE: Clementina Cooper  [address] [id number]        Dear Colleague,    Thank you for referring your patient, Clementina Cooper, to the [facility]. Please see a copy of my visit note below.    Clementina Cooper is a 81 year old female who is seen as self referral for right elbow and long finger pain.  She has had this pain for the last 6 to 8 months primarily in the finger and more recently in the elbow.  Pain is of the inner aspect of the elbow and forearm.  She has swelling of the right long finger PIP joint and pain with movement.  She has sharp pains rated up to 8-9 out of 10 she has been taking Tylenol for pain relief.  She does not recall any specific injury.    X-ray of the hands on 1/21/2021 were unremarkable except for bilateral first CMC joint degenerative changes.    Past Medical History:   Diagnosis Date     Atrial fibrillation (H) 6/10/2014     Cataract      Family history of colon cancer      Glaucoma (increased eye pressure)      Hyperlipidemia LDL goal < 100 2008     Hypothyroid      Need for prophylactic hormone replacement therapy (postmenopausal)      Obesity      Primary osteoarthritis of left knee 2/1/2021     PVD (peripheral vascular disease) (H)     right leg.  working with Dr Dempsey.  (Stent placed in L leg 1/2010)     Type 2 diabetes, HbA1c goal < 7% (H)     diabetes       Past Surgical History:   Procedure Laterality Date     ANGIOPLASTY      right leg     C STOMACH SURGERY PROCEDURE UNLISTED       CARDIAC SURGERY      Atrial fibrillation ablation      CATARACT IOL, RT/LT       [facility] TRABECULOPLASTY BY LASER SURGERY       [facility] VASCULAR SURGERY PROCEDURE UNLIST       HYSTERECTOMY, CERVIX STATUS UNKNOWN  1989    uterine bleeding     HYSTERECTOMY, PAP NO LONGER INDICATED       LASER YAG CAPSULOTOMY Right 02/06/2018; 2/2019    right eye; left eye     LASER YAG IRIDOTOMY  remotely    both eyes (elsewhere)      PHACOEMULSIFICATION WITH STANDARD INTRAOCULAR LENS IMPLANT  2012; 2012    right eye; left eye     ZZC NONSPECIFIC PROCEDURE      neck surgery/trauma       Family History   Problem Relation Age of Onset     Diabetes Mother      Heart Disease Mother      Heart Disease Father      Heart Disease Son      Cancer Brother      Heart Disease Sister      Cancer Brother      Cancer Brother      Heart Disease Sister      Macular Degeneration No family hx of      Glaucoma No family hx of        Social History     Socioeconomic History     Marital status:      Spouse name: Not on file     Number of children: 3     Years of education: Not on file     Highest education level: Not on file   Occupational History     Not on file   Tobacco Use     Smoking status: Former Smoker     Quit date: 1999     Years since quittin.9     Smokeless tobacco: Never Used   Substance and Sexual Activity     Alcohol use: Yes     Comment: occasional     Drug use: No     Sexual activity: Not Currently   Other Topics Concern     Parent/sibling w/ CABG, MI or angioplasty before 65F 55M? Yes     Comment: brother transplant   Social History Narrative    Lives in home with  (Caitlin Lemon) (one level with basement).  3 kids all in area (supportive).  No pets.       Social Determinants of Health     Financial Resource Strain:      Difficulty of Paying Living Expenses:    Food Insecurity:      Worried About Running Out of Food in the Last Year:      Ran Out of Food in the Last Year:    Transportation Needs:      Lack of Transportation (Medical):      Lack of Transportation (Non-Medical):    Physical Activity:      Days of Exercise per Week:      Minutes of Exercise per Session:    Stress:      Feeling of Stress :    Social Connections:      Frequency of Communication with Friends and Family:      Frequency of Social Gatherings with Friends and Family:      Attends Congregation Services:      Active Member of Clubs or  Organizations:      Attends Club or Organization Meetings:      Marital Status:    Intimate Partner Violence:      Fear of Current or Ex-Partner:      Emotionally Abused:      Physically Abused:      Sexually Abused:        Current Outpatient Medications   Medication Sig Dispense Refill     ACE NOT PRESCRIBED, INTENTIONAL, 1 each At Bedtime ACE Inhibitor not prescribed due to Other:  Neck swelling.  Also, BP is on low normal side with the betablocker 0 each 0     atorvastatin (LIPITOR) 20 MG tablet TAKE 1 TABLET BY MOUTH EVERY DAY 90 tablet 3     augmented betamethasone dipropionate (DIPROLENE-AF) 0.05 % external cream Apply topically 2 times daily As needed. 50 g 3     Biotin 5000 MCG CAPS Take  by mouth.       blood glucose (NO BRAND SPECIFIED) test strip Use to test blood sugar 1 times daily  . 100 strip 3     CENTRUM SILVER OR one daily       empagliflozin (JARDIANCE) 10 MG TABS tablet Take 1 tablet (10 mg) by mouth daily 90 tablet 0     Evening Primrose Oil 1000 MG CAPS Take by mouth daily       glipiZIDE (GLUCOTROL XL) 5 MG 24 hr tablet Take 2 tablets (10 mg) by mouth 2 times daily 360 tablet 3     levothyroxine (SYNTHROID/LEVOTHROID) 100 MCG tablet TAKE 1 TABLET BY MOUTH EVERY DAY 90 tablet 0     losartan (COZAAR) 25 MG tablet TAKE 0.5 TABLETS (12.5 MG) BY MOUTH DAILY 45 tablet 3     meclizine (ANTIVERT) 25 MG tablet Take 1 tablet (25 mg) by mouth 3 times daily as needed for dizziness 30 tablet 2     ONETOUCH ULTRA test strip USE TO TEST BLOOD SUGAR TWICE A DAY OR AS DIRECTED 200 strip 0     propranolol (INDERAL) 10 MG tablet +++NEED ANNUAL EXAM+++TAKE 1 TABLET (10 MG) BY MOUTH 2 TIMES DAILY AS NEEDED 180 tablet 0     VITAMIN D 1000 UNIT OR TABS 1 TABLET DAILY       warfarin ANTICOAGULANT (COUMADIN) 5 MG tablet Takes 5 mg mon wed and Friday and 2.5mg all other days unless directed otherwise by [facility]. 65 tablet 6       Allergies   Allergen Reactions     Benzocaine      Betadine [Povidone Iodine]      Rash      "Januvia [Sitagliptin]      Not feel well   See 5/19/21 tel call     Pravastatin      Muscle aches     Vagisil [Kdc:Benzocaine+Cetyl Alcohol+Edetic Acid+Methylparaben+Propylene Glycol+...]      It is the benzocaine       Xarelto [Rivaroxaban]      Daily headache     Zocor [Hmg-Coa-R Inhibitors]      Muscle aches       REVIEW OF SYSTEMS:  CONSTITUTIONAL:  NEGATIVE for fever, chills, change in weight, not feeling tired  SKIN:  NEGATIVE for worrisome rashes, no skin lumps, no skin ulcers and no non-healing wounds  EYES:  NEGATIVE for vision changes or irritation.  ENT/MOUTH:  NEGATIVE.  No hearing loss, no hoarseness, no difficulty swallowing.  RESP:  NEGATIVE. No cough or shortness of breath.  CV:  NEGATIVE for chest pain, palpitations or peripheral edema  GI:  NEGATIVE for nausea, abdominal pain, heartburn, or change in bowel habits  :  Negative. No dysuria, no hematuria  MUSCULOSKELETAL:  See HPI above  NEURO:  NEGATIVE . No headaches, no dizziness,  no numbness  ENDOCRINE:  NEGATIVE for temperature intolerance, skin/hair changes  HEME/ALLERGY/IMMUNE:  NEGATIVE for bleeding problems  PSYCHIATRIC:  NEGATIVE. no anxiety, no depression.     Exam:  Vitals: /78   Pulse 86   Ht 1.651 m (5' 5\")   Wt 85.3 kg (188 lb)   BMI 31.28 kg/m    BMI= Body mass index is 31.28 kg/m .  Constitutional:  healthy, alert and no distress  Neuro: Alert and Oriented x 3, Sensation grossly WNL.  Psych: Affect normal   Respiratory: Breathing not labored.  Cardiovascular: normal peripheral pulses  Lymph: no adenopathy  Skin: No rashes,worrisome lesions or skin problems  She has tenderness at the medial epicondyles and in the flexor tendons of the forearm on the right.  No tenderness at the lateral epicondyles or olecranon.  She does not have tenderness over the ulnar nerve at the elbow.  She does have pain with  in resisted wrist flexion.  No pain with finger or wrist extension.  She does have swelling of the right long finger PIP " joint.  There is pain with both full extension and flexion of the finger slightly loss of motion.  No instability of the collateral ligaments.  No triggering.    Assessment:  Right medial epicondylitis.  2. Right long finger PIP osteoarthritis.    Plan:  Voltaren gel.  minimize gripping.  Stretching of flexor tendons.        Again, thank you for allowing me to participate in the care of your patient.        Sincerely,        Kendall Lopez MD

## 2021-10-25 NOTE — PATIENT INSTRUCTIONS
Diagnosis;  Medial epicondylitis  Long finger osteoarthritis.    Continue Voltaren gel  (diclofenac)   Minimize gripping.

## 2021-10-27 PROBLEM — M77.01 MEDIAL EPICONDYLITIS OF RIGHT ELBOW: Status: ACTIVE | Noted: 2021-10-27

## 2021-10-27 PROBLEM — M15.2: Status: ACTIVE | Noted: 2021-10-27

## 2021-10-27 NOTE — PROGRESS NOTES
Clementina Cooper is a 81 year old female who is seen as self referral for right elbow and long finger pain.  She has had this pain for the last 6 to 8 months primarily in the finger and more recently in the elbow.  Pain is of the inner aspect of the elbow and forearm.  She has swelling of the right long finger PIP joint and pain with movement.  She has sharp pains rated up to 8-9 out of 10 she has been taking Tylenol for pain relief.  She does not recall any specific injury.    X-ray of the hands on 1/21/2021 were unremarkable except for bilateral first CMC joint degenerative changes.    Past Medical History:   Diagnosis Date     Atrial fibrillation (H) 6/10/2014     Cataract      Family history of colon cancer      Glaucoma (increased eye pressure)      Hyperlipidemia LDL goal < 100 2008     Hypothyroid      Need for prophylactic hormone replacement therapy (postmenopausal)      Obesity      Primary osteoarthritis of left knee 2/1/2021     PVD (peripheral vascular disease) (H)     right leg.  working with Dr Dempsey.  (Stent placed in L leg 1/2010)     Type 2 diabetes, HbA1c goal < 7% (H)     diabetes       Past Surgical History:   Procedure Laterality Date     ANGIOPLASTY      right leg     C STOMACH SURGERY PROCEDURE UNLISTED       CARDIAC SURGERY      Atrial fibrillation ablation      CATARACT IOL, RT/LT       HC TRABECULOPLASTY BY LASER SURGERY       [facility] VASCULAR SURGERY PROCEDURE UNLIST       HYSTERECTOMY, CERVIX STATUS UNKNOWN  1989    uterine bleeding     HYSTERECTOMY, PAP NO LONGER INDICATED       LASER YAG CAPSULOTOMY Right 02/06/2018; 2/2019    right eye; left eye     LASER YAG IRIDOTOMY  remotely    both eyes (elsewhere)     PHACOEMULSIFICATION WITH STANDARD INTRAOCULAR LENS IMPLANT  7/2012; 8/2012    right eye; left eye     ZZC NONSPECIFIC PROCEDURE  2001    neck surgery/trauma       Family History   Problem Relation Age of Onset     Diabetes Mother      Heart Disease Mother      Heart Disease Father       Heart Disease Son      Cancer Brother      Heart Disease Sister      Cancer Brother      Cancer Brother      Heart Disease Sister      Macular Degeneration No family hx of      Glaucoma No family hx of        Social History     Socioeconomic History     Marital status:      Spouse name: Not on file     Number of children: 3     Years of education: Not on file     Highest education level: Not on file   Occupational History     Not on file   Tobacco Use     Smoking status: Former Smoker     Quit date: 1999     Years since quittin.9     Smokeless tobacco: Never Used   Substance and Sexual Activity     Alcohol use: Yes     Comment: occasional     Drug use: No     Sexual activity: Not Currently   Other Topics Concern     Parent/sibling w/ CABG, MI or angioplasty before 65F 55M? Yes     Comment: brother transplant   Social History Narrative    Lives in home with  (Caitlin Lemon) (one level with basement).  3 kids all in area (supportive).  No pets.       Social Determinants of Health     Financial Resource Strain:      Difficulty of Paying Living Expenses:    Food Insecurity:      Worried About Running Out of Food in the Last Year:      Ran Out of Food in the Last Year:    Transportation Needs:      Lack of Transportation (Medical):      Lack of Transportation (Non-Medical):    Physical Activity:      Days of Exercise per Week:      Minutes of Exercise per Session:    Stress:      Feeling of Stress :    Social Connections:      Frequency of Communication with Friends and Family:      Frequency of Social Gatherings with Friends and Family:      Attends Buddhist Services:      Active Member of Clubs or Organizations:      Attends Club or Organization Meetings:      Marital Status:    Intimate Partner Violence:      Fear of Current or Ex-Partner:      Emotionally Abused:      Physically Abused:      Sexually Abused:        Current Outpatient Medications   Medication Sig Dispense Refill     ACE NOT  PRESCRIBED, INTENTIONAL, 1 each At Bedtime ACE Inhibitor not prescribed due to Other:  Neck swelling.  Also, BP is on low normal side with the betablocker 0 each 0     atorvastatin (LIPITOR) 20 MG tablet TAKE 1 TABLET BY MOUTH EVERY DAY 90 tablet 3     augmented betamethasone dipropionate (DIPROLENE-AF) 0.05 % external cream Apply topically 2 times daily As needed. 50 g 3     Biotin 5000 MCG CAPS Take  by mouth.       blood glucose (NO BRAND SPECIFIED) test strip Use to test blood sugar 1 times daily  . 100 strip 3     CENTRUM SILVER OR one daily       empagliflozin (JARDIANCE) 10 MG TABS tablet Take 1 tablet (10 mg) by mouth daily 90 tablet 0     Evening Primrose Oil 1000 MG CAPS Take by mouth daily       glipiZIDE (GLUCOTROL XL) 5 MG 24 hr tablet Take 2 tablets (10 mg) by mouth 2 times daily 360 tablet 3     levothyroxine (SYNTHROID/LEVOTHROID) 100 MCG tablet TAKE 1 TABLET BY MOUTH EVERY DAY 90 tablet 0     losartan (COZAAR) 25 MG tablet TAKE 0.5 TABLETS (12.5 MG) BY MOUTH DAILY 45 tablet 3     meclizine (ANTIVERT) 25 MG tablet Take 1 tablet (25 mg) by mouth 3 times daily as needed for dizziness 30 tablet 2     ONETOUCH ULTRA test strip USE TO TEST BLOOD SUGAR TWICE A DAY OR AS DIRECTED 200 strip 0     propranolol (INDERAL) 10 MG tablet +++NEED ANNUAL EXAM+++TAKE 1 TABLET (10 MG) BY MOUTH 2 TIMES DAILY AS NEEDED 180 tablet 0     VITAMIN D 1000 UNIT OR TABS 1 TABLET DAILY       warfarin ANTICOAGULANT (COUMADIN) 5 MG tablet Takes 5 mg mon wed and Friday and 2.5mg all other days unless directed otherwise by [facility]. 65 tablet 6       Allergies   Allergen Reactions     Benzocaine      Betadine [Povidone Iodine]      Rash     Januvia [Sitagliptin]      Not feel well   See 5/19/21 tel call     Pravastatin      Muscle aches     Vagisil [Kdc:Benzocaine+Cetyl Alcohol+Edetic Acid+Methylparaben+Propylene Glycol+...]      It is the benzocaine       Xarelto [Rivaroxaban]      Daily headache     Zocor [Hmg-Coa-R Inhibitors]       "Muscle aches       REVIEW OF SYSTEMS:  CONSTITUTIONAL:  NEGATIVE for fever, chills, change in weight, not feeling tired  SKIN:  NEGATIVE for worrisome rashes, no skin lumps, no skin ulcers and no non-healing wounds  EYES:  NEGATIVE for vision changes or irritation.  ENT/MOUTH:  NEGATIVE.  No hearing loss, no hoarseness, no difficulty swallowing.  RESP:  NEGATIVE. No cough or shortness of breath.  CV:  NEGATIVE for chest pain, palpitations or peripheral edema  GI:  NEGATIVE for nausea, abdominal pain, heartburn, or change in bowel habits  :  Negative. No dysuria, no hematuria  MUSCULOSKELETAL:  See HPI above  NEURO:  NEGATIVE . No headaches, no dizziness,  no numbness  ENDOCRINE:  NEGATIVE for temperature intolerance, skin/hair changes  HEME/ALLERGY/IMMUNE:  NEGATIVE for bleeding problems  PSYCHIATRIC:  NEGATIVE. no anxiety, no depression.     Exam:  Vitals: /78   Pulse 86   Ht 1.651 m (5' 5\")   Wt 85.3 kg (188 lb)   BMI 31.28 kg/m    BMI= Body mass index is 31.28 kg/m .  Constitutional:  healthy, alert and no distress  Neuro: Alert and Oriented x 3, Sensation grossly WNL.  Psych: Affect normal   Respiratory: Breathing not labored.  Cardiovascular: normal peripheral pulses  Lymph: no adenopathy  Skin: No rashes,worrisome lesions or skin problems  She has tenderness at the medial epicondyles and in the flexor tendons of the forearm on the right.  No tenderness at the lateral epicondyles or olecranon.  She does not have tenderness over the ulnar nerve at the elbow.  She does have pain with  in resisted wrist flexion.  No pain with finger or wrist extension.  She does have swelling of the right long finger PIP joint.  There is pain with both full extension and flexion of the finger slightly loss of motion.  No instability of the collateral ligaments.  No triggering.    Assessment:  Right medial epicondylitis.  2. Right long finger PIP osteoarthritis.    Plan:  Voltaren gel.  minimize " gripping.  Stretching of flexor tendons.

## 2021-10-28 ENCOUNTER — LAB (OUTPATIENT)
Dept: LAB | Facility: CLINIC | Age: 81
End: 2021-10-28
Payer: COMMERCIAL

## 2021-10-28 ENCOUNTER — ANTICOAGULATION THERAPY VISIT (OUTPATIENT)
Dept: ANTICOAGULATION | Facility: CLINIC | Age: 81
End: 2021-10-28

## 2021-10-28 DIAGNOSIS — Z98.890 STATUS POST CATHETER ABLATION OF ATRIAL FIBRILLATION: ICD-10-CM

## 2021-10-28 DIAGNOSIS — Z51.81 ANTICOAGULATION GOAL OF INR 2 TO 3: ICD-10-CM

## 2021-10-28 DIAGNOSIS — I48.20 CHRONIC ATRIAL FIBRILLATION (H): Primary | ICD-10-CM

## 2021-10-28 DIAGNOSIS — Z79.01 ANTICOAGULATION GOAL OF INR 2 TO 3: ICD-10-CM

## 2021-10-28 DIAGNOSIS — I73.9 PVD (PERIPHERAL VASCULAR DISEASE) (H): ICD-10-CM

## 2021-10-28 DIAGNOSIS — I48.20 CHRONIC ATRIAL FIBRILLATION (H): ICD-10-CM

## 2021-10-28 LAB — INR BLD: 2.7 (ref 0.9–1.1)

## 2021-10-28 PROCEDURE — 85610 PROTHROMBIN TIME: CPT

## 2021-10-28 PROCEDURE — 36415 COLL VENOUS BLD VENIPUNCTURE: CPT

## 2021-10-28 NOTE — PROGRESS NOTES
ANTICOAGULATION MANAGEMENT     Clementina ROGER Allison 81 year old female is on warfarin with therapeutic INR result. (Goal INR 2.0-3.0)    Recent labs: (last 7 days)     10/28/21  1415   INR 2.7*       ASSESSMENT     Source(s): Chart Review and Patient/Caregiver Call       Warfarin doses taken: Warfarin taken as instructed    Diet: No new diet changes identified    New illness, injury, or hospitalization: No    Medication/supplement changes: None noted    Signs or symptoms of bleeding or clotting: No    Previous INR: Therapeutic last 2(+) visits    Additional findings: None     PLAN     Recommended plan for no diet, medication or health factor changes affecting INR     Dosing Instructions: Continue your current warfarin dose with next INR in 6 weeks       Summary  As of 10/28/2021    Full warfarin instructions:  5 mg every Mon, Wed, Fri; 2.5 mg all other days   Next INR check:  12/9/2021             Telephone call with Clementina who verbalizes understanding and agrees to plan    Lab visit scheduled    Education provided: Goal range and significance of current result    Plan made per [facility] anticoagulation protocol    Lou Santiago RN  Anticoagulation Clinic  10/28/2021    _______________________________________________________________________     Anticoagulation Episode Summary     Current INR goal:  2.0-3.0   TTR:  70.8 % (1 y)   Target end date:  Indefinite   Send INR reminders to:  RON CRUZ    Indications    Chronic atrial fibrillation (H) [I48.20]  Status post catheter ablation of atrial fibrillation [Z98.890]  PVD (peripheral vascular disease) (H) [I73.9]  Anticoagulation goal of INR 2 to 3 [Z51.81  Z79.01]           Comments:           Anticoagulation Care Providers     Provider Role Specialty Phone number    Estela Davila MD Referring Internal Medicine [phone]

## 2021-11-03 DIAGNOSIS — L40.9 PSORIASIS: ICD-10-CM

## 2021-11-03 RX ORDER — CLOBETASOL PROPIONATE 0.5 MG/G
CREAM TOPICAL
Qty: 180 G | Refills: 1 | Status: SHIPPED | OUTPATIENT
Start: 2021-11-03 | End: 2023-01-12

## 2021-11-03 NOTE — TELEPHONE ENCOUNTER
Patient would like to go back to Clobetasol cream.  It was switched to alternative due to cost but alternatives are not effective.  Per patient, she has been having difficulties with getting through and leaving a message for provider.  She stated that someone was supposed to call her back but the person who called her back was a  so she does not think that her messages are getting through to the care team before.  She has been waiting to get the cream and would like this refilled asap and to get 2 tubes      Yuri Helms RN  [facility]

## 2021-11-06 NOTE — TELEPHONE ENCOUNTER
"Requested Prescriptions   Pending Prescriptions Disp Refills     warfarin (COUMADIN) 5 MG tablet [Pharmacy Med Name: WARFARIN SODIUM 5 MG TABLET] 102 tablet 2     Sig: TAKE 1 TABLET (5 MG) BY MOUTH DAILY ON MON, WED, FRI AND TAKE 1/2 TABLET (2.5MG) ALL OTHER DAYS    Vitamin K Antagonists Failed    3/6/2018  8:18 PM       Failed - INR is within goal in the past 6 weeks    Confirm INR is within goal in the past 6 weeks.     Recent Labs   Lab Test 01/23/18   INR  2.8*                      Passed - Recent (12 mo) or future (30 days) visit within the authorizing provider's specialty    Patient had office visit in the last year or has a visit in the next 30 days with authorizing provider.  See \"Patient Info\" tab in inbasket, or \"Choose Columns\" in Meds & Orders section of the refill encounter.            Passed - Patient is 18 years of age or older       Passed - Patient is not pregnant       Passed - No positive pregnancy on file in past 12 months        Last Written Prescription Date:  12/1/16  Last Fill Quantity: 102,  # refills: 3   Last office visit: 11/2/2017 with prescribing provider:  11/2/17   Future Office Visit:   Next 5 appointments (look out 90 days)     May 22, 2018  9:20 AM CDT   PHYSICAL with Estela Davila MD   [facility] ([facility]    [address] [place] [id number] [phone]                   " -- DO NOT REPLY / DO NOT REPLY ALL --  -- Message is from the [facility]--    Provider paged via [employer] Documentation - The below message was copied and pasted from a [employer] page:      Initiated Date/Time  11/6/2021 3:54 pm  Message Sent Date/Time  11/6/2021 3:55 pm  Source  [facility] Contact [facility]  Department  [facility]  Method  Secure Text  Contacted  Sreedhar Mckinley MD  Details  Patient  Message  [phone] [facility] CALLER NAME: JUSTIN REQUESTED PHYSICIAN: SREEDHAR MCKINLEY RE: YONATAN WORRELL PATIENT 8- PATIENT PCP: SREEDHAR MCKINLEY IF RX, PHARMACY #: N/A PATIENT DAUGHTER IS CALLING IN DUE TO CONCERNS SHE IS HAVING WITH HER MOTHER CARE AND HEALTH CAN SHE PLEASE GET A CALL BACK COLE DON 11/06/21 AR

## 2021-11-08 ENCOUNTER — OFFICE VISIT (OUTPATIENT)
Dept: ORTHOPEDICS | Facility: CLINIC | Age: 81
End: 2021-11-08
Payer: COMMERCIAL

## 2021-11-08 VITALS
DIASTOLIC BLOOD PRESSURE: 71 MMHG | WEIGHT: 188 LBS | BODY MASS INDEX: 31.32 KG/M2 | HEART RATE: 89 BPM | SYSTOLIC BLOOD PRESSURE: 125 MMHG | HEIGHT: 65 IN | RESPIRATION RATE: 16 BRPM

## 2021-11-08 DIAGNOSIS — M17.12 PRIMARY OSTEOARTHRITIS OF LEFT KNEE: Primary | ICD-10-CM

## 2021-11-08 PROCEDURE — 20610 DRAIN/INJ JOINT/BURSA W/O US: CPT | Mod: LT | Performed by: ORTHOPAEDIC SURGERY

## 2021-11-08 RX ORDER — LIDOCAINE HYDROCHLORIDE 10 MG/ML
5 INJECTION, SOLUTION INFILTRATION; PERINEURAL
Status: DISCONTINUED | OUTPATIENT
Start: 2021-11-08 | End: 2024-06-13

## 2021-11-08 RX ORDER — METHYLPREDNISOLONE ACETATE 80 MG/ML
80 INJECTION, SUSPENSION INTRA-ARTICULAR; INTRALESIONAL; INTRAMUSCULAR; SOFT TISSUE
Status: DISCONTINUED | OUTPATIENT
Start: 2021-11-08 | End: 2024-06-13

## 2021-11-08 RX ADMIN — METHYLPREDNISOLONE ACETATE 80 MG: 80 INJECTION, SUSPENSION INTRA-ARTICULAR; INTRALESIONAL; INTRAMUSCULAR; SOFT TISSUE at 09:00

## 2021-11-08 RX ADMIN — LIDOCAINE HYDROCHLORIDE 5 ML: 10 INJECTION, SOLUTION INFILTRATION; PERINEURAL at 09:00

## 2021-11-08 ASSESSMENT — MIFFLIN-ST. JEOR: SCORE: 1313.64

## 2021-11-08 NOTE — PROGRESS NOTES
Large Joint Injection/Arthocentesis: L knee joint    Date/Time: 11/8/2021 9:00 AM  Performed by: Kendall Lopez MD  Authorized by: Kendall Lopez MD     Indications:  Pain  Needle Size:  22 G  Guidance: landmark guided    Approach:  Anteromedial  Location:  Knee      Medications:  5 mL lidocaine 1 %; 80 mg methylPREDNISolone 80 MG/ML  Outcome:  Tolerated well, no immediate complications  Procedure discussed: discussed risks, benefits, and alternatives    Consent Given by:  Patient  Timeout: timeout called immediately prior to procedure    Prep: patient was prepped and draped in usual sterile fashion

## 2021-11-08 NOTE — PROGRESS NOTES
Follow up left knee primary osteoarthritis.  X-ray 1/21/21 shows moderate medial narrowing.  Also has small popliteal cyst by ultrasound.    Last injection 7/19/21.  Range of motion 0-125.    Positive medial joint line tenderness.      She  desires injection today of left knee(s).  Risks, benefits, potential complications and alternatives were discussed.   With the patient's consent, sterile prep was performed of left knee(s).  Left knee was injected with Depo Medrol 80 mg and lidocaine at anteromedial site.  Return to clinic as needed.

## 2021-11-08 NOTE — LETTER
11/8/2021         RE: Clementina Cooper  [facility] [id number]        Dear Colleague,    Thank you for referring your patient, Clementina Cooper, to the [facility]. Please see a copy of my visit note below.    Large Joint Injection/Arthocentesis: L knee joint    Date/Time: 11/8/2021 9:00 AM  Performed by: Kendall Lopez MD  Authorized by: Kendall Lopez MD     Indications:  Pain  Needle Size:  22 G  Guidance: landmark guided    Approach:  Anteromedial  Location:  Knee      Medications:  5 mL lidocaine 1 %; 80 mg methylPREDNISolone 80 MG/ML  Outcome:  Tolerated well, no immediate complications  Procedure discussed: discussed risks, benefits, and alternatives    Consent Given by:  Patient  Timeout: timeout called immediately prior to procedure    Prep: patient was prepped and draped in usual sterile fashion            Follow up left knee primary osteoarthritis.  X-ray 1/21/21 shows moderate medial narrowing.  Also has small popliteal cyst by ultrasound.    Last injection 7/19/21.  Range of motion 0-125.    Positive medial joint line tenderness.      She  desires injection today of left knee(s).  Risks, benefits, potential complications and alternatives were discussed.   With the patient's consent, sterile prep was performed of left knee(s).  Left knee was injected with Depo Medrol 80 mg and lidocaine at anteromedial site.  Return to clinic as needed.           Again, thank you for allowing me to participate in the care of your patient.        Sincerely,        Kendall Lopez MD

## 2021-11-11 ENCOUNTER — TRANSFERRED RECORDS (OUTPATIENT)
Dept: HEALTH INFORMATION MANAGEMENT | Facility: CLINIC | Age: 81
End: 2021-11-11
Payer: COMMERCIAL

## 2021-11-16 ENCOUNTER — LAB (OUTPATIENT)
Dept: LAB | Facility: CLINIC | Age: 81
End: 2021-11-16
Payer: COMMERCIAL

## 2021-11-16 DIAGNOSIS — E11.9 TYPE 2 DIABETES MELLITUS WITHOUT COMPLICATION, WITHOUT LONG-TERM CURRENT USE OF INSULIN (H): ICD-10-CM

## 2021-11-16 DIAGNOSIS — E78.5 HYPERLIPIDEMIA LDL GOAL <100: ICD-10-CM

## 2021-11-16 LAB
ANION GAP SERPL CALCULATED.3IONS-SCNC: 2 MMOL/L (ref 3–14)
BUN SERPL-MCNC: 18 MG/DL (ref 7–30)
CALCIUM SERPL-MCNC: 9.4 MG/DL (ref 8.5–10.1)
CHLORIDE BLD-SCNC: 105 MMOL/L (ref 94–109)
CHOLEST SERPL-MCNC: 170 MG/DL
CO2 SERPL-SCNC: 30 MMOL/L (ref 20–32)
CREAT SERPL-MCNC: 0.74 MG/DL (ref 0.52–1.04)
CREAT UR-MCNC: 77 MG/DL
FASTING STATUS PATIENT QL REPORTED: YES
GFR SERPL CREATININE-BSD FRML MDRD: 76 ML/MIN/1.73M2
GLUCOSE BLD-MCNC: 168 MG/DL (ref 70–99)
HBA1C MFR BLD: 8.3 % (ref 0–5.6)
HDLC SERPL-MCNC: 56 MG/DL
LDLC SERPL CALC-MCNC: 94 MG/DL
MICROALBUMIN UR-MCNC: 20 MG/L
MICROALBUMIN/CREAT UR: 25.97 MG/G CR (ref 0–25)
NONHDLC SERPL-MCNC: 114 MG/DL
POTASSIUM BLD-SCNC: 4.5 MMOL/L (ref 3.4–5.3)
SODIUM SERPL-SCNC: 137 MMOL/L (ref 133–144)
TRIGL SERPL-MCNC: 100 MG/DL

## 2021-11-16 PROCEDURE — 36415 COLL VENOUS BLD VENIPUNCTURE: CPT

## 2021-11-16 PROCEDURE — 82043 UR ALBUMIN QUANTITATIVE: CPT

## 2021-11-16 PROCEDURE — 83036 HEMOGLOBIN GLYCOSYLATED A1C: CPT

## 2021-11-16 PROCEDURE — 80061 LIPID PANEL: CPT

## 2021-11-16 PROCEDURE — 80048 BASIC METABOLIC PNL TOTAL CA: CPT

## 2021-11-16 NOTE — LETTER
November 16, 2021      Clementina Cooper  [address] [place] [id number]        Dear Lynda:    We are writing to inform you of your test results.    HgbA1C is not quite at goal.  Electrolytes and kidney function are stable.  Cholesterol is great.        See you soon!    Resulted Orders   Hemoglobin A1c   Result Value Ref Range    Hemoglobin A1C 8.3 (H) 0.0 - 5.6 %      Comment:      Normal <5.7%   Prediabetes 5.7-6.4%    Diabetes 6.5% or higher     Note: Adopted from ADA consensus guidelines.   Basic metabolic panel  (Ca, Cl, CO2, Creat, Gluc, K, Na, BUN)   Result Value Ref Range    Sodium 137 133 - 144 mmol/L    Potassium 4.5 3.4 - 5.3 mmol/L    Chloride 105 94 - 109 mmol/L    Carbon Dioxide (CO2) 30 20 - 32 mmol/L    Anion Gap 2 (L) 3 - 14 mmol/L    Urea Nitrogen 18 7 - 30 mg/dL    Creatinine 0.74 0.52 - 1.04 mg/dL    Calcium 9.4 8.5 - 10.1 mg/dL    Glucose 168 (H) 70 - 99 mg/dL    GFR Estimate 76 >60 mL/min/1.73m2      Comment:      As of July 11, 2021, eGFR is calculated by the CKD-EPI creatinine equation, without race adjustment. eGFR can be influenced by muscle mass, exercise, and diet. The reported eGFR is an estimation only and is only applicable if the renal function is stable.   Albumin Random Urine Quantitative with Creat Ratio   Result Value Ref Range    Creatinine Urine mg/dL 77 mg/dL    Albumin Urine mg/L 20 mg/L    Albumin Urine mg/g Cr 25.97 (H) 0.00 - 25.00 mg/g Cr   Lipid panel reflex to direct LDL Fasting   Result Value Ref Range    Cholesterol 170 <200 mg/dL    Triglycerides 100 <150 mg/dL    Direct Measure HDL 56 >=50 mg/dL    LDL Cholesterol Calculated 94 <=100 mg/dL    Non HDL Cholesterol 114 <130 mg/dL    Patient Fasting > 8hrs? Yes     Narrative    Cholesterol  Desirable:  <200 mg/dL    Triglycerides  Normal:  Less than 150 mg/dL  Borderline High:  150-199 mg/dL  High:  200-499 mg/dL  Very High:  Greater than or equal to 500 mg/dL    Direct Measure HDL  Female:   Greater than or equal to 50 mg/dL   Male:  Greater than or equal to 40 mg/dL    LDL Cholesterol  Desirable:  <100mg/dL  Above Desirable:  100-129 mg/dL   Borderline High:  130-159 mg/dL   High:  160-189 mg/dL   Very High:  >= 190 mg/dL    Non HDL Cholesterol  Desirable:  130 mg/dL  Above Desirable:  130-159 mg/dL  Borderline High:  160-189 mg/dL  High:  190-219 mg/dL  Very High:  Greater than or equal to 220 mg/dL     If you have any questions or concerns, please call the clinic at the number listed above.     Sincerely,      Estela Davila MD/jomar

## 2021-11-16 NOTE — RESULT ENCOUNTER NOTE
Clementina Cooper    HgbA1C is not quite at goal.  Electrolytes and kidney function are stable.  Cholesterol is great.        See you soon!      TAISHA MORROW M.D.

## 2021-11-18 DIAGNOSIS — E11.9 TYPE 2 DIABETES MELLITUS WITHOUT COMPLICATION, WITHOUT LONG-TERM CURRENT USE OF INSULIN (H): ICD-10-CM

## 2021-11-18 RX ORDER — EMPAGLIFLOZIN 10 MG/1
TABLET, FILM COATED ORAL
Qty: 90 TABLET | Refills: 1 | Status: SHIPPED | OUTPATIENT
Start: 2021-11-18 | End: 2021-11-22

## 2021-11-22 ENCOUNTER — OFFICE VISIT (OUTPATIENT)
Dept: INTERNAL MEDICINE | Facility: CLINIC | Age: 81
End: 2021-11-22
Payer: COMMERCIAL

## 2021-11-22 VITALS
OXYGEN SATURATION: 93 % | BODY MASS INDEX: 30.32 KG/M2 | TEMPERATURE: 97.3 F | HEIGHT: 65 IN | DIASTOLIC BLOOD PRESSURE: 73 MMHG | HEART RATE: 80 BPM | SYSTOLIC BLOOD PRESSURE: 128 MMHG | WEIGHT: 182 LBS

## 2021-11-22 DIAGNOSIS — Z98.890 STATUS POST CATHETER ABLATION OF ATRIAL FIBRILLATION: ICD-10-CM

## 2021-11-22 DIAGNOSIS — Z00.00 ENCOUNTER FOR MEDICARE ANNUAL WELLNESS EXAM: ICD-10-CM

## 2021-11-22 DIAGNOSIS — Z00.01 ENCOUNTER FOR GENERAL ADULT MEDICAL EXAMINATION WITH ABNORMAL FINDINGS: Primary | ICD-10-CM

## 2021-11-22 DIAGNOSIS — G25.0 ESSENTIAL TREMOR: ICD-10-CM

## 2021-11-22 DIAGNOSIS — E78.5 HYPERLIPIDEMIA LDL GOAL <100: ICD-10-CM

## 2021-11-22 DIAGNOSIS — E11.9 TYPE 2 DIABETES MELLITUS WITHOUT COMPLICATION, WITHOUT LONG-TERM CURRENT USE OF INSULIN (H): ICD-10-CM

## 2021-11-22 DIAGNOSIS — Z79.01 LONG TERM CURRENT USE OF ANTICOAGULANT THERAPY: ICD-10-CM

## 2021-11-22 DIAGNOSIS — M17.12 PRIMARY OSTEOARTHRITIS OF LEFT KNEE: ICD-10-CM

## 2021-11-22 DIAGNOSIS — I48.20 CHRONIC ATRIAL FIBRILLATION (H): ICD-10-CM

## 2021-11-22 DIAGNOSIS — E03.4 HYPOTHYROIDISM DUE TO ACQUIRED ATROPHY OF THYROID: ICD-10-CM

## 2021-11-22 PROCEDURE — 99397 PER PM REEVAL EST PAT 65+ YR: CPT | Performed by: INTERNAL MEDICINE

## 2021-11-22 PROCEDURE — 99212 OFFICE O/P EST SF 10 MIN: CPT | Mod: 25 | Performed by: INTERNAL MEDICINE

## 2021-11-22 RX ORDER — LANCING DEVICE/LANCETS
KIT MISCELLANEOUS
Qty: 1 EACH | Refills: 1 | Status: SHIPPED | OUTPATIENT
Start: 2021-11-22 | End: 2023-06-16

## 2021-11-22 ASSESSMENT — MIFFLIN-ST. JEOR: SCORE: 1286.43

## 2021-11-22 ASSESSMENT — ACTIVITIES OF DAILY LIVING (ADL): CURRENT_FUNCTION: NO ASSISTANCE NEEDED

## 2021-11-22 NOTE — PATIENT INSTRUCTIONS
Patient Education   Personalized Prevention Plan  You are due for the preventive services outlined below.  Your care team is available to assist you in scheduling these services.  If you have already completed any of these items, please share that information with your care team to update in your medical record.  Health Maintenance Due   Topic Date Due     Osteoporosis Screening  01/04/2020     Annual Wellness Visit  05/28/2020     Flu Vaccine (1) 09/01/2021     COVID-19 Vaccine (3 - Booster for Pfizer series) 09/08/2021     Discuss Advance Care Planning  10/06/2021     Diabetic Foot Exam  10/22/2021     FALL RISK ASSESSMENT  10/22/2021           Increase Jardiance to 25 mg daily    Vitamin A&D ointment or a diaper rash ointment to area twice daily (after gentle clean and try)  You can use the steroid cream as needed for itching as you are.      Consider a toe separator if helpful.

## 2021-11-22 NOTE — PROGRESS NOTES
"SUBJECTIVE:   Clementina Cooper is a 81 year old female who presents for Preventive Visit.    82 y/o F here for AFE.      h/o SNHL, PVD, DMII, chronic Afib (coumadin), s/p Pulm Vein Isolation for Afib 2018,  hypothyroid and essential tremor     Exercising less due to knee dejenerative joint arthritis    Gets knee injections which really help.      Has lost 20# since starting Jardiance.    Recent A1C 8.3      Patient has been advised of split billing requirements and indicates understanding: Yes   Are you in the first 12 months of your Medicare coverage?  No    Healthy Habits:    In general, how would you rate your overall health?  Good    Frequency of exercise:  None    Duration of exercise:  Other    Do you usually eat at least 4 servings of fruit and vegetables a day, include whole grains    & fiber and avoid regularly eating high fat or \"junk\" foods?  No    Taking medications regularly:  Yes    Barriers to taking medications:  None    Medication side effects:  None    Ability to successfully perform activities of daily living:  No assistance needed    Home Safety:  No safety concerns identified    Hearing Impairment:  Difficulty following a conversation in a noisy restaurant or crowded room, difficulty understanding speech on the telephone, difficulty following dialogue in the theater and need to ask people to speak up or repeat themselves    In the past 6 months, have you been bothered by leaking of urine?  No    In general, how would you rate your overall mental or emotional health?  Good      PHQ-2 Total Score:    Additional concerns today:  No    Do you feel safe in your environment? Yes    Have you ever done Advance Care Planning? (For example, a Health Directive, POLST, or a discussion with a medical provider or your loved ones about your wishes): No, advance care planning information given to patient to review.  Patient plans to discuss their wishes with loved ones or provider.         Fall risk   "     Cognitive Screening   1) Repeat 3 items (Leader, Season, Table)    2) Clock draw: NORMAL  3) 3 item recall: Recalls 3 objects  Results: 3 items recalled: COGNITIVE IMPAIRMENT LESS LIKELY    Mini-CogTM Copyright [employer]IN[employer] Wilson. Licensed by the author for use in [facility]; reprinted with permission (agatha@[facility]). All rights reserved.      Do you have sleep apnea, excessive snoring or daytime drowsiness?: daytime drowsiness    Reviewed and updated as needed this visit by clinical staff                Reviewed and updated as needed this visit by Provider               Social History     Tobacco Use     Smoking status: Former Smoker     Quit date: 1999     Years since quittin.9     Smokeless tobacco: Never Used   Substance Use Topics     Alcohol use: Yes     Comment: occasional     If you drink alcohol do you typically have >3 drinks per day or >7 drinks per week? No    Alcohol Use 2018   Prescreen: >3 drinks/day or >7 drinks/week? No   Prescreen: >3 drinks/day or >7 drinks/week? -           No concerns     Current providers sharing in care for this patient include:   Patient Care Team:  Estela Davila MD as PCP - General  Kendall Lopez MD as Assigned Musculoskeletal Provider  Rahul Jennings MD as Assigned Surgical Provider  Estela Davila MD as Assigned PCP    The following health maintenance items are reviewed in Epic and correct as of today:  Health Maintenance Due   Topic Date Due     DEXA  2020     MEDICARE ANNUAL WELLNESS VISIT  2020     INFLUENZA VACCINE (1) 2021     COVID-19 Vaccine (3 - Booster for Pfizer series) 2021     ADVANCE CARE PLANNING  10/06/2021     DIABETIC FOOT EXAM  10/22/2021     FALL RISK ASSESSMENT  10/22/2021     Labs reviewed in EPIC  BP Readings from Last 3 Encounters:   21 128/73   21 125/71   10/25/21 125/78    Wt Readings from Last 3 Encounters:   21 82.6 kg (182 lb)   21 85.3 kg (188 lb)    10/25/21 85.3 kg (188 lb)                  Patient Active Problem List   Diagnosis     PVD (peripheral vascular disease) (H)     Family history of colon cancer     HYPERLIPIDEMIA LDL GOAL <100     Open-angle glaucoma, mild-mod, ou     Advanced directives, counseling/discussion     Essential tremor     Pseudophakia, Yag Caps, ou     S/P iridectomy, ou     Anticoagulation goal of INR 2 to 3     Hypothyroidism due to acquired atrophy of thyroid     Chronic atrial fibrillation (H)     overweight  HCC     Long-term (current) use of anticoagulants [Z79.01]     Type 2 diabetes mellitus without complication, without long-term current use of insulin (H)     Glaucoma suspect, bilateral     Status post catheter ablation of atrial fibrillation     Trochanteric bursitis of right hip     SNHL (sensory-neural hearing loss), asymmetrical     Left knee pain     Primary osteoarthritis of left knee     Primary osteoarthritis of right knee     Medial epicondylitis of right elbow     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     Past Surgical History:   Procedure Laterality Date     ANGIOPLASTY      right leg     C STOMACH SURGERY PROCEDURE UNLISTED       CARDIAC SURGERY      Atrial fibrillation ablation      CATARACT IOL, RT/LT       HC TRABECULOPLASTY BY LASER SURGERY       HC VASCULAR SURGERY PROCEDURE UNLIST       HYSTERECTOMY, CERVIX STATUS UNKNOWN      uterine bleeding     HYSTERECTOMY, PAP NO LONGER INDICATED       LASER YAG CAPSULOTOMY Right 2018; 2019    right eye; left eye     LASER YAG IRIDOTOMY  remotely    both eyes (elsewhere)     PHACOEMULSIFICATION WITH STANDARD INTRAOCULAR LENS IMPLANT  2012; 2012    right eye; left eye     Z NONSPECIFIC PROCEDURE      neck surgery/trauma       Social History     Tobacco Use     Smoking status: Former Smoker     Quit date: 1999     Years since quittin.9     Smokeless tobacco: Never Used   Substance Use Topics      Alcohol use: Yes     Comment: occasional     Family History   Problem Relation Age of Onset     Diabetes Mother      Heart Disease Mother      Heart Disease Father      Heart Disease Son      Cancer Brother      Heart Disease Sister      Cancer Brother      Cancer Brother      Heart Disease Sister      Macular Degeneration No family hx of      Glaucoma No family hx of          Current Outpatient Medications   Medication Sig Dispense Refill     ACE NOT PRESCRIBED, INTENTIONAL, 1 each At Bedtime ACE Inhibitor not prescribed due to Other:  Neck swelling.  Also, BP is on low normal side with the betablocker 0 each 0     atorvastatin (LIPITOR) 20 MG tablet TAKE 1 TABLET BY MOUTH EVERY DAY 90 tablet 3     augmented betamethasone dipropionate (DIPROLENE-AF) 0.05 % external cream Apply topically 2 times daily As needed. 50 g 3     Biotin 5000 MCG CAPS Take  by mouth.       blood glucose (NO BRAND SPECIFIED) test strip Use to test blood sugar 1 times daily  . 100 strip 3     CENTRUM SILVER OR one daily       clobetasol (TEMOVATE) 0.05 % external cream APPLY 1 GRAM TOPICALLY 2 TIMES DAILY AS NEEDED 180 g 1     Evening Primrose Oil 1000 MG CAPS Take by mouth daily       glipiZIDE (GLUCOTROL XL) 5 MG 24 hr tablet Take 2 tablets (10 mg) by mouth 2 times daily 360 tablet 3     JARDIANCE 10 MG TABS tablet TAKE 1 TABLET BY MOUTH EVERY DAY 90 tablet 1     levothyroxine (SYNTHROID/LEVOTHROID) 100 MCG tablet TAKE 1 TABLET BY MOUTH EVERY DAY 90 tablet 0     losartan (COZAAR) 25 MG tablet TAKE 0.5 TABLETS (12.5 MG) BY MOUTH DAILY 45 tablet 3     meclizine (ANTIVERT) 25 MG tablet Take 1 tablet (25 mg) by mouth 3 times daily as needed for dizziness 30 tablet 2     ONETOUCH ULTRA test strip USE TO TEST BLOOD SUGAR TWICE A DAY OR AS DIRECTED 200 strip 0     propranolol (INDERAL) 10 MG tablet +++NEED ANNUAL EXAM+++TAKE 1 TABLET (10 MG) BY MOUTH 2 TIMES DAILY AS NEEDED 180 tablet 0     VITAMIN D 1000 UNIT OR TABS 1 TABLET DAILY       warfarin  ANTICOAGULANT (COUMADIN) 5 MG tablet Takes 5 mg mon wed and Friday and 2.5mg all other days unless directed otherwise by [facility]. 65 tablet 6     Allergies   Allergen Reactions     Benzocaine      Betadine [Povidone Iodine]      Rash     Januvia [Sitagliptin]      Not feel well   See 5/19/21 tel call     Pravastatin      Muscle aches     Vagisil [Kdc:Benzocaine+Cetyl Alcohol+Edetic Acid+Methylparaben+Propylene Glycol+...]      It is the benzocaine       Xarelto [Rivaroxaban]      Daily headache     Zocor [Hmg-Coa-R Inhibitors]      Muscle aches     Recent Labs   Lab Test 11/16/21  0918 07/19/21  1107 04/19/21  0913 01/21/21  1524 10/16/20  0726 03/30/20  0905 11/05/19  0730 05/21/19  0734 09/27/18  1017 05/16/18  0729 06/26/17  1000 03/21/17  0728 09/28/16  0756 03/29/16  0738   A1C 8.3* 7.5* 8.2*   < > 8.3*   < >  --  7.0*   < > 7.0*   < > 8.0*   < > 7.0*   LDL 94  --   --   --  72  --   --  73  --  81  --  72  --  87   HDL 56  --   --   --  51  --   --  46*  --  44*  --  42*  --  39*   TRIG 100  --   --   --  124  --   --  187*  --  113  --  202*  --  213*   ALT  --   --   --   --   --   --   --   --   --  31  --  29  --  32   CR 0.74  --   --   --  0.81  --  0.86 1.01   < > 0.84  --  0.82   < > 0.80   GFRESTIMATED 76  --   --   --  68  --  64 53*   < > 66  --  68   < > 69   GFRESTBLACK  --   --   --   --  79  --  74 61   < > 80  --  82   < > 84   POTASSIUM 4.5  --   --   --  4.1  --  4.2 4.2   < > 4.2  --  4.0   < > 4.1   TSH  --   --  2.51  --  5.52*  --  6.41* 4.56*   < > 2.61   < > 5.43*   < > 6.31*    < > = values in this interval not displayed.           Mammogram Screening - Patient over age 75, has elected to continue with screening.  Pertinent mammograms are reviewed under the imaging tab.    Review of Systems  Constitutional, HEENT, cardiovascular, pulmonary, GI, , musculoskeletal, neuro, skin, endocrine and psych systems are negative, except as otherwise noted.    Less exercise due to knee dejenerative  "joint arthritis.     OBJECTIVE:   /73 (BP Location: Right arm, Patient Position: Sitting, Cuff Size: Adult Large)   Pulse 80   Temp 97.3  F (36.3  C) (Oral)   Ht 1.651 m (5' 5\")   Wt 82.6 kg (182 lb)   SpO2 93%   BMI 30.29 kg/m   Estimated body mass index is 31.28 kg/m  as calculated from the following:    Height as of 11/8/21: 1.651 m (5' 5\").    Weight as of 11/8/21: 85.3 kg (188 lb).  Physical Exam  GENERAL: healthy, alert and no distress  EYES: Eyes grossly normal to inspection, PERRL and conjunctivae and sclerae normal  HENT: ear canals and TM's normal, nose and mouth without ulcers or lesions  NECK: no adenopathy, no asymmetry, masses, or scars and thyroid normal to palpation  RESP: lungs clear to auscultation - no rales, rhonchi or wheezes  BREAST: normal without masses, tenderness or nipple discharge and no palpable axillary masses or adenopathy  CV: irregularly irregular rhythm, no murmur, click or rub, peripheral pulses strong and no peripheral edema  ABDOMEN: soft, nontender, no hepatosplenomegaly, no masses and bowel sounds normal   (female): normal female external genitalia, normal urethral meatus, vaginal mucosa pink, moist, well rugated, without masses or discharge   Hysterectomy status.  Bimanual only  MS: no gross musculoskeletal defects noted, no edema    SKIN: no suspicious lesions or rashes  NEURO: Normal strength and tone, mentation intact and speech normal  PSYCH: mentation appears normal, affect normal/bright  LYMPH: no cervical, supraclavicular, axillary, or inguinal adenopathy  Diabetic foot exam: normal DP and PT pulses, no trophic changes or ulcerative lesions and normal sensory exam  - slighly diminished sensory at L distal toes      Diagnostic Test Results:  Labs reviewed in Epic    ASSESSMENT / PLAN:   (Z00.01) Encounter for general adult medical examination with abnormal findings  (primary encounter diagnosis)   (Z00.00) Encounter for Medicare annual wellness " "exam  Comment:         (E11.9) Type 2 diabetes mellitus without complication, without long-term current use of insulin (H)  Comment: will increase Jardiance to 25 mcg (from 10)  Plan: Hemoglobin A1c, empagliflozin (JARDIANCE) 25 MG        TABS tablet, blood glucose (ONE TOUCH DELICA)         lancing device         (E03.4) Hypothyroidism due to acquired atrophy of thyroid  Comment: continue current management   Plan:      (I48.20) Chronic atrial fibrillation (H)  Comment: controlled.  On coumading.   Plan: continue current management   (Z98.890) Status post catheter ablation of atrial fibrillation  Comment  (Z79.01) Long-term (current) use of anticoagulants [Z79.01]  Comment: *        (M17.12) Primary osteoarthritis of left knee  Comment: injections prn via ortho.;   Plan:      (G25.0) Essential tremor  Comment:  No change.    Plan:      (E78.5) Hyperlipidemia LDL goal <100  Comment:    Plan:      Patient has been advised of split billing requirements and indicates understanding:   COUNSELING:  Reviewed preventive health counseling, as reflected in patient instructions       Regular exercise    Estimated body mass index is 31.28 kg/m  as calculated from the following:    Height as of 11/8/21: 1.651 m (5' 5\").    Weight as of 11/8/21: 85.3 kg (188 lb).        She reports that she quit smoking about 21 years ago. She has never used smokeless tobacco.      Appropriate preventive services were discussed with this patient, including applicable screening as appropriate for cardiovascular disease, diabetes, osteopenia/osteoporosis, and glaucoma.  As appropriate for age/gender, discussed screening for colorectal cancer, prostate cancer, breast cancer, and cervical cancer. Checklist reviewing preventive services available has been given to the patient.    Reviewed patients plan of care and provided an AVS. The Basic Care Plan (routine screening as documented in Health Maintenance) for Clementina meets the Care Plan " requirement. This Care Plan has been established and reviewed with the Patient.    Counseling Resources:  ATP IV Guidelines  Pooled Cohorts Equation Calculator  Breast Cancer Risk Calculator  Breast Cancer: Medication to Reduce Risk  FRAX Risk Assessment  ICSI Preventive Guidelines  Dietary Guidelines for Americans, 2010  USDA's MyPlate  ASA Prophylaxis  Lung CA Screening    Estela Davila MD  [facility]    Identified Health Risks:

## 2021-12-09 ENCOUNTER — ANTICOAGULATION THERAPY VISIT (OUTPATIENT)
Dept: ANTICOAGULATION | Facility: CLINIC | Age: 81
End: 2021-12-09

## 2021-12-09 ENCOUNTER — LAB (OUTPATIENT)
Dept: LAB | Facility: CLINIC | Age: 81
End: 2021-12-09
Payer: COMMERCIAL

## 2021-12-09 DIAGNOSIS — I48.20 CHRONIC ATRIAL FIBRILLATION (H): ICD-10-CM

## 2021-12-09 DIAGNOSIS — Z51.81 ANTICOAGULATION GOAL OF INR 2 TO 3: ICD-10-CM

## 2021-12-09 DIAGNOSIS — I73.9 PVD (PERIPHERAL VASCULAR DISEASE) (H): ICD-10-CM

## 2021-12-09 DIAGNOSIS — I48.20 CHRONIC ATRIAL FIBRILLATION (H): Primary | ICD-10-CM

## 2021-12-09 DIAGNOSIS — Z98.890 STATUS POST CATHETER ABLATION OF ATRIAL FIBRILLATION: ICD-10-CM

## 2021-12-09 DIAGNOSIS — Z79.01 ANTICOAGULATION GOAL OF INR 2 TO 3: ICD-10-CM

## 2021-12-09 LAB — INR BLD: 2 (ref 0.9–1.1)

## 2021-12-09 PROCEDURE — 85610 PROTHROMBIN TIME: CPT

## 2021-12-09 PROCEDURE — 36416 COLLJ CAPILLARY BLOOD SPEC: CPT

## 2021-12-09 NOTE — PROGRESS NOTES
Anticoagulation Management    Unable to reach Lynda today.    Today's INR result of 2.0 is therapeutic (goal INR of 2.0-3.0).  Result received from: Clinic Lab    Follow up required to confirm warfarin dose taken and assess for changes    [facility]      [facility]tico[facility] clinic to follow up    Lou Santiago RN

## 2021-12-09 NOTE — PROGRESS NOTES
ANTICOAGULATION MANAGEMENT     Clementina ROGER Allison 81 year old female is on warfarin with therapeutic INR result. (Goal INR 2.0-3.0)    Recent labs: (last 7 days)     12/09/21  0952   INR 2.0*       ASSESSMENT     Source(s): Chart Review and Patient/Caregiver Call       Warfarin doses taken: Warfarin taken as instructed    Diet: No new diet changes identified    New illness, injury, or hospitalization: No    Medication/supplement changes: Increased Jardiance    Signs or symptoms of bleeding or clotting: No    Previous INR: Therapeutic last 2(+) visits    Additional findings: None     PLAN     Recommended plan for no diet, medication or health factor changes affecting INR     Dosing Instructions: Continue your current warfarin dose with next INR in 6 weeks       Summary  As of 12/9/2021    Full warfarin instructions:  5 mg every Mon, Wed, Fri; 2.5 mg all other days   Next INR check:  1/20/2022             Telephone call with Clementina who verbalizes understanding and agrees to plan    Lab visit scheduled    Education provided: Goal range and significance of current result    Plan made per [facility] anticoagulation protocol    Lou Santiago RN  Anticoagulation Clinic  12/9/2021    _______________________________________________________________________     Anticoagulation Episode Summary     Current INR goal:  2.0-3.0   TTR:  75.9 % (1 y)   Target end date:  Indefinite   Send INR reminders to:  RON CRUZ    Indications    Chronic atrial fibrillation (H) [I48.20]  Status post catheter ablation of atrial fibrillation [Z98.890]  PVD (peripheral vascular disease) (H) [I73.9]  Anticoagulation goal of INR 2 to 3 [Z51.81  Z79.01]           Comments:           Anticoagulation Care Providers     Provider Role Specialty Phone number    Estela Davila MD Referring Internal Medicine [phone]

## 2021-12-11 DIAGNOSIS — G25.0 ESSENTIAL TREMOR: ICD-10-CM

## 2021-12-13 RX ORDER — PROPRANOLOL HYDROCHLORIDE 10 MG/1
TABLET ORAL
Qty: 180 TABLET | Refills: 2 | Status: SHIPPED | OUTPATIENT
Start: 2021-12-13 | End: 2022-09-15

## 2021-12-31 PROBLEM — M25.562 LEFT KNEE PAIN: Status: RESOLVED | Noted: 2021-01-14 | Resolved: 2021-12-31

## 2022-01-04 ENCOUNTER — ANCILLARY PROCEDURE (OUTPATIENT)
Dept: GENERAL RADIOLOGY | Facility: CLINIC | Age: 82
End: 2022-01-04
Attending: FAMILY MEDICINE
Payer: COMMERCIAL

## 2022-01-04 ENCOUNTER — OFFICE VISIT (OUTPATIENT)
Dept: FAMILY MEDICINE | Facility: CLINIC | Age: 82
End: 2022-01-04
Payer: COMMERCIAL

## 2022-01-04 VITALS
SYSTOLIC BLOOD PRESSURE: 126 MMHG | HEART RATE: 80 BPM | TEMPERATURE: 98.3 F | DIASTOLIC BLOOD PRESSURE: 80 MMHG | OXYGEN SATURATION: 94 % | BODY MASS INDEX: 30.29 KG/M2 | RESPIRATION RATE: 20 BRPM | WEIGHT: 182 LBS

## 2022-01-04 DIAGNOSIS — R05.9 COUGH: ICD-10-CM

## 2022-01-04 DIAGNOSIS — J18.9 PNEUMONIA OF RIGHT LOWER LOBE DUE TO INFECTIOUS ORGANISM: Primary | ICD-10-CM

## 2022-01-04 PROCEDURE — 71046 X-RAY EXAM CHEST 2 VIEWS: CPT | Mod: FY | Performed by: RADIOLOGY

## 2022-01-04 PROCEDURE — 99213 OFFICE O/P EST LOW 20 MIN: CPT | Performed by: FAMILY MEDICINE

## 2022-01-04 RX ORDER — AZITHROMYCIN 250 MG/1
TABLET, FILM COATED ORAL
Qty: 6 TABLET | Refills: 0 | Status: SHIPPED | OUTPATIENT
Start: 2022-01-04 | End: 2022-01-04

## 2022-01-04 RX ORDER — PREDNISONE 20 MG/1
20 TABLET ORAL DAILY
Qty: 3 TABLET | Refills: 0 | Status: SHIPPED | OUTPATIENT
Start: 2022-01-04 | End: 2022-01-07

## 2022-01-04 RX ORDER — AZITHROMYCIN 250 MG/1
TABLET, FILM COATED ORAL
Qty: 6 TABLET | Refills: 0 | Status: SHIPPED | OUTPATIENT
Start: 2022-01-04 | End: 2022-01-09

## 2022-01-04 ASSESSMENT — PAIN SCALES - GENERAL: PAINLEVEL: NO PAIN (0)

## 2022-01-04 NOTE — PROGRESS NOTES
Assessment & Plan     Pneumonia of right lower lobe due to infectious organism  Reviewed x-ray with patient, likely she may have an early sign of pneumonia at the right lower lobe,  She was advised with supportive care, increase fluid intake.  She was started on prednisone, Z-Malcolm, use as been prescribed.  - predniSONE (DELTASONE) 20 MG tablet; Take 1 tablet (20 mg) by mouth daily for 3 days  - azithromycin (ZITHROMAX) 250 MG tablet; Take 2 tablets (500 mg) by mouth daily for 1 day, THEN 1 tablet (250 mg) daily for 4 days.    Cough    - XR Chest 2 Views; Future       Work on weight loss  Regular exercise    No follow-ups on file.    Irena Lamb MD  [facility]    Tremaine Howell is a 81 year old who presents for the following health issues:  Patient reports for the past 2 days she has been having chest congestion, coughing and wheezing.  She has no fever, no chills.  Denies short of breath.  She has no sinus congestion.  She had a negative Covid test at home 2 days ago.  She has been vaccinated.  No history of travel.  She reports her  has similar symptoms.  She has no diarrhea no vomiting.  No history of Asthma.    Acute Illness  Acute illness concerns: cough/congestion  Onset/Duration: Sunday  Symptoms:  Fever: no  Chills/Sweats: no  Headache (location?): no  Sinus Pressure: no  Conjunctivitis:  no  Ear Pain: no  Rhinorrhea: YES  Congestion: YES- chest  Sore Throat: YES- sometimes  Cough: YES-non-productive  Wheeze: YES  Decreased Appetite: no  Nausea: no  Vomiting: no  Diarrhea: no  Dysuria/Freq.: no  Dysuria or Hematuria: no  Fatigue/Achiness: YES  Sick/Strep Exposure: YES-   Therapies tried and outcome: None      Review of Systems   Constitutional, HEENT, cardiovascular, pulmonary, GI, , musculoskeletal, neuro, skin, endocrine and psych systems are negative, except as otherwise noted.      Objective    Pulse 80   Temp 98.3  F (36.8  C) (Tympanic)   There is  no height or weight on file to calculate BMI.  Physical Exam   GENERAL: healthy, alert and no distress  HENT: ear canals and TM's normal, nose and mouth without ulcers or lesions  RESP: lungs exam positive for rales, rhonchi bilaterally and  Wheezes, right lower side.  CV: regular rate and rhythm, normal S1 S2, no S3 or S4, no murmur, click or rub, no peripheral edema and peripheral pulses strong  ABDOMEN: soft, nontender, no hepatosplenomegaly, no masses and bowel sounds normal  MS: no gross musculoskeletal defects noted, no edema    Orders Placed This Encounter   Procedures     XR Chest 2 Views           Irena Lamb MD

## 2022-01-05 ENCOUNTER — DOCUMENTATION ONLY (OUTPATIENT)
Dept: FAMILY MEDICINE | Facility: CLINIC | Age: 82
End: 2022-01-05
Payer: COMMERCIAL

## 2022-01-05 NOTE — PROGRESS NOTES
ANTICOAGULATION  MANAGEMENT     Interacting Medication Review    Interacting medication(s): Azithromycin (Zithromax, Z-tigist) and Prednisone  with warfarin.    Duration: Zpack is for 5 days starting 1/4 and prednsone was x 3 days     Indication: Pneumonia    New medication?: Yes, interaction may increase INR and risk of bleeding       PLAN     Continue current warfarin dose. Recommend to check INR on 1/7/22    Spoke with Clementina    No adjustment to Anticoagulation Calendar was required    Sunitha White RN

## 2022-01-07 ENCOUNTER — ANTICOAGULATION THERAPY VISIT (OUTPATIENT)
Dept: ANTICOAGULATION | Facility: CLINIC | Age: 82
End: 2022-01-07

## 2022-01-07 ENCOUNTER — LAB (OUTPATIENT)
Dept: LAB | Facility: CLINIC | Age: 82
End: 2022-01-07
Payer: COMMERCIAL

## 2022-01-07 DIAGNOSIS — I73.9 PVD (PERIPHERAL VASCULAR DISEASE) (H): ICD-10-CM

## 2022-01-07 DIAGNOSIS — Z79.01 ANTICOAGULATION GOAL OF INR 2 TO 3: ICD-10-CM

## 2022-01-07 DIAGNOSIS — Z98.890 STATUS POST CATHETER ABLATION OF ATRIAL FIBRILLATION: ICD-10-CM

## 2022-01-07 DIAGNOSIS — I48.20 CHRONIC ATRIAL FIBRILLATION (H): ICD-10-CM

## 2022-01-07 DIAGNOSIS — Z51.81 ANTICOAGULATION GOAL OF INR 2 TO 3: ICD-10-CM

## 2022-01-07 DIAGNOSIS — I48.20 CHRONIC ATRIAL FIBRILLATION (H): Primary | ICD-10-CM

## 2022-01-07 LAB — INR BLD: 2 (ref 0.9–1.1)

## 2022-01-07 PROCEDURE — 36415 COLL VENOUS BLD VENIPUNCTURE: CPT

## 2022-01-07 PROCEDURE — 85610 PROTHROMBIN TIME: CPT

## 2022-01-07 NOTE — PROGRESS NOTES
ANTICOAGULATION MANAGEMENT     Clementina Cooper 81 year old female is on warfarin with therapeutic INR result. (Goal INR 2.0-3.0)    Recent labs: (last 7 days)     01/07/22  1119   INR 2.0*       ASSESSMENT     Source(s): Chart Review       Warfarin doses taken: Warfarin taken as instructed    Diet: No new diet changes identified    New illness, injury, or hospitalization: No    Medication/supplement changes: None noted    Signs or symptoms of bleeding or clotting: No    Previous INR: Therapeutic last 2(+) visits    Additional findings: None     PLAN     Recommended plan for no diet, medication or health factor changes affecting INR     Dosing Instructions: Continue your current warfarin dose with next INR in 4 weeks       Summary  As of 1/7/2022    Full warfarin instructions:  5 mg every Mon, Wed, Fri; 2.5 mg all other days   Next INR check:  2/4/2022             Telephone call with Clementina who verbalizes understanding and agrees to plan    Lab visit scheduled    Education provided: Please call back if any changes to your diet, medications or how you've been taking warfarin    Plan made per [facility] anticoagulation protocol    Ely Sterling RN  Anticoagulation Clinic  1/7/2022    _______________________________________________________________________     Anticoagulation Episode Summary     Current INR goal:  2.0-3.0   TTR:  75.9 % (1 y)   Target end date:  Indefinite   Send INR reminders to:  RON CRUZ    Indications    Chronic atrial fibrillation (H) [I48.20]  Status post catheter ablation of atrial fibrillation [Z98.890]  PVD (peripheral vascular disease) (H) [I73.9]  Anticoagulation goal of INR 2 to 3 [Z51.81  Z79.01]           Comments:           Anticoagulation Care Providers     Provider Role Specialty Phone number    Estela Davila MD Referring Internal Medicine [phone]

## 2022-01-08 DIAGNOSIS — E03.4 HYPOTHYROIDISM DUE TO ACQUIRED ATROPHY OF THYROID: ICD-10-CM

## 2022-01-09 RX ORDER — LEVOTHYROXINE SODIUM 100 UG/1
TABLET ORAL
Qty: 90 TABLET | Refills: 3 | Status: SHIPPED | OUTPATIENT
Start: 2022-01-09 | End: 2023-01-11

## 2022-01-10 ENCOUNTER — TELEPHONE (OUTPATIENT)
Dept: INTERNAL MEDICINE | Facility: CLINIC | Age: 82
End: 2022-01-10
Payer: COMMERCIAL

## 2022-01-10 DIAGNOSIS — L40.9 PSORIASIS: ICD-10-CM

## 2022-01-10 NOTE — TELEPHONE ENCOUNTER
Reason for Call:  Medication or medication refill:    Do you use a [facility]?  Name of the pharmacy and phone number for the current request:  [facility] [place]    Name of the medication requested: augmented betamethasone dipropionate (DIPROLENE-AF) 0.05 % external cream    Other request: patient requesting 3 month supply    Can we leave a detailed message on this number? YES    Phone number patient can be reached at: Home number on file [phone] (home)    Best Time: anytime    Call taken on 1/10/2022 at 10:29 AM by Marium Sainz

## 2022-01-12 RX ORDER — BETAMETHASONE DIPROPIONATE 0.5 MG/G
CREAM TOPICAL 2 TIMES DAILY
Qty: 50 G | Refills: 3 | Status: SHIPPED | OUTPATIENT
Start: 2022-01-12 | End: 2022-01-14

## 2022-01-12 NOTE — TELEPHONE ENCOUNTER
Physical Therapy Daily Treatment    Visit Count: 5    Plan of Care: 7/29/2019 Through: 9/23/2019  Insurance Information: $40 co pay, Medicare complete  Referred by: Butch Kilpatrick DO; Next provider visit (if known/scheduled): none  Medical Diagnosis (from order):       Diagnosis Information             Diagnosis      724.2 (ICD-9-CM) - M54.5 (ICD-10-CM) - Acute midline low back pain without sciatica      724.1 (ICD-9-CM) - M54.6 (ICD-10-CM) - Acute midline thoracic back pain                  Treatment Diagnosis: Lumbar and thoracic symptoms with increased pain/symptoms, impaired posture, impaired strength, impaired range of motion     Date of onset/injury: chronic with flare up 5 months ago  Diagnosis Precautions: none  Chart reviewed at time of initial evaluation (relevant co-morbidities, allergies, tests and medications listed): anxiety     SUBJECTIVE   Drove an additional 5 hours Friday and Sunday this week to [place] [in addition to driving to [place] and [place] 2. 5 hours each]. C/o B hips L>R and lower back. Anterior thigh pain in the morning which loosens up with activity.   Current Pain (0-10 scale): 6 , last 4  Functional Change: none reported    OBJECTIVE     Palpation- increased tone and tenderness left psoas major    Treatment   Therapeutic Exercise:   Nu step seat 7 arms 9 level 7, 6 6 minutes (3 min each)  Instructed in B hip flexor stretches at the stairs with foot on 3rd step- cues to not bounce at end range  B leg press 130# 3 x 10 seat 6 back 4  Side lying STM to vastus lateralis along the IT band L>R  Side lying PROM hip extension then adduction (Obers)  (Compa stretches B in neutral and ER hips)  (Hamstring and piriformis stretches B)  Prone PA mobs S1-T4 grades 3 and 4- most tender over L1  STM B QL and TL spinal extensors in prone; L psoas in supine. Instructed pt not to do self massage of psoas.      Skilled input: posture correction, education/instruction on HEP    Home Program:  Prescription approved per [facility] Refill Protocol.    Kylie Grubbs, RN, BSN, PHN  [facility]: [place]       RORY to press up, seated and 4 pt trunk flexion, and LTR, standing and supine hamstring stretches  8/05- core strengthening- prone hip ext, opp U/LE extension, ab brace with single and double hip flexor isometrics, stretches for hip flexor/quads and 4 pt piriformis B  8/09- provided additional positions for piriformis stretches  8/19- hip flexor stretch at 3rd step    Writer verbally educated the patient and received verbal consent from the patient on hand placement, positioning of patient, and techniques to be performed today including hand placement and palpation for techniques as described above and how they are pertinent to the patient's plan of care.      Suggestions for next session as indicated: progress per plan of care, add multi hip  for LE strengthening    ASSESSMENT   Increased tone of left psoas affecting radicular and or referred hip pain. Adding new home ex and assessing psoas limited the time to add multi hip.    Pain after treatment (patient reported, 0-10 scale): 4  Result of above outlined education: Verbalizes understanding and Needs reinforcement    THERAPY DAILY BILLING   Insurance: [facility] MEDICARE SOLUTION 2. N/A    Evaluation Procedures:  No evaluation codes were used on this date of service    Timed Procedures:  Manual Therapy, 15 minutes  Neuromuscular Re-Education, 10 minutes  Therapeutic Exercise, 20 minutes    Untimed Procedures:  No untimed codes were used on this date of service    Total Treatment Time: 45 minutes

## 2022-01-13 NOTE — TELEPHONE ENCOUNTER
Patient said that she wanted three tubes so she does not have to go to the pharmacy every month.Please advise.

## 2022-01-14 RX ORDER — BETAMETHASONE DIPROPIONATE 0.5 MG/G
CREAM TOPICAL 2 TIMES DAILY
Qty: 150 G | Refills: 3 | Status: SHIPPED | OUTPATIENT
Start: 2022-01-14 | End: 2023-01-12

## 2022-01-14 NOTE — TELEPHONE ENCOUNTER
Called and notified pt of rx information below. Pt verbalized understanding and will check with pharmacy.     Yana Fallon MA on 1/14/2022 at 12:28 PM

## 2022-01-14 NOTE — TELEPHONE ENCOUNTER
Prescription for 3 tubes sent.  It will be up to her insurance to decide if they allow the pharmacy to dispense this much at once.   Please notify patient    Yuri Helms RN  [facility]

## 2022-01-17 ENCOUNTER — OFFICE VISIT (OUTPATIENT)
Dept: OPHTHALMOLOGY | Facility: CLINIC | Age: 82
End: 2022-01-17
Payer: COMMERCIAL

## 2022-01-17 DIAGNOSIS — Z01.01 ENCOUNTER FOR EXAMINATION OF EYES AND VISION WITH ABNORMAL FINDINGS: Primary | ICD-10-CM

## 2022-01-17 DIAGNOSIS — Z98.890 S/P IRIDECTOMY: ICD-10-CM

## 2022-01-17 DIAGNOSIS — H52.4 PRESBYOPIA: ICD-10-CM

## 2022-01-17 DIAGNOSIS — Z96.1 PSEUDOPHAKIA: ICD-10-CM

## 2022-01-17 DIAGNOSIS — E11.9 TYPE 2 DIABETES MELLITUS WITHOUT COMPLICATION, WITHOUT LONG-TERM CURRENT USE OF INSULIN (H): ICD-10-CM

## 2022-01-17 DIAGNOSIS — H40.1131 PRIMARY OPEN ANGLE GLAUCOMA (POAG) OF BOTH EYES, MILD STAGE: ICD-10-CM

## 2022-01-17 PROCEDURE — 92015 DETERMINE REFRACTIVE STATE: CPT | Performed by: OPHTHALMOLOGY

## 2022-01-17 PROCEDURE — 92014 COMPRE OPH EXAM EST PT 1/>: CPT | Performed by: OPHTHALMOLOGY

## 2022-01-17 ASSESSMENT — SLIT LAMP EXAM - LIDS
COMMENTS: 1+ DERMATOCHALASIS - UPPER LID
COMMENTS: 1+ DERMATOCHALASIS - UPPER LID

## 2022-01-17 ASSESSMENT — REFRACTION_MANIFEST
OS_ADD: +2.75
OS_SPHERE: -1.00
OS_CYLINDER: +0.50
OD_CYLINDER: +0.50
OD_SPHERE: -0.50
OS_AXIS: 040
OD_AXIS: 160
OD_ADD: +2.75

## 2022-01-17 ASSESSMENT — VISUAL ACUITY
OS_PH_SC: 20/25
OS_SC+: -1
OS_SC: 20/30
OD_SC+: -3
OD_SC: 20/25
METHOD: SNELLEN - LINEAR

## 2022-01-17 ASSESSMENT — CONF VISUAL FIELD
OD_NORMAL: 1
OS_NORMAL: 1

## 2022-01-17 ASSESSMENT — CUP TO DISC RATIO
OD_RATIO: 0.7
OS_RATIO: 0.6

## 2022-01-17 ASSESSMENT — TONOMETRY
IOP_METHOD: APPLANATION
OD_IOP_MMHG: 15
OS_IOP_MMHG: 15

## 2022-01-17 ASSESSMENT — EXTERNAL EXAM - LEFT EYE: OS_EXAM: NORMAL

## 2022-01-17 ASSESSMENT — EXTERNAL EXAM - RIGHT EYE: OD_EXAM: NORMAL

## 2022-01-17 NOTE — LETTER
1/17/2022         RE: Clementina Cooper  [address], [place]. \[facility]"" [id number]        Dear Colleague,    Thank you for referring your patient, Clementina Cooper, to the [facility]. Please see a copy of my visit note below.     Current Eye Medications:  No eye drops or eye vitamins.      Subjective:  Patient is here for a Diabetic Eye Exam.   No vision changes or concerns - she wears prescription reading glasses which are working adequately.     Lab Results   Component Value Date    A1C 8.3 11/16/2021    A1C 7.5 07/19/2021    A1C 8.2 04/19/2021    A1C 9.2 01/21/2021    A1C 8.3 10/16/2020    A1C 9.0 03/30/2020    A1C 7.0 05/21/2019        Objective:  See Ophthalmology Exam.       Assessment:  Stable eye exam.  Intraocular pressure and discs remain stable off Latanoprost.  No diabetic retinopathy.      ICD-10-CM    1. Encounter for examination of eyes and vision with abnormal findings  Z01.01    2. Presbyopia  H52.4 REFRACTIVE STATUS   3. Type 2 diabetes mellitus without complication, without long-term current use of insulin (H)  E11.9 EYE EXAM (SIMPLE-NONBILLABLE)   4. Primary open angle glaucoma (POAG) of both eyes, mild stage  H40.1131    5. Pseudophakia, Yag Caps, ou  Z96.1    6. S/P iridectomy, ou  Z98.890         Plan:  Continue observation with regard to glaucoma suspect status.   Glasses Rx given - optional   Possible posterior vitreous detachment (sudden onset large floater and/or flashing lights) both eyes discussed.   May use artificial tears up to 4 times daily both eyes. (Refresh Tears, Systane Ultra/Balance, or Theratears)   Call in September 2022 for an appointment in January 2023 for Complete Exam    Dr. Jennings [phone]              Again, thank you for allowing me to participate in the care of your patient.        Sincerely,        Rahul Jennings MD

## 2022-01-17 NOTE — PATIENT INSTRUCTIONS
Continue observation with regard to glaucoma suspect status.   Glasses Rx given - optional   Possible posterior vitreous detachment (sudden onset large floater and/or flashing lights) both eyes discussed.   May use artificial tears up to 4 times daily both eyes. (Refresh Tears, Systane Ultra/Balance, or Theratears)   Call in September 2022 for an appointment in January 2023 for Complete Exam    Dr. Jennings [phone]     Patient Education   Diabetes weakens the blood vessels all over the body, including the eyes. Damage to the blood vessels in the eyes can cause swelling or bleeding into part of the eye (called the retina). This is called diabetic retinopathy (LUIS-tin-AH-puh-thee). If not treated, this disease can cause vision loss or blindness.   Symptoms may include blurred or distorted vision, but many people have no symptoms. It's important to see your eye doctor regularly to check for problems.   Early treatment and good control can help protect your vision. Here are the things you can do to help prevent vision loss:      1. Keep your blood sugar levels under tight control.      2. Bring high blood pressure under control.      3. No smoking.      4. Have yearly dilated eye exams.        none

## 2022-01-17 NOTE — PROGRESS NOTES
Current Eye Medications:  No eye drops or eye vitamins.      Subjective:  Patient is here for a Diabetic Eye Exam.   No vision changes or concerns - she wears prescription reading glasses which are working adequately.     Lab Results   Component Value Date    A1C 8.3 11/16/2021    A1C 7.5 07/19/2021    A1C 8.2 04/19/2021    A1C 9.2 01/21/2021    A1C 8.3 10/16/2020    A1C 9.0 03/30/2020    A1C 7.0 05/21/2019        Objective:  See Ophthalmology Exam.       Assessment:  Stable eye exam.  Intraocular pressure and discs remain stable off Latanoprost.  No diabetic retinopathy.      ICD-10-CM    1. Encounter for examination of eyes and vision with abnormal findings  Z01.01    2. Presbyopia  H52.4 REFRACTIVE STATUS   3. Type 2 diabetes mellitus without complication, without long-term current use of insulin (H)  E11.9 EYE EXAM (SIMPLE-NONBILLABLE)   4. Primary open angle glaucoma (POAG) of both eyes, mild stage  H40.1131    5. Pseudophakia, Yag Caps, ou  Z96.1    6. S/P iridectomy, ou  Z98.890         Plan:  Continue observation with regard to glaucoma suspect status.   Glasses Rx given - optional   Possible posterior vitreous detachment (sudden onset large floater and/or flashing lights) both eyes discussed.   May use artificial tears up to 4 times daily both eyes. (Refresh Tears, Systane Ultra/Balance, or Theratears)   Call in September 2022 for an appointment in January 2023 for Complete Exam    Dr. Jennings [phone]

## 2022-02-04 ENCOUNTER — LAB (OUTPATIENT)
Dept: LAB | Facility: CLINIC | Age: 82
End: 2022-02-04
Payer: COMMERCIAL

## 2022-02-04 ENCOUNTER — ANTICOAGULATION THERAPY VISIT (OUTPATIENT)
Dept: ANTICOAGULATION | Facility: CLINIC | Age: 82
End: 2022-02-04

## 2022-02-04 DIAGNOSIS — Z98.890 STATUS POST CATHETER ABLATION OF ATRIAL FIBRILLATION: ICD-10-CM

## 2022-02-04 DIAGNOSIS — I48.20 CHRONIC ATRIAL FIBRILLATION (H): ICD-10-CM

## 2022-02-04 DIAGNOSIS — Z79.01 ANTICOAGULATION GOAL OF INR 2 TO 3: ICD-10-CM

## 2022-02-04 DIAGNOSIS — Z51.81 ANTICOAGULATION GOAL OF INR 2 TO 3: ICD-10-CM

## 2022-02-04 DIAGNOSIS — I48.20 CHRONIC ATRIAL FIBRILLATION (H): Primary | ICD-10-CM

## 2022-02-04 DIAGNOSIS — I73.9 PVD (PERIPHERAL VASCULAR DISEASE) (H): ICD-10-CM

## 2022-02-04 LAB — INR BLD: 2.4 (ref 0.9–1.1)

## 2022-02-04 PROCEDURE — 36415 COLL VENOUS BLD VENIPUNCTURE: CPT

## 2022-02-04 PROCEDURE — 85610 PROTHROMBIN TIME: CPT

## 2022-02-04 NOTE — PROGRESS NOTES
ANTICOAGULATION MANAGEMENT     Clementina ROGER Allison 81 year old female is on warfarin with therapeutic INR result. (Goal INR 2.0-3.0)    Recent labs: (last 7 days)     02/04/22  1019   INR 2.4*       ASSESSMENT     Source(s): Chart Review and Patient/Caregiver Call       Warfarin doses taken: Warfarin taken as instructed    Diet: No new diet changes identified    New illness, injury, or hospitalization: No    Medication/supplement changes: None noted    Signs or symptoms of bleeding or clotting: No    Previous INR: Therapeutic last 2(+) visits    Additional findings: None     PLAN     Recommended plan for no diet, medication or health factor changes affecting INR     Dosing Instructions: Continue your current warfarin dose with next INR in 4 weeks       Summary  As of 2/4/2022    Full warfarin instructions:  5 mg every Mon, Wed, Fri; 2.5 mg all other days   Next INR check:  3/17/2022             Telephone call with Clementina who verbalizes understanding and agrees to plan    Lab visit scheduled    Education provided: Goal range and significance of current result    Plan made per [facility] anticoagulation protocol    Lou Santiago RN  Anticoagulation Clinic  2/4/2022    _______________________________________________________________________     Anticoagulation Episode Summary     Current INR goal:  2.0-3.0   TTR:  78.2 % (1 y)   Target end date:  Indefinite   Send INR reminders to:  RON CRUZ    Indications    Chronic atrial fibrillation (H) [I48.20]  Status post catheter ablation of atrial fibrillation [Z98.890]  PVD (peripheral vascular disease) (H) [I73.9]  Anticoagulation goal of INR 2 to 3 [Z51.81  Z79.01]           Comments:           Anticoagulation Care Providers     Provider Role Specialty Phone number    Estela Davila MD Referring Internal Medicine [phone]

## 2022-02-24 ENCOUNTER — TELEPHONE (OUTPATIENT)
Dept: ANTICOAGULATION | Facility: CLINIC | Age: 82
End: 2022-02-24
Payer: COMMERCIAL

## 2022-02-24 NOTE — TELEPHONE ENCOUNTER
Patient states that the pharmacist was able to answer her questions.    Lou Santiago RN - [facility]

## 2022-02-24 NOTE — TELEPHONE ENCOUNTER
Patient left a VM on the [place] line requesting to speak to an ACN regarding antibiotics she has been prescribed. Inquiring if this will affect her warfarin dosing.     Please call patient.

## 2022-02-28 ENCOUNTER — OFFICE VISIT (OUTPATIENT)
Dept: FAMILY MEDICINE | Facility: CLINIC | Age: 82
End: 2022-02-28
Payer: COMMERCIAL

## 2022-02-28 VITALS
BODY MASS INDEX: 30.66 KG/M2 | WEIGHT: 184 LBS | HEART RATE: 90 BPM | HEIGHT: 65 IN | OXYGEN SATURATION: 94 % | DIASTOLIC BLOOD PRESSURE: 78 MMHG | TEMPERATURE: 98.2 F | SYSTOLIC BLOOD PRESSURE: 136 MMHG | RESPIRATION RATE: 22 BRPM

## 2022-02-28 DIAGNOSIS — Z20.822 PERSON UNDER INVESTIGATION FOR COVID-19: Primary | ICD-10-CM

## 2022-02-28 DIAGNOSIS — E11.9 TYPE 2 DIABETES MELLITUS WITHOUT COMPLICATION, WITHOUT LONG-TERM CURRENT USE OF INSULIN (H): ICD-10-CM

## 2022-02-28 PROCEDURE — U0003 INFECTIOUS AGENT DETECTION BY NUCLEIC ACID (DNA OR RNA); SEVERE ACUTE RESPIRATORY SYNDROME CORONAVIRUS 2 (SARS-COV-2) (CORONAVIRUS DISEASE [COVID-19]), AMPLIFIED PROBE TECHNIQUE, MAKING USE OF HIGH THROUGHPUT TECHNOLOGIES AS DESCRIBED BY CMS-2020-01-R: HCPCS | Performed by: FAMILY MEDICINE

## 2022-02-28 PROCEDURE — U0005 INFEC AGEN DETEC AMPLI PROBE: HCPCS | Performed by: FAMILY MEDICINE

## 2022-02-28 PROCEDURE — 99213 OFFICE O/P EST LOW 20 MIN: CPT | Performed by: FAMILY MEDICINE

## 2022-02-28 RX ORDER — GUAIFENESIN/DEXTROMETHORPHAN 100-10MG/5
10 SYRUP ORAL 3 TIMES DAILY PRN
Qty: 118 ML | Refills: 0 | Status: SHIPPED | OUTPATIENT
Start: 2022-02-28 | End: 2022-04-08

## 2022-02-28 NOTE — LETTER
March 2, 2022      Lynda Cooper  [address] [address]        Dear ,    We are writing to inform you of your test results.    Covid test is negative   Follow-up if not better        Resulted Orders   Symptomatic; Yes; 2/24/2022 COVID-19 Virus (Coronavirus) by PCR Nose   Result Value Ref Range    SARS CoV2 PCR Negative Negative, Testing sent to reference lab. Results will be returned via unsolicited result      Comment:      NEGATIVE: SARS-CoV-2 (COVID-19) RNA not detected, presumed negative.    Narrative    Testing was performed using the jorden SARS-CoV-2 assay on the jorden  [employer] System. This test should be ordered for the detection of  SARS-CoV-2 in individuals who meet SARS-CoV-2 clinical and/or  epidemiological criteria. Test performance is unknown in asymptomatic  patients. This test is for in vitro diagnostic use under the FDA EUA  for laboratories certified under CLIA to perform high and/or moderate  complexity testing. This test has not been FDA cleared or approved. A  negative result does not rule out the presence of PCR inhibitors in  the specimen or target RNA in concentration below the limit of  detection for the assay. The possibility of a false negative should  be considered if the patient's recent exposure or clinical  presentation suggests COVID-19. This test was validated by the [facility]. This  laboratory is certified under the Clinical Laboratory Improvement  Amendments of 1988 (CLIA-88) as qualified to perform high and/or  moderate complexity laboratory testing.       If you have any questions or concerns, please call the clinic at the number listed above.       Sincerely,      Carissa Mcdonald MD/pascale

## 2022-02-28 NOTE — PATIENT INSTRUCTIONS
"Discharge Instructions for COVID-19 Patients  You have--or may have--COVID-19. Please follow the instructions listed below.   If you have a weakened immune system, discuss with your doctor any other actions you need to take.  How can I protect others?  If you have symptoms (fever, cough, body aches or trouble breathing):    Stay home and away from others (self-isolate) until:  ? Your other symptoms have resolved (gotten better). And   ? You've had no fever--and no medicine that reduces fever--for 1 full day (24 hours). And   ? At least 10 days have passed since your symptoms started. (You may need to wait 20 days. Follow the advice of your care team.)  If you don't show symptoms, but testing showed that you have COVID-19:    Stay home and away from others (self-isolate) until at least 10 days have passed since the date of your first positive COVID-19 test.  During this time    Stay in your own room, even for meals. Use your own bathroom if you can.    Stay away from others in your home. No hugging, kissing or shaking hands. No visitors.    Don't go to work, school or anywhere else.    Clean \"high touch\" surfaces often (doorknobs, counters, handles). Use household cleaning spray or wipes.    You'll find a full list of  on the EPA website: www.epa.gov/pesticide-registration/list-n-disinfectants-use-against-sars-cov-2.    Cover your mouth and nose with a mask or other face covering to avoid spreading germs.    Wash your hands and face often. Use soap and water.    Caregivers in these groups are at risk for severe illness due to COVID-19:  ? People 65 years and older  ? People who live in a nursing home or long-term care facility  ? People with chronic disease (lung, heart, cancer, diabetes, kidney, liver, immunologic)  ? People who have a weakened immune system, including those who:    Are in cancer treatment    Take medicine that weakens the immune system, such as corticosteroids    Had a bone marrow or organ " transplant    Have an immune deficiency    Have poorly controlled HIV or AIDS    Are obese (body mass index of 40 or higher)    Smoke regularly    Caregivers should wear gloves while washing dishes, handling laundry and cleaning bedrooms and bathrooms.    Use caution when washing and drying laundry: Don't shake dirty laundry and use the warmest water setting that you can.    For more tips on managing your health at home, go to www.cdc.gov/coronavirus/2019-ncov/downloads/10Things.pdf.  How can I take care of myself at home?  1. Get lots of rest. Drink extra fluids (unless a doctor has told you not to).  2. Take Tylenol (acetaminophen) for fever or pain. If you have liver or kidney problems, ask your family doctor if it's okay to take Tylenol.   Adults can take either:   ? 650 mg (two 325 mg pills) every 4 to 6 hours, or   ? 1,000 mg (two 500 mg pills) every 8 hours as needed.  ? Note: Don't take more than 3,000 mg in one day. Acetaminophen is found in many medicines (both prescribed and over-the-counter medicines). Read all labels to be sure you don't take too much.   For children, check the Tylenol bottle for the right dose. The dose is based on the child's age or weight.  3. If you have other health problems (like cancer, heart failure, an organ transplant or severe kidney disease): Call your specialty clinic if you don't feel better in the next 2 days.  4. Know when to call 911. Emergency warning signs include:  ? Trouble breathing or shortness of breath  ? Pain or pressure in the chest that doesn't go away  ? Feeling confused like you haven't felt before, or not being able to wake up  ? Bluish-colored lips or face  5. Your doctor may have prescribed a blood thinner medicine. Follow their instructions.  Where can I get more information?     [employer] [facility] - About COVID-19:   [email or web address]    CDC - What to Do If You're Sick:  www.cdc.gov/coronavirus/2019-ncov/about/steps-when-sick.html    CDC - Ending Home Isolation: www.cdc.gov/coronavirus/2019-ncov/hcp/disposition-in-home-patients.html    CDC - Caring for Someone: www.cdc.gov/coronavirus/2019-ncov/if-you-are-sick/care-for-someone.html    [facility] - Interim Guidance for Hospital Discharge to Home: www.health.[facility].[place]./diseases/coronavirus/hcp/hospdischarge.pdf    Below are the COVID-19 hotlines at the [place] Department of Health ([facility]). Interpreters are available.  ? For health questions: Call 908-206-1265 or 1-138.471.8247 (7 a.m. to 7 p.m.)  ? For questions about schools and childcare: Call 557-977-0531 or 1-504.800.9556 (7 a.m. to 7 p.m.)    For informational purposes only. Not to replace the advice of your health care provider. Clinically reviewed by Dr. Krishna Smith.   Copyright   2020 [facility]. All rights reserved. [employer] 574795 - REV 01/05/21.

## 2022-02-28 NOTE — PROGRESS NOTES
Assessment & Plan     Person under investigation for COVID-19  Advised Rest/fluids  Tylenol otc  Follow up 1 week if not better/sooner if worse    - Symptomatic; Yes; 2/24/2022 COVID-19 Virus (Coronavirus) by PCR; Future  - guaiFENesin-dextromethorphan (ROBITUSSIN DM) 100-10 MG/5ML syrup; Take 10 mLs by mouth 3 times daily as needed for cough  - Symptomatic; Yes; 2/24/2022 COVID-19 Virus (Coronavirus) by PCR Nose    Type 2 diabetes mellitus without complication, without long-term current use of insulin (H)  Stable       Return in about 1 week (around 3/7/2022), or if symptoms worsen or fail to improve, for recheck/ sooner if worse or New symptoms.    Carissa Mcdonald MD  [facility] NANCY Howell is a 81 year old who presents for the following health issues     History of Present Illness     Reason for visit:  Cough  Symptom onset:  1-3 days ago  Symptom intensity:  Moderate  Symptom progression:  Worsening  Had these symptoms before:  Yes    She eats 0-1 servings of fruits and vegetables daily.She consumes 1 sweetened beverage(s) daily.She exercises with enough effort to increase her heart rate 20 to 29 minutes per day.  She exercises with enough effort to increase her heart rate 5 days per week.   She is taking medications regularly.       Acute Illness  Acute illness concerns: cough  Onset/Duration: 4 days ago  Symptoms:  Fever: no  Chills/Sweats: YES  Headache (location?): YES- mild  Sinus Pressure: no  Conjunctivitis:  no  Ear Pain: no  Rhinorrhea: no  Congestion: YES  Sore Throat:no  Cough: YES-non-productive  Wheeze: no  Decreased Appetite: no  Nausea: no  Vomiting: no  Diarrhea: no  Dysuria/Freq.: no  Dysuria or Hematuria: no  Fatigue/Achiness: YES  Sick/Strep Exposure: no  Therapies tried and outcome: tylenol helped very little      Review of Systems   CONSTITUTIONAL: NEGATIVE for fever, chills, change in weight  INTEGUMENTARY/SKIN: NEGATIVE for worrisome rashes, moles or  "lesions  ENT/MOUTH: NEGATIVE for ear, mouth and throat problems  RESP:as above  CV: NEGATIVE for chest pain, palpitations or peripheral edema  GI: NEGATIVE for nausea, abdominal pain, heartburn, or change in bowel habits  NEURO: NEGATIVE for weakness, dizziness or paresthesias  ROS otherwise negative      Objective    /78   Pulse 90   Temp 98.2  F (36.8  C) (Oral)   Resp 22   Ht 1.651 m (5' 5\")   Wt 83.5 kg (184 lb)   SpO2 94%   BMI 30.62 kg/m    Body mass index is 30.62 kg/m .  Physical Exam   GENERAL: healthy, alert and no distress  EYES: Eyes grossly normal to inspection, PERRL and conjunctivae and sclerae normal  HENT: normal cephalic/atraumatic, ear canals and TM's normal, oropharynx clear, oral mucous membranes moist and nasal congestion  NECK: no adenopathy, no asymmetry, masses, or scars and thyroid normal to palpation  RESP: lungs clear to auscultation - no rales, rhonchi or wheezes  CV: irregularly Irregular rhythm,, no murmur, click or rub, no peripheral edema and peripheral pulses strong  ABDOMEN: soft, nontender, no hepatosplenomegaly, no masses and bowel sounds normal  MS: no gross musculoskeletal defects noted, no edema    Pending       "

## 2022-03-01 LAB — SARS-COV-2 RNA RESP QL NAA+PROBE: NEGATIVE

## 2022-03-04 ENCOUNTER — OFFICE VISIT (OUTPATIENT)
Dept: FAMILY MEDICINE | Facility: CLINIC | Age: 82
End: 2022-03-04
Payer: COMMERCIAL

## 2022-03-04 ENCOUNTER — ANCILLARY PROCEDURE (OUTPATIENT)
Dept: GENERAL RADIOLOGY | Facility: CLINIC | Age: 82
End: 2022-03-04
Attending: STUDENT IN AN ORGANIZED HEALTH CARE EDUCATION/TRAINING PROGRAM
Payer: COMMERCIAL

## 2022-03-04 VITALS
HEART RATE: 91 BPM | OXYGEN SATURATION: 95 % | DIASTOLIC BLOOD PRESSURE: 70 MMHG | SYSTOLIC BLOOD PRESSURE: 130 MMHG | TEMPERATURE: 98.1 F | WEIGHT: 183 LBS | BODY MASS INDEX: 30.49 KG/M2 | HEIGHT: 65 IN

## 2022-03-04 DIAGNOSIS — H10.32 ACUTE BACTERIAL CONJUNCTIVITIS OF LEFT EYE: ICD-10-CM

## 2022-03-04 DIAGNOSIS — J18.9 PNEUMONIA OF RIGHT LOWER LOBE DUE TO INFECTIOUS ORGANISM: Primary | ICD-10-CM

## 2022-03-04 DIAGNOSIS — J18.9 PNEUMONIA OF RIGHT LOWER LOBE DUE TO INFECTIOUS ORGANISM: ICD-10-CM

## 2022-03-04 PROCEDURE — 99214 OFFICE O/P EST MOD 30 MIN: CPT | Performed by: STUDENT IN AN ORGANIZED HEALTH CARE EDUCATION/TRAINING PROGRAM

## 2022-03-04 PROCEDURE — 71046 X-RAY EXAM CHEST 2 VIEWS: CPT | Mod: FY | Performed by: RADIOLOGY

## 2022-03-04 RX ORDER — POLYMYXIN B SULFATE AND TRIMETHOPRIM 1; 10000 MG/ML; [USP'U]/ML
1-2 SOLUTION OPHTHALMIC EVERY 6 HOURS
Qty: 2 ML | Refills: 0 | Status: SHIPPED | OUTPATIENT
Start: 2022-03-04 | End: 2022-03-09

## 2022-03-04 NOTE — PATIENT INSTRUCTIONS
Patient Education     Treating Pneumonia   Pneumonia is an infection of one or both of the lungs. Pneumonia:    Is often caused by either a virus, fungus, or bacteria    Can be very serious, especially in babies, young children, and older adults. It s also serious for those with other long-term health problems or weakened immune systems.    Is sometimes treated at home and sometimes in the hospital    Antibiotic medicines  Antibiotics may be prescribed for pneumonia caused by bacteria. They may be pills (oral medicines) or shots (injections). Or they may be given by IV (intravenously) into a vein. If you are taking pills at home:    Fill your prescription and start taking your medicine as soon as you can.    You will likely start to feel better in a day or 2, but don t stop taking the antibiotic.    Use a pill organizer to help you remember to take your medicine.    Let your healthcare provider know if you have side effects.    Take your medicine exactly as directed on the label. Talk with your provider or pharmacist if you have any questions.  Antiviral medicines  Antiviral medicine may be prescribed for pneumonia caused by a virus. For example, antiviral medicine may be prescribed for pneumonia caused by the flu virus. Antibiotics don't work against viruses. If you are taking antiviral medicine at home:    Fill your prescription and start taking your medicine as soon as you can.    Talk with your provider or pharmacist about possible side effects.    Take the medicine exactly as instructed.  To relieve symptoms  There are many medicines that can help relieve symptoms of pneumonia. Some are prescription and some are over the counter.  Your healthcare provider may advise:    Acetaminophen or ibuprofen to lower your fever and to reduce headache or other pain    Cough medicine to loosen mucus or to reduce coughing  Check with your healthcare provider or pharmacist before taking any over-the-counter medicines. Some  over-the-counter medicines should not be used if you have certain health conditions.  Special treatments  If you are hospitalized for pneumonia, you may have other therapies, including:    Inhaled medicines to help with breathing or chest congestion    Supplemental oxygen to increase low oxygen levels  Drink fluids and eat healthy  You should eat healthy to help your body fight the infection. Drinking a lot of fluids helps to replace fluids lost from fever and to loosen mucus in your chest.    Diet. Make healthy food choices, including fruits and vegetables, lean meats and other proteins, 100% whole grain, and low-fat or no-fat dairy products.    Fluids. Drink at least 6 to 8 tall glasses a day. Water and 100% fruit or vegetable juice are best.  Get plenty of rest and sleep  You may be more tired than normal for a while. It's important to get enough sleep at night. It s also important to rest during the day. Talk with your healthcare provider if coughing or other symptoms are interfering with your sleep.  Preventing the spread of germs  The best thing you can do to prevent spreading germs is to wash your hands often. You should:    Scrub your hands with soap and warm water for 15 to 20 seconds.    Clean in between your fingers, the backs of your hands, and around your nails.    Dry your hands on a separate towel or use paper towels.  You should also:    Keep alcohol-based hand  nearby.    Make sure you also clean surfaces that you touch. Use a product that kills all types of germs.    Stay away from others until you are feeling better.  When to call your healthcare provider  Call your healthcare provider if you have any of these:    Symptoms get worse or new symptoms develop    Fever continues    Shortness of breath with normal daily activities    Side effects from your medicine    Increased mucus or mucus that is darker in color    Coughing gets worse    Chest pain when coughing or breathing  Call 911  Call  911if any of these occur:    Lips or fingers are bluish, purple, or gray in color    Trouble breathing or wheezing    Shortness of breath that gets worse and doesn't improve with treatment    Feeling faint or dizzy    Loss of consciousness    Trouble talking or swallowing    Feeling of doom  Tony last reviewed this educational content on 12/1/2019 2000-2021 [employer]. All rights reserved. This information is not intended as a substitute for professional medical care. Always follow your healthcare professional's instructions.

## 2022-03-04 NOTE — PROGRESS NOTES
Assessment & Plan     Pneumonia of right lower lobe due to infectious organism  XR consistent with some increased opacities in RLL primarily in bronchioles.   XR and PE consistent with pneumonia. Suspect had viral infection with superimposed bacterial infection with symptom worsening.   Treat with Augmentin BID x 7 days   Rest, hydrate.   She does have A.fib and is on coumadin. Has an INR check in 2 weeks    - XR Chest 2 Views; Future    Acute bacterial conjunctivitis of left eye  Recommend eye drops 4 times daily x 5 days. Avoid touching/rubbing eye      Return in about 5 days (around 3/9/2022), or if symptoms worsen or fail to improve.    Raysa Burr [facility]    Tremaine Howell is a 81 year old who presents for the following health issues      Acute Illness  Acute illness concerns: Was seen at [place] on 02/28 and was Negative for Covid   Onset/Duration: 9 days today.  Symptoms:  Fever: no  Chills/Sweats: YES  Headache (location?): YES  Sinus Pressure: no  Conjunctivitis:  YES started Wed morning. Left eye Redness, discharge. Not really itchy    Ear Pain: no  Rhinorrhea: YES Occ  Congestion: YES chest & nasal  Sore Throat: YES  Cough: YES yellow   Wheeze: no  Decreased Appetite: no  Nausea: no  Vomiting: no  Diarrhea: no  Dysuria/Freq.: no  Dysuria or Hematuria: no  Fatigue/Achiness: YES both  Sick/Strep Exposure: no  Therapies tried and outcome: ROBITUSSIN DM, Tylenol, fluids, nyquil    Was seen earlier this week and was tested for covid and was negative. Was given robitussin. Symptoms worsening.       Coughing all the time, wet cough but non-productive   Feels very weak   Had headache   Started getting worse - especially her coughing, started 9 days ago.   No confusion   Started 2 days ago has had left conjunctivitis, redness, discharge, crusted shut  No wheezing or SOB.   No fevers.     Lives with , he is not ill     Pneumonia couple months ago    O2 sats today  "95%      Review of Systems   Constitutional, HEENT, cardiovascular, pulmonary, gi and gu systems are negative, except as otherwise noted.      Objective    /70 (BP Location: Right arm, Patient Position: Sitting, Cuff Size: Adult Regular)   Pulse 91   Temp 98.1  F (36.7  C) (Oral)   Ht 1.651 m (5' 5\")   Wt 83 kg (183 lb)   SpO2 95%   BMI 30.45 kg/m    Body mass index is 30.45 kg/m .  Physical Exam   GENERAL: healthy, alert and no distress  EYES: Eyes grossly normal to inspection, PERRL and left conjunctivae injected with purulent discharge and sclerae normal  HENT: ear canals and TM's normal, nose and mouth without ulcers or lesions  NECK: no adenopathy, no asymmetry, masses, or scars and thyroid normal to palpation  RESP: rales in RLL, no wheezing noted, moving air well throughout  CV: irregularly irregular rate and rhythm, normal S1 S2, no S3 or S4, no murmur, click or rub, no peripheral edema and peripheral pulses strong    MS: no gross musculoskeletal defects noted, no edema    CXR - Reviewed and interpreted by me : RLL infiltrates, no effusions, pneumothoraces, cardiomegaly or masses            "

## 2022-03-16 DIAGNOSIS — E11.9 TYPE 2 DIABETES MELLITUS WITHOUT COMPLICATION, WITHOUT LONG-TERM CURRENT USE OF INSULIN (H): ICD-10-CM

## 2022-03-16 RX ORDER — GLIPIZIDE 5 MG/1
10 TABLET, FILM COATED, EXTENDED RELEASE ORAL 2 TIMES DAILY
Qty: 360 TABLET | Refills: 3 | Status: SHIPPED | OUTPATIENT
Start: 2022-03-16 | End: 2023-01-12

## 2022-03-17 ENCOUNTER — LAB (OUTPATIENT)
Dept: LAB | Facility: CLINIC | Age: 82
End: 2022-03-17
Payer: COMMERCIAL

## 2022-03-17 ENCOUNTER — ANTICOAGULATION THERAPY VISIT (OUTPATIENT)
Dept: ANTICOAGULATION | Facility: CLINIC | Age: 82
End: 2022-03-17

## 2022-03-17 DIAGNOSIS — Z79.01 ANTICOAGULATION GOAL OF INR 2 TO 3: ICD-10-CM

## 2022-03-17 DIAGNOSIS — I48.20 CHRONIC ATRIAL FIBRILLATION (H): Primary | ICD-10-CM

## 2022-03-17 DIAGNOSIS — Z51.81 ANTICOAGULATION GOAL OF INR 2 TO 3: ICD-10-CM

## 2022-03-17 DIAGNOSIS — I73.9 PVD (PERIPHERAL VASCULAR DISEASE) (H): ICD-10-CM

## 2022-03-17 DIAGNOSIS — Z98.890 STATUS POST CATHETER ABLATION OF ATRIAL FIBRILLATION: ICD-10-CM

## 2022-03-17 DIAGNOSIS — I48.20 CHRONIC ATRIAL FIBRILLATION (H): ICD-10-CM

## 2022-03-17 LAB — INR BLD: 2.4 (ref 0.9–1.1)

## 2022-03-17 PROCEDURE — 36415 COLL VENOUS BLD VENIPUNCTURE: CPT

## 2022-03-17 PROCEDURE — 85610 PROTHROMBIN TIME: CPT

## 2022-03-17 NOTE — PROGRESS NOTES
ANTICOAGULATION MANAGEMENT     Clementina Cooper 81 year old female is on warfarin with therapeutic INR result. (Goal INR 2.0-3.0)    Recent labs: (last 7 days)     03/17/22  0925   INR 2.4*       ASSESSMENT       Source(s): Chart Review and Patient/Caregiver Call       Warfarin doses taken: Warfarin taken as instructed    Diet: No new diet changes identified    New illness, injury, or hospitalization: Yes: had pneumonia, pink eye    Medication/supplement changes: Amoxil for pneumonia a couple weeks ago    Signs or symptoms of bleeding or clotting: No    Previous INR: Therapeutic last 2(+) visits    Additional findings: None       PLAN     Recommended plan for no diet, medication or health factor changes affecting INR     Dosing Instructions: Continue your current warfarin dose with next INR in 6 weeks       Summary  As of 3/17/2022    Full warfarin instructions:  5 mg every Mon, Wed, Fri; 2.5 mg all other days   Next INR check:  4/28/2022             Telephone call with Jingcorina who verbalizes understanding and agrees to plan    Lab visit scheduled    Education provided: Goal range and significance of current result    Plan made per [facility] anticoagulation protocol    Lou Santiago RN  Anticoagulation Clinic  3/17/2022    _______________________________________________________________________     Anticoagulation Episode Summary     Current INR goal:  2.0-3.0   TTR:  78.2 % (1 y)   Target end date:  Indefinite   Send INR reminders to:  RON CRUZ    Indications    Chronic atrial fibrillation (H) [I48.20]  Status post catheter ablation of atrial fibrillation [Z98.890]  PVD (peripheral vascular disease) (H) [I73.9]  Anticoagulation goal of INR 2 to 3 [Z51.81  Z79.01]           Comments:           Anticoagulation Care Providers     Provider Role Specialty Phone number    Estela Davila MD Referring Internal Medicine [phone]

## 2022-03-29 ENCOUNTER — TELEPHONE (OUTPATIENT)
Dept: FAMILY MEDICINE | Facility: CLINIC | Age: 82
End: 2022-03-29

## 2022-03-29 ENCOUNTER — OFFICE VISIT (OUTPATIENT)
Dept: FAMILY MEDICINE | Facility: CLINIC | Age: 82
End: 2022-03-29
Payer: COMMERCIAL

## 2022-03-29 VITALS
OXYGEN SATURATION: 94 % | SYSTOLIC BLOOD PRESSURE: 126 MMHG | HEART RATE: 69 BPM | WEIGHT: 184 LBS | DIASTOLIC BLOOD PRESSURE: 82 MMHG | TEMPERATURE: 98.1 F | BODY MASS INDEX: 30.62 KG/M2

## 2022-03-29 DIAGNOSIS — J34.89 SINUS PRESSURE: ICD-10-CM

## 2022-03-29 DIAGNOSIS — R05.9 COUGH: ICD-10-CM

## 2022-03-29 DIAGNOSIS — E03.4 HYPOTHYROIDISM DUE TO ACQUIRED ATROPHY OF THYROID: Primary | ICD-10-CM

## 2022-03-29 PROCEDURE — 99214 OFFICE O/P EST MOD 30 MIN: CPT | Performed by: PHYSICIAN ASSISTANT

## 2022-03-29 RX ORDER — PREDNISONE 20 MG/1
TABLET ORAL
Qty: 20 TABLET | Refills: 0 | Status: SHIPPED | OUTPATIENT
Start: 2022-03-29 | End: 2022-04-08

## 2022-03-29 NOTE — TELEPHONE ENCOUNTER
Spoke with pt. Had pneumonia last part of February and March. Is Wondering if she is fully recovered from pneumonia.  Was prescribed Amoxicilin for pneumonia on 3/4/22. Negative for covid on 2/28. Was coughing, but now not so much, but does occasionally. No difficulty breathing or wheezing.   No fever or chills. Has a runny and congested nose. Has had symptoms for 7-8 days. Off and on headaches. No sinus pain or pressure. Pt requesting to be seen in person so she can have her lungs assessed. Pt declined virtual. Rapid rooming added to appointment notes.     Emmanuelle Azevedo RN  [facility]

## 2022-03-29 NOTE — PROGRESS NOTES
Assessment & Plan     1. Hypothyroidism due to acquired atrophy of thyroid    2. Cough    3. Sinus pressure      Exam is normal. Will try a prednisone taper and then follow up if not improving. No signs of bacterial illness.                  No follow-ups on file.    FRANCISCO Nuñez [facility] NANCY Howell is a 81 year old who presents for the following health issues     HPI     Acute Illness  Acute illness concerns: sinus   Onset/Duration: x8-9 days  Symptoms:  Fever: no  Chills/Sweats: no  Headache (location?): YES- off and on, global  Sinus Pressure: no  Conjunctivitis:  no  Ear Pain: no  Rhinorrhea: YES  Congestion: YES  Sore Throat: YES  Cough: YES-productive of green sputum  Wheeze: no  Decreased Appetite: no  Nausea: no  Vomiting: no  Diarrhea: no  Dysuria/Freq.: no  Dysuria or Hematuria: no  Fatigue/Achiness: YES  Sick/Strep Exposure: no  Therapies tried and outcome: OTC Tylenol     No shortness of breath.   Mucus production.   Feels like she has a lot of junk in her face.     Reviewed recent visits- had suspected pneumonia, but no pneumonia on x-ray in January or last office visit. Patient finished all the augmentin.     Review of Systems   Constitutional, HEENT, cardiovascular, pulmonary, gi and gu systems are negative, except as otherwise noted.      Objective    /82 (BP Location: Left arm, Patient Position: Chair, Cuff Size: Adult Large)   Pulse 69   Temp 98.1  F (36.7  C) (Oral)   Wt 83.5 kg (184 lb)   SpO2 94%   Breastfeeding No   BMI 30.62 kg/m    Body mass index is 30.62 kg/m .  Physical Exam   GENERAL: healthy, alert and no distress  HENT: ear canals and TM's normal, nose and mouth without ulcers or lesions  NECK: no adenopathy,   RESP: lungs clear to auscultation - no rales, rhonchi or wheezes  CV: regular rate and rhythm, normal S1 S2, no S3 or S4, no murmur,   MS: no gross musculoskeletal defects noted, no edema

## 2022-03-29 NOTE — TELEPHONE ENCOUNTER
Please triage for appointment today per Donna Diamond PA-C. Pts appointment was scheduled at 12:12pm today for sinus infection. Pt may need to be scheduled as a virtual.    Camille GALAVIZ MA

## 2022-04-01 ENCOUNTER — LAB (OUTPATIENT)
Dept: LAB | Facility: CLINIC | Age: 82
End: 2022-04-01
Payer: COMMERCIAL

## 2022-04-01 DIAGNOSIS — E03.4 HYPOTHYROIDISM DUE TO ACQUIRED ATROPHY OF THYROID: Primary | ICD-10-CM

## 2022-04-01 DIAGNOSIS — E11.9 TYPE 2 DIABETES MELLITUS WITHOUT COMPLICATION, WITHOUT LONG-TERM CURRENT USE OF INSULIN (H): ICD-10-CM

## 2022-04-01 LAB
HBA1C MFR BLD: 7.2 % (ref 0–5.6)
TSH SERPL DL<=0.005 MIU/L-ACNC: 0.94 MU/L (ref 0.4–4)

## 2022-04-01 PROCEDURE — 83036 HEMOGLOBIN GLYCOSYLATED A1C: CPT

## 2022-04-01 PROCEDURE — 84443 ASSAY THYROID STIM HORMONE: CPT

## 2022-04-01 PROCEDURE — 36415 COLL VENOUS BLD VENIPUNCTURE: CPT

## 2022-04-01 NOTE — RESULT ENCOUNTER NOTE
Clementina Cooper    Diabetes is at goal.   Thyroid is within normal, no changes needed. \    Sincerely,       TAISHA MORROW M.D.

## 2022-04-01 NOTE — LETTER
April 4, 2022      Lynda Cooper  [address] [place] [id number]        Dear Lynda:    We are writing to inform you of your test results.    Diabetes is at goal.   Thyroid is within normal, no changes needed.    Resulted Orders   Hemoglobin A1c   Result Value Ref Range    Hemoglobin A1C 7.2 (H) 0.0 - 5.6 %      Comment:      Normal <5.7%   Prediabetes 5.7-6.4%    Diabetes 6.5% or higher     Note: Adopted from ADA consensus guidelines.   TSH WITH FREE T4 REFLEX   Result Value Ref Range    TSH 0.94 0.40 - 4.00 mU/L     If you have any questions or concerns, please call the clinic at the number listed above.     Sincerely,      Estela Davila MD/jomar

## 2022-04-08 ENCOUNTER — OFFICE VISIT (OUTPATIENT)
Dept: INTERNAL MEDICINE | Facility: CLINIC | Age: 82
End: 2022-04-08
Payer: COMMERCIAL

## 2022-04-08 VITALS
SYSTOLIC BLOOD PRESSURE: 125 MMHG | TEMPERATURE: 97.6 F | OXYGEN SATURATION: 94 % | BODY MASS INDEX: 30.29 KG/M2 | WEIGHT: 182 LBS | DIASTOLIC BLOOD PRESSURE: 75 MMHG | HEART RATE: 80 BPM

## 2022-04-08 DIAGNOSIS — E03.4 HYPOTHYROIDISM DUE TO ACQUIRED ATROPHY OF THYROID: ICD-10-CM

## 2022-04-08 DIAGNOSIS — I48.20 CHRONIC ATRIAL FIBRILLATION (H): ICD-10-CM

## 2022-04-08 DIAGNOSIS — I73.9 PVD (PERIPHERAL VASCULAR DISEASE) (H): ICD-10-CM

## 2022-04-08 DIAGNOSIS — M17.12 PRIMARY OSTEOARTHRITIS OF LEFT KNEE: ICD-10-CM

## 2022-04-08 DIAGNOSIS — Z79.01 LONG TERM CURRENT USE OF ANTICOAGULANT THERAPY: ICD-10-CM

## 2022-04-08 DIAGNOSIS — Z23 HIGH PRIORITY FOR 2019-NCOV VACCINE: ICD-10-CM

## 2022-04-08 DIAGNOSIS — Z98.890 STATUS POST CATHETER ABLATION OF ATRIAL FIBRILLATION: ICD-10-CM

## 2022-04-08 DIAGNOSIS — Z78.0 ASYMPTOMATIC POSTMENOPAUSAL STATUS: ICD-10-CM

## 2022-04-08 DIAGNOSIS — E11.9 TYPE 2 DIABETES MELLITUS WITHOUT COMPLICATION, WITHOUT LONG-TERM CURRENT USE OF INSULIN (H): Primary | ICD-10-CM

## 2022-04-08 PROCEDURE — 91305 COVID-19,PF,PFIZER (12+ YRS): CPT | Performed by: INTERNAL MEDICINE

## 2022-04-08 PROCEDURE — 0054A COVID-19,PF,PFIZER (12+ YRS): CPT | Performed by: INTERNAL MEDICINE

## 2022-04-08 PROCEDURE — 99214 OFFICE O/P EST MOD 30 MIN: CPT | Performed by: INTERNAL MEDICINE

## 2022-04-08 NOTE — PATIENT INSTRUCTIONS
Call to schedule imaging     Bone DEnsity   (Consider Fosamax if bone fragility is present)      Return to clinic for annual exam in 6 months (October 2022)

## 2022-04-08 NOTE — PROGRESS NOTES
Assessment & Plan      Type 2 diabetes mellitus without complication, without long-term current use of insulin (H)  Well controlled.     Hypothyroidism due to acquired atrophy of thyroid   continue current management     Chronic atrial fibrillation (H)   rate controlled. On coumadin   Anticoagulation goal of INR 2 to 3   afib  Status post catheter ablation of atrial fibrillation  Failed.        Primary osteoarthritis of left knee    No current issues  High priority for 2019-nCoV vaccine     - COVID-19,PF,PFIZER (12+ Yrs GRAY LABEL)    Asymptomatic postmenopausal status   - DEXA HIP/PELVIS/SPINE - Future; Future  She would like to take Fosamax if fragility is present.     PVD (peripheral vascular disease) (H)   h/o R iliac stenosis.  Asa and madi med.  BP well controlled.           I spent a total of 30 minutes on the day of the visit.   Time spent doing chart review, history and exam, documentation and further activities per the note             Return in about 6 months (around 10/8/2022) for Physical Exam.    Estela Davila MD  [facility]    ===============================================================  =================================================================    Subjective   Lynda is a 81 year old who presents for the following health issues     HPI   82 y/o F here for DM f/u.  H/o  SNHL, PVD, DMII, chronic Afib (coumadin), s/p Pulm Vein Isolation for Afib 2018,  hypothyroid and essential tremor     Recent A1C is 7.2.     Over past months, had LLL Pneuminia and treated for sinusitis.   She is 90% better.              Diabetes Follow-up    How often are you checking your blood sugar? One time daily  What time of day are you checking your blood sugars (select all that apply)?  Before meals  Have you had any blood sugars above 200?  No  Have you had any blood sugars below 70?  No    What symptoms do you notice when your blood sugar is low?  Weak    What concerns do you have  [FreeTextEntry1] : F/U today about your diabetes? None     Do you have any of these symptoms? (Select all that apply)  No numbness or tingling in feet.  No redness, sores or blisters on feet.  No complaints of excessive thirst.  No reports of blurry vision.  No significant changes to weight.      BP Readings from Last 2 Encounters:   04/08/22 125/75   03/29/22 126/82     Hemoglobin A1C POCT (%)   Date Value   04/19/2021 8.2 (H)   01/21/2021 9.2 (H)     Hemoglobin A1C (%)   Date Value   04/01/2022 7.2 (H)   11/16/2021 8.3 (H)     LDL Cholesterol Calculated (mg/dL)   Date Value   11/16/2021 94   10/16/2020 72   05/21/2019 73            How many servings of fruits and vegetables do you eat daily?  0-1    On average, how many sweetened beverages do you drink each day (Examples: soda, juice, sweet tea, etc.  Do NOT count diet or artificially sweetened beverages)?   1    How many days per week do you exercise enough to make your heart beat faster? 3 or less    How many minutes a day do you exercise enough to make your heart beat faster? 9 or less    How many days per week do you miss taking your medication? 0        Review of Systems   Constitutional, HEENT, cardiovascular, pulmonary, gi and gu systems are negative, except as otherwise noted.      Objective    /75 (BP Location: Right arm, Patient Position: Sitting, Cuff Size: Adult Regular)   Pulse 80   Temp 97.6  F (36.4  C) (Oral)   Wt 82.6 kg (182 lb)   SpO2 94%   BMI 30.29 kg/m    Body mass index is 30.29 kg/m .  Physical Exam   GENERAL: healthy, alert and no distress  EYES: Eyes grossly normal to inspection, PERRL and conjunctivae and sclerae normal  HENT: ear canals and TM's normal, nose and mouth without ulcers or lesions  NECK: no adenopathy, no asymmetry, masses, or scars and thyroid normal to palpation  RESP: lungs clear to auscultation - no rales, rhonchi or wheezes  CV: irregularly irregular rhythm, no murmur, click or rub and no peripheral edema  MS: no gross  [de-identified] : 43 yr old female here for F/U\par \par Thirst = Still w/thirst.  Would get suddenly thirsty all day long, needing to drink.  If she doesn't drink, would get a HA and muscle cramps.   Feeling it now but is holding back on drinking, so as not to have polyuria.  Last blood work showed normal thyroid and no diabetes, normal osmolar blood.  \par \par \par  musculoskeletal defects noted, no edema  SKIN: no suspicious lesions or rashes       4/1/22 HgbA1C was 7.2

## 2022-04-11 ENCOUNTER — TELEPHONE (OUTPATIENT)
Dept: FAMILY MEDICINE | Facility: CLINIC | Age: 82
End: 2022-04-11
Payer: COMMERCIAL

## 2022-04-11 DIAGNOSIS — J34.89 SINUS PRESSURE: ICD-10-CM

## 2022-04-11 DIAGNOSIS — R05.9 COUGH: ICD-10-CM

## 2022-04-11 NOTE — TELEPHONE ENCOUNTER
Patient called was told to call if she needs more prednisone please call and advise.  Ruth Laird  Team Kristi,

## 2022-04-12 RX ORDER — PREDNISONE 20 MG/1
TABLET ORAL
Qty: 20 TABLET | Refills: 0 | Status: SHIPPED | OUTPATIENT
Start: 2022-04-12 | End: 2023-01-12

## 2022-04-12 NOTE — TELEPHONE ENCOUNTER
Pt was given provider's message as written. Verbalized understanding and agrees with plan.    Emmanuelle Azevedo RN  [facility]

## 2022-04-12 NOTE — TELEPHONE ENCOUNTER
Let patient know another round of prednisone Rx'd   her to get OTC Fluticasone and use daily as directed for the next couple of months.        I did enter ENT referral, so if the above things don't help then She should schedule ENT appointment.       Thank you

## 2022-04-12 NOTE — TELEPHONE ENCOUNTER
Patient was seen by SANTINO Cheung on 3/29/2022 for a cough and sinus pressure.  She was put on prednisone taper.  She finished her last dose on Nikita 4/10/2022.  Symptoms have improved but still has the cough and head congestion.  She had an appointment with Estela Uribe on 4/8/2022 and per patient, provider suggested another round of prednisone if symptoms do not resolve?    Patient would like another course of prednisone  Pharmacy pended    Yuri Helms RN  [facility]

## 2022-04-25 ENCOUNTER — ANCILLARY PROCEDURE (OUTPATIENT)
Dept: BONE DENSITY | Facility: CLINIC | Age: 82
End: 2022-04-25
Attending: INTERNAL MEDICINE
Payer: COMMERCIAL

## 2022-04-25 DIAGNOSIS — Z78.0 ASYMPTOMATIC POSTMENOPAUSAL STATUS: ICD-10-CM

## 2022-04-25 PROCEDURE — 77080 DXA BONE DENSITY AXIAL: CPT | Performed by: INTERNAL MEDICINE

## 2022-04-25 NOTE — LETTER
May 4, 2022      Lynda ROGER Cooper  [address] [facility] [address]        Dear Lynda:    We are writing to inform you of your test results.    The bone density does show bone loss and fragility at the hip bones.   Would you be willing to start weekly Fosamax for treatment of this fragility?  Let me know via message, or at our next appointment.    Resulted Orders   DEXA HIP/PELVIS/SPINE - Future    Narrative    BONE DENSITOMETRY  [address] [place] [address]  2022      PATIENT: Clementina Cooper  CHART: [id number]   :  1940  AGE:  81 year old  SEX:  female   REFERRING PROVIDER:  Estela Davila MD     PROCEDURE:  Bone density scanning was performed using DXA technology of   the lumbar spine and hip.  Scanning was performed on a Lunar Prodigy   scanner.  Reporting is completed in the form of a T-score.  The T-score   represents the standard deviation from peak bone mass based on a young   healthy adult.     REFERENCE T-SCORES:       Normal                -1.0 and greater                                 Osteopenia         Between -1.0 and -2.5                                             Osteoporosis     -2.5 and less                                       RISK FACTORS:  Post-menopausal, Parent history of a hip fracture    CURRENT TREATMENT:  Vitamin D     FINDINGS:               Lumbar Spine (L1,2,4)      T-score:  0.4, marked degenerative   changes present at L3, so only L1,2 and 4 are evaluated.                Left Femoral Neck            T-score:  -0.6               Right Femoral Neck          T-score:  -0.4                              Lumbar (L1,2 and 4) BMD: 1.220  Previous: 1.298                         Total Hip Mean BMD: 0.987  Previous: 1.032     Comparison is made to another DXA performed on the same Lunar Prodigy    machine on 2017.      IMPRESSION  Normal bone mineral density., Degenerative changes of the lumbar spine   which  "may falsely elevate results.    There has been significant decrease in bone density of the lumbar spine.   There has been a trend toward  significant decrease in bone density of the   hip(s).     Recommendations include ensuring adequate Calcium and Vitamin D.    The current NOF Guidelines recommend treatment for patients with prior hip   or vertebral fracture, T-score -2.5 or below, or 10 year risk of any major   osteoporotic fracture 20% or greater, or 10 year risk of hip fracture 3%   or greater as calculated using the FRAX calculator (www.shef.ac.uk/FRAX or   you can google \"FRAX\").    This patient's risks based on available information, with the use of FRAX,   are 16 % for major osteoporotic fracture and 7.6 % for hip fracture.   Based on these guidelines, treatment (in addition to calcium and vitamin   D) is recommended for this patient, after ruling out other causes of   osteoporosis.  This is meant as an aid to clinical decision making; one must still use   clinical judgement.    Follow up can be considered in 2 years.   ___________________  Vivien Reyez M.D.  Electronically signed           If you have any questions or concerns, please call the clinic at the number listed above.     Sincerely,      Estela Davila MD/jomar      "

## 2022-04-28 ENCOUNTER — LAB (OUTPATIENT)
Dept: LAB | Facility: CLINIC | Age: 82
End: 2022-04-28
Payer: COMMERCIAL

## 2022-04-28 ENCOUNTER — ANTICOAGULATION THERAPY VISIT (OUTPATIENT)
Dept: ANTICOAGULATION | Facility: CLINIC | Age: 82
End: 2022-04-28

## 2022-04-28 DIAGNOSIS — Z51.81 ANTICOAGULATION GOAL OF INR 2 TO 3: ICD-10-CM

## 2022-04-28 DIAGNOSIS — I48.20 CHRONIC ATRIAL FIBRILLATION (H): ICD-10-CM

## 2022-04-28 DIAGNOSIS — Z79.01 ANTICOAGULATION GOAL OF INR 2 TO 3: ICD-10-CM

## 2022-04-28 DIAGNOSIS — I73.9 PVD (PERIPHERAL VASCULAR DISEASE) (H): ICD-10-CM

## 2022-04-28 DIAGNOSIS — Z79.01 ANTICOAGULATION GOAL OF INR 2 TO 3: Primary | ICD-10-CM

## 2022-04-28 DIAGNOSIS — Z98.890 STATUS POST CATHETER ABLATION OF ATRIAL FIBRILLATION: ICD-10-CM

## 2022-04-28 DIAGNOSIS — I48.20 CHRONIC ATRIAL FIBRILLATION (H): Primary | ICD-10-CM

## 2022-04-28 DIAGNOSIS — Z51.81 ANTICOAGULATION GOAL OF INR 2 TO 3: Primary | ICD-10-CM

## 2022-04-28 LAB — INR BLD: 2.4 (ref 0.9–1.1)

## 2022-04-28 PROCEDURE — 85610 PROTHROMBIN TIME: CPT

## 2022-04-28 PROCEDURE — 36416 COLLJ CAPILLARY BLOOD SPEC: CPT

## 2022-04-28 NOTE — PROGRESS NOTES
ANTICOAGULATION MANAGEMENT     Clementina Cooper 81 year old female is on warfarin with therapeutic INR result. (Goal INR 2.0-3.0)    Recent labs: (last 7 days)     04/28/22  0930   INR 2.4*       ASSESSMENT       Source(s): Chart Review and Patient/Caregiver Call       Warfarin doses taken: Warfarin taken as instructed    Diet: No new diet changes identified    New illness, injury, or hospitalization: No    Medication/supplement changes: Was on prednisone since last INR, no longer taking    Signs or symptoms of bleeding or clotting: No    Previous INR: Therapeutic last 2(+) visits    Additional findings: None       PLAN     Recommended plan for no diet, medication or health factor changes affecting INR     Dosing Instructions: continue your current warfarin dose with next INR in 6 weeks       Summary  As of 4/28/2022    Full warfarin instructions:  5 mg every Mon, Wed, Fri; 2.5 mg all other days   Next INR check:  6/9/2022             Telephone call with Lynda who verbalizes understanding and agrees to plan    Lab visit scheduled    Education provided: Goal range and significance of current result    Plan made per [facility] anticoagulation protocol    Lou Santiago RN  Anticoagulation Clinic  4/28/2022    _______________________________________________________________________     Anticoagulation Episode Summary     Current INR goal:  2.0-3.0   TTR:  85.8 % (1 y)   Target end date:  Indefinite   Send INR reminders to:  RON CRUZ    Indications    Chronic atrial fibrillation (H) [I48.20]  Status post catheter ablation of atrial fibrillation [Z98.890]  PVD (peripheral vascular disease) (H) [I73.9]  Anticoagulation goal of INR 2 to 3 [Z51.81  Z79.01]           Comments:           Anticoagulation Care Providers     Provider Role Specialty Phone number    Estela Davila MD Referring Internal Medicine [phone]

## 2022-05-04 NOTE — RESULT ENCOUNTER NOTE
Clementina Cooper    The bone density does show bone loss and fragility at the hip bones.   Would you be willing to start weekly Fosamax for treatment of this fragility?  Let me know via message, or at our next appointment     Sincerely,       TAISHA MORROW M.D.

## 2022-06-09 ENCOUNTER — LAB (OUTPATIENT)
Dept: LAB | Facility: CLINIC | Age: 82
End: 2022-06-09
Payer: COMMERCIAL

## 2022-06-09 ENCOUNTER — ANTICOAGULATION THERAPY VISIT (OUTPATIENT)
Dept: ANTICOAGULATION | Facility: CLINIC | Age: 82
End: 2022-06-09

## 2022-06-09 DIAGNOSIS — Z98.890 STATUS POST CATHETER ABLATION OF ATRIAL FIBRILLATION: ICD-10-CM

## 2022-06-09 DIAGNOSIS — Z79.01 ANTICOAGULATION GOAL OF INR 2 TO 3: ICD-10-CM

## 2022-06-09 DIAGNOSIS — I48.20 CHRONIC ATRIAL FIBRILLATION (H): ICD-10-CM

## 2022-06-09 DIAGNOSIS — I73.9 PVD (PERIPHERAL VASCULAR DISEASE) (H): ICD-10-CM

## 2022-06-09 DIAGNOSIS — Z51.81 ANTICOAGULATION GOAL OF INR 2 TO 3: ICD-10-CM

## 2022-06-09 DIAGNOSIS — I48.20 CHRONIC ATRIAL FIBRILLATION (H): Primary | ICD-10-CM

## 2022-06-09 LAB — INR BLD: 2.5 (ref 0.9–1.1)

## 2022-06-09 PROCEDURE — 85610 PROTHROMBIN TIME: CPT

## 2022-06-09 PROCEDURE — 36416 COLLJ CAPILLARY BLOOD SPEC: CPT

## 2022-06-09 NOTE — PROGRESS NOTES
ANTICOAGULATION MANAGEMENT     Clementina Cooper 81 year old female is on warfarin with therapeutic INR result. (Goal INR 2.0-3.0)    Recent labs: (last 7 days)     06/09/22  0929   INR 2.5*       ASSESSMENT       Source(s): Chart Review and Patient/Caregiver Call       Warfarin doses taken: Warfarin taken as instructed    Diet: No new diet changes identified    New illness, injury, or hospitalization: No    Medication/supplement changes: None noted    Signs or symptoms of bleeding or clotting: No    Previous INR: bruising easier    Additional findings: None       PLAN     Recommended plan for no diet, medication or health factor changes affecting INR     Dosing Instructions: continue your current warfarin dose with next INR in 6 weeks       Summary  As of 6/9/2022    Full warfarin instructions:  5 mg every Mon, Wed, Fri; 2.5 mg all other days   Next INR check:  7/21/2022             Telephone call with Lynda who verbalizes understanding and agrees to plan    Lab visit scheduled    Education provided: Goal range and significance of current result    Plan made per [facility] anticoagulation protocol    Lou Santiago RN  Anticoagulation Clinic  6/9/2022    _______________________________________________________________________     Anticoagulation Episode Summary     Current INR goal:  2.0-3.0   TTR:  85.8 % (1 y)   Target end date:  Indefinite   Send INR reminders to:  RON CRUZ    Indications    Chronic atrial fibrillation (H) [I48.20]  Status post catheter ablation of atrial fibrillation [Z98.890]  PVD (peripheral vascular disease) (H) [I73.9]  Anticoagulation goal of INR 2 to 3 [Z51.81  Z79.01]           Comments:           Anticoagulation Care Providers     Provider Role Specialty Phone number    Estela Davila MD Referring Internal Medicine [phone]

## 2022-06-22 ENCOUNTER — DOCUMENTATION ONLY (OUTPATIENT)
Dept: FAMILY MEDICINE | Facility: CLINIC | Age: 82
End: 2022-06-22

## 2022-06-22 DIAGNOSIS — Z51.81 ANTICOAGULATION GOAL OF INR 2 TO 3: ICD-10-CM

## 2022-06-22 DIAGNOSIS — I48.20 CHRONIC ATRIAL FIBRILLATION (H): Primary | ICD-10-CM

## 2022-06-22 DIAGNOSIS — Z79.01 ANTICOAGULATION GOAL OF INR 2 TO 3: ICD-10-CM

## 2022-06-22 NOTE — PROGRESS NOTES
ANTICOAGULATION CLINIC REFERRAL RENEWAL REQUEST       An annual renewal order is required for all patients referred to [facility].?  Please review and sign the pended referral order for Clementina Cooper.       ANTICOAGULATION SUMMARY      Warfarin indication(s)   Atrial Fibrillation and PVD    Mechanical heart valve present?  NO       Current goal range   INR: 2.0-3.0     Goal appropriate for indication? Goal INR 2-3, standard for indication(s) above     Time in Therapeutic Range (TTR)  (Goal > 60%) 85.8%       Office visit with referring provider's group within last year yes on 4/8/22       Lou Santiago RN  [facility]

## 2022-06-29 ENCOUNTER — NURSE TRIAGE (OUTPATIENT)
Dept: FAMILY MEDICINE | Facility: CLINIC | Age: 82
End: 2022-06-29

## 2022-06-29 NOTE — TELEPHONE ENCOUNTER
Reason for call:  Symptom   Symptom or request: Foot swelling (left)    Duration (how long have symptoms been present): over a month   Have you been treated for this before? Unsure    Additional comments: She said that the swelling comes and goes.    She is on medications for her INR    She is scheduled with a provider 7/6/2022    Phone number to reach patient:  Cell number on file:    Telephone Information:   Mobile [phone]     Best Time:      Can we leave a detailed message on this number?  YES    Travel screening: Not Applicable

## 2022-06-29 NOTE — TELEPHONE ENCOUNTER
Provider Response to 2nd Level Triage Request    I have reviewed the RN documentation. My recommendation is:  Refer to ED

## 2022-06-29 NOTE — TELEPHONE ENCOUNTER
Called and spoke with patient.  Informed her of the provider's recommendations.  Patient verbalized understanding and has no further questions or concerns at this time.    She will keep the appointment on 7/9/22

## 2022-06-29 NOTE — TELEPHONE ENCOUNTER
"Nurse Triage SBAR    Is this a 2nd Level Triage? YES, LICENSED PRACTITIONER REVIEW IS REQUIRED    Situation: LLE swelling    Background: Intermittent LLE swelling from foot to below the knee x 1 month. Swelling is there on most days. Denies any injuries     Assessment: swelling of LLE is present today. Denies pain but stated leg appears a little pinkish compared to the other side. No other symptoms noted. No known cause or contributing factors that patient can think of    Protocol Recommended Disposition:   Go To ED/UCC Now (Or To Office With PCP Approval)    Recommendation: to be seen in person for further eval      Routed to provider    Does the patient meet one of the following criteria for ADS visit consideration? 16+ years old, with an [facility] PCP     TIP  Providers, please consider if this condition is appropriate for management at one of our Acute and Diagnostic Services sites.     If patient is a good candidate, please use dotphrase <dot>triageresponse and select Refer to ADS to document.    Reason for Disposition    Thigh or calf pain and only 1 side and present > 1 hour    Additional Information    Negative: Sounds like a life-threatening emergency to the triager    Negative: Chest pain    Negative: Small area of swelling and followed an insect bite to the area    Negative: Followed a knee injury    Negative: Ankle or foot injury    Negative: Pregnant with leg swelling or edema    Negative: Difficulty breathing at rest    Negative: Entire foot is cool or blue in comparison to other side    Negative: Swelling of face, arm or hands (Exception: slight puffiness of fingers during hot weather)    Negative: Pregnant > 20 weeks and sudden weight gain (i.e., > 2 lbs, 1 kg in one week)    Negative: SEVERE swelling (e.g., swelling extends above knee, entire leg is swollen, weeping fluid)    Answer Assessment - Initial Assessment Questions  1. ONSET: \"When did the swelling start?\" (e.g., minutes, hours, days)      1 " "month ago  2. LOCATION: \"What part of the leg is swollen?\"  \"Are both legs swollen or just one leg?\"      Left leg, from foot to below the knee   3. SEVERITY: \"How bad is the swelling?\" (e.g., localized; mild, moderate, severe)   - Localized - small area of swelling localized to one leg   - MILD pedal edema - swelling limited to foot and ankle, pitting edema < 1/4 inch (6 mm) deep, rest and elevation eliminate most or all swelling   - MODERATE edema - swelling of lower leg to knee, pitting edema > 1/4 inch (6 mm) deep, rest and elevation only partially reduce swelling   - SEVERE edema - swelling extends above knee, facial or hand swelling present       Moderate   4. REDNESS: \"Does the swelling look red or infected?\"      \"a little pinkish\"  5. PAIN: \"Is the swelling painful to touch?\" If so, ask: \"How painful is it?\"   (Scale 1-10; mild, moderate or severe)      Denies   6. FEVER: \"Do you have a fever?\" If so, ask: \"What is it, how was it measured, and when did it start?\"       Denies   7. CAUSE: \"What do you think is causing the leg swelling?\"      Not sure   8. MEDICAL HISTORY: \"Do you have a history of heart failure, kidney disease, liver failure, or cancer?\"      Denies   9. RECURRENT SYMPTOM: \"Have you had leg swelling before?\" If so, ask: \"When was the last time?\" \"What happened that time?\"     No previous symptoms  10. OTHER SYMPTOMS: \"Do you have any other symptoms?\" (e.g., chest pain, difficulty breathing)        Denies   11. PREGNANCY: \"Is there any chance you are pregnant?\" \"When was your last menstrual period?\"        N/a    Protocols used: LEG SWELLING AND EDEMA-A-DAVIE Helms RN  [facility]    "

## 2022-07-06 ENCOUNTER — OFFICE VISIT (OUTPATIENT)
Dept: FAMILY MEDICINE | Facility: CLINIC | Age: 82
End: 2022-07-06
Payer: COMMERCIAL

## 2022-07-06 VITALS
HEIGHT: 65 IN | HEART RATE: 69 BPM | TEMPERATURE: 98.4 F | BODY MASS INDEX: 30.16 KG/M2 | SYSTOLIC BLOOD PRESSURE: 114 MMHG | OXYGEN SATURATION: 96 % | RESPIRATION RATE: 14 BRPM | WEIGHT: 181 LBS | DIASTOLIC BLOOD PRESSURE: 66 MMHG

## 2022-07-06 DIAGNOSIS — M79.89 SWELLING OF LEFT FOOT: Primary | ICD-10-CM

## 2022-07-06 PROCEDURE — 99213 OFFICE O/P EST LOW 20 MIN: CPT | Performed by: NURSE PRACTITIONER

## 2022-07-06 NOTE — PROGRESS NOTES
Assessment & Plan     Swelling of left foot  Exam today shows trace edema on left, otherwise unremarkable exam both dependant and elevated. My suspicion for clot is low due to her being on warfarin with INRs therapeutic but swelling is unilateral so will order US to r/o clot. Patient with history of angio an right (opposite) leg, reviewed red flags with her.  Counseled on self-care measures including: wearing compression stockings during the day and taking off at night, getting regular walks, elevating foot when resting; and warning signs of when to seek urgent medical care including: change in color of foot or toes (red, purple, white), development of pain, change in tempeture (hot or cold), sores on the foot/toes.  - US Lower Extremity Venous Duplex Left; Future  - Compression Sleeve/Stocking Order for DME - ONLY FOR DME    Return in about 2 weeks (around 7/20/2022) for follow up if symptoms worsen or do not improve.    Shelbi De Leon, [place] CNP  [facility] NANCY Howell is a 82 year old accompanied by her none, presenting for the following health issues:  Swelling      HPI     SWELLING OF LEFT FOOT  Onset/Duration: on and off for 2 months   Description  Location: left foot   Joint Swelling: YES- left ankle.   Redness: YES  Pain: No  Warmth: No  White foot/toes: No  Progression of Symptoms:  Intermittent  Accompanying signs and symptoms:   Fevers: No  Numbness/tingling/weakness: No  History  Trauma to the area: No  Recent illness:  No  Previous similar problem: No  Previous evaluation:  No  Therapies tried and outcome: elevating is helpful  States that swelling has been gone now again the past couple of days.     Review of Systems   Constitutional, HEENT, cardiovascular, pulmonary, GI, , musculoskeletal, neuro, skin, endocrine and psych systems are negative, except as otherwise noted.      Objective    /66   Pulse 69   Temp 98.4  F (36.9  C) (Oral)   Resp 14   Ht  "1.651 m (5' 5\")   Wt 82.1 kg (181 lb)   LMP  (LMP Unknown)   SpO2 96%   Breastfeeding No   BMI 30.12 kg/m    Body mass index is 30.12 kg/m .  Physical Exam   GENERAL: healthy, alert and no distress  CV: peripheral pulses strong and no peripheral edema  MS: trace edema to left foot  SKIN: no suspicious lesions or rashes  PSYCH: mentation appears normal, affect normal/bright              .  ..  "

## 2022-07-11 ENCOUNTER — ANCILLARY PROCEDURE (OUTPATIENT)
Dept: ULTRASOUND IMAGING | Facility: CLINIC | Age: 82
End: 2022-07-11
Attending: NURSE PRACTITIONER
Payer: COMMERCIAL

## 2022-07-11 DIAGNOSIS — M79.89 SWELLING OF LEFT FOOT: ICD-10-CM

## 2022-07-11 PROCEDURE — 93971 EXTREMITY STUDY: CPT | Mod: TC | Performed by: RADIOLOGY

## 2022-07-20 ENCOUNTER — LAB (OUTPATIENT)
Dept: LAB | Facility: CLINIC | Age: 82
End: 2022-07-20
Payer: COMMERCIAL

## 2022-07-20 ENCOUNTER — ANTICOAGULATION THERAPY VISIT (OUTPATIENT)
Dept: ANTICOAGULATION | Facility: CLINIC | Age: 82
End: 2022-07-20

## 2022-07-20 DIAGNOSIS — I73.9 PVD (PERIPHERAL VASCULAR DISEASE) (H): ICD-10-CM

## 2022-07-20 DIAGNOSIS — Z98.890 STATUS POST CATHETER ABLATION OF ATRIAL FIBRILLATION: ICD-10-CM

## 2022-07-20 DIAGNOSIS — Z51.81 ANTICOAGULATION GOAL OF INR 2 TO 3: ICD-10-CM

## 2022-07-20 DIAGNOSIS — Z79.01 ANTICOAGULATION GOAL OF INR 2 TO 3: ICD-10-CM

## 2022-07-20 DIAGNOSIS — I48.20 CHRONIC ATRIAL FIBRILLATION (H): Primary | ICD-10-CM

## 2022-07-20 DIAGNOSIS — I48.20 CHRONIC ATRIAL FIBRILLATION (H): ICD-10-CM

## 2022-07-20 LAB — INR BLD: 2.4 (ref 0.9–1.1)

## 2022-07-20 PROCEDURE — 36416 COLLJ CAPILLARY BLOOD SPEC: CPT

## 2022-07-20 PROCEDURE — 85610 PROTHROMBIN TIME: CPT

## 2022-07-20 NOTE — PROGRESS NOTES
ANTICOAGULATION MANAGEMENT     Clementina Cooper 82 year old female is on warfarin with therapeutic INR result. (Goal INR 2.0-3.0)    Recent labs: (last 7 days)     07/20/22  0929   INR 2.4*       ASSESSMENT       Source(s): Chart Review and Patient/Caregiver Call       Warfarin doses taken: Warfarin taken as instructed    Diet: No new diet changes identified    New illness, injury, or hospitalization: No    Medication/supplement changes: None noted    Signs or symptoms of bleeding or clotting: No    Previous INR: Therapeutic last 2(+) visits    Additional findings: None       PLAN     Recommended plan for no diet, medication or health factor changes affecting INR     Dosing Instructions: continue your current warfarin dose with next INR in 6 weeks       Summary  As of 7/20/2022    Full warfarin instructions:  5 mg every Mon, Wed, Fri; 2.5 mg all other days   Next INR check:  8/31/2022             Telephone call with Lynda who verbalizes understanding and agrees to plan    Lab visit scheduled    Education provided: Goal range and significance of current result    Plan made per [facility] anticoagulation protocol    Lou Santiago RN  Anticoagulation Clinic  7/20/2022    _______________________________________________________________________     Anticoagulation Episode Summary     Current INR goal:  2.0-3.0   TTR:  89.8 % (1 y)   Target end date:  Indefinite   Send INR reminders to:  RON CRUZ    Indications    Chronic atrial fibrillation (H) [I48.20]  Status post catheter ablation of atrial fibrillation [Z98.890]  PVD (peripheral vascular disease) (H) [I73.9]  Anticoagulation goal of INR 2 to 3 [Z51.81  Z79.01]           Comments:           Anticoagulation Care Providers     Provider Role Specialty Phone number    Estela Davila MD Referring Internal Medicine [phone]

## 2022-07-25 ENCOUNTER — OFFICE VISIT (OUTPATIENT)
Dept: ORTHOPEDICS | Facility: CLINIC | Age: 82
End: 2022-07-25
Payer: COMMERCIAL

## 2022-07-25 VITALS — RESPIRATION RATE: 22 BRPM | BODY MASS INDEX: 29.99 KG/M2 | WEIGHT: 180 LBS | HEIGHT: 65 IN

## 2022-07-25 DIAGNOSIS — M17.12 PRIMARY OSTEOARTHRITIS OF LEFT KNEE: Primary | ICD-10-CM

## 2022-07-25 DIAGNOSIS — M17.11 PRIMARY OSTEOARTHRITIS OF RIGHT KNEE: ICD-10-CM

## 2022-07-25 PROCEDURE — 20610 DRAIN/INJ JOINT/BURSA W/O US: CPT | Mod: 50 | Performed by: ORTHOPAEDIC SURGERY

## 2022-07-25 RX ORDER — LIDOCAINE HYDROCHLORIDE 10 MG/ML
5 INJECTION, SOLUTION INFILTRATION; PERINEURAL
Status: DISCONTINUED | OUTPATIENT
Start: 2022-07-25 | End: 2024-06-13

## 2022-07-25 RX ORDER — METHYLPREDNISOLONE ACETATE 80 MG/ML
80 INJECTION, SUSPENSION INTRA-ARTICULAR; INTRALESIONAL; INTRAMUSCULAR; SOFT TISSUE
Status: DISCONTINUED | OUTPATIENT
Start: 2022-07-25 | End: 2024-06-13

## 2022-07-25 RX ADMIN — METHYLPREDNISOLONE ACETATE 80 MG: 80 INJECTION, SUSPENSION INTRA-ARTICULAR; INTRALESIONAL; INTRAMUSCULAR; SOFT TISSUE at 14:43

## 2022-07-25 RX ADMIN — LIDOCAINE HYDROCHLORIDE 5 ML: 10 INJECTION, SOLUTION INFILTRATION; PERINEURAL at 14:43

## 2022-07-25 NOTE — PROGRESS NOTES
Large Joint Injection/Arthocentesis: bilateral knee    Date/Time: 7/25/2022 2:43 PM  Performed by: Kendall Lopez MD  Authorized by: Kendall Lopez MD     Indications:  Pain  Needle Size:  22 G  Guidance: landmark guided    Location:  Knee  Laterality:  Bilateral      Medications (Right):  80 mg methylPREDNISolone 80 MG/ML; 5 mL lidocaine 1 %  Medications (Left):  80 mg methylPREDNISolone 80 MG/ML; 5 mL lidocaine 1 %  Outcome:  Tolerated well, no immediate complications  Procedure discussed: discussed risks, benefits, and alternatives    Consent Given by:  Patient  Timeout: timeout called immediately prior to procedure    Prep: patient was prepped and draped in usual sterile fashion

## 2022-07-25 NOTE — LETTER
7/25/2022         RE: Clementina Cooper  [address] [facility] [address]        Dear Colleague,    Thank you for referring your patient, Clementina Cooper, to the [facility]. Please see a copy of my visit note below.    Large Joint Injection/Arthocentesis: bilateral knee    Date/Time: 7/25/2022 2:43 PM  Performed by: Kendall Lopez MD  Authorized by: Kendall Lopez MD     Indications:  Pain  Needle Size:  22 G  Guidance: landmark guided    Location:  Knee  Laterality:  Bilateral      Medications (Right):  80 mg methylPREDNISolone 80 MG/ML; 5 mL lidocaine 1 %  Medications (Left):  80 mg methylPREDNISolone 80 MG/ML; 5 mL lidocaine 1 %  Outcome:  Tolerated well, no immediate complications  Procedure discussed: discussed risks, benefits, and alternatives    Consent Given by:  Patient  Timeout: timeout called immediately prior to procedure    Prep: patient was prepped and draped in usual sterile fashion            Follow up bilateral knee primary osteoarthritis.  Last injection right 6/1/21, left 11/8/21.  Range of motion 0-125 degrees..    She  desires injection today of bilateral knee(s).  Risks, benefits, potential complications and alternatives were discussed.   With the patient's consent, sterile prep was performed of bilateral knee(s).  Each knee was injected with Depo Medrol 80 mg and lidocaine at anteromedial site.  Return to clinic as needed.         Again, thank you for allowing me to participate in the care of your patient.        Sincerely,        Kendall Lopez MD

## 2022-07-25 NOTE — PROGRESS NOTES
Follow up bilateral knee primary osteoarthritis.  Last injection right 6/1/21, left 11/8/21.  Range of motion 0-125 degrees..    She  desires injection today of bilateral knee(s).  Risks, benefits, potential complications and alternatives were discussed.   With the patient's consent, sterile prep was performed of bilateral knee(s).  Each knee was injected with Depo Medrol 80 mg and lidocaine at anteromedial site.  Return to clinic as needed.

## 2022-08-16 ENCOUNTER — TELEPHONE (OUTPATIENT)
Dept: FAMILY MEDICINE | Facility: CLINIC | Age: 82
End: 2022-08-16

## 2022-08-16 NOTE — TELEPHONE ENCOUNTER
Patient has COVID, requesting INR appointment. Appointment scheduled for 8/22    Lou Santiago RN - [facility]

## 2022-08-16 NOTE — TELEPHONE ENCOUNTER
Reason for Call:  INR    Who is calling?  PATIENT    Phone number:  [phone]    Fax number:  N/A    Name of caller: ADOLFO DIMAS    INR Value:  UNKNOWN    Are there any other concerns:  Yes: PLEASE CALL PATIENT    Can we leave a detailed message on this number? YES    Phone number patient can be reached at: Home number on file [phone] (home)      Call taken on 8/16/2022 at 8:50 AM by Erna Palmer

## 2022-08-22 ENCOUNTER — LAB (OUTPATIENT)
Dept: LAB | Facility: CLINIC | Age: 82
End: 2022-08-22
Payer: COMMERCIAL

## 2022-08-22 ENCOUNTER — ANTICOAGULATION THERAPY VISIT (OUTPATIENT)
Dept: ANTICOAGULATION | Facility: CLINIC | Age: 82
End: 2022-08-22

## 2022-08-22 DIAGNOSIS — I73.9 PVD (PERIPHERAL VASCULAR DISEASE) (H): ICD-10-CM

## 2022-08-22 DIAGNOSIS — Z51.81 ANTICOAGULATION GOAL OF INR 2 TO 3: ICD-10-CM

## 2022-08-22 DIAGNOSIS — I48.20 CHRONIC ATRIAL FIBRILLATION (H): ICD-10-CM

## 2022-08-22 DIAGNOSIS — I48.20 CHRONIC ATRIAL FIBRILLATION (H): Primary | ICD-10-CM

## 2022-08-22 DIAGNOSIS — Z79.01 ANTICOAGULATION GOAL OF INR 2 TO 3: ICD-10-CM

## 2022-08-22 DIAGNOSIS — Z98.890 STATUS POST CATHETER ABLATION OF ATRIAL FIBRILLATION: ICD-10-CM

## 2022-08-22 LAB — INR BLD: 2.9 (ref 0.9–1.1)

## 2022-08-22 PROCEDURE — 36416 COLLJ CAPILLARY BLOOD SPEC: CPT

## 2022-08-22 PROCEDURE — 85610 PROTHROMBIN TIME: CPT

## 2022-08-22 NOTE — PROGRESS NOTES
ANTICOAGULATION MANAGEMENT     Clementina Cooper 82 year old female is on warfarin with therapeutic INR result. (Goal INR 2.0-3.0)    Recent labs: (last 7 days)     08/22/22  0931   INR 2.9*       ASSESSMENT       Source(s): Chart Review and Patient/Caregiver Call       Warfarin doses taken: Warfarin taken as instructed    Diet: No new diet changes identified    New illness, injury, or hospitalization: Yes: ER visit for Covid, recovered    Medication/supplement changes: did have paxlovid for covid    Signs or symptoms of bleeding or clotting: No    Previous INR: Therapeutic last 2(+) visits    Additional findings: None       PLAN     Recommended plan for temporary change(s) affecting INR     Dosing Instructions: Continue your current warfarin dose with next INR in 8 weeks       Summary  As of 8/22/2022    Full warfarin instructions:  5 mg every Mon, Wed, Fri; 2.5 mg all other days   Next INR check:  10/10/2022             Telephone call with Lynda who verbalizes understanding and agrees to plan    Lab visit scheduled    Education provided: Goal range and significance of current result, Importance of therapeutic range, Importance of following up at instructed interval, Importance of taking warfarin as instructed, Importance of notifying clinic for changes in medications; a sooner lab recheck maybe needed., Importance of notifying clinic for diarrhea, nausea/vomiting, reduced intake, and/or illness; a sooner lab recheck maybe needed., Importance of notifying clinic of upcoming surgeries and procedures 2 weeks in advance and Contact [phone]  with any changes, questions or concerns.     Plan made per [facility] anticoagulation protocol    Emma Wallace RN  Anticoagulation Clinic  8/22/2022    _______________________________________________________________________     Anticoagulation Episode Summary     Current INR goal:  2.0-3.0   TTR:  98.8 % (1 y)   Target end date:  Indefinite   Send INR reminders to:  RON  FRIDLEY    Indications    Chronic atrial fibrillation (H) [I48.20]  Status post catheter ablation of atrial fibrillation [Z98.890]  PVD (peripheral vascular disease) (H) [I73.9]  Anticoagulation goal of INR 2 to 3 [Z51.81  Z79.01]           Comments:           Anticoagulation Care Providers     Provider Role Specialty Phone number    Estela Davila MD Referring Internal Medicine [phone]

## 2022-08-23 DIAGNOSIS — E11.9 TYPE 2 DIABETES MELLITUS WITHOUT COMPLICATION, WITHOUT LONG-TERM CURRENT USE OF INSULIN (H): ICD-10-CM

## 2022-08-24 RX ORDER — LOSARTAN POTASSIUM 25 MG/1
TABLET ORAL
Qty: 45 TABLET | Refills: 0 | Status: SHIPPED | OUTPATIENT
Start: 2022-08-24 | End: 2022-11-18

## 2022-10-17 ENCOUNTER — ANTICOAGULATION THERAPY VISIT (OUTPATIENT)
Dept: ANTICOAGULATION | Facility: CLINIC | Age: 82
End: 2022-10-17

## 2022-10-17 ENCOUNTER — LAB (OUTPATIENT)
Dept: LAB | Facility: CLINIC | Age: 82
End: 2022-10-17
Payer: COMMERCIAL

## 2022-10-17 DIAGNOSIS — Z98.890 STATUS POST CATHETER ABLATION OF ATRIAL FIBRILLATION: ICD-10-CM

## 2022-10-17 DIAGNOSIS — Z79.01 ANTICOAGULATION GOAL OF INR 2 TO 3: ICD-10-CM

## 2022-10-17 DIAGNOSIS — I48.20 CHRONIC ATRIAL FIBRILLATION (H): Primary | ICD-10-CM

## 2022-10-17 DIAGNOSIS — I48.20 CHRONIC ATRIAL FIBRILLATION (H): ICD-10-CM

## 2022-10-17 DIAGNOSIS — Z51.81 ANTICOAGULATION GOAL OF INR 2 TO 3: ICD-10-CM

## 2022-10-17 DIAGNOSIS — I73.9 PVD (PERIPHERAL VASCULAR DISEASE) (H): ICD-10-CM

## 2022-10-17 LAB — INR BLD: 3.6 (ref 0.9–1.1)

## 2022-10-17 PROCEDURE — 85610 PROTHROMBIN TIME: CPT

## 2022-10-17 PROCEDURE — 36416 COLLJ CAPILLARY BLOOD SPEC: CPT

## 2022-10-17 NOTE — PROGRESS NOTES
ANTICOAGULATION MANAGEMENT     Clementina Cooper 82 year old female is on warfarin with supratherapeutic INR result. (Goal INR 2.0-3.0)    Recent labs: (last 7 days)     10/17/22  0931   INR 3.6*       ASSESSMENT       Source(s): Chart Review and Patient/Caregiver Call       Warfarin doses taken: Warfarin taken as instructed    Diet: No new diet changes identified    New illness, injury, or hospitalization: No    Medication/supplement changes: None noted    Signs or symptoms of bleeding or clotting: No    Previous INR: Therapeutic last 2(+) visits    Additional findings: None       PLAN     Recommended plan for no diet, medication or health factor changes affecting INR     Dosing Instructions: partial hold then decrease your warfarin dose (10% change) with next INR in 2 weeks       Summary  As of 10/17/2022    Full warfarin instructions:  10/17: 2.5 mg; Otherwise 5 mg every Mon, Fri; 2.5 mg all other days   Next INR check:  10/31/2022             Telephone call with Lynda who verbalizes understanding and agrees to plan    Lab visit scheduled    Education provided: Goal range and significance of current result, Monitoring for bleeding signs and symptoms, Monitoring for clotting signs and symptoms and Contact [phone]  with any changes, questions or concerns.     Plan made per [facility] anticoagulation protocol    Sunitha White RN  Anticoagulation Clinic  10/17/2022    _______________________________________________________________________     Anticoagulation Episode Summary     Current INR goal:  2.0-3.0   TTR:  86.9 % (1 y)   Target end date:  Indefinite   Send INR reminders to:  RON CRUZ    Indications    Chronic atrial fibrillation (H) [I48.20]  Status post catheter ablation of atrial fibrillation [Z98.890]  PVD (peripheral vascular disease) (H) [I73.9]  Anticoagulation goal of INR 2 to 3 [Z51.81  Z79.01]           Comments:           Anticoagulation Care Providers     Provider Role Specialty Phone  number    Estela Davila MD [facility] Internal Medicine [phone]

## 2022-10-31 ENCOUNTER — LAB (OUTPATIENT)
Dept: LAB | Facility: CLINIC | Age: 82
End: 2022-10-31
Payer: COMMERCIAL

## 2022-10-31 ENCOUNTER — ANTICOAGULATION THERAPY VISIT (OUTPATIENT)
Dept: ANTICOAGULATION | Facility: CLINIC | Age: 82
End: 2022-10-31

## 2022-10-31 DIAGNOSIS — E11.65 TYPE 2 DIABETES MELLITUS WITH HYPERGLYCEMIA, WITHOUT LONG-TERM CURRENT USE OF INSULIN (H): ICD-10-CM

## 2022-10-31 DIAGNOSIS — Z98.890 STATUS POST CATHETER ABLATION OF ATRIAL FIBRILLATION: ICD-10-CM

## 2022-10-31 DIAGNOSIS — I48.20 CHRONIC ATRIAL FIBRILLATION (H): ICD-10-CM

## 2022-10-31 DIAGNOSIS — I73.9 PVD (PERIPHERAL VASCULAR DISEASE) (H): ICD-10-CM

## 2022-10-31 DIAGNOSIS — Z79.01 ANTICOAGULATION GOAL OF INR 2 TO 3: ICD-10-CM

## 2022-10-31 DIAGNOSIS — Z51.81 ANTICOAGULATION GOAL OF INR 2 TO 3: ICD-10-CM

## 2022-10-31 DIAGNOSIS — I48.20 CHRONIC ATRIAL FIBRILLATION (H): Primary | ICD-10-CM

## 2022-10-31 DIAGNOSIS — Z13.220 SCREENING FOR HYPERLIPIDEMIA: ICD-10-CM

## 2022-10-31 LAB — INR BLD: 2.4 (ref 0.9–1.1)

## 2022-10-31 PROCEDURE — 36416 COLLJ CAPILLARY BLOOD SPEC: CPT

## 2022-10-31 PROCEDURE — 85610 PROTHROMBIN TIME: CPT

## 2022-10-31 NOTE — PROGRESS NOTES
ANTICOAGULATION MANAGEMENT     Clementina Cooper 82 year old female is on warfarin with therapeutic INR result. (Goal INR 2.0-3.0)    Recent labs: (last 7 days)     10/31/22  0951   INR 2.4*       ASSESSMENT       Source(s): Chart Review and Patient/Caregiver Call       Warfarin doses taken: Warfarin taken as instructed    Diet: No new diet changes identified    New illness, injury, or hospitalization: No    Medication/supplement changes: None noted    Signs or symptoms of bleeding or clotting: No    Previous INR: Supratherapeutic    Additional findings: None       PLAN     Recommended plan for no diet, medication or health factor changes affecting INR     Dosing Instructions: Continue your current warfarin dose with next INR in 3 weeks       Summary  As of 10/31/2022    Full warfarin instructions:  5 mg every Mon, Fri; 2.5 mg all other days; Starting 10/31/2022   Next INR check:  11/21/2022             Telephone call with Lynda who verbalizes understanding and agrees to plan    Lab visit scheduled    Education provided:     Please call back if any changes to your diet, medications or how you've been taking warfarin    Goal range and lab monitoring: goal range and significance of current result, Importance of therapeutic range and Importance of following up at instructed interval    Plan made per [facility] anticoagulation protocol    Emma Wallace RN  Anticoagulation Clinic  10/31/2022    _______________________________________________________________________     Anticoagulation Episode Summary     Current INR goal:  2.0-3.0   TTR:  84.9 % (1 y)   Target end date:  Indefinite   Send INR reminders to:  RON CRUZ    Indications    Chronic atrial fibrillation (H) [I48.20]  Status post catheter ablation of atrial fibrillation [Z98.890]  PVD (peripheral vascular disease) (H) [I73.9]  Anticoagulation goal of INR 2 to 3 [Z51.81  Z79.01]           Comments:           Anticoagulation Care Providers     Provider Role  Specialty Phone number    Estela Davila MD Referring Internal Medicine [phone]

## 2022-11-18 DIAGNOSIS — E11.9 TYPE 2 DIABETES MELLITUS WITHOUT COMPLICATION, WITHOUT LONG-TERM CURRENT USE OF INSULIN (H): ICD-10-CM

## 2022-11-18 RX ORDER — LOSARTAN POTASSIUM 25 MG/1
TABLET ORAL
Qty: 45 TABLET | Refills: 0 | Status: SHIPPED | OUTPATIENT
Start: 2022-11-18 | End: 2023-02-13

## 2022-11-21 ENCOUNTER — ANTICOAGULATION THERAPY VISIT (OUTPATIENT)
Dept: ANTICOAGULATION | Facility: CLINIC | Age: 82
End: 2022-11-21

## 2022-11-21 ENCOUNTER — LAB (OUTPATIENT)
Dept: LAB | Facility: CLINIC | Age: 82
End: 2022-11-21
Payer: COMMERCIAL

## 2022-11-21 DIAGNOSIS — I73.9 PVD (PERIPHERAL VASCULAR DISEASE) (H): ICD-10-CM

## 2022-11-21 DIAGNOSIS — Z51.81 ANTICOAGULATION GOAL OF INR 2 TO 3: ICD-10-CM

## 2022-11-21 DIAGNOSIS — I48.20 CHRONIC ATRIAL FIBRILLATION (H): ICD-10-CM

## 2022-11-21 DIAGNOSIS — Z79.01 ANTICOAGULATION GOAL OF INR 2 TO 3: ICD-10-CM

## 2022-11-21 DIAGNOSIS — Z13.220 SCREENING FOR HYPERLIPIDEMIA: ICD-10-CM

## 2022-11-21 DIAGNOSIS — E11.65 TYPE 2 DIABETES MELLITUS WITH HYPERGLYCEMIA (H): Primary | ICD-10-CM

## 2022-11-21 DIAGNOSIS — E11.65 TYPE 2 DIABETES MELLITUS WITH HYPERGLYCEMIA, WITHOUT LONG-TERM CURRENT USE OF INSULIN (H): ICD-10-CM

## 2022-11-21 DIAGNOSIS — I48.20 CHRONIC ATRIAL FIBRILLATION (H): Primary | ICD-10-CM

## 2022-11-21 DIAGNOSIS — Z98.890 STATUS POST CATHETER ABLATION OF ATRIAL FIBRILLATION: ICD-10-CM

## 2022-11-21 LAB
ANION GAP SERPL CALCULATED.3IONS-SCNC: 5 MMOL/L (ref 3–14)
BUN SERPL-MCNC: 18 MG/DL (ref 7–30)
CALCIUM SERPL-MCNC: 9.2 MG/DL (ref 8.5–10.1)
CHLORIDE BLD-SCNC: 109 MMOL/L (ref 94–109)
CHOLEST SERPL-MCNC: 167 MG/DL
CO2 SERPL-SCNC: 28 MMOL/L (ref 20–32)
CREAT SERPL-MCNC: 0.74 MG/DL (ref 0.52–1.04)
FASTING STATUS PATIENT QL REPORTED: ABNORMAL
GFR SERPL CREATININE-BSD FRML MDRD: 80 ML/MIN/1.73M2
GLUCOSE BLD-MCNC: 156 MG/DL (ref 70–99)
HBA1C MFR BLD: 7 % (ref 0–5.6)
HDLC SERPL-MCNC: 47 MG/DL
INR BLD: 3.2 (ref 0.9–1.1)
LDLC SERPL CALC-MCNC: 85 MG/DL
NONHDLC SERPL-MCNC: 120 MG/DL
POTASSIUM BLD-SCNC: 4.4 MMOL/L (ref 3.4–5.3)
SODIUM SERPL-SCNC: 142 MMOL/L (ref 133–144)
TRIGL SERPL-MCNC: 177 MG/DL

## 2022-11-21 PROCEDURE — 85610 PROTHROMBIN TIME: CPT

## 2022-11-21 PROCEDURE — 36416 COLLJ CAPILLARY BLOOD SPEC: CPT

## 2022-11-21 PROCEDURE — 83036 HEMOGLOBIN GLYCOSYLATED A1C: CPT

## 2022-11-21 PROCEDURE — 80061 LIPID PANEL: CPT

## 2022-11-21 PROCEDURE — 36415 COLL VENOUS BLD VENIPUNCTURE: CPT

## 2022-11-21 PROCEDURE — 80048 BASIC METABOLIC PNL TOTAL CA: CPT

## 2022-11-21 NOTE — PROGRESS NOTES
ANTICOAGULATION MANAGEMENT     Clementina Cooper 82 year old female is on warfarin with supratherapeutic INR result. (Goal INR 2.0-3.0)    Recent labs: (last 7 days)     11/21/22  0928   INR 3.2*       ASSESSMENT       Source(s): Chart Review and Patient/Caregiver Call       Warfarin doses taken: Warfarin taken as instructed    Diet: Decreased greens/vitamin K in diet; ongoing change    New illness, injury, or hospitalization: No    Medication/supplement changes: None noted    Signs or symptoms of bleeding or clotting: No    Previous INR: Therapeutic last visit; previously outside of goal range    Additional findings: None       PLAN     Recommended plan for ongoing change(s) affecting INR     Dosing Instructions: decrease your warfarin dose (11.1% change) with next INR in 1 week       Summary  As of 11/21/2022    Full warfarin instructions:  5 mg every Fri; 2.5 mg all other days; Starting 11/21/2022   Next INR check:  11/29/2022             Telephone call with Lynda who verbalizes understanding and agrees to plan    Lab visit scheduled    Education provided:     Please call back if any changes to your diet, medications or how you've been taking warfarin    Taking warfarin: importance of following [facility] instructions vs instructions on the prescription bottle    Goal range and lab monitoring: goal range and significance of current result, Importance of therapeutic range and Importance of following up at instructed interval    Plan made per [facility] anticoagulation protocol    Emma Wallace RN  Anticoagulation Clinic  11/21/2022    _______________________________________________________________________     Anticoagulation Episode Summary     Current INR goal:  2.0-3.0   TTR:  83.5 % (1 y)   Target end date:  Indefinite   Send INR reminders to:  RON CRUZ    Indications    Chronic atrial fibrillation (H) [I48.20]  Status post catheter ablation of atrial fibrillation [Z98.890]  PVD (peripheral vascular disease) (H)  [I73.9]  Anticoagulation goal of INR 2 to 3 [Z51.81  Z79.01]           Comments:           Anticoagulation Care Providers     Provider Role Specialty Phone number    Estela Davila MD Referring Internal Medicine [phone]

## 2022-11-21 NOTE — LETTER
November 22, 2022      Lynda Cooper  [address] [place] [id number]        Dear Lynda:    We are writing to inform you of your test results.    Electrolytes, kidney function, cholesterol look great.  The HgbA1C is at goal, nice work!    Resulted Orders   Lipid panel reflex to direct LDL Non-fasting   Result Value Ref Range    Cholesterol 167 <200 mg/dL    Triglycerides 177 (H) <150 mg/dL    Direct Measure HDL 47 (L) >=50 mg/dL    LDL Cholesterol Calculated 85 <=100 mg/dL    Non HDL Cholesterol 120 <130 mg/dL    Patient Fasting > 8hrs? Unknown     Narrative    Cholesterol  Desirable:  <200 mg/dL    Triglycerides  Normal:  Less than 150 mg/dL  Borderline High:  150-199 mg/dL  High:  200-499 mg/dL  Very High:  Greater than or equal to 500 mg/dL    Direct Measure HDL  Female:  Greater than or equal to 50 mg/dL   Male:  Greater than or equal to 40 mg/dL    LDL Cholesterol  Desirable:  <100mg/dL  Above Desirable:  100-129 mg/dL   Borderline High:  130-159 mg/dL   High:  160-189 mg/dL   Very High:  >= 190 mg/dL    Non HDL Cholesterol  Desirable:  130 mg/dL  Above Desirable:  130-159 mg/dL  Borderline High:  160-189 mg/dL  High:  190-219 mg/dL  Very High:  Greater than or equal to 220 mg/dL   BASIC METABOLIC PANEL   Result Value Ref Range    Sodium 142 133 - 144 mmol/L    Potassium 4.4 3.4 - 5.3 mmol/L    Chloride 109 94 - 109 mmol/L    Carbon Dioxide (CO2) 28 20 - 32 mmol/L    Anion Gap 5 3 - 14 mmol/L    Urea Nitrogen 18 7 - 30 mg/dL    Creatinine 0.74 0.52 - 1.04 mg/dL    Calcium 9.2 8.5 - 10.1 mg/dL    Glucose 156 (H) 70 - 99 mg/dL    GFR Estimate 80 >60 mL/min/1.73m2      Comment:      Effective December 21, 2021 eGFRcr in adults is calculated using the 2021 CKD-EPI creatinine equation which includes age and gender (Olamide hernandez al., [facility], DOI: 10.1056/IZLXsj9972053)   HEMOGLOBIN A1C   Result Value Ref Range    Hemoglobin A1C 7.0 (H) 0.0 - 5.6 %      Comment:      Normal <5.7%   Prediabetes  5.7-6.4%    Diabetes 6.5% or higher     Note: Adopted from ADA consensus guidelines.     If you have any questions or concerns, please call the clinic at the number listed above.     Sincerely,      Estela Davila MD/jomar

## 2022-11-22 NOTE — RESULT ENCOUNTER NOTE
Clementina Cooper    Electrolytes, kidney function, cholesterol look great.  The HgbA1C is at goal, nice work!    Sincerely,       TAISHA MORROW M.D.

## 2022-11-23 DIAGNOSIS — E11.9 TYPE 2 DIABETES MELLITUS WITHOUT COMPLICATION, WITHOUT LONG-TERM CURRENT USE OF INSULIN (H): ICD-10-CM

## 2022-11-23 RX ORDER — EMPAGLIFLOZIN 25 MG/1
TABLET, FILM COATED ORAL
Qty: 90 TABLET | Refills: 3 | Status: SHIPPED | OUTPATIENT
Start: 2022-11-23 | End: 2023-06-16

## 2022-11-29 ENCOUNTER — ANTICOAGULATION THERAPY VISIT (OUTPATIENT)
Dept: ANTICOAGULATION | Facility: CLINIC | Age: 82
End: 2022-11-29

## 2022-11-29 ENCOUNTER — LAB (OUTPATIENT)
Dept: LAB | Facility: CLINIC | Age: 82
End: 2022-11-29
Payer: COMMERCIAL

## 2022-11-29 DIAGNOSIS — Z79.01 ANTICOAGULATION GOAL OF INR 2 TO 3: ICD-10-CM

## 2022-11-29 DIAGNOSIS — I48.20 CHRONIC ATRIAL FIBRILLATION (H): Primary | ICD-10-CM

## 2022-11-29 DIAGNOSIS — Z51.81 ANTICOAGULATION GOAL OF INR 2 TO 3: ICD-10-CM

## 2022-11-29 DIAGNOSIS — Z98.890 STATUS POST CATHETER ABLATION OF ATRIAL FIBRILLATION: ICD-10-CM

## 2022-11-29 DIAGNOSIS — I73.9 PVD (PERIPHERAL VASCULAR DISEASE) (H): ICD-10-CM

## 2022-11-29 DIAGNOSIS — I48.20 CHRONIC ATRIAL FIBRILLATION (H): ICD-10-CM

## 2022-11-29 LAB — INR BLD: 2.1 (ref 0.9–1.1)

## 2022-11-29 PROCEDURE — 36416 COLLJ CAPILLARY BLOOD SPEC: CPT

## 2022-11-29 PROCEDURE — 85610 PROTHROMBIN TIME: CPT

## 2022-11-29 NOTE — PROGRESS NOTES
ANTICOAGULATION MANAGEMENT     Clementina Cooper 82 year old female is on warfarin with therapeutic INR result. (Goal INR 2.0-3.0)    Recent labs: (last 7 days)     11/29/22  1036   INR 2.1*       ASSESSMENT       Source(s): Chart Review and Patient/Caregiver Call       Warfarin doses taken: Warfarin taken as instructed    Diet: No new diet changes identified    New illness, injury, or hospitalization: No    Medication/supplement changes: None noted    Signs or symptoms of bleeding or clotting: No    Previous INR: Supratherapeutic    Additional findings: None       PLAN     Recommended plan for no diet, medication or health factor changes affecting INR     Dosing Instructions: Continue your current warfarin dose with next INR in 2 weeks       Summary  As of 11/29/2022    Full warfarin instructions:  5 mg every Fri; 2.5 mg all other days; Starting 11/29/2022   Next INR check:  12/13/2022             Telephone call with Lynda who verbalizes understanding and agrees to plan    Lab visit scheduled    Education provided:     Goal range and lab monitoring: goal range and significance of current result    Plan made per [facility] anticoagulation protocol    Lou Santiago RN  Anticoagulation Clinic  11/29/2022    _______________________________________________________________________     Anticoagulation Episode Summary     Current INR goal:  2.0-3.0   TTR:  83.1 % (1 y)   Target end date:  Indefinite   Send INR reminders to:  RON CRUZ    Indications    Chronic atrial fibrillation (H) [I48.20]  Status post catheter ablation of atrial fibrillation [Z98.890]  PVD (peripheral vascular disease) (H) [I73.9]  Anticoagulation goal of INR 2 to 3 [Z51.81  Z79.01]           Comments:           Anticoagulation Care Providers     Provider Role Specialty Phone number    Estela Davila MD Referring Internal Medicine [phone]

## 2022-12-02 ENCOUNTER — TRANSFERRED RECORDS (OUTPATIENT)
Dept: HEALTH INFORMATION MANAGEMENT | Facility: CLINIC | Age: 82
End: 2022-12-02

## 2022-12-12 DIAGNOSIS — G25.0 ESSENTIAL TREMOR: ICD-10-CM

## 2022-12-12 RX ORDER — PROPRANOLOL HYDROCHLORIDE 10 MG/1
TABLET ORAL
Qty: 180 TABLET | Refills: 0 | Status: SHIPPED | OUTPATIENT
Start: 2022-12-12 | End: 2023-03-24

## 2022-12-13 ENCOUNTER — ANTICOAGULATION THERAPY VISIT (OUTPATIENT)
Dept: ANTICOAGULATION | Facility: CLINIC | Age: 82
End: 2022-12-13

## 2022-12-13 ENCOUNTER — LAB (OUTPATIENT)
Dept: LAB | Facility: CLINIC | Age: 82
End: 2022-12-13
Payer: COMMERCIAL

## 2022-12-13 DIAGNOSIS — Z98.890 STATUS POST CATHETER ABLATION OF ATRIAL FIBRILLATION: ICD-10-CM

## 2022-12-13 DIAGNOSIS — I48.20 CHRONIC ATRIAL FIBRILLATION (H): Primary | ICD-10-CM

## 2022-12-13 DIAGNOSIS — I48.20 CHRONIC ATRIAL FIBRILLATION (H): ICD-10-CM

## 2022-12-13 DIAGNOSIS — I73.9 PVD (PERIPHERAL VASCULAR DISEASE) (H): ICD-10-CM

## 2022-12-13 DIAGNOSIS — Z51.81 ANTICOAGULATION GOAL OF INR 2 TO 3: ICD-10-CM

## 2022-12-13 DIAGNOSIS — Z79.01 ANTICOAGULATION GOAL OF INR 2 TO 3: ICD-10-CM

## 2022-12-13 LAB — INR BLD: 1.6 (ref 0.9–1.1)

## 2022-12-13 PROCEDURE — 85610 PROTHROMBIN TIME: CPT

## 2022-12-13 PROCEDURE — 36416 COLLJ CAPILLARY BLOOD SPEC: CPT

## 2022-12-13 NOTE — PROGRESS NOTES
ANTICOAGULATION MANAGEMENT     Clementina Cooper 82 year old female is on warfarin with subtherapeutic INR result. (Goal INR 2.0-3.0)    Recent labs: (last 7 days)     12/13/22  0929   INR 1.6*       ASSESSMENT       Source(s): Chart Review and Patient/Caregiver Call       Warfarin doses taken: Warfarin taken as instructed    Diet: No new diet changes identified    New illness, injury, or hospitalization: No    Medication/supplement changes: None noted    Signs or symptoms of bleeding or clotting: No    Previous INR: Therapeutic last visit; previously outside of goal range    Additional findings: None       PLAN     Recommended plan for no diet, medication or health factor changes affecting INR     Dosing Instructions: Increase your warfarin dose (12.5% change) with next INR in 2 weeks       Summary  As of 12/13/2022    Full warfarin instructions:  5 mg every Tue, Fri; 2.5 mg all other days; Starting 12/13/2022   Next INR check:               Telephone call with Lynda who verbalizes understanding and agrees to plan    Lab visit scheduled    Education provided:     None required    Please call back if any changes to your diet, medications or how you've been taking warfarin    Symptom monitoring: monitoring for clotting signs and symptoms and monitoring for stroke signs and symptoms    Plan made per [facility] anticoagulation protocol    Ely Sterling RN  Anticoagulation Clinic  12/13/2022    _______________________________________________________________________     Anticoagulation Episode Summary     Current INR goal:  2.0-3.0   TTR:  80.1 % (1 y)   Target end date:  Indefinite   Send INR reminders to:  RON CRUZ    Indications    Chronic atrial fibrillation (H) [I48.20]  Status post catheter ablation of atrial fibrillation [Z98.890]  PVD (peripheral vascular disease) (H) [I73.9]  Anticoagulation goal of INR 2 to 3 [Z51.81  Z79.01]           Comments:           Anticoagulation Care Providers     Provider  Role Specialty Phone number    Estela Davila MD Referring Internal Medicine [phone]

## 2022-12-27 ENCOUNTER — ANTICOAGULATION THERAPY VISIT (OUTPATIENT)
Dept: ANTICOAGULATION | Facility: CLINIC | Age: 82
End: 2022-12-27

## 2022-12-27 ENCOUNTER — LAB (OUTPATIENT)
Dept: LAB | Facility: CLINIC | Age: 82
End: 2022-12-27
Payer: COMMERCIAL

## 2022-12-27 DIAGNOSIS — I48.20 CHRONIC ATRIAL FIBRILLATION (H): ICD-10-CM

## 2022-12-27 DIAGNOSIS — I73.9 PVD (PERIPHERAL VASCULAR DISEASE) (H): ICD-10-CM

## 2022-12-27 DIAGNOSIS — Z98.890 STATUS POST CATHETER ABLATION OF ATRIAL FIBRILLATION: ICD-10-CM

## 2022-12-27 DIAGNOSIS — Z51.81 ANTICOAGULATION GOAL OF INR 2 TO 3: ICD-10-CM

## 2022-12-27 DIAGNOSIS — Z79.01 ANTICOAGULATION GOAL OF INR 2 TO 3: ICD-10-CM

## 2022-12-27 DIAGNOSIS — I48.20 CHRONIC ATRIAL FIBRILLATION (H): Primary | ICD-10-CM

## 2022-12-27 DIAGNOSIS — E11.65 TYPE 2 DIABETES MELLITUS WITH HYPERGLYCEMIA (H): ICD-10-CM

## 2022-12-27 LAB — INR BLD: 2.9 (ref 0.9–1.1)

## 2022-12-27 PROCEDURE — 36416 COLLJ CAPILLARY BLOOD SPEC: CPT

## 2022-12-27 PROCEDURE — 85610 PROTHROMBIN TIME: CPT

## 2022-12-27 NOTE — PROGRESS NOTES
ANTICOAGULATION MANAGEMENT     Clementina Cooper 82 year old female is on warfarin with therapeutic INR result. (Goal INR 2.0-3.0)    Recent labs: (last 7 days)     12/27/22  0943   INR 2.9*       ASSESSMENT       Source(s): Chart Review and Patient/Caregiver Call       Warfarin doses taken: Warfarin taken as instructed    Diet: No new diet changes identified    New illness, injury, or hospitalization: No    Medication/supplement changes: None noted    Signs or symptoms of bleeding or clotting: No    Previous INR: Subtherapeutic    Additional findings: None       PLAN     Recommended plan for no diet, medication or health factor changes affecting INR     Dosing Instructions: Continue your current warfarin dose with next INR in 3 weeks       Summary  As of 12/27/2022    Full warfarin instructions:  5 mg every Tue, Fri; 2.5 mg all other days   Next INR check:  1/17/2023             Telephone call with Lynda who verbalizes understanding and agrees to plan    Lab visit scheduled    Education provided:     Goal range and lab monitoring: goal range and significance of current result    Plan made per [facility] anticoagulation protocol    Lou Santiago RN  Anticoagulation Clinic  12/27/2022    _______________________________________________________________________     Anticoagulation Episode Summary     Current INR goal:  2.0-3.0   TTR:  78.9 % (1 y)   Target end date:  Indefinite   Send INR reminders to:  RON CRZU    Indications    Chronic atrial fibrillation (H) [I48.20]  Status post catheter ablation of atrial fibrillation [Z98.890]  PVD (peripheral vascular disease) (H) [I73.9]  Anticoagulation goal of INR 2 to 3 [Z51.81  Z79.01]           Comments:           Anticoagulation Care Providers     Provider Role Specialty Phone number    Estela Davila MD Referring Internal Medicine [phone]

## 2022-12-27 NOTE — PROGRESS NOTES
ANTICOAGULATION MANAGEMENT     Clementina Cooper 82 year old female is on warfarin with therapeutic INR result. (Goal INR 2.0-3.0)    Recent labs: (last 7 days)     12/27/22  0943   INR 2.9*       ASSESSMENT       Source(s): Chart Review    Previous INR was Subtherapeutic    Medication, diet, health changes since last INR chart reviewed; none identified           PLAN     Unable to reach Lynda today.    LMTCB    Follow up required to confirm warfarin dose taken and assess for changes    Lou Santiago RN  Anticoagulation Clinic  12/27/2022

## 2023-01-09 DIAGNOSIS — G25.0 ESSENTIAL TREMOR: ICD-10-CM

## 2023-01-09 DIAGNOSIS — E11.9 TYPE 2 DIABETES MELLITUS WITHOUT COMPLICATION, WITHOUT LONG-TERM CURRENT USE OF INSULIN (H): ICD-10-CM

## 2023-01-10 RX ORDER — GLIPIZIDE 5 MG/1
10 TABLET, FILM COATED, EXTENDED RELEASE ORAL 2 TIMES DAILY
Qty: 360 TABLET | Refills: 3 | OUTPATIENT
Start: 2023-01-10

## 2023-01-10 RX ORDER — PROPRANOLOL HYDROCHLORIDE 10 MG/1
TABLET ORAL
Qty: 180 TABLET | Refills: 0 | OUTPATIENT
Start: 2023-01-10

## 2023-01-10 NOTE — TELEPHONE ENCOUNTER
Refill request too soon. Refused.     Kylie Vanegas, RN, BSN, PHN  [facility]: [place]

## 2023-01-11 DIAGNOSIS — Z51.81 ANTICOAGULATION MANAGEMENT ENCOUNTER: Primary | ICD-10-CM

## 2023-01-11 DIAGNOSIS — E03.4 HYPOTHYROIDISM DUE TO ACQUIRED ATROPHY OF THYROID: ICD-10-CM

## 2023-01-11 DIAGNOSIS — Z79.01 ANTICOAGULATION MANAGEMENT ENCOUNTER: Primary | ICD-10-CM

## 2023-01-11 RX ORDER — LEVOTHYROXINE SODIUM 100 UG/1
TABLET ORAL
Qty: 90 TABLET | Refills: 0 | Status: SHIPPED | OUTPATIENT
Start: 2023-01-11 | End: 2023-04-20

## 2023-01-12 ENCOUNTER — OFFICE VISIT (OUTPATIENT)
Dept: INTERNAL MEDICINE | Facility: CLINIC | Age: 83
End: 2023-01-12
Payer: COMMERCIAL

## 2023-01-12 VITALS
OXYGEN SATURATION: 96 % | DIASTOLIC BLOOD PRESSURE: 71 MMHG | HEIGHT: 65 IN | TEMPERATURE: 97.9 F | SYSTOLIC BLOOD PRESSURE: 127 MMHG | HEART RATE: 71 BPM | WEIGHT: 184 LBS | BODY MASS INDEX: 30.66 KG/M2

## 2023-01-12 DIAGNOSIS — Z00.01 ENCOUNTER FOR GENERAL ADULT MEDICAL EXAMINATION WITH ABNORMAL FINDINGS: Primary | ICD-10-CM

## 2023-01-12 DIAGNOSIS — H61.21 IMPACTED CERUMEN OF RIGHT EAR: ICD-10-CM

## 2023-01-12 DIAGNOSIS — I73.9 PVD (PERIPHERAL VASCULAR DISEASE) (H): ICD-10-CM

## 2023-01-12 DIAGNOSIS — M17.12 PRIMARY OSTEOARTHRITIS OF LEFT KNEE: ICD-10-CM

## 2023-01-12 DIAGNOSIS — I48.20 CHRONIC ATRIAL FIBRILLATION (H): ICD-10-CM

## 2023-01-12 DIAGNOSIS — Z98.890 STATUS POST CATHETER ABLATION OF ATRIAL FIBRILLATION: ICD-10-CM

## 2023-01-12 DIAGNOSIS — E03.4 HYPOTHYROIDISM DUE TO ACQUIRED ATROPHY OF THYROID: ICD-10-CM

## 2023-01-12 DIAGNOSIS — Z23 NEED FOR IMMUNIZATION AGAINST TETANUS ALONE: ICD-10-CM

## 2023-01-12 DIAGNOSIS — E11.65 TYPE 2 DIABETES MELLITUS WITH HYPERGLYCEMIA, WITHOUT LONG-TERM CURRENT USE OF INSULIN (H): ICD-10-CM

## 2023-01-12 DIAGNOSIS — G25.0 ESSENTIAL TREMOR: ICD-10-CM

## 2023-01-12 DIAGNOSIS — E11.9 TYPE 2 DIABETES MELLITUS WITHOUT COMPLICATION, WITHOUT LONG-TERM CURRENT USE OF INSULIN (H): ICD-10-CM

## 2023-01-12 DIAGNOSIS — Z79.01 LONG TERM CURRENT USE OF ANTICOAGULANT THERAPY: ICD-10-CM

## 2023-01-12 PROCEDURE — G0439 PPPS, SUBSEQ VISIT: HCPCS | Performed by: INTERNAL MEDICINE

## 2023-01-12 PROCEDURE — 90471 IMMUNIZATION ADMIN: CPT | Performed by: INTERNAL MEDICINE

## 2023-01-12 PROCEDURE — 90714 TD VACC NO PRESV 7 YRS+ IM: CPT | Performed by: INTERNAL MEDICINE

## 2023-01-12 RX ORDER — GLIPIZIDE 5 MG/1
TABLET, FILM COATED, EXTENDED RELEASE ORAL
Qty: 270 TABLET | Refills: 3 | Status: SHIPPED | OUTPATIENT
Start: 2023-01-12 | End: 2023-03-24

## 2023-01-12 ASSESSMENT — ACTIVITIES OF DAILY LIVING (ADL): CURRENT_FUNCTION: NO ASSISTANCE NEEDED

## 2023-01-12 NOTE — PATIENT INSTRUCTIONS
Reduce glipizide to ONE tab in morning, TWO tabs in evening    Return to clinic 6 months (summer 2023)     Discuss R hip with Dr Lopez.

## 2023-01-12 NOTE — PROGRESS NOTES
"SUBJECTIVE:   Lynda is a 82 year old who presents for Preventive Visit.  Patient has been advised of split billing requirements and indicates understanding: Yes  Are you in the first 12 months of your Medicare coverage?  No    83 y/o F here for Wellness and DM f/u.  H/o  SNHL, PVD, DMII, chronic Afib (coumadin), s/p Pulm Vein Isolation for Afib 2018,  hypothyroid, tressa Knee djd (injections via Ortho),  and essential tremor     Not exercising much.  Has bursitis in R hip, and this limits.   In past, injection had not helped  She sees Dr Lopez (ortho) for knee injections.   >Recent labs look great, A1C is 7.0      Healthy Habits:    In general, how would you rate your overall health?  Good    Frequency of exercise:  None    Duration of exercise:  Other    Do you usually eat at least 4 servings of fruit and vegetables a day, include whole grains    & fiber and avoid regularly eating high fat or \"junk\" foods?  No    Taking medications regularly:  Yes    Barriers to taking medications:  None    Medication side effects:  None    Ability to successfully perform activities of daily living:  No assistance needed    Home Safety:  No safety concerns identified    Hearing Impairment:  Difficulty following a conversation in a noisy restaurant or crowded room, feel that people are mumbling or not speaking clearly, need to ask people to speak up or repeat themselves and difficulty understanding soft or whispered speech    In the past 6 months, have you been bothered by leaking of urine?  No    In general, how would you rate your overall mental or emotional health?  Good      PHQ-2 Total Score:    Additional concerns today:  No      Have you ever done Advance Care Planning? (For example, a Health Directive, POLST, or a discussion with a medical provider or your loved ones about your wishes): No, advance care planning information given to patient to review.  Patient plans to discuss their wishes with loved ones or provider.       "   Fall risk  Fallen 2 or more times in the past year?: No  Any fall with injury in the past year?: No    Cognitive Screening   1) Repeat 3 items (Leader, Season, Table)    2) Clock draw: NORMAL  3) 3 item recall: Recalls 3 objects  Results: 3 items recalled: COGNITIVE IMPAIRMENT LESS LIKELY    Mini-CogTM Copyright CHINEDU Wilson. Licensed by the author for use in [facility]; reprinted with permission (soerica@[facility]). All rights reserved.      Do you have sleep apnea, excessive snoring or daytime drowsiness?: no    Reviewed and updated as needed this visit by clinical staff                  Reviewed and updated as needed this visit by Provider                 Social History     Tobacco Use     Smoking status: Former     Types: Cigarettes     Quit date: 1999     Years since quittin.1     Smokeless tobacco: Never   Substance Use Topics     Alcohol use: Yes     Comment: occasional     If you drink alcohol do you typically have >3 drinks per day or >7 drinks per week? No    Alcohol Use 2021   Prescreen: >3 drinks/day or >7 drinks/week? -   Prescreen: >3 drinks/day or >7 drinks/week? No           No Concerns     Current providers sharing in care for this patient include:   Patient Care Team:  Estela Davila MD as PCP - General  Kendall Lopez MD as Assigned Musculoskeletal Provider  Rahul Jennings MD as Assigned Surgical Provider  Estela Davila MD as Assigned PCP    The following health maintenance items are reviewed in Epic and correct as of today:  Health Maintenance   Topic Date Due     CBC  10/16/2021     MICROALBUMIN  2022     MEDICARE ANNUAL WELLNESS VISIT  2022     DIABETIC FOOT EXAM  2022     FALL RISK ASSESSMENT  2022     PHQ-2 (once per calendar year)  2023     DTAP/TDAP/TD IMMUNIZATION (2 - Td or Tdap) 2023     EYE EXAM  2023     TSH W/FREE T4 REFLEX  2023     ANNUAL REVIEW OF HM ORDERS  2023     A1C  2023      BMP  11/21/2023     LIPID  11/21/2023     DEXA  04/25/2025     ADVANCE CARE PLANNING  11/22/2026     INFLUENZA VACCINE  Completed     Pneumococcal Vaccine: 65+ Years  Completed     ZOSTER IMMUNIZATION  Completed     COVID-19 Vaccine  Completed     IPV IMMUNIZATION  Aged Out     MENINGITIS IMMUNIZATION  Aged Out     Lab work is in process  Labs reviewed in EPIC  BP Readings from Last 3 Encounters:   01/12/23 127/71   07/06/22 114/66   04/08/22 125/75    Wt Readings from Last 3 Encounters:   01/12/23 83.5 kg (184 lb)   07/25/22 81.6 kg (180 lb)   07/06/22 82.1 kg (181 lb)                  Patient Active Problem List   Diagnosis     PVD (peripheral vascular disease) (H)     Family history of colon cancer     HYPERLIPIDEMIA LDL GOAL <100     Open-angle glaucoma, mild-mod, ou     Advanced directives, counseling/discussion     Essential tremor     Pseudophakia, Yag Caps, ou     S/P iridectomy, ou     Anticoagulation goal of INR 2 to 3     Hypothyroidism due to acquired atrophy of thyroid     Chronic atrial fibrillation (H)     overweight  HCC     Long-term (current) use of anticoagulants [Z79.01]     Type 2 diabetes mellitus with hyperglycemia, without long-term current use of insulin (H)     Glaucoma suspect, bilateral     Status post catheter ablation of atrial fibrillation     Trochanteric bursitis of right hip     SNHL (sensory-neural hearing loss), asymmetrical     Primary osteoarthritis of left knee     Primary osteoarthritis of right knee     Medial epicondylitis of right elbow     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     Past Surgical History:   Procedure Laterality Date     ANGIOPLASTY      right leg     CARDIAC SURGERY      Atrial fibrillation ablation      CATARACT IOL, RT/LT       [facility] TRABECULOPLASTY BY LASER SURGERY       [facility] VASCULAR SURGERY PROCEDURE UNLIST       HYSTERECTOMY, CERVIX STATUS UNKNOWN  1989    uterine bleeding     HYSTERECTOMY, PAP NO LONGER INDICATED        LASER YAG CAPSULOTOMY Right 2018; 2019    right eye; left eye     LASER YAG IRIDOTOMY  remotely    both eyes (elsewhere)     PHACOEMULSIFICATION WITH STANDARD INTRAOCULAR LENS IMPLANT  2012; 2012    right eye; left eye     [facility] NONSPECIFIC PROCEDURE      neck surgery/trauma     [facility] STOMACH SURGERY PROCEDURE UNLISTED         Social History     Tobacco Use     Smoking status: Former     Types: Cigarettes     Quit date: 1999     Years since quittin.1     Smokeless tobacco: Never   Substance Use Topics     Alcohol use: Yes     Comment: occasional     Family History   Problem Relation Age of Onset     Diabetes Mother      Heart Disease Mother      Heart Disease Father      Heart Disease Son      Cancer Brother      Heart Disease Sister      Cancer Brother      Cancer Brother      Heart Disease Sister      Macular Degeneration No family hx of      Glaucoma No family hx of          Current Outpatient Medications   Medication Sig Dispense Refill     ACE NOT PRESCRIBED, INTENTIONAL, 1 each At Bedtime ACE Inhibitor not prescribed due to Other:  Neck swelling.  Also, BP is on low normal side with the betablocker 0 each 0     atorvastatin (LIPITOR) 20 MG tablet Take 1 tablet (20 mg) by mouth daily 90 tablet 3     Biotin 5000 MCG CAPS Take  by mouth.       blood glucose (NO BRAND SPECIFIED) test strip Use to test blood sugar 1 times daily  . 100 strip 3     blood glucose (ONE TOUCH DELICA) lancing device Device to be used with lancets.   Okay to use alternative device if better coverage.  She needs to replace her One Touch UltraSoft Lancing device.  Thank you 1 each 1     CENTRUM SILVER OR one daily       glipiZIDE (GLUCOTROL XL) 5 MG 24 hr tablet One tab in the morning, two tabs at supper. 270 tablet 3     JARDIANCE 25 MG TABS tablet TAKE 1 TABLET BY MOUTH EVERY DAY 90 tablet 3     levothyroxine (SYNTHROID/LEVOTHROID) 100 MCG tablet TAKE 1 TABLET BY MOUTH EVERY DAY 90 tablet 0     losartan  (COZAAR) 25 MG tablet TAKE 1/2 TABLET BY MOUTH DAILY 45 tablet 0     ONETOUCH ULTRA test strip USE TO TEST BLOOD SUGAR TWICE A DAY OR AS DIRECTED 200 strip 0     propranolol (INDERAL) 10 MG tablet TAKE 1 TABLET BY MOUTH TWICE A DAY AS NEEDED 180 tablet 0     VITAMIN D 1000 UNIT OR TABS 1 TABLET DAILY       warfarin ANTICOAGULANT (COUMADIN) 5 MG tablet TAKES 1 TAB BY MOUTH MON, WED AND FRI & 1/2 TAB ALL OTHER DAYS UNLESS DIRECTED OTHERWISE BY [facility]. 65 tablet 3     augmented betamethasone dipropionate (DIPROLENE-AF) 0.05 % external cream Apply topically 2 times daily As needed. (Patient not taking: Reported on 1/12/2023) 150 g 3     clobetasol (TEMOVATE) 0.05 % external cream APPLY 1 GRAM TOPICALLY 2 TIMES DAILY AS NEEDED (Patient not taking: Reported on 1/12/2023) 180 g 1     Evening Primrose Oil 1000 MG CAPS Take by mouth daily (Patient not taking: Reported on 1/12/2023)       predniSONE (DELTASONE) 20 MG tablet Take 3 tabs by mouth daily x 3 days, then 2 tabs daily x 3 days, then 1 tab daily x 3 days, then 1/2 tab daily x 3 days. 20 tablet 0     Allergies   Allergen Reactions     Benzocaine      Januvia [Sitagliptin]      Not feel well   See 5/19/21 tel call     Pravastatin Muscle Pain (Myalgia)     Muscle aches     Simvastatin Muscle Pain (Myalgia)     Vagisil [Kdc:Benzocaine+Cetyl Alcohol+Edetic Acid+Methylparaben+Propylene Glycol+...]      It is the benzocaine       Xarelto [Rivaroxaban]      Daily headache     Zocor [Hmg-Coa-R Inhibitors]      Muscle aches     Chlorhexidine Rash     Severe painful groin rash after cardiac ablation procedure with bilateral groin access pretreated with Cloroprep     Metformin Other (See Comments) and Rash     Low BS.   Rash on leg.  Pt. Is on metformin and does okay with it      Povidone Iodine Rash     Rash     Recent Labs   Lab Test 11/21/22  0925 04/01/22  0937 11/16/21  0918 07/19/21  1107 04/19/21  0913 01/21/21  1524 10/16/20  0726 03/30/20  0905 11/05/19  0730  "09/27/18  1017 05/16/18  0729 06/26/17  1000 03/21/17  0728 09/28/16  0756 03/29/16  0738   A1C 7.0* 7.2* 8.3*   < > 8.2*   < > 8.3*   < >  --    < > 7.0*   < > 8.0*   < > 7.0*   LDL 85  --  94  --   --   --  72  --   --    < > 81  --  72  --  87   HDL 47*  --  56  --   --   --  51  --   --    < > 44*  --  42*  --  39*   TRIG 177*  --  100  --   --   --  124  --   --    < > 113  --  202*  --  213*   ALT  --   --   --   --   --   --   --   --   --   --  31  --  29  --  32   CR 0.74  --  0.74  --   --   --  0.81  --  0.86   < > 0.84  --  0.82   < > 0.80   GFRESTIMATED 80  --  76  --   --   --  68  --  64   < > 66  --  68   < > 69   GFRESTBLACK  --   --   --   --   --   --  79  --  74   < > 80  --  82   < > 84   POTASSIUM 4.4  --  4.5  --   --   --  4.1  --  4.2   < > 4.2  --  4.0   < > 4.1   TSH  --  0.94  --   --  2.51  --  5.52*  --  6.41*   < > 2.61   < > 5.43*   < > 6.31*    < > = values in this interval not displayed.           Mammogram Screening - Patient over age 75, has elected to continue with screening.  Pertinent mammograms are reviewed under the imaging tab.    Review of Systems  Constitutional, HEENT, cardiovascular, pulmonary, gi and gu systems are negative, except as otherwise noted.    OBJECTIVE:   /71 (BP Location: Left arm, Patient Position: Sitting, Cuff Size: Adult Regular)   Pulse 71   Temp 97.9  F (36.6  C) (Oral)   Ht 1.651 m (5' 5\")   Wt 83.5 kg (184 lb)   LMP  (LMP Unknown)   SpO2 96%   BMI 30.62 kg/m   Estimated body mass index is 29.95 kg/m  as calculated from the following:    Height as of 7/25/22: 1.651 m (5' 5\").    Weight as of 7/25/22: 81.6 kg (180 lb).  Physical Exam  GENERAL APPEARANCE: healthy, alert and no distress  EYES: Eyes grossly normal to inspection, PERRL and conjunctivae and sclerae normal  HENT: ear canals and TM's normal, nose and mouth without ulcers or lesions, oropharynx clear and oral mucous membranes moist  NECK: no adenopathy, no asymmetry, masses, or " "scars and thyroid normal to palpation  RESP: lungs clear to auscultation - no rales, rhonchi or wheezes  BREAST: normal without masses, tenderness or nipple discharge and no palpable axillary masses or adenopathy  CV: regular rate and rhythm, normal S1 S2, no S3 or S4, no murmur, click or rub, no peripheral edema and peripheral pulses strong  ABDOMEN: soft, nontender, no hepatosplenomegaly, no masses and bowel sounds normal   (female):  Defer.   MS: no musculoskeletal defects are noted and gait is age appropriate without ataxia  SKIN: no suspicious lesions or rashes  NEURO: Normal strength and tone, sensory exam grossly normal, mentation intact and speech normal  DIABETIC FOOT EXAM: normal DP and PT pulses, no trophic changes or ulcerative lesions and diminished sensory exam at toes to vibration   PSYCH: mentation appears normal and affect normal/bright    Diagnostic Test Results:  Labs reviewed in Epic   See Epic    No results found for this or any previous visit (from the past 24 hour(s)).    ASSESSMENT / PLAN:   (Z00.01) Encounter for general adult medical examination with abnormal findings  (primary encounter diagnosis)  Comment:  She is doing well in general.  Living in house with .  precontemplating \"downsizing\" but no real plan as of yet.   Plan: discuss a plan with  for long term.     (E11.65) Type 2 diabetes mellitus with hyperglycemia, without long-term current use of insulin (H)  Comment: well controlled.  Continue current management    Plan:      (G25.0) Essential tremor  Comment: well controlled.   Plan:      (I48.20) Chronic atrial fibrillation (H)  Comment: rate controlled.    Plan: continue current management   (Z98.890) Status post catheter ablation of atrial fibrillation  (Z79.01) Long-term (current) use of anticoagulants [Z79.01]  Comment:  On anticoagulaiton   Plan:   Continue current management      (E03.4) Hypothyroidism due to acquired atrophy of thyroid  Comment:  Euthyroid. " "  Plan: continue current dose.     (M17.12) Primary osteoarthritis of left knee  Comment: sees Dr quintero for injections prn.  She has R hip bursitis, will address with him at next visit.   Plan:      (I73.9) PVD (peripheral vascular disease) (H)  Comment:  No symptoms     Plan: continue current management     (E11.9) Type 2 diabetes mellitus without complication, without long-term current use of insulin (H)  Comment: BS are low sometimes at lunch, she is symptomatic.  Will lower am glipizide dose.    Plan: glipiZIDE (GLUCOTROL XL) 5 MG 24 hr tablet        (Z23) Need for immunization against tetanus alone  Comment:    Plan: TD PRESERV FREE, IM (7+ YRS) (DECAVAC/TENIVAC)            (H61.21) Impacted cerumen of right ear  Comment:irrigated.    Plan:            COUNSELING:  Reviewed preventive health counseling, as reflected in patient instructions       Regular exercise       Immunizations    Vaccinated for: Td            BMI:   Estimated body mass index is 29.95 kg/m  as calculated from the following:    Height as of 7/25/22: 1.651 m (5' 5\").    Weight as of 7/25/22: 81.6 kg (180 lb).         She reports that she quit smoking about 23 years ago. She has never used smokeless tobacco.      Appropriate preventive services were discussed with this patient, including applicable screening as appropriate for cardiovascular disease, diabetes, osteopenia/osteoporosis, and glaucoma.  As appropriate for age/gender, discussed screening for colorectal cancer, prostate cancer, breast cancer, and cervical cancer. Checklist reviewing preventive services available has been given to the patient.    Reviewed patients plan of care and provided an AVS. The Basic Care Plan (routine screening as documented in Health Maintenance) for Clementina meets the Care Plan requirement. This Care Plan has been established and reviewed with the Patient.          Estela Davila MD  [facility]    Identified Health Risks:  "

## 2023-01-12 NOTE — NURSING NOTE
Prior to immunization administration, verified patients identity using patient s name and date of birth. Please see Immunization Activity for additional information.     Screening Questionnaire for Adult Immunization    Are you sick today?   No   Do you have allergies to medications, food, a vaccine component or latex?   No   Have you ever had a serious reaction after receiving a vaccination?   No   Do you have a long-term health problem with heart, lung, kidney, or metabolic disease (e.g., diabetes), asthma, a blood disorder, no spleen, complement component deficiency, a cochlear implant, or a spinal fluid leak?  Are you on long-term aspirin therapy?   No   Do you have cancer, leukemia, HIV/AIDS, or any other immune system problem?   No   Do you have a parent, brother, or sister with an immune system problem?   No   In the past 3 months, have you taken medications that affect  your immune system, such as prednisone, other steroids, or anticancer drugs; drugs for the treatment of rheumatoid arthritis, Crohn s disease, or psoriasis; or have you had radiation treatments?   No   Have you had a seizure, or a brain or other nervous system problem?   No   During the past year, have you received a transfusion of blood or blood    products, or been given immune (gamma) globulin or antiviral drug?   No   For women: Are you pregnant or is there a chance you could become       pregnant during the next month?   No   Have you received any vaccinations in the past 4 weeks?   No     Immunization questionnaire answers were all negative.        Per orders of Dr. Davila, injection of TD given by Dannielle Travis CMA. Patient instructed to remain in clinic for 15 minutes afterwards, and to report any adverse reaction to me immediately.       Screening performed by Dannielle Travis CMA on 1/12/2023 at 10:43 AM.

## 2023-01-17 ENCOUNTER — ANTICOAGULATION THERAPY VISIT (OUTPATIENT)
Dept: ANTICOAGULATION | Facility: CLINIC | Age: 83
End: 2023-01-17

## 2023-01-17 ENCOUNTER — TELEPHONE (OUTPATIENT)
Dept: FAMILY MEDICINE | Facility: CLINIC | Age: 83
End: 2023-01-17

## 2023-01-17 ENCOUNTER — LAB (OUTPATIENT)
Dept: LAB | Facility: CLINIC | Age: 83
End: 2023-01-17
Payer: COMMERCIAL

## 2023-01-17 DIAGNOSIS — I48.20 CHRONIC ATRIAL FIBRILLATION (H): ICD-10-CM

## 2023-01-17 DIAGNOSIS — Z98.890 STATUS POST CATHETER ABLATION OF ATRIAL FIBRILLATION: ICD-10-CM

## 2023-01-17 DIAGNOSIS — I73.9 PVD (PERIPHERAL VASCULAR DISEASE) (H): ICD-10-CM

## 2023-01-17 DIAGNOSIS — Z79.01 ANTICOAGULATION GOAL OF INR 2 TO 3: ICD-10-CM

## 2023-01-17 DIAGNOSIS — I48.20 CHRONIC ATRIAL FIBRILLATION (H): Primary | ICD-10-CM

## 2023-01-17 DIAGNOSIS — Z51.81 ANTICOAGULATION GOAL OF INR 2 TO 3: ICD-10-CM

## 2023-01-17 LAB — INR BLD: 2.4 (ref 0.9–1.1)

## 2023-01-17 PROCEDURE — 36415 COLL VENOUS BLD VENIPUNCTURE: CPT

## 2023-01-17 PROCEDURE — 85610 PROTHROMBIN TIME: CPT

## 2023-01-17 NOTE — TELEPHONE ENCOUNTER
General Call      Reason for Call:  Returning call for INR results    What are your questions or concerns:  Please contact to discuss and advise.    Date of last appointment with provider: 1/17    Okay to leave a detailed message?: Yes at Home number on file [phone] (home)

## 2023-01-17 NOTE — PROGRESS NOTES
ANTICOAGULATION MANAGEMENT     Clementina Cooper 82 year old female is on warfarin with therapeutic INR result. (Goal INR 2.0-3.0)    Recent labs: (last 7 days)     01/17/23  0915   INR 2.4*       ASSESSMENT       Source(s): Chart Review    Previous INR was Therapeutic last visit; previously outside of goal range    Medication, diet, health changes since last INR chart reviewed; none identified           PLAN     Unable to reach Lynda today.    LMTCB    Follow up required to confirm warfarin dose taken and assess for changes    Lou Santiago RN  Anticoagulation Clinic  1/17/2023

## 2023-01-17 NOTE — TELEPHONE ENCOUNTER
See anticoag encounter for 1/17/23    Lou Santiago RN - [facility]

## 2023-01-17 NOTE — PROGRESS NOTES
ANTICOAGULATION MANAGEMENT     Clementina Cooper 82 year old female is on warfarin with therapeutic INR result. (Goal INR 2.0-3.0)    Recent labs: (last 7 days)     01/17/23  0915   INR 2.4*       ASSESSMENT       Source(s): Chart Review and Patient/Caregiver Call       Warfarin doses taken: Warfarin taken as instructed    Diet: No new diet changes identified    New illness, injury, or hospitalization: No    Medication/supplement changes: None noted    Signs or symptoms of bleeding or clotting: No    Previous INR: Therapeutic last visit; previously outside of goal range    Additional findings: None       PLAN     Recommended plan for no diet, medication or health factor changes affecting INR     Dosing Instructions: Continue your current warfarin dose with next INR in 5 weeks       Summary  As of 1/17/2023    Full warfarin instructions:  5 mg every Tue, Fri; 2.5 mg all other days   Next INR check:  2/21/2023             Telephone call with Lynda who verbalizes understanding and agrees to plan    Lab visit scheduled    Education provided:     Goal range and lab monitoring: goal range and significance of current result    Plan made per [facility] anticoagulation protocol    Lou Santiago RN  Anticoagulation Clinic  1/17/2023    _______________________________________________________________________     Anticoagulation Episode Summary     Current INR goal:  2.0-3.0   TTR:  78.9 % (1 y)   Target end date:  Indefinite   Send INR reminders to:  RON CRUZ    Indications    Chronic atrial fibrillation (H) [I48.20]  Status post catheter ablation of atrial fibrillation [Z98.890]  PVD (peripheral vascular disease) (H) [I73.9]  Anticoagulation goal of INR 2 to 3 [Z51.81  Z79.01]           Comments:           Anticoagulation Care Providers     Provider Role Specialty Phone number    Estela Davila MD Referring Internal Medicine [phone]

## 2023-01-18 ENCOUNTER — OFFICE VISIT (OUTPATIENT)
Dept: OPHTHALMOLOGY | Facility: CLINIC | Age: 83
End: 2023-01-18
Payer: COMMERCIAL

## 2023-01-18 DIAGNOSIS — Z96.1 PSEUDOPHAKIA: ICD-10-CM

## 2023-01-18 DIAGNOSIS — E11.9 TYPE 2 DIABETES MELLITUS WITHOUT COMPLICATION, WITHOUT LONG-TERM CURRENT USE OF INSULIN (H): ICD-10-CM

## 2023-01-18 DIAGNOSIS — H40.003 GLAUCOMA SUSPECT, BILATERAL: ICD-10-CM

## 2023-01-18 DIAGNOSIS — H52.4 PRESBYOPIA: ICD-10-CM

## 2023-01-18 DIAGNOSIS — Z01.01 ENCOUNTER FOR EXAMINATION OF EYES AND VISION WITH ABNORMAL FINDINGS: Primary | ICD-10-CM

## 2023-01-18 DIAGNOSIS — Z98.890 S/P IRIDECTOMY: ICD-10-CM

## 2023-01-18 PROCEDURE — 92015 DETERMINE REFRACTIVE STATE: CPT | Performed by: OPHTHALMOLOGY

## 2023-01-18 PROCEDURE — 92014 COMPRE OPH EXAM EST PT 1/>: CPT | Performed by: OPHTHALMOLOGY

## 2023-01-18 ASSESSMENT — SLIT LAMP EXAM - LIDS
COMMENTS: 1+ DERMATOCHALASIS - UPPER LID
COMMENTS: 1+ DERMATOCHALASIS - UPPER LID

## 2023-01-18 ASSESSMENT — REFRACTION_WEARINGRX
OD_AXIS: 172
OS_ADD: +2.75
SPECS_TYPE: BIFOCAL
OS_SPHERE: -1.50
OD_SPHERE: -0.25
OD_ADD: +2.75
OS_CYLINDER: +0.75
OS_AXIS: 089
OD_CYLINDER: +0.75

## 2023-01-18 ASSESSMENT — CONF VISUAL FIELD
OD_SUPERIOR_NASAL_RESTRICTION: 0
OS_INFERIOR_TEMPORAL_RESTRICTION: 0
OD_INFERIOR_TEMPORAL_RESTRICTION: 0
OS_SUPERIOR_TEMPORAL_RESTRICTION: 0
OS_NORMAL: 1
OS_SUPERIOR_NASAL_RESTRICTION: 0
OD_SUPERIOR_TEMPORAL_RESTRICTION: 0
OD_INFERIOR_NASAL_RESTRICTION: 0
METHOD: COUNTING FINGERS
OD_NORMAL: 1
OS_INFERIOR_NASAL_RESTRICTION: 0

## 2023-01-18 ASSESSMENT — VISUAL ACUITY
OS_CC: 20/25
CORRECTION_TYPE: GLASSES
OS_CC+: -2
OD_CC+: -2
METHOD: SNELLEN - LINEAR
OD_CC: 20/20

## 2023-01-18 ASSESSMENT — REFRACTION_MANIFEST
OD_CYLINDER: +0.25
OS_SPHERE: -0.75
OS_ADD: +2.75
OD_ADD: +2.75
OD_AXIS: 155
OS_CYLINDER: +0.50
OD_SPHERE: -0.50
OS_AXIS: 035

## 2023-01-18 ASSESSMENT — EXTERNAL EXAM - LEFT EYE: OS_EXAM: NORMAL

## 2023-01-18 ASSESSMENT — EXTERNAL EXAM - RIGHT EYE: OD_EXAM: NORMAL

## 2023-01-18 ASSESSMENT — TONOMETRY
OD_IOP_MMHG: 17
OS_IOP_MMHG: 17
IOP_METHOD: APPLANATION

## 2023-01-18 ASSESSMENT — CUP TO DISC RATIO
OS_RATIO: 0.6
OD_RATIO: 0.7

## 2023-01-18 NOTE — PATIENT INSTRUCTIONS
"Continue observation with regard to glaucoma suspect status.   Glasses prescription given - optional  May use artificial tears up to four times a day (like Refresh Optive, Systane Balance, TheraTears, or generic artificial tears are ok. Avoid \"get the red out\" drops).   Possible posterior vitreous detachment (sudden onset large floater and/or flashing lights) both eyes discussed.   Return visit 6 months for a pachy, intraocular pressure check, glaucoma OCT, retinal OCT, and Garcia Visual Field.   Rahul Jennings M.D.  [phone]     Patient Education   Diabetes weakens the blood vessels all over the body, including the eyes. Damage to the blood vessels in the eyes can cause swelling or bleeding into part of the eye (called the retina). This is called diabetic retinopathy ([facility]-tin--[facility]-thee). If not treated, this disease can cause vision loss or blindness.   Symptoms may include blurred or distorted vision, but many people have no symptoms. It's important to see your eye doctor regularly to check for problems.   Early treatment and good control can help protect your vision. Here are the things you can do to help prevent vision loss:      1. Keep your blood sugar levels under tight control.      2. Bring high blood pressure under control.      3. No smoking.      4. Have yearly dilated eye exams.       "

## 2023-01-18 NOTE — LETTER
"    1/18/2023         RE: Clementina Cooper  [address], [place]. \[facility]"" [id number]        Dear Colleague,    Thank you for referring your patient, Clementina Cooper, to the [facility]. Please see a copy of my visit note below.     Current Eye Medications:  None     Subjective:  Complete eye exam. Vision is doing fine distance and near both eyes. No eye pain or discomfort in either eye.   Diabetic for about 10 years. Blood sugar has been good.    Lab Results   Component Value Date    A1C 7.0 11/21/2022    A1C 7.2 04/01/2022    A1C 8.3 11/16/2021    A1C 7.5 07/19/2021    A1C 8.2 04/19/2021    A1C 9.2 01/21/2021    A1C 8.3 10/16/2020    A1C 9.0 03/30/2020    A1C 7.0 05/21/2019        Objective:  See Ophthalmology Exam.       Assessment:  Stable eye exam.  No diabetic retinopathy.      ICD-10-CM    1. Encounter for examination of eyes and vision with abnormal findings  Z01.01 EYE EXAM (SIMPLE-NONBILLABLE)     REFRACTION      2. Presbyopia  H52.4 REFRACTION      3. Type 2 diabetes mellitus without complication, without long-term current use of insulin (H)  E11.9 EYE EXAM (SIMPLE-NONBILLABLE)      4. Pseudophakia, Yag Caps, ou  Z96.1       5. S/P iridectomy, ou  Z98.890       6. Glaucoma suspect, bilateral  H40.003            Plan:  Continue observation with regard to glaucoma suspect status.   Glasses prescription given - optional  May use artificial tears up to four times a day (like Refresh Optive, Systane Balance, TheraTears, or generic artificial tears are ok. Avoid \"get the red out\" drops).   Possible posterior vitreous detachment (sudden onset large floater and/or flashing lights) both eyes discussed.   Return visit 6 months for a pachy, intraocular pressure check, glaucoma OCT, retinal OCT, and Garcia Visual Field.   Rahul Jennings M.D.  [phone]            Again, thank you for allowing me to participate in the care of your patient.        Sincerely,        Rahul BARRERA" MD Dick

## 2023-01-18 NOTE — PROGRESS NOTES
" Current Eye Medications:  None     Subjective:  Complete eye exam. Vision is doing fine distance and near both eyes. No eye pain or discomfort in either eye.   Diabetic for about 10 years. Blood sugar has been good.    Lab Results   Component Value Date    A1C 7.0 11/21/2022    A1C 7.2 04/01/2022    A1C 8.3 11/16/2021    A1C 7.5 07/19/2021    A1C 8.2 04/19/2021    A1C 9.2 01/21/2021    A1C 8.3 10/16/2020    A1C 9.0 03/30/2020    A1C 7.0 05/21/2019        Objective:  See Ophthalmology Exam.       Assessment:  Stable eye exam.  No diabetic retinopathy.      ICD-10-CM    1. Encounter for examination of eyes and vision with abnormal findings  Z01.01 EYE EXAM (SIMPLE-NONBILLABLE)     REFRACTION      2. Presbyopia  H52.4 REFRACTION      3. Type 2 diabetes mellitus without complication, without long-term current use of insulin (H)  E11.9 EYE EXAM (SIMPLE-NONBILLABLE)      4. Pseudophakia, Yag Caps, ou  Z96.1       5. S/P iridectomy, ou  Z98.890       6. Glaucoma suspect, bilateral  H40.003            Plan:  Continue observation with regard to glaucoma suspect status.   Glasses prescription given - optional  May use artificial tears up to four times a day (like Refresh Optive, Systane Balance, TheraTears, or generic artificial tears are ok. Avoid \"get the red out\" drops).   Possible posterior vitreous detachment (sudden onset large floater and/or flashing lights) both eyes discussed.   Return visit 6 months for a pachy, intraocular pressure check, glaucoma OCT, retinal OCT, and [facility].   Rahul Jennings M.D.  [phone]        "

## 2023-02-13 DIAGNOSIS — E11.9 TYPE 2 DIABETES MELLITUS WITHOUT COMPLICATION, WITHOUT LONG-TERM CURRENT USE OF INSULIN (H): ICD-10-CM

## 2023-02-13 RX ORDER — LOSARTAN POTASSIUM 25 MG/1
TABLET ORAL
Qty: 45 TABLET | Refills: 0 | Status: SHIPPED | OUTPATIENT
Start: 2023-02-13 | End: 2023-03-24

## 2023-02-13 NOTE — TELEPHONE ENCOUNTER
Reason for call:  Medication   If this is a refill request, has the caller requested the refill from the pharmacy already? N/A  Will the patient be using a [facility]? No  Name of the pharmacy and phone number for the current request:    [id number] - [place], [place] - [address]    Name of the medication requested: losartan (COZAAR) 25 MG tablet    Other request:  [id number] - [place], [place] - [address] is closing and she is transferring her prescriptions to [facility].    Phone number to reach patient:  [phone]    Best Time:      Can we leave a detailed message on this number?  YES    Travel screening: Not Applicable

## 2023-02-21 ENCOUNTER — LAB (OUTPATIENT)
Dept: LAB | Facility: CLINIC | Age: 83
End: 2023-02-21
Payer: COMMERCIAL

## 2023-02-21 ENCOUNTER — ANTICOAGULATION THERAPY VISIT (OUTPATIENT)
Dept: ANTICOAGULATION | Facility: CLINIC | Age: 83
End: 2023-02-21

## 2023-02-21 DIAGNOSIS — E11.65 TYPE 2 DIABETES MELLITUS WITH HYPERGLYCEMIA (H): ICD-10-CM

## 2023-02-21 DIAGNOSIS — Z79.01 ANTICOAGULATION GOAL OF INR 2 TO 3: ICD-10-CM

## 2023-02-21 DIAGNOSIS — Z98.890 STATUS POST CATHETER ABLATION OF ATRIAL FIBRILLATION: ICD-10-CM

## 2023-02-21 DIAGNOSIS — Z51.81 ANTICOAGULATION GOAL OF INR 2 TO 3: ICD-10-CM

## 2023-02-21 DIAGNOSIS — I48.20 CHRONIC ATRIAL FIBRILLATION (H): Primary | ICD-10-CM

## 2023-02-21 DIAGNOSIS — I73.9 PVD (PERIPHERAL VASCULAR DISEASE) (H): ICD-10-CM

## 2023-02-21 DIAGNOSIS — I48.20 CHRONIC ATRIAL FIBRILLATION (H): ICD-10-CM

## 2023-02-21 LAB — INR BLD: 2.5 (ref 0.9–1.1)

## 2023-02-21 PROCEDURE — 85610 PROTHROMBIN TIME: CPT

## 2023-02-21 PROCEDURE — 36416 COLLJ CAPILLARY BLOOD SPEC: CPT

## 2023-02-21 NOTE — PROGRESS NOTES
ANTICOAGULATION MANAGEMENT     Clementina Cooper 82 year old female is on warfarin with therapeutic INR result. (Goal INR 2.0-3.0)    Recent labs: (last 7 days)     02/21/23  0953   INR 2.5*       ASSESSMENT       Source(s): Chart Review and Patient/Caregiver Call       Warfarin doses taken: Warfarin taken as instructed    Diet: No new diet changes identified    New illness, injury, or hospitalization: No    Medication/supplement changes: None noted    Signs or symptoms of bleeding or clotting: No    Previous INR: Therapeutic last 2(+) visits    Additional findings: None       PLAN     Recommended plan for no diet, medication or health factor changes affecting INR     Dosing Instructions: Continue your current warfarin dose with next INR in 6 weeks       Summary  As of 2/21/2023    Full warfarin instructions:  5 mg every Tue, Fri; 2.5 mg all other days   Next INR check:  4/4/2023             Telephone call with Lynda who verbalizes understanding and agrees to plan    Lab visit scheduled    Education provided:     Please call back if any changes to your diet, medications or how you've been taking warfarin    Contact [phone]  with any changes, questions or concerns.     Plan made per [facility] anticoagulation protocol    Daniella Frances RN  Anticoagulation Clinic  2/21/2023    _______________________________________________________________________     Anticoagulation Episode Summary     Current INR goal:  2.0-3.0   TTR:  78.9 % (1 y)   Target end date:  Indefinite   Send INR reminders to:  RON CRUZ    Indications    Chronic atrial fibrillation (H) [I48.20]  Status post catheter ablation of atrial fibrillation [Z98.890]  PVD (peripheral vascular disease) (H) [I73.9]  Anticoagulation goal of INR 2 to 3 [Z51.81  Z79.01]           Comments:           Anticoagulation Care Providers     Provider Role Specialty Phone number    Estela Davila MD Referring Internal Medicine [phone]

## 2023-03-24 DIAGNOSIS — E11.9 TYPE 2 DIABETES MELLITUS WITHOUT COMPLICATION, WITHOUT LONG-TERM CURRENT USE OF INSULIN (H): ICD-10-CM

## 2023-03-24 DIAGNOSIS — G25.0 ESSENTIAL TREMOR: ICD-10-CM

## 2023-03-24 DIAGNOSIS — I73.9 PVD (PERIPHERAL VASCULAR DISEASE) (H): ICD-10-CM

## 2023-03-24 DIAGNOSIS — E78.5 HYPERLIPIDEMIA LDL GOAL <100: ICD-10-CM

## 2023-03-24 RX ORDER — PROPRANOLOL HYDROCHLORIDE 10 MG/1
TABLET ORAL
Qty: 180 TABLET | Refills: 0 | OUTPATIENT
Start: 2023-03-24

## 2023-03-24 RX ORDER — PROPRANOLOL HYDROCHLORIDE 10 MG/1
TABLET ORAL
Qty: 180 TABLET | Refills: 0 | Status: SHIPPED | OUTPATIENT
Start: 2023-03-24 | End: 2023-06-16

## 2023-03-24 RX ORDER — ATORVASTATIN CALCIUM 20 MG/1
20 TABLET, FILM COATED ORAL DAILY
Qty: 90 TABLET | Refills: 3 | Status: SHIPPED | OUTPATIENT
Start: 2023-03-24 | End: 2024-01-26

## 2023-03-24 RX ORDER — LOSARTAN POTASSIUM 25 MG/1
TABLET ORAL
Qty: 45 TABLET | Refills: 0 | Status: SHIPPED | OUTPATIENT
Start: 2023-03-24 | End: 2023-06-16

## 2023-03-24 RX ORDER — GLIPIZIDE 5 MG/1
TABLET, FILM COATED, EXTENDED RELEASE ORAL
Qty: 270 TABLET | Refills: 0 | Status: SHIPPED | OUTPATIENT
Start: 2023-03-24 | End: 2023-06-16

## 2023-03-24 NOTE — TELEPHONE ENCOUNTER
Pending Prescriptions:                       Disp   Refills    propranolol (INDERAL) 10 MG tablet        180 ta*0            Sig: TAKE 1 TABLET BY MOUTH TWICE A DAY AS NEEDED    atorvastatin (LIPITOR) 20 MG tablet       90 tab*3            Sig: Take 1 tablet (20 mg) by mouth daily    glipiZIDE (GLUCOTROL XL) 5 MG 24 hr xrirto770 ta*3            Sig: One tab in the morning, two tabs at supper.    losartan (COZAAR) 25 MG tablet            45 tab*0            Sig: TAKE 1/2 TABLET BY MOUTH DAILY

## 2023-03-24 NOTE — TELEPHONE ENCOUNTER
Requested Prescriptions   Pending Prescriptions Disp Refills     propranolol (INDERAL) 10 MG tablet 180 tablet 0     Sig: TAKE 1 TABLET BY MOUTH TWICE A DAY AS NEEDED       There is no refill protocol information for this order        Estela Laird   [facility]

## 2023-04-04 ENCOUNTER — ANTICOAGULATION THERAPY VISIT (OUTPATIENT)
Dept: ANTICOAGULATION | Facility: CLINIC | Age: 83
End: 2023-04-04

## 2023-04-04 ENCOUNTER — LAB (OUTPATIENT)
Dept: LAB | Facility: CLINIC | Age: 83
End: 2023-04-04
Payer: COMMERCIAL

## 2023-04-04 DIAGNOSIS — Z51.81 ANTICOAGULATION GOAL OF INR 2 TO 3: ICD-10-CM

## 2023-04-04 DIAGNOSIS — Z79.01 ANTICOAGULATION GOAL OF INR 2 TO 3: ICD-10-CM

## 2023-04-04 DIAGNOSIS — I48.20 CHRONIC ATRIAL FIBRILLATION (H): ICD-10-CM

## 2023-04-04 DIAGNOSIS — Z98.890 STATUS POST CATHETER ABLATION OF ATRIAL FIBRILLATION: ICD-10-CM

## 2023-04-04 DIAGNOSIS — I73.9 PVD (PERIPHERAL VASCULAR DISEASE) (H): ICD-10-CM

## 2023-04-04 DIAGNOSIS — I48.20 CHRONIC ATRIAL FIBRILLATION (H): Primary | ICD-10-CM

## 2023-04-04 LAB — INR BLD: 2.9 (ref 0.9–1.1)

## 2023-04-04 PROCEDURE — 85610 PROTHROMBIN TIME: CPT

## 2023-04-04 PROCEDURE — 36416 COLLJ CAPILLARY BLOOD SPEC: CPT

## 2023-04-04 NOTE — PROGRESS NOTES
ANTICOAGULATION MANAGEMENT     Clementina Cooper 82 year old female is on warfarin with therapeutic INR result. (Goal INR 2.0-3.0)    Recent labs: (last 7 days)     04/04/23  0958   INR 2.9*       ASSESSMENT       Source(s): Chart Review    Previous INR was Therapeutic last 2(+) visits    Medication, diet, health changes since last INR chart reviewed; none identified             PLAN     Recommended plan for no diet, medication or health factor changes affecting INR     Dosing Instructions: Continue your current warfarin dose with next INR in 6 weeks       Summary  As of 4/4/2023    Full warfarin instructions:  5 mg every Tue, Fri; 2.5 mg all other days   Next INR check:  5/16/2023             Detailed voice message left for Lynda with dosing instructions and follow up date.     Contact [phone]  to schedule and with any changes, questions or concerns.     Education provided:     Please call back if any changes to your diet, medications or how you've been taking warfarin    Goal range and lab monitoring: goal range and significance of current result    Plan made per [facility] anticoagulation protocol    Lou Santiago RN  Anticoagulation Clinic  4/4/2023    _______________________________________________________________________     Anticoagulation Episode Summary     Current INR goal:  2.0-3.0   TTR:  78.9 % (1 y)   Target end date:  Indefinite   Send INR reminders to:  RON CRUZ    Indications    Chronic atrial fibrillation (H) [I48.20]  Status post catheter ablation of atrial fibrillation [Z98.890]  PVD (peripheral vascular disease) (H) [I73.9]  Anticoagulation goal of INR 2 to 3 [Z51.81  Z79.01]           Comments:           Anticoagulation Care Providers     Provider Role Specialty Phone number    Estela Davila MD Referring Internal Medicine [phone]

## 2023-04-20 DIAGNOSIS — E03.4 HYPOTHYROIDISM DUE TO ACQUIRED ATROPHY OF THYROID: ICD-10-CM

## 2023-04-20 RX ORDER — LEVOTHYROXINE SODIUM 100 UG/1
100 TABLET ORAL DAILY
Qty: 90 TABLET | Refills: 3 | Status: SHIPPED | OUTPATIENT
Start: 2023-04-20 | End: 2024-01-26

## 2023-04-20 NOTE — TELEPHONE ENCOUNTER
Medication Question or Refill    Contacts       Type Contact Phone/Fax    04/20/2023 09:44 AM CDT Phone (Incoming) Lynda Cooper (Self) [phone] (H)          What medication are you calling about (include dose and sig)?: LEVOTHYROXINE    Preferred Pharmacy:  [facility][id number] - [place] [address] [place] [address] [facility] [address]  Phone: [phone] Fax: [phone]      Controlled Substance Agreement on file:   CSA -- Patient Level:    CSA: None found at the patient level.       Who prescribed the medication?: TAISHA MORROW MD    Do you need a refill? Yes    When did you use the medication last? 4/20/23    Patient offered an appointment? No    Do you have any questions or concerns?  No      Okay to leave a detailed message?: Yes at Cell number on file:    No relevant phone numbers on file.

## 2023-05-16 ENCOUNTER — LAB (OUTPATIENT)
Dept: LAB | Facility: CLINIC | Age: 83
End: 2023-05-16
Payer: COMMERCIAL

## 2023-05-16 ENCOUNTER — ANTICOAGULATION THERAPY VISIT (OUTPATIENT)
Dept: ANTICOAGULATION | Facility: CLINIC | Age: 83
End: 2023-05-16

## 2023-05-16 DIAGNOSIS — Z51.81 ANTICOAGULATION GOAL OF INR 2 TO 3: ICD-10-CM

## 2023-05-16 DIAGNOSIS — Z98.890 STATUS POST CATHETER ABLATION OF ATRIAL FIBRILLATION: ICD-10-CM

## 2023-05-16 DIAGNOSIS — I48.20 CHRONIC ATRIAL FIBRILLATION (H): Primary | ICD-10-CM

## 2023-05-16 DIAGNOSIS — I48.20 CHRONIC ATRIAL FIBRILLATION (H): ICD-10-CM

## 2023-05-16 DIAGNOSIS — I73.9 PVD (PERIPHERAL VASCULAR DISEASE) (H): ICD-10-CM

## 2023-05-16 DIAGNOSIS — Z79.01 ANTICOAGULATION GOAL OF INR 2 TO 3: ICD-10-CM

## 2023-05-16 LAB — INR BLD: 3.7 (ref 0.9–1.1)

## 2023-05-16 PROCEDURE — 36416 COLLJ CAPILLARY BLOOD SPEC: CPT

## 2023-05-16 PROCEDURE — 85610 PROTHROMBIN TIME: CPT

## 2023-05-16 NOTE — PROGRESS NOTES
ANTICOAGULATION MANAGEMENT     Clementina Cooper 82 year old female is on warfarin with supratherapeutic INR result. (Goal INR 2.0-3.0)    Recent labs: (last 7 days)     05/16/23  0930   INR 3.7*       ASSESSMENT       Source(s): Chart Review    Previous INR was Therapeutic last 2(+) visits    Medication, diet, health changes since last INR chart reviewed; none identified             PLAN     Unable to reach Lynda today.    LMTCB    Follow up required to confirm warfarin dose taken and assess for changes and discuss out of range result     Lou Santiago RN  Anticoagulation Clinic  5/16/2023

## 2023-05-16 NOTE — PROGRESS NOTES
ANTICOAGULATION MANAGEMENT     Clementina Cooper 82 year old female is on warfarin with supratherapeutic INR result. (Goal INR 2.0-3.0)    Recent labs: (last 7 days)     05/16/23  0930   INR 3.7*       ASSESSMENT       Source(s): Chart Review and Patient/Caregiver Call       Warfarin doses taken: Warfarin taken as instructed    Diet: No new diet changes identified    Medication/supplement changes: None noted    New illness, injury, or hospitalization: No    Signs or symptoms of bleeding or clotting: No    Previous result: Therapeutic last 2(+) visits    Additional findings: None         PLAN     Recommended plan for no diet, medication or health factor changes affecting INR     Dosing Instructions: hold dose then decrease your warfarin dose (11.1% change) with next INR in 2 weeks       Summary  As of 5/16/2023    Full warfarin instructions:  5/16: Hold; Otherwise 5 mg every Tue; 2.5 mg all other days   Next INR check:  5/30/2023             Telephone call with Lynda who verbalizes understanding and agrees to plan    Lab visit scheduled    Education provided:     Goal range and lab monitoring: goal range and significance of current result    Plan made per [facility] anticoagulation protocol    Lou Santiago RN  Anticoagulation Clinic  5/16/2023    _______________________________________________________________________     Anticoagulation Episode Summary     Current INR goal:  2.0-3.0   TTR:  68.8 % (1 y)   Target end date:  Indefinite   Send INR reminders to:  RON CRUZ    Indications    Chronic atrial fibrillation (H) [I48.20]  Status post catheter ablation of atrial fibrillation [Z98.890]  PVD (peripheral vascular disease) (H) [I73.9]  Anticoagulation goal of INR 2 to 3 [Z51.81  Z79.01]           Comments:           Anticoagulation Care Providers     Provider Role Specialty Phone number    Estela Davila MD Referring Internal Medicine [phone]

## 2023-05-31 ENCOUNTER — ANTICOAGULATION THERAPY VISIT (OUTPATIENT)
Dept: ANTICOAGULATION | Facility: CLINIC | Age: 83
End: 2023-05-31

## 2023-05-31 ENCOUNTER — LAB (OUTPATIENT)
Dept: LAB | Facility: CLINIC | Age: 83
End: 2023-05-31
Payer: COMMERCIAL

## 2023-05-31 DIAGNOSIS — I48.20 CHRONIC ATRIAL FIBRILLATION (H): Primary | ICD-10-CM

## 2023-05-31 DIAGNOSIS — I73.9 PVD (PERIPHERAL VASCULAR DISEASE) (H): ICD-10-CM

## 2023-05-31 DIAGNOSIS — Z51.81 ANTICOAGULATION GOAL OF INR 2 TO 3: ICD-10-CM

## 2023-05-31 DIAGNOSIS — Z79.01 ANTICOAGULATION GOAL OF INR 2 TO 3: ICD-10-CM

## 2023-05-31 DIAGNOSIS — E11.65 TYPE 2 DIABETES MELLITUS WITH HYPERGLYCEMIA (H): ICD-10-CM

## 2023-05-31 DIAGNOSIS — Z98.890 STATUS POST CATHETER ABLATION OF ATRIAL FIBRILLATION: ICD-10-CM

## 2023-05-31 DIAGNOSIS — I48.20 CHRONIC ATRIAL FIBRILLATION (H): ICD-10-CM

## 2023-05-31 DIAGNOSIS — E11.65 TYPE 2 DIABETES MELLITUS WITH HYPERGLYCEMIA, WITHOUT LONG-TERM CURRENT USE OF INSULIN (H): ICD-10-CM

## 2023-05-31 DIAGNOSIS — E03.4 HYPOTHYROIDISM DUE TO ACQUIRED ATROPHY OF THYROID: ICD-10-CM

## 2023-05-31 LAB
ERYTHROCYTE [DISTWIDTH] IN BLOOD BY AUTOMATED COUNT: 12.8 % (ref 10–15)
HBA1C MFR BLD: 7.5 % (ref 0–5.6)
HCT VFR BLD AUTO: 45 % (ref 35–47)
HGB BLD-MCNC: 14.8 G/DL (ref 11.7–15.7)
INR BLD: 1.9 (ref 0.9–1.1)
MCH RBC QN AUTO: 29.8 PG (ref 26.5–33)
MCHC RBC AUTO-ENTMCNC: 32.9 G/DL (ref 31.5–36.5)
MCV RBC AUTO: 91 FL (ref 78–100)
PLATELET # BLD AUTO: 302 10E3/UL (ref 150–450)
RBC # BLD AUTO: 4.97 10E6/UL (ref 3.8–5.2)
TSH SERPL DL<=0.005 MIU/L-ACNC: 3.05 UIU/ML (ref 0.3–4.2)
WBC # BLD AUTO: 5.8 10E3/UL (ref 4–11)

## 2023-05-31 PROCEDURE — 85610 PROTHROMBIN TIME: CPT

## 2023-05-31 PROCEDURE — 85027 COMPLETE CBC AUTOMATED: CPT

## 2023-05-31 PROCEDURE — 36415 COLL VENOUS BLD VENIPUNCTURE: CPT

## 2023-05-31 PROCEDURE — 84443 ASSAY THYROID STIM HORMONE: CPT

## 2023-05-31 PROCEDURE — 83036 HEMOGLOBIN GLYCOSYLATED A1C: CPT

## 2023-05-31 NOTE — PROGRESS NOTES
ANTICOAGULATION MANAGEMENT     Clementina Cooper 82 year old female is on warfarin with subtherapeutic INR result. (Goal INR 2.0-3.0)    Recent labs: (last 7 days)     05/31/23  1116   INR 1.9*       ASSESSMENT       Source(s): Chart Review and Patient/Caregiver Call       Warfarin doses taken: Warfarin taken as instructed    Diet: No new diet changes identified    Medication/supplement changes: None noted    New illness, injury, or hospitalization: No    Signs or symptoms of bleeding or clotting: No    Previous result: Supratherapeutic    Additional findings: None     PLAN     Recommended plan for no diet, medication or health factor changes affecting INR     Dosing Instructions: Continue your current warfarin dose with next INR in 2 weeks       Discussed ordering new tablet strength with patient (2.5 mg tablets) - she expressed that she prefers to stay with 5 mg tablets at this time. Pt does not wish to have a new tablet strength ordered. Discussed with Zhane MARIE RPH and will continue maintenance dose and recheck in 2 weeks.    Summary  As of 5/31/2023    Full warfarin instructions:  5 mg every Tue; 2.5 mg all other days   Next INR check:  6/9/2023             Telephone call with Lynda who verbalizes understanding and agrees to plan    Lab visit scheduled    Education provided:     Please call back if any changes to your diet, medications or how you've been taking warfarin    Symptom monitoring: monitoring for clotting signs and symptoms    Plan made with [facility] Pharmacist Zhane Good RN  Anticoagulation Clinic  5/31/2023    _______________________________________________________________________     Anticoagulation Episode Summary     Current INR goal:  2.0-3.0   TTR:  67.0 % (1 y)   Target end date:  Indefinite   Send INR reminders to:  RON CRUZ    Indications    Chronic atrial fibrillation (H) [I48.20]  Status post catheter ablation of atrial fibrillation [Z98.890]  PVD (peripheral  vascular disease) (H) [I73.9]  Anticoagulation goal of INR 2 to 3 [Z51.81  Z79.01]           Comments:           Anticoagulation Care Providers     Provider Role Specialty Phone number    Estela Davila MD Referring Internal Medicine [phone]

## 2023-06-09 ENCOUNTER — LAB (OUTPATIENT)
Dept: LAB | Facility: CLINIC | Age: 83
End: 2023-06-09
Payer: COMMERCIAL

## 2023-06-09 ENCOUNTER — ANTICOAGULATION THERAPY VISIT (OUTPATIENT)
Dept: ANTICOAGULATION | Facility: CLINIC | Age: 83
End: 2023-06-09

## 2023-06-09 DIAGNOSIS — I48.20 CHRONIC ATRIAL FIBRILLATION (H): ICD-10-CM

## 2023-06-09 DIAGNOSIS — Z51.81 ANTICOAGULATION GOAL OF INR 2 TO 3: ICD-10-CM

## 2023-06-09 DIAGNOSIS — Z98.890 STATUS POST CATHETER ABLATION OF ATRIAL FIBRILLATION: ICD-10-CM

## 2023-06-09 DIAGNOSIS — I48.20 CHRONIC ATRIAL FIBRILLATION (H): Primary | ICD-10-CM

## 2023-06-09 DIAGNOSIS — Z79.01 ANTICOAGULATION GOAL OF INR 2 TO 3: ICD-10-CM

## 2023-06-09 DIAGNOSIS — E03.4 HYPOTHYROIDISM DUE TO ACQUIRED ATROPHY OF THYROID: ICD-10-CM

## 2023-06-09 DIAGNOSIS — E11.65 TYPE 2 DIABETES MELLITUS WITH HYPERGLYCEMIA, WITHOUT LONG-TERM CURRENT USE OF INSULIN (H): ICD-10-CM

## 2023-06-09 DIAGNOSIS — I73.9 PVD (PERIPHERAL VASCULAR DISEASE) (H): ICD-10-CM

## 2023-06-09 LAB — INR BLD: 2.5 (ref 0.9–1.1)

## 2023-06-09 PROCEDURE — 36415 COLL VENOUS BLD VENIPUNCTURE: CPT

## 2023-06-09 PROCEDURE — 85610 PROTHROMBIN TIME: CPT

## 2023-06-09 NOTE — PROGRESS NOTES
ANTICOAGULATION MANAGEMENT     Clementina Cooper 82 year old female is on warfarin with therapeutic INR result. (Goal INR 2.0-3.0)    Recent labs: (last 7 days)     06/09/23  0928   INR 2.5*       ASSESSMENT       Source(s): Chart Review    Previous INR was Subtherapeutic    Medication, diet, health changes since last INR chart reviewed; none identified             PLAN     Recommended plan for no diet, medication or health factor changes affecting INR     Dosing Instructions: Continue your current warfarin dose with next INR in 1-2  weeks       Summary  As of 6/9/2023    Full warfarin instructions:  5 mg every Tue; 2.5 mg all other days   Next INR check:  6/30/2023             Detailed voice message left for Lynda with dosing instructions and follow up date.     Contact [phone]  to schedule and with any changes, questions or concerns.     Education provided:     Please call back if any changes to your diet, medications or how you've been taking warfarin    Goal range and lab monitoring: goal range and significance of current result, Importance of therapeutic range and Importance of following up at instructed interval    Plan made per [facility] anticoagulation protocol    Emma Wallace RN  Anticoagulation Clinic  6/9/2023    _______________________________________________________________________     Anticoagulation Episode Summary     Current INR goal:  2.0-3.0   TTR:  66.6 % (1 y)   Target end date:  Indefinite   Send INR reminders to:  MUNAAG [place]    Indications    Chronic atrial fibrillation (H) [I48.20]  Status post catheter ablation of atrial fibrillation [Z98.890]  PVD (peripheral vascular disease) (H) [I73.9]  Anticoagulation goal of INR 2 to 3 [Z51.81  Z79.01]           Comments:           Anticoagulation Care Providers     Provider Role Specialty Phone number    Estela Davila MD Referring Internal Medicine [phone]

## 2023-06-16 ENCOUNTER — OFFICE VISIT (OUTPATIENT)
Dept: INTERNAL MEDICINE | Facility: CLINIC | Age: 83
End: 2023-06-16
Payer: COMMERCIAL

## 2023-06-16 VITALS
SYSTOLIC BLOOD PRESSURE: 113 MMHG | TEMPERATURE: 98 F | HEIGHT: 65 IN | HEART RATE: 68 BPM | BODY MASS INDEX: 30.32 KG/M2 | WEIGHT: 182 LBS | OXYGEN SATURATION: 100 % | DIASTOLIC BLOOD PRESSURE: 74 MMHG

## 2023-06-16 DIAGNOSIS — Z79.01 LONG TERM CURRENT USE OF ANTICOAGULANT THERAPY: ICD-10-CM

## 2023-06-16 DIAGNOSIS — E03.4 HYPOTHYROIDISM DUE TO ACQUIRED ATROPHY OF THYROID: ICD-10-CM

## 2023-06-16 DIAGNOSIS — Z79.01 ANTICOAGULATION GOAL OF INR 2 TO 3: ICD-10-CM

## 2023-06-16 DIAGNOSIS — E11.9 TYPE 2 DIABETES MELLITUS WITHOUT COMPLICATION, WITHOUT LONG-TERM CURRENT USE OF INSULIN (H): Primary | ICD-10-CM

## 2023-06-16 DIAGNOSIS — G25.0 ESSENTIAL TREMOR: ICD-10-CM

## 2023-06-16 DIAGNOSIS — Z98.890 STATUS POST CATHETER ABLATION OF ATRIAL FIBRILLATION: ICD-10-CM

## 2023-06-16 DIAGNOSIS — I48.20 CHRONIC ATRIAL FIBRILLATION (H): ICD-10-CM

## 2023-06-16 DIAGNOSIS — Z51.81 ANTICOAGULATION GOAL OF INR 2 TO 3: ICD-10-CM

## 2023-06-16 DIAGNOSIS — M15.0 PRIMARY OSTEOARTHRITIS INVOLVING MULTIPLE JOINTS: ICD-10-CM

## 2023-06-16 DIAGNOSIS — I48.20 CHRONIC ATRIAL FIBRILLATION (H): Primary | ICD-10-CM

## 2023-06-16 DIAGNOSIS — I73.9 PVD (PERIPHERAL VASCULAR DISEASE) (H): ICD-10-CM

## 2023-06-16 PROCEDURE — 99213 OFFICE O/P EST LOW 20 MIN: CPT | Performed by: INTERNAL MEDICINE

## 2023-06-16 RX ORDER — WARFARIN SODIUM 5 MG/1
TABLET ORAL
Qty: 65 TABLET | Refills: 3 | Status: SHIPPED | OUTPATIENT
Start: 2023-06-16 | End: 2024-09-13

## 2023-06-16 RX ORDER — LANCING DEVICE/LANCETS
KIT MISCELLANEOUS
Qty: 1 EACH | Refills: 3 | Status: SHIPPED | OUTPATIENT
Start: 2023-06-16 | End: 2024-06-13

## 2023-06-16 RX ORDER — PROPRANOLOL HYDROCHLORIDE 10 MG/1
TABLET ORAL
Qty: 180 TABLET | Refills: 3 | Status: SHIPPED | OUTPATIENT
Start: 2023-06-16 | End: 2024-01-26

## 2023-06-16 RX ORDER — LOSARTAN POTASSIUM 25 MG/1
TABLET ORAL
Qty: 45 TABLET | Refills: 3 | Status: SHIPPED | OUTPATIENT
Start: 2023-06-16 | End: 2024-01-26

## 2023-06-16 RX ORDER — GLIPIZIDE 5 MG/1
10 TABLET, FILM COATED, EXTENDED RELEASE ORAL DAILY
Qty: 180 TABLET | Refills: 3 | Status: SHIPPED | OUTPATIENT
Start: 2023-06-16 | End: 2024-01-26

## 2023-06-16 NOTE — PROGRESS NOTES
Assessment & Plan     Type 2 diabetes mellitus without complication, without long-term current use of insulin (H)  She would like a further reduction of glipizide due to weekly low sugars in afternoones.    - blood glucose (NO BRAND SPECIFIED) test strip; Use to test blood sugar 1 times daily  .  - blood glucose (ONE TOUCH DELICA) lancing device; Device to be used with lancets.   Okay to use alternative device if better coverage.  She needs to replace her One Touch UltraSoft Lancing device.  Thank you  - glipiZIDE (GLUCOTROL XL) 5 MG 24 hr tablet; Take 2 tablets (10 mg) by mouth daily .  - empagliflozin (JARDIANCE) 25 MG TABS tablet; Take 1 tablet (25 mg) by mouth daily  - losartan (COZAAR) 25 MG tablet; TAKE 1/2 TABLET BY MOUTH DAILY    PVD (peripheral vascular disease) (H)   no symptoms   She is not walking anymore due to R hip dejenerative joint arthritis      Long-term (current) use of anticoagulants [Z79.01]   coumadin     Chronic atrial fibrillation (H)   controlled rate.      Status post catheter ablation of atrial fibrillation       Hypothyroidism due to acquired atrophy of thyroid   euthyroid     Primary osteoarthritis involving multiple joints   limited mobility  Has handicap parking     Essential tremor   refill   - propranolol (INDERAL) 10 MG tablet; TAKE 1 TABLET BY MOUTH TWICE A DAY AS NEEDED    Anticoagulation goal of INR 2 to 3     - warfarin ANTICOAGULANT (COUMADIN) 5 MG tablet; TAKES 1 TAB BY MOUTH MON, WED AND FRI & 1/2 TAB ALL OTHER DAYS UNLESS DIRECTED OTHERWISE BY [facility].      I spent a total of 20 minutes on the day of the visit.   Time spent by me doing chart review, history and exam, documentation and further activities per the note            Estela Davila MD  [employer] [place] is a 82 year old, presenting for the following health issues:  Diabetes      83 y/o  F here for DM recheck.       H/o SNHL, PVD, DMII, chronic Afib (coumadin), s/p  "Pulm Vein Isolation for Afib 2018,  hypothyroid, tressa Knee djd (injections via Ortho),  and essential tremor     She sees Dr Lopez for prn injections (OA knees, R hip DJD)     *6 mo ago, reduced glipizide a little>> Her A1C 5/31/23 = 7.5 .   She would like to reduce glipizide a little more due to weekly low BS    OTher labs (TSH, CBC) Looked great       She is no longer walking daily    Has R hip bursitis which gets exacerbated with walking.   \"So I just quit\"    She does not want intervention.     Her  had developed neuropathy so he can no longer go walking with her daily anyway.                 6/16/2023     2:12 PM   Additional Questions   Roomed by Dannielle BRAY     Diabetes Follow-up    How often are you checking your blood sugar? One time daily  What time of day are you checking your blood sugars (select all that apply)?  Before meals  Have you had any blood sugars above 200?  No  Have you had any blood sugars below 70?  No    What symptoms do you notice when your blood sugar is low?  Shaky and hunger    What concerns do you have today about your diabetes? None     Do you have any of these symptoms? (Select all that apply)  No numbness or tingling in feet.  No redness, sores or blisters on feet.  No complaints of excessive thirst.  No reports of blurry vision.  No significant changes to weight.      BP Readings from Last 2 Encounters:   01/12/23 127/71   07/06/22 114/66     Hemoglobin A1C (%)   Date Value   05/31/2023 7.5 (H)   11/21/2022 7.0 (H)   04/19/2021 8.2 (H)   01/21/2021 9.2 (H)     LDL Cholesterol Calculated (mg/dL)   Date Value   11/21/2022 85   11/16/2021 94   10/16/2020 72   05/21/2019 73            How many servings of fruits and vegetables do you eat daily?  0-1    On average, how many sweetened beverages do you drink each day (Examples: soda, juice, sweet tea, etc.  Do NOT count diet or artificially sweetened beverages)?   0    How many days per week do you exercise enough to make " "your heart beat faster? 3 or less    How many minutes a day do you exercise enough to make your heart beat faster? 9 or less    How many days per week do you miss taking your medication? 0          Review of Systems   Constitutional, HEENT, cardiovascular, pulmonary, gi and gu systems are negative, except as otherwise noted.      Objective    /74 (BP Location: Left arm, Patient Position: Sitting, Cuff Size: Adult Large)   Pulse 68   Temp 98  F (36.7  C) (Oral)   Ht 1.651 m (5' 5\")   Wt 82.6 kg (182 lb)   LMP  (LMP Unknown)   SpO2 100%   BMI 30.29 kg/m    There is no height or weight on file to calculate BMI.  Physical Exam   GENERAL: healthy, alert and no distress  EYES: Eyes grossly normal to inspection, PERRL and conjunctivae and sclerae normal  RESP: lungs clear to auscultation - no rales, rhonchi or wheezes  CV: regular rate and rhythm, normal S1 S2, no S3 or S4, no murmur, click or rub, no peripheral edema and peripheral pulses strong  MS: no gross musculoskeletal defects noted, no edema  NEURO: Normal strength and tone, mentation intact and speech normal  PSYCH: mentation appears normal, affect normal/bright    Lab on 06/09/2023   Component Date Value Ref Range Status     INR 06/09/2023 2.5 (H)  0.9 - 1.1 Final     A1C is 7.5                "

## 2023-06-16 NOTE — PATIENT INSTRUCTIONS
Annual Physical due 1/2024.       You can check the A1C again in early September if you want.     Reduce glipizide to TWO tabs at supper.   (No more morning dose)

## 2023-06-23 ENCOUNTER — ANTICOAGULATION THERAPY VISIT (OUTPATIENT)
Dept: ANTICOAGULATION | Facility: CLINIC | Age: 83
End: 2023-06-23

## 2023-06-23 ENCOUNTER — LAB (OUTPATIENT)
Dept: LAB | Facility: CLINIC | Age: 83
End: 2023-06-23
Payer: COMMERCIAL

## 2023-06-23 DIAGNOSIS — I48.20 CHRONIC ATRIAL FIBRILLATION (H): Primary | ICD-10-CM

## 2023-06-23 DIAGNOSIS — I48.20 CHRONIC ATRIAL FIBRILLATION (H): ICD-10-CM

## 2023-06-23 DIAGNOSIS — Z98.890 STATUS POST CATHETER ABLATION OF ATRIAL FIBRILLATION: ICD-10-CM

## 2023-06-23 DIAGNOSIS — Z51.81 ANTICOAGULATION GOAL OF INR 2 TO 3: ICD-10-CM

## 2023-06-23 DIAGNOSIS — Z79.01 ANTICOAGULATION GOAL OF INR 2 TO 3: ICD-10-CM

## 2023-06-23 DIAGNOSIS — I73.9 PVD (PERIPHERAL VASCULAR DISEASE) (H): ICD-10-CM

## 2023-06-23 LAB — INR BLD: 2.7 (ref 0.9–1.1)

## 2023-06-23 PROCEDURE — 36416 COLLJ CAPILLARY BLOOD SPEC: CPT

## 2023-06-23 PROCEDURE — 85610 PROTHROMBIN TIME: CPT

## 2023-06-23 NOTE — PROGRESS NOTES
ANTICOAGULATION MANAGEMENT     Clementina Cooper 82 year old female is on warfarin with therapeutic INR result. (Goal INR 2.0-3.0)    Recent labs: (last 7 days)     06/23/23  1056   INR 2.7*       ASSESSMENT       Source(s): Chart Review and Patient/Caregiver Call       Warfarin doses taken: Warfarin taken as instructed    Diet: No new diet changes identified    Medication/supplement changes: None noted    New illness, injury, or hospitalization: No    Signs or symptoms of bleeding or clotting: No    Previous result: Therapeutic last visit; previously outside of goal range    Additional findings: None         PLAN     Recommended plan for no diet, medication or health factor changes affecting INR     Dosing Instructions: Continue your current warfarin dose with next INR in 3 weeks       Summary  As of 6/23/2023    Full warfarin instructions:  5 mg every Tue; 2.5 mg all other days   Next INR check:  7/14/2023             Telephone call with Lynda who verbalizes understanding and agrees to plan    Lab visit scheduled    Education provided:     Goal range and lab monitoring: goal range and significance of current result    Plan made per [facility] anticoagulation protocol    Lou Santiago RN  Anticoagulation Clinic  6/23/2023    _______________________________________________________________________     Anticoagulation Episode Summary     Current INR goal:  2.0-3.0   TTR:  66.6 % (1 y)   Target end date:  Indefinite   Send INR reminders to:  RON CRUZ    Indications    Chronic atrial fibrillation (H) [I48.20]  Status post catheter ablation of atrial fibrillation [Z98.890]  PVD (peripheral vascular disease) (H) [I73.9]  Anticoagulation goal of INR 2 to 3 [Z51.81  Z79.01]           Comments:           Anticoagulation Care Providers     Provider Role Specialty Phone number    Estela Davila MD Referring Internal Medicine [phone]

## 2023-07-06 ENCOUNTER — TELEPHONE (OUTPATIENT)
Dept: INTERNAL MEDICINE | Facility: CLINIC | Age: 83
End: 2023-07-06
Payer: COMMERCIAL

## 2023-07-06 DIAGNOSIS — E11.9 TYPE 2 DIABETES MELLITUS WITHOUT COMPLICATION, WITHOUT LONG-TERM CURRENT USE OF INSULIN (H): Primary | ICD-10-CM

## 2023-07-06 NOTE — TELEPHONE ENCOUNTER
Received incoming fax from pharmacy requesting a Rx for lancets. Pharmacy received Rx for lancet device but no lancets.     Please advise,    Jessica Velazquez, CMA

## 2023-07-14 ENCOUNTER — TELEPHONE (OUTPATIENT)
Dept: FAMILY MEDICINE | Facility: CLINIC | Age: 83
End: 2023-07-14
Payer: COMMERCIAL

## 2023-07-14 DIAGNOSIS — I48.20 CHRONIC ATRIAL FIBRILLATION (H): Primary | ICD-10-CM

## 2023-07-14 NOTE — TELEPHONE ENCOUNTER
ANTICOAGULATION CLINIC REFERRAL RENEWAL REQUEST       An annual renewal order is required for all patients referred to [facility].?  Please review and sign the pended referral order for Clementina Cooper.       ANTICOAGULATION SUMMARY      Warfarin indication(s)   Atrial Fibrillation and PVD    Mechanical heart valve present?  NO       Current goal range   INR: 2.0-3.0     Goal appropriate for indication? Goal INR 2-3, standard for indication(s) above     Time in Therapeutic Range (TTR)  (Goal > 60%) 66.6%       Office visit with referring provider's group within last year yes on 6/16/23       Lou Santiago RN  [facility]

## 2023-07-21 ENCOUNTER — ANTICOAGULATION THERAPY VISIT (OUTPATIENT)
Dept: ANTICOAGULATION | Facility: CLINIC | Age: 83
End: 2023-07-21

## 2023-07-21 ENCOUNTER — LAB (OUTPATIENT)
Dept: LAB | Facility: CLINIC | Age: 83
End: 2023-07-21
Payer: COMMERCIAL

## 2023-07-21 DIAGNOSIS — I48.20 CHRONIC ATRIAL FIBRILLATION (H): Primary | ICD-10-CM

## 2023-07-21 DIAGNOSIS — Z98.890 STATUS POST CATHETER ABLATION OF ATRIAL FIBRILLATION: ICD-10-CM

## 2023-07-21 DIAGNOSIS — I48.20 CHRONIC ATRIAL FIBRILLATION (H): ICD-10-CM

## 2023-07-21 DIAGNOSIS — I73.9 PVD (PERIPHERAL VASCULAR DISEASE) (H): ICD-10-CM

## 2023-07-21 DIAGNOSIS — Z51.81 ANTICOAGULATION GOAL OF INR 2 TO 3: ICD-10-CM

## 2023-07-21 DIAGNOSIS — Z79.01 ANTICOAGULATION GOAL OF INR 2 TO 3: ICD-10-CM

## 2023-07-21 LAB — INR BLD: 2.3 (ref 0.9–1.1)

## 2023-07-21 PROCEDURE — 36416 COLLJ CAPILLARY BLOOD SPEC: CPT

## 2023-07-21 PROCEDURE — 85610 PROTHROMBIN TIME: CPT

## 2023-07-21 NOTE — PROGRESS NOTES
ANTICOAGULATION MANAGEMENT     Clementina Cooper 83 year old female is on warfarin with therapeutic INR result. (Goal INR 2.0-3.0)    Recent labs: (last 7 days)     07/21/23  0943   INR 2.3*       ASSESSMENT       Source(s): Chart Review and Patient/Caregiver Call       Warfarin doses taken: Warfarin taken as instructed    Diet: No new diet changes identified    Medication/supplement changes: None noted    New illness, injury, or hospitalization: No    Signs or symptoms of bleeding or clotting: No    Previous result: Therapeutic last 2(+) visits    Additional findings: None         PLAN     Recommended plan for no diet, medication or health factor changes affecting INR     Dosing Instructions: Continue your current warfarin dose with next INR in 5 weeks       Summary  As of 7/21/2023    Full warfarin instructions:  5 mg every Tue; 2.5 mg all other days   Next INR check:  8/25/2023             Telephone call with Lynda who verbalizes understanding and agrees to plan    Lab visit scheduled    Education provided:     Goal range and lab monitoring: goal range and significance of current result    Plan made per [facility] anticoagulation protocol    Lou Santiago RN  Anticoagulation Clinic  7/21/2023    _______________________________________________________________________     Anticoagulation Episode Summary     Current INR goal:  2.0-3.0   TTR:  66.6 % (1 y)   Target end date:  Indefinite   Send INR reminders to:  MUNAAG NANCY    Indications    Chronic atrial fibrillation (H) [I48.20]  Status post catheter ablation of atrial fibrillation [Z98.890]  PVD (peripheral vascular disease) (H) [I73.9]  Anticoagulation goal of INR 2 to 3 [Z51.81  Z79.01]           Comments:           Anticoagulation Care Providers     Provider Role Specialty Phone number    Estela Davila MD Referring Internal Medicine [phone]

## 2023-07-24 ENCOUNTER — OFFICE VISIT (OUTPATIENT)
Dept: OPHTHALMOLOGY | Facility: CLINIC | Age: 83
End: 2023-07-24
Payer: COMMERCIAL

## 2023-07-24 DIAGNOSIS — H40.003 GLAUCOMA SUSPECT, BILATERAL: Primary | ICD-10-CM

## 2023-07-24 PROCEDURE — 92133 CPTRZD OPH DX IMG PST SGM ON: CPT | Performed by: OPHTHALMOLOGY

## 2023-07-24 PROCEDURE — 92012 INTRM OPH EXAM EST PATIENT: CPT | Performed by: OPHTHALMOLOGY

## 2023-07-24 PROCEDURE — 92083 EXTENDED VISUAL FIELD XM: CPT | Performed by: OPHTHALMOLOGY

## 2023-07-24 ASSESSMENT — PACHYMETRY
OS_CT(UM): 538
OD_CT(UM): 531

## 2023-07-24 ASSESSMENT — TONOMETRY
IOP_METHOD: APPLANATION
OD_IOP_MMHG: 16
OS_IOP_MMHG: 17

## 2023-07-24 ASSESSMENT — SLIT LAMP EXAM - LIDS
COMMENTS: 1+ DERMATOCHALASIS - UPPER LID
COMMENTS: 1+ DERMATOCHALASIS - UPPER LID

## 2023-07-24 ASSESSMENT — VISUAL ACUITY
OD_SC+: -2
OD_SC: 20/20
OS_SC: 20/30
METHOD: SNELLEN - LINEAR

## 2023-07-24 ASSESSMENT — CUP TO DISC RATIO
OD_RATIO: 0.7
OS_RATIO: 0.6

## 2023-07-24 ASSESSMENT — EXTERNAL EXAM - RIGHT EYE: OD_EXAM: NORMAL

## 2023-07-24 ASSESSMENT — EXTERNAL EXAM - LEFT EYE: OS_EXAM: NORMAL

## 2023-07-24 NOTE — LETTER
7/24/2023         RE: Clementina Cooper  [address] [facility] [id number]        Dear Colleague,    Thank you for referring your patient, Clementina Cooper, to the [facility]. Please see a copy of my visit note below.     Current Eye Medications:  None     Subjective:  Here for glaucoma suspect follow up with testing. Vision is stable. No eye pain or discomfort.      Objective:  See Ophthalmology Exam.       Assessment:  Stable intraocular pressure both eyes.  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  Garcia Visual Field mostly within normal limits both eyes.  Patient would like to remain off drops with more frequent monitoring. (NB: GDX from 2011 & 2013 show similar NFL thinning both eyes.)      Plan:  Continue observation with regard to glaucoma suspect status.  Return visit in 6 months for a complete exam.   Rahul Jennings M.D.  [phone]    Again, thank you for allowing me to participate in the care of your patient.        Sincerely,        Rahul Jennings MD

## 2023-07-24 NOTE — PATIENT INSTRUCTIONS
Continue observation with regard to glaucoma suspect status.  Return visit in 6 months for a complete exam.   Rahul Jennings M.D.  [phone]

## 2023-07-24 NOTE — PROGRESS NOTES
Current Eye Medications:  None     Subjective:  Here for glaucoma suspect follow up with testing. Vision is stable. No eye pain or discomfort.      Objective:  See Ophthalmology Exam.       Assessment:  Stable intraocular pressure both eyes.  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  Garcia Visual Field mostly within normal limits both eyes.  Patient would like to remain off drops with more frequent monitoring. (NB: GDX from 2011 & 2013 show similar NFL thinning both eyes.)      Plan:  Continue observation with regard to glaucoma suspect status.  Return visit in 6 months for a complete exam.   Rahul Jennings M.D.  [phone]

## 2023-08-25 ENCOUNTER — ANTICOAGULATION THERAPY VISIT (OUTPATIENT)
Dept: ANTICOAGULATION | Facility: CLINIC | Age: 83
End: 2023-08-25

## 2023-08-25 ENCOUNTER — LAB (OUTPATIENT)
Dept: LAB | Facility: CLINIC | Age: 83
End: 2023-08-25
Payer: COMMERCIAL

## 2023-08-25 DIAGNOSIS — Z51.81 ANTICOAGULATION GOAL OF INR 2 TO 3: ICD-10-CM

## 2023-08-25 DIAGNOSIS — I48.20 CHRONIC ATRIAL FIBRILLATION (H): Primary | ICD-10-CM

## 2023-08-25 DIAGNOSIS — E11.65 TYPE 2 DIABETES MELLITUS WITH HYPERGLYCEMIA, WITHOUT LONG-TERM CURRENT USE OF INSULIN (H): Primary | ICD-10-CM

## 2023-08-25 DIAGNOSIS — Z98.890 STATUS POST CATHETER ABLATION OF ATRIAL FIBRILLATION: ICD-10-CM

## 2023-08-25 DIAGNOSIS — I73.9 PVD (PERIPHERAL VASCULAR DISEASE) (H): ICD-10-CM

## 2023-08-25 DIAGNOSIS — Z79.01 ANTICOAGULATION GOAL OF INR 2 TO 3: ICD-10-CM

## 2023-08-25 DIAGNOSIS — I48.20 CHRONIC ATRIAL FIBRILLATION (H): ICD-10-CM

## 2023-08-25 LAB — INR BLD: 2.7 (ref 0.9–1.1)

## 2023-08-25 PROCEDURE — 36416 COLLJ CAPILLARY BLOOD SPEC: CPT

## 2023-08-25 PROCEDURE — 85610 PROTHROMBIN TIME: CPT

## 2023-08-25 NOTE — PROGRESS NOTES
ANTICOAGULATION MANAGEMENT     Clementina Cooper 83 year old female is on warfarin with therapeutic INR result. (Goal INR 2.0-3.0)    Recent labs: (last 7 days)     08/25/23  0921   INR 2.7*       ASSESSMENT     Source(s): Chart Review and Patient/Caregiver Call     Warfarin doses taken: Warfarin taken as instructed  Diet: No new diet changes identified  Medication/supplement changes: None noted  New illness, injury, or hospitalization: No  Signs or symptoms of bleeding or clotting: No  Previous result: Therapeutic last 2(+) visits  Additional findings: None       PLAN     Recommended plan for no diet, medication or health factor changes affecting INR     Dosing Instructions: Continue your current warfarin dose with next INR in 6 weeks       Summary  As of 8/25/2023      Full warfarin instructions:  5 mg every Tue; 2.5 mg all other days   Next INR check:  10/6/2023               Telephone call with Lynda who verbalizes understanding and agrees to plan and who agrees to plan and repeated back plan correctly    Lab visit scheduled    Education provided:   Please call back if any changes to your diet, medications or how you've been taking warfarin  Goal range and lab monitoring: goal range and significance of current result and Importance of therapeutic range    Plan made per [facility] anticoagulation protocol    Abbie Luis RN  Anticoagulation Clinic  8/25/2023    _______________________________________________________________________     Anticoagulation Episode Summary       Current INR goal:  2.0-3.0   TTR:  66.6 % (1 y)   Target end date:  Indefinite   Send INR reminders to:  MUNAAG NANCY    Indications    Chronic atrial fibrillation (H) [I48.20]  Status post catheter ablation of atrial fibrillation [Z98.890]  PVD (peripheral vascular disease) (H) [I73.9]  Anticoagulation goal of INR 2 to 3 [Z51.81  Z79.01]             Comments:               Anticoagulation Care Providers       Provider Role Specialty  Phone number    Estela Davila MD Referring Internal Medicine [phone]

## 2023-10-06 ENCOUNTER — ANTICOAGULATION THERAPY VISIT (OUTPATIENT)
Dept: ANTICOAGULATION | Facility: CLINIC | Age: 83
End: 2023-10-06

## 2023-10-06 ENCOUNTER — LAB (OUTPATIENT)
Dept: LAB | Facility: CLINIC | Age: 83
End: 2023-10-06
Payer: COMMERCIAL

## 2023-10-06 DIAGNOSIS — I73.9 PVD (PERIPHERAL VASCULAR DISEASE) (H): ICD-10-CM

## 2023-10-06 DIAGNOSIS — Z51.81 ANTICOAGULATION GOAL OF INR 2 TO 3: ICD-10-CM

## 2023-10-06 DIAGNOSIS — Z79.01 ANTICOAGULATION GOAL OF INR 2 TO 3: ICD-10-CM

## 2023-10-06 DIAGNOSIS — I48.20 CHRONIC ATRIAL FIBRILLATION (H): Primary | ICD-10-CM

## 2023-10-06 DIAGNOSIS — Z98.890 STATUS POST CATHETER ABLATION OF ATRIAL FIBRILLATION: ICD-10-CM

## 2023-10-06 DIAGNOSIS — I48.20 CHRONIC ATRIAL FIBRILLATION (H): ICD-10-CM

## 2023-10-06 LAB — INR BLD: 2.3 (ref 0.9–1.1)

## 2023-10-06 PROCEDURE — 85610 PROTHROMBIN TIME: CPT

## 2023-10-06 PROCEDURE — 36416 COLLJ CAPILLARY BLOOD SPEC: CPT

## 2023-10-06 NOTE — PROGRESS NOTES
ANTICOAGULATION MANAGEMENT     Clementina Cooper 83 year old female is on warfarin with therapeutic INR result. (Goal INR 2.0-3.0)    Recent labs: (last 7 days)     10/06/23  0922   INR 2.3*       ASSESSMENT     Source(s): Chart Review and Patient/Caregiver Call     Warfarin doses taken: Warfarin taken as instructed  Diet: No new diet changes identified  Medication/supplement changes: None noted  New illness, injury, or hospitalization: No  Signs or symptoms of bleeding or clotting: No  Previous result: Therapeutic last 2(+) visits  Additional findings: None       PLAN     Recommended plan for no diet, medication or health factor changes affecting INR     Dosing Instructions: Continue your current warfarin dose with next INR in 6 weeks       Summary  As of 10/6/2023      Full warfarin instructions:  5 mg every Tue; 2.5 mg all other days   Next INR check:  11/17/2023               Telephone call with Lynda who verbalizes understanding and agrees to plan    Lab visit scheduled    Education provided:   Goal range and lab monitoring: goal range and significance of current result    Plan made per [facility] anticoagulation protocol    Lou Santiago RN  Anticoagulation Clinic  10/6/2023    _______________________________________________________________________     Anticoagulation Episode Summary       Current INR goal:  2.0-3.0   TTR:  76.6 % (1 y)   Target end date:  Indefinite   Send INR reminders to:  RON CRUZ    Indications    Chronic atrial fibrillation (H) [I48.20]  Status post catheter ablation of atrial fibrillation [Z98.890]  PVD (peripheral vascular disease) (H24) [I73.9]  Anticoagulation goal of INR 2 to 3 [Z51.81  Z79.01]             Comments:               Anticoagulation Care Providers       Provider Role Specialty Phone number    Estela Davila MD Referring Internal Medicine [phone]

## 2023-11-17 ENCOUNTER — LAB (OUTPATIENT)
Dept: LAB | Facility: CLINIC | Age: 83
End: 2023-11-17
Payer: COMMERCIAL

## 2023-11-17 ENCOUNTER — ANTICOAGULATION THERAPY VISIT (OUTPATIENT)
Dept: ANTICOAGULATION | Facility: CLINIC | Age: 83
End: 2023-11-17

## 2023-11-17 DIAGNOSIS — I73.9 PVD (PERIPHERAL VASCULAR DISEASE) (H): ICD-10-CM

## 2023-11-17 DIAGNOSIS — Z79.01 ANTICOAGULATION GOAL OF INR 2 TO 3: ICD-10-CM

## 2023-11-17 DIAGNOSIS — Z51.81 ANTICOAGULATION GOAL OF INR 2 TO 3: ICD-10-CM

## 2023-11-17 DIAGNOSIS — I48.20 CHRONIC ATRIAL FIBRILLATION (H): Primary | ICD-10-CM

## 2023-11-17 DIAGNOSIS — I48.20 CHRONIC ATRIAL FIBRILLATION (H): ICD-10-CM

## 2023-11-17 DIAGNOSIS — Z98.890 STATUS POST CATHETER ABLATION OF ATRIAL FIBRILLATION: ICD-10-CM

## 2023-11-17 LAB — INR BLD: 2.5 (ref 0.9–1.1)

## 2023-11-17 PROCEDURE — 85610 PROTHROMBIN TIME: CPT

## 2023-11-17 PROCEDURE — 36415 COLL VENOUS BLD VENIPUNCTURE: CPT

## 2023-11-17 NOTE — PROGRESS NOTES
ANTICOAGULATION MANAGEMENT     Clementina Cooper 83 year old female is on warfarin with therapeutic INR result. (Goal INR 2.0-3.0)    Recent labs: (last 7 days)     11/17/23  0929   INR 2.5*       ASSESSMENT     Source(s): Chart Review  Previous INR was Therapeutic last 2(+) visits  Medication, diet, health changes since last INR chart reviewed; none identified         PLAN     Recommended plan for no diet, medication or health factor changes affecting INR     Dosing Instructions: Continue your current warfarin dose with next INR in 6 weeks       Summary  As of 11/17/2023      Full warfarin instructions:  5 mg every Tue; 2.5 mg all other days   Next INR check:  12/29/2023               Detailed voice message left for Lynda with dosing instructions and follow up date.     Contact [phone]  to schedule and with any changes, questions or concerns.     Education provided:   Please call back if any changes to your diet, medications or how you've been taking warfarin  Goal range and lab monitoring: goal range and significance of current result    Plan made per [facility] anticoagulation protocol    Lou Santiago RN  Anticoagulation Clinic  11/17/2023    _______________________________________________________________________     Anticoagulation Episode Summary       Current INR goal:  2.0-3.0   TTR:  81.9% (1 y)   Target end date:  Indefinite   Send INR reminders to:  RON CRUZ    Indications    Chronic atrial fibrillation (H) [I48.20]  Status post catheter ablation of atrial fibrillation [Z98.890]  PVD (peripheral vascular disease) (H24) [I73.9]  Anticoagulation goal of INR 2 to 3 [Z51.81  Z79.01]             Comments:               Anticoagulation Care Providers       Provider Role Specialty Phone number    Estela Davila MD Referring HOSPITALIST [phone]

## 2023-12-20 ENCOUNTER — NURSE TRIAGE (OUTPATIENT)
Dept: FAMILY MEDICINE | Facility: CLINIC | Age: 83
End: 2023-12-20

## 2023-12-20 ENCOUNTER — OFFICE VISIT (OUTPATIENT)
Dept: FAMILY MEDICINE | Facility: CLINIC | Age: 83
End: 2023-12-20
Payer: COMMERCIAL

## 2023-12-20 ENCOUNTER — TELEPHONE (OUTPATIENT)
Dept: ANTICOAGULATION | Facility: CLINIC | Age: 83
End: 2023-12-20

## 2023-12-20 ENCOUNTER — ANCILLARY PROCEDURE (OUTPATIENT)
Dept: GENERAL RADIOLOGY | Facility: CLINIC | Age: 83
End: 2023-12-20
Attending: PHYSICIAN ASSISTANT
Payer: COMMERCIAL

## 2023-12-20 VITALS
HEIGHT: 65 IN | TEMPERATURE: 97.7 F | DIASTOLIC BLOOD PRESSURE: 65 MMHG | HEART RATE: 75 BPM | OXYGEN SATURATION: 93 % | BODY MASS INDEX: 30.32 KG/M2 | RESPIRATION RATE: 16 BRPM | WEIGHT: 182 LBS | SYSTOLIC BLOOD PRESSURE: 131 MMHG

## 2023-12-20 DIAGNOSIS — Z79.01 ANTICOAGULATION GOAL OF INR 2 TO 3: ICD-10-CM

## 2023-12-20 DIAGNOSIS — R05.1 ACUTE COUGH: ICD-10-CM

## 2023-12-20 DIAGNOSIS — I73.9 PVD (PERIPHERAL VASCULAR DISEASE) (H): ICD-10-CM

## 2023-12-20 DIAGNOSIS — E11.65 TYPE 2 DIABETES MELLITUS WITH HYPERGLYCEMIA, WITHOUT LONG-TERM CURRENT USE OF INSULIN (H): ICD-10-CM

## 2023-12-20 DIAGNOSIS — Z51.81 ANTICOAGULATION GOAL OF INR 2 TO 3: ICD-10-CM

## 2023-12-20 DIAGNOSIS — J18.9 PNEUMONIA OF RIGHT LOWER LOBE DUE TO INFECTIOUS ORGANISM: Primary | ICD-10-CM

## 2023-12-20 DIAGNOSIS — Z98.890 STATUS POST CATHETER ABLATION OF ATRIAL FIBRILLATION: ICD-10-CM

## 2023-12-20 DIAGNOSIS — I48.20 CHRONIC ATRIAL FIBRILLATION (H): Primary | ICD-10-CM

## 2023-12-20 PROBLEM — Z98.1 S/P CERVICAL SPINAL FUSION: Status: ACTIVE | Noted: 2023-12-20

## 2023-12-20 PROBLEM — M19.90 DJD (DEGENERATIVE JOINT DISEASE): Status: ACTIVE | Noted: 2023-12-20

## 2023-12-20 PROBLEM — I25.10 CORONARY ATHEROSCLEROSIS: Status: ACTIVE | Noted: 2023-12-20

## 2023-12-20 PROCEDURE — 71046 X-RAY EXAM CHEST 2 VIEWS: CPT | Mod: TC | Performed by: RADIOLOGY

## 2023-12-20 PROCEDURE — 99213 OFFICE O/P EST LOW 20 MIN: CPT | Performed by: PHYSICIAN ASSISTANT

## 2023-12-20 RX ORDER — AZITHROMYCIN 250 MG/1
TABLET, FILM COATED ORAL
Qty: 6 TABLET | Refills: 0 | Status: SHIPPED | OUTPATIENT
Start: 2023-12-20 | End: 2023-12-21 | Stop reason: SINTOL

## 2023-12-20 RX ORDER — RESPIRATORY SYNCYTIAL VIRUS VACCINE 120MCG/0.5
0.5 KIT INTRAMUSCULAR ONCE
Qty: 1 EACH | Refills: 0 | Status: CANCELLED | OUTPATIENT
Start: 2023-12-20 | End: 2023-12-20

## 2023-12-20 ASSESSMENT — PAIN SCALES - GENERAL: PAINLEVEL: NO PAIN (0)

## 2023-12-20 NOTE — TELEPHONE ENCOUNTER
Pt calling to request an appt for possible pneumonia. Symptoms started a couple of days ago.  just got over pneumonia. Hears funny noises coming out of her chest. Asked if it sounds like high pitched whistling sounds and pt said yes. States she feels terrible. Doesn't cough a lot when she does cough. Asked if she is having difficulty breathing and she said she is somewhat having difficulty breathing. Has mild breathing difficulty with exertion and at rest. Is able to lie flat. Pt was able to speak in sentences during the call. Voice sounded hoarse. Has the chills. Doesn't think she has a fever. Hasn't measured temp. Writer advised pt go to ER or C now and pt declined stating she doesn't think breathing difficulty is that bad. Hasn't tested for Covid. No known exposure to influenza or Covid. No openings at [facility]. There was an opening at [facility] today. Pt preferred to be seen this afternoon rather than going to UC or ER now. Assisted with scheduling. Routing to Provider as FYI.    Emmanuelle Azevedo RN  [facility]        Reason for Disposition   MILD difficulty breathing (e.g., minimal/no SOB at rest, SOB with walking, pulse < 100) of new-onset or worse than normal    Additional Information   Negative: SEVERE difficulty breathing (e.g., struggling for each breath, speaks in single words, pulse > 120)   Negative: Breathing stopped and hasn't returned   Negative: Choking on something   Negative: Bluish (or gray) lips or face   Negative: Difficult to awaken or acting confused (e.g., disoriented, slurred speech)   Negative: Passed out (i.e., fainted, collapsed and was not responding)   Negative: Wheezing started suddenly after medicine, an allergic food, or bee sting   Negative: Stridor (harsh sound while breathing in)   Negative: Slow, shallow and weak breathing   Negative: Sounds like a life-threatening emergency to the triager   Negative: Fever > 103 F (39.4 C)   Negative: Fever > 101 F (38.3 C)  "and over 60 years of age   Negative: Fever > 100.0 F (37.8 C) and bedridden (e.g., nursing home patient, stroke, chronic illness, recovering from surgery)   Negative: Fever > 100.0 F (37.8 C) and diabetes mellitus or weak immune system (e.g., HIV positive, cancer chemo, splenectomy, organ transplant, chronic steroids)   Negative: Periods where breathing stops and then resumes normally and bedridden (e.g., nursing home patient, CVA)   Negative: Pregnant or postpartum (from 0 to 6 weeks after delivery)   Negative: Patient sounds very sick or weak to the triager   Negative: MODERATE difficulty breathing (e.g., speaks in phrases, SOB even at rest, pulse 100-120) of new-onset or worse than normal   Negative: Oxygen level (e.g., pulse oximetry) 90% or lower   Negative: Wheezing can be heard across the room   Negative: Drooling or spitting out saliva (because can't swallow)   Negative: Any history of prior \"blood clot\" in leg or lungs   Negative: Illness requiring prolonged bedrest in past month (e.g., immobilization, long hospital stay)   Negative: Hip or leg fracture (broken bone) in past month (or had cast on leg or ankle in past month)   Negative: Major surgery in the past month   Negative: Long-distance travel in past month (e.g., car, bus, train, plane; with trip lasting 6 or more hours)   Negative: Cancer treatment in past six months (or has cancer now)   Negative: Extra heartbeats, irregular heart beating, or heart is beating very fast (i.e., 'palpitations')   Negative: Chest pain   Negative: Wheezing (high pitched whistling sound) and previous asthma attacks or use of asthma medicines   Negative: Difficulty breathing and within 14 days of COVID-19 Exposure   Negative: Difficulty breathing and only present when coughing   Negative: Difficulty breathing and only from stuffy nose   Negative: Difficulty breathing and only from stuffy nose or runny nose from common cold    Protocols used: Breathing Difficulty-A-[place]    "

## 2023-12-20 NOTE — TELEPHONE ENCOUNTER
ANTICOAGULATION  MANAGEMENT     Interacting Medication Review    Interacting medication(s): Azithromycin (Zithromax, Z-tigist) and augmentin  with warfarin.    Duration:  zpack 5 days, agmentin 7 days    Indication: Pneumonia    New medication?: Yes, interaction may increase INR and risk of bleeding. Closer INR monitoring recommended.        PLAN     Continue your current warfarin dose with next INR in 2 days            No answer at listed number    New recheck date updated on anticoagulation calendar     Plan made per [facility] anticoagulation protocol    Emma Wallace RN  Anticoagulation Clinic

## 2023-12-20 NOTE — PROGRESS NOTES
"  Assessment & Plan     Pneumonia of right lower lobe due to infectious organism  Chest xray showed infiltrate, awaiting radiology' official report. However, starting treatment with antibiotics. Discussed interaction with Warfarin with patient and she is going to follow up with INR recheck in 2 days. Side effects of antibiotics educated to patient. She should see improvement in symptoms in 48 hrs. In addition I recommend rest, fluids. Inhaler suggested but patient declined at this time. She can use Tylenol as needed for  fevers. I do recommend follow up in 1 wk to recheck on symptoms or sooner if needed. Patient agree's with this plan and has no further questions  - amoxicillin-clavulanate (AUGMENTIN) 875-125 MG tablet; Take 1 tablet by mouth 2 times daily  - azithromycin (ZITHROMAX) 250 MG tablet; Take 2 tablets (500 mg) by mouth daily for 1 day, THEN 1 tablet (250 mg) daily for 4 days.    Acute cough  See plan above  - XR Chest 2 Views; Future    Type 2 diabetes mellitus with hyperglycemia, without long-term current use of insulin (H)  Chronic, stable, last A1c in 5/23 was 7.6, she is following with her PCP       BMI:   Estimated body mass index is 30.29 kg/m  as calculated from the following:    Height as of this encounter: 1.651 m (5' 5\").    Weight as of this encounter: 82.6 kg (182 lb).       GABE Sevilla  [facility]    Tremaine Howell is a 83 year old, presenting for the following health issues:  URI        12/20/2023     3:14 PM   Additional Questions   Roomed by delmi       History of Present Illness       Reason for visit:  Cold symptoms  Symptom onset:  1-2 weeks ago  Symptoms include:  Chills, slight cough, wheezing in chest, sinus congestion, slight shortness of breath  Symptom intensity:  Moderate  Symptom progression:  Worsening  Had these symptoms before:  Yes  Has tried/received treatment for these symptoms:  Yes  Previous treatment was successful:  Yes  Prior " "treatment description:  I do not remember    She eats 2-3 servings of fruits and vegetables daily.She consumes 1 sweetened beverage(s) daily.She exercises with enough effort to increase her heart rate 9 or less minutes per day.  She exercises with enough effort to increase her heart rate 3 or less days per week.   She is taking medications regularly.       Acute Illness  Acute illness concerns: URI symptoms  Onset/Duration: 1 week  Symptoms:  Fever: No  Chills/Sweats: YES  Headache (location?): No  Sinus Pressure: No  Conjunctivitis:  No  Ear Pain: no  Rhinorrhea: YES- clear discharge  Congestion: YES  Sore Throat: No  Cough: YES-non-productive, with shortness of breath, worsening over time  Wheeze: Sometimes  Decreased Appetite: No  Nausea: No  Vomiting: No  Diarrhea: No  Dysuria/Freq.: No  Dysuria or Hematuria: No  Fatigue/Achiness: YES  Sick/Strep Exposure: YES  is sick  Therapies tried and outcome: Tylenol  Did not test for COVID at home.       Review of Systems   Constitutional, HEENT, cardiovascular, pulmonary, gi and gu systems are negative, except as otherwise noted.      Objective    /65   Pulse 75   Temp 97.7  F (36.5  C) (Oral)   Resp 16   Ht 1.651 m (5' 5\")   Wt 82.6 kg (182 lb)   LMP  (LMP Unknown)   SpO2 93%   BMI 30.29 kg/m    Body mass index is 30.29 kg/m .  Physical Exam   GENERAL: healthy, alert and no distress  EYES: Eyes grossly normal to inspection, PERRL and conjunctivae and sclerae normal  HENT: normal cephalic/atraumatic, ear canals and TM's normal, nasal mucosa edematous , oropharynx clear, and oral mucous membranes moist  NECK: no adenopathy, no asymmetry, masses, or scars and thyroid normal to palpation  RESP: lungs clear to auscultation - no rales, rhonchi or wheezes, good air movement  CV: regular rate and rhythm, normal S1 S2, no S3 or S4, no murmur, click or rub, no peripheral edema and peripheral pulses strong  MS: no gross musculoskeletal defects noted, no " edema    CXR - Reviewed and interpreted by me Infiltrate seen in the right lower lobe - no effusions, pneumothoraces, cardiomegaly or masses

## 2023-12-21 ENCOUNTER — TELEPHONE (OUTPATIENT)
Dept: FAMILY MEDICINE | Facility: CLINIC | Age: 83
End: 2023-12-21
Payer: COMMERCIAL

## 2023-12-21 DIAGNOSIS — J18.9 PNEUMONIA OF RIGHT LOWER LOBE DUE TO INFECTIOUS ORGANISM: Primary | ICD-10-CM

## 2023-12-21 RX ORDER — DOXYCYCLINE 100 MG/1
CAPSULE ORAL
Qty: 14 CAPSULE | Refills: 0 | Status: SHIPPED | OUTPATIENT
Start: 2023-12-21 | End: 2024-06-13

## 2023-12-21 NOTE — TELEPHONE ENCOUNTER
RN called patient/family and relayed provider's message. Patient/family verbalized understanding.     Claudia Guardado RN, BSN  [facility]: [place]

## 2023-12-21 NOTE — TELEPHONE ENCOUNTER
Received call from patient - she is calling to update after OV yesterday with Erna Arrieta PA-C.     She reports being on the verge of vomiting all night; upset stomach after taking prescribed medications:    - amoxicillin-clavulanate (AUGMENTIN) 875-125 MG tablet; Take 1 tablet by mouth 2 times daily  - azithromycin (ZITHROMAX) 250 MG tablet; Take 2 tablets (500 mg) by mouth daily for 1 day, THEN 1 tablet (250 mg) daily for 4 days.    Would like alternate medication if possible.    Pharmacy: [facility]  [facility], [facility]

## 2023-12-21 NOTE — TELEPHONE ENCOUNTER
Writer spoke with patient. She only took one dose of zpak and augmentin and was up all night vomiting. She called clinic today and per notes just entered, the abx will be changed to doxycycline. Writer advised to recheck INR early next week. She had an INR lab appt for 12-29 but moved it up to 12-27 as this is the soonest patient can come.    Daniella Frances RN, BSN, PHN  12-21-23

## 2023-12-21 NOTE — TELEPHONE ENCOUNTER
Unfortunately, that is a common side effect of antibiotics. I will switch antibiotic to doxycyline but please remember to take with food this can help with the side effects. Please discard other antibiotics

## 2023-12-22 ENCOUNTER — TELEPHONE (OUTPATIENT)
Dept: FAMILY MEDICINE | Facility: CLINIC | Age: 83
End: 2023-12-22
Payer: COMMERCIAL

## 2023-12-22 NOTE — TELEPHONE ENCOUNTER
----- Message from GABE Sevilla sent at 12/21/2023  2:22 PM CST -----  Please call patient and let her know, Radiology report xray showed no sign of pneumonia, however, I still recommend treatment based on symptoms and history. Follow up in 1 week to recheck on symptoms is not improving.

## 2023-12-27 ENCOUNTER — ANTICOAGULATION THERAPY VISIT (OUTPATIENT)
Dept: ANTICOAGULATION | Facility: CLINIC | Age: 83
End: 2023-12-27

## 2023-12-27 ENCOUNTER — LAB (OUTPATIENT)
Dept: LAB | Facility: CLINIC | Age: 83
End: 2023-12-27
Payer: COMMERCIAL

## 2023-12-27 DIAGNOSIS — I48.20 CHRONIC ATRIAL FIBRILLATION (H): ICD-10-CM

## 2023-12-27 DIAGNOSIS — I48.20 CHRONIC ATRIAL FIBRILLATION (H): Primary | ICD-10-CM

## 2023-12-27 DIAGNOSIS — E11.65 TYPE 2 DIABETES MELLITUS WITH HYPERGLYCEMIA, WITHOUT LONG-TERM CURRENT USE OF INSULIN (H): Primary | ICD-10-CM

## 2023-12-27 DIAGNOSIS — Z79.01 ANTICOAGULATION GOAL OF INR 2 TO 3: ICD-10-CM

## 2023-12-27 DIAGNOSIS — Z51.81 ANTICOAGULATION GOAL OF INR 2 TO 3: ICD-10-CM

## 2023-12-27 DIAGNOSIS — Z98.890 STATUS POST CATHETER ABLATION OF ATRIAL FIBRILLATION: ICD-10-CM

## 2023-12-27 DIAGNOSIS — I73.9 PVD (PERIPHERAL VASCULAR DISEASE) (H): ICD-10-CM

## 2023-12-27 DIAGNOSIS — I25.10 CORONARY ATHEROSCLEROSIS: ICD-10-CM

## 2023-12-27 LAB — INR BLD: 1.9 (ref 0.9–1.1)

## 2023-12-27 PROCEDURE — 85610 PROTHROMBIN TIME: CPT

## 2023-12-27 PROCEDURE — 36416 COLLJ CAPILLARY BLOOD SPEC: CPT

## 2023-12-27 NOTE — PROGRESS NOTES
ANTICOAGULATION MANAGEMENT     Clementina Cooper 83 year old female is on warfarin with subtherapeutic INR result. (Goal INR 2.0-3.0)    Recent labs: (last 7 days)     12/27/23  0950   INR 1.9*       ASSESSMENT     Source(s): Chart Review  Previous INR was Therapeutic last 2(+) visits  Medication, diet, health changes since last INR chart reviewed; none identified         PLAN     Unable to reach Lynda today.    LMTCB    Follow up required to confirm warfarin dose taken and assess for changes    Lou Santiago RN  Anticoagulation Clinic  12/27/2023

## 2023-12-27 NOTE — PROGRESS NOTES
ANTICOAGULATION MANAGEMENT     Clementina Cooper 83 year old female is on warfarin with subtherapeutic INR result. (Goal INR 2.0-3.0)    Recent labs: (last 7 days)     12/27/23  0950   INR 1.9*       ASSESSMENT     Source(s): Chart Review and Patient/Caregiver Call     Warfarin doses taken: Warfarin taken as instructed  Diet: No new diet changes identified  Medication/supplement changes:  doxycycline for last 7 days, today is last day of it  New illness, injury, or hospitalization: Yes: pneumonia  Signs or symptoms of bleeding or clotting: No  Previous result: Therapeutic last 2(+) visits  Additional findings: None       PLAN     Recommended plan for no diet, medication or health factor changes affecting INR     Dosing Instructions: Continue your current warfarin dose with next INR in 2 weeks       Summary  As of 12/27/2023      Full warfarin instructions:  5 mg every Tue; 2.5 mg all other days   Next INR check:  1/10/2024               Telephone call with Lynda who verbalizes understanding and agrees to plan    Patient elected to schedule next visit in 3 weeks    Education provided:   Goal range and lab monitoring: goal range and significance of current result    Plan made per [facility] anticoagulation protocol    Lou Santiago RN  Anticoagulation Clinic  12/27/2023    _______________________________________________________________________     Anticoagulation Episode Summary       Current INR goal:  2.0-3.0   TTR:  85.9% (1 y)   Target end date:  Indefinite   Send INR reminders to:  RON CRUZ    Indications    Chronic atrial fibrillation (H) [I48.20]  Status post catheter ablation of atrial fibrillation [Z98.890]  PVD (peripheral vascular disease) (H24) [I73.9]  Anticoagulation goal of INR 2 to 3 [Z51.81  Z79.01]             Comments:               Anticoagulation Care Providers       Provider Role Specialty Phone number    Estela Davila MD Referring HOSPITALIST [phone]

## 2024-01-19 ENCOUNTER — ANTICOAGULATION THERAPY VISIT (OUTPATIENT)
Dept: ANTICOAGULATION | Facility: CLINIC | Age: 84
End: 2024-01-19

## 2024-01-19 ENCOUNTER — LAB (OUTPATIENT)
Dept: LAB | Facility: CLINIC | Age: 84
End: 2024-01-19
Payer: COMMERCIAL

## 2024-01-19 DIAGNOSIS — E11.65 TYPE 2 DIABETES MELLITUS WITH HYPERGLYCEMIA, WITHOUT LONG-TERM CURRENT USE OF INSULIN (H): ICD-10-CM

## 2024-01-19 DIAGNOSIS — I48.20 CHRONIC ATRIAL FIBRILLATION (H): ICD-10-CM

## 2024-01-19 DIAGNOSIS — I48.20 CHRONIC ATRIAL FIBRILLATION (H): Primary | ICD-10-CM

## 2024-01-19 DIAGNOSIS — I73.9 PVD (PERIPHERAL VASCULAR DISEASE) (H): ICD-10-CM

## 2024-01-19 DIAGNOSIS — Z98.890 STATUS POST CATHETER ABLATION OF ATRIAL FIBRILLATION: ICD-10-CM

## 2024-01-19 DIAGNOSIS — Z51.81 ANTICOAGULATION GOAL OF INR 2 TO 3: ICD-10-CM

## 2024-01-19 DIAGNOSIS — I25.10 CORONARY ATHEROSCLEROSIS: ICD-10-CM

## 2024-01-19 DIAGNOSIS — Z79.01 ANTICOAGULATION GOAL OF INR 2 TO 3: ICD-10-CM

## 2024-01-19 LAB
HBA1C MFR BLD: 7.4 % (ref 0–5.6)
INR BLD: 2.6 (ref 0.9–1.1)

## 2024-01-19 PROCEDURE — 80061 LIPID PANEL: CPT

## 2024-01-19 PROCEDURE — 85610 PROTHROMBIN TIME: CPT

## 2024-01-19 PROCEDURE — 80048 BASIC METABOLIC PNL TOTAL CA: CPT

## 2024-01-19 PROCEDURE — 83036 HEMOGLOBIN GLYCOSYLATED A1C: CPT

## 2024-01-19 PROCEDURE — 36415 COLL VENOUS BLD VENIPUNCTURE: CPT

## 2024-01-19 NOTE — PROGRESS NOTES
ANTICOAGULATION MANAGEMENT     Clementina Cooper 83 year old female is on warfarin with therapeutic INR result. (Goal INR 2.0-3.0)    Recent labs: (last 7 days)     01/19/24  0925   INR 2.6*       ASSESSMENT     Source(s): Chart Review and Patient/Caregiver Call     Warfarin doses taken: Warfarin taken as instructed  Diet: No new diet changes identified  Medication/supplement changes: None noted  New illness, injury, or hospitalization: No  Signs or symptoms of bleeding or clotting: No  Previous result: Subtherapeutic  Additional findings: None       PLAN     Recommended plan for no diet, medication or health factor changes affecting INR     Dosing Instructions: Continue your current warfarin dose with next INR in 3 weeks       Summary  As of 1/19/2024      Full warfarin instructions:  5 mg every Tue; 2.5 mg all other days   Next INR check:  2/9/2024               Telephone call with Lynda who verbalizes understanding and agrees to plan    Lab visit scheduled    Education provided:   Goal range and lab monitoring: goal range and significance of current result    Plan made per [facility] anticoagulation protocol    Lou Santiago RN  Anticoagulation Clinic  1/19/2024    _______________________________________________________________________     Anticoagulation Episode Summary       Current INR goal:  2.0-3.0   TTR:  85.0% (1 y)   Target end date:  Indefinite   Send INR reminders to:  RON CRUZ    Indications    Chronic atrial fibrillation (H) [I48.20]  Status post catheter ablation of atrial fibrillation [Z98.890]  PVD (peripheral vascular disease) (H24) [I73.9]  Anticoagulation goal of INR 2 to 3 [Z51.81  Z79.01]             Comments:               Anticoagulation Care Providers       Provider Role Specialty Phone number    Estela Davila MD Referring HOSPITALIST [phone]

## 2024-01-20 LAB
ANION GAP SERPL CALCULATED.3IONS-SCNC: 11 MMOL/L (ref 7–15)
BUN SERPL-MCNC: 18.2 MG/DL (ref 8–23)
CALCIUM SERPL-MCNC: 9 MG/DL (ref 8.8–10.2)
CHLORIDE SERPL-SCNC: 104 MMOL/L (ref 98–107)
CHOLEST SERPL-MCNC: 173 MG/DL
CREAT SERPL-MCNC: 0.72 MG/DL (ref 0.51–0.95)
DEPRECATED HCO3 PLAS-SCNC: 24 MMOL/L (ref 22–29)
EGFRCR SERPLBLD CKD-EPI 2021: 82 ML/MIN/1.73M2
FASTING STATUS PATIENT QL REPORTED: YES
GLUCOSE SERPL-MCNC: 148 MG/DL (ref 70–99)
HDLC SERPL-MCNC: 42 MG/DL
LDLC SERPL CALC-MCNC: 97 MG/DL
NONHDLC SERPL-MCNC: 131 MG/DL
POTASSIUM SERPL-SCNC: 4.7 MMOL/L (ref 3.4–5.3)
SODIUM SERPL-SCNC: 139 MMOL/L (ref 135–145)
TRIGL SERPL-MCNC: 172 MG/DL

## 2024-01-26 ENCOUNTER — OFFICE VISIT (OUTPATIENT)
Dept: FAMILY MEDICINE | Facility: CLINIC | Age: 84
End: 2024-01-26
Payer: COMMERCIAL

## 2024-01-26 VITALS
HEART RATE: 78 BPM | TEMPERATURE: 97.2 F | BODY MASS INDEX: 29.09 KG/M2 | HEIGHT: 66 IN | SYSTOLIC BLOOD PRESSURE: 117 MMHG | DIASTOLIC BLOOD PRESSURE: 71 MMHG | OXYGEN SATURATION: 94 % | WEIGHT: 181 LBS | RESPIRATION RATE: 16 BRPM

## 2024-01-26 DIAGNOSIS — Z00.00 ENCOUNTER FOR MEDICARE ANNUAL WELLNESS EXAM: Primary | ICD-10-CM

## 2024-01-26 DIAGNOSIS — E11.65 TYPE 2 DIABETES MELLITUS WITH HYPERGLYCEMIA, WITHOUT LONG-TERM CURRENT USE OF INSULIN (H): ICD-10-CM

## 2024-01-26 DIAGNOSIS — E03.4 HYPOTHYROIDISM DUE TO ACQUIRED ATROPHY OF THYROID: ICD-10-CM

## 2024-01-26 DIAGNOSIS — I73.9 PVD (PERIPHERAL VASCULAR DISEASE) (H): ICD-10-CM

## 2024-01-26 DIAGNOSIS — E78.5 HYPERLIPIDEMIA LDL GOAL <100: ICD-10-CM

## 2024-01-26 DIAGNOSIS — G25.0 ESSENTIAL TREMOR: ICD-10-CM

## 2024-01-26 DIAGNOSIS — E11.9 TYPE 2 DIABETES MELLITUS WITHOUT COMPLICATION, WITHOUT LONG-TERM CURRENT USE OF INSULIN (H): ICD-10-CM

## 2024-01-26 DIAGNOSIS — I48.20 CHRONIC ATRIAL FIBRILLATION (H): ICD-10-CM

## 2024-01-26 PROCEDURE — G0439 PPPS, SUBSEQ VISIT: HCPCS | Performed by: FAMILY MEDICINE

## 2024-01-26 RX ORDER — GLIPIZIDE 5 MG/1
10 TABLET, FILM COATED, EXTENDED RELEASE ORAL DAILY
Qty: 180 TABLET | Refills: 3 | Status: SHIPPED | OUTPATIENT
Start: 2024-01-26

## 2024-01-26 RX ORDER — LOSARTAN POTASSIUM 25 MG/1
TABLET ORAL
Qty: 45 TABLET | Refills: 3 | Status: SHIPPED | OUTPATIENT
Start: 2024-01-26

## 2024-01-26 RX ORDER — PROPRANOLOL HYDROCHLORIDE 10 MG/1
TABLET ORAL
Qty: 180 TABLET | Refills: 3 | Status: SHIPPED | OUTPATIENT
Start: 2024-01-26

## 2024-01-26 RX ORDER — LEVOTHYROXINE SODIUM 100 UG/1
100 TABLET ORAL DAILY
Qty: 90 TABLET | Refills: 3 | Status: SHIPPED | OUTPATIENT
Start: 2024-01-26

## 2024-01-26 RX ORDER — ATORVASTATIN CALCIUM 20 MG/1
20 TABLET, FILM COATED ORAL DAILY
Qty: 90 TABLET | Refills: 3 | Status: SHIPPED | OUTPATIENT
Start: 2024-01-26

## 2024-01-26 ASSESSMENT — ENCOUNTER SYMPTOMS
NERVOUS/ANXIOUS: 0
CONSTIPATION: 0
HEMATURIA: 0
BREAST MASS: 0
EYE PAIN: 0
PARESTHESIAS: 0
DYSURIA: 0
DIARRHEA: 0
NAUSEA: 0
SHORTNESS OF BREATH: 0
ARTHRALGIAS: 0
SORE THROAT: 0
DIZZINESS: 0
JOINT SWELLING: 0
FEVER: 0
WEAKNESS: 0
PALPITATIONS: 0
COUGH: 0
HEADACHES: 0
HEARTBURN: 0
HEMATOCHEZIA: 0
MYALGIAS: 0
CHILLS: 0
ABDOMINAL PAIN: 0
FREQUENCY: 0

## 2024-01-26 ASSESSMENT — ACTIVITIES OF DAILY LIVING (ADL): CURRENT_FUNCTION: NO ASSISTANCE NEEDED

## 2024-01-26 NOTE — PATIENT INSTRUCTIONS
"Patient Education   Personalized Prevention Plan  You are due for the preventive services outlined below.  Your care team is available to assist you in scheduling these services.  If you have already completed any of these items, please share that information with your care team to update in your medical record.  Health Maintenance Due   Topic Date Due     Kidney Microalbumin Urine Test  11/16/2022     Diabetic Foot Exam  01/12/2024     Learning About Being Physically Active  What is physical activity?     Being physically active means doing any kind of activity that gets your body moving.  The types of physical activity that can help you get fit and stay healthy include:  Aerobic or \"cardio\" activities. These make your heart beat faster and make you breathe harder, such as brisk walking, riding a bike, or running. They strengthen your heart and lungs and build up your endurance.  Strength training activities. These make your muscles work against, or \"resist,\" something. Examples include lifting weights or doing push-ups. These activities help tone and strengthen your muscles and bones.  Stretches. These let you move your joints and muscles through their full range of motion. Stretching helps you be more flexible.  Reaching a balance between these three types of physical activity is important because each one contributes to your overall fitness.  What are the benefits of being active?  Being active is one of the best things you can do for your health. It helps you to:  Feel stronger and have more energy to do all the things you like to do.  Focus better at school or work.  Feel, think, and sleep better.  Reach and stay at a healthy weight.  Lose fat and build lean muscle.  Lower your risk for serious health problems, including diabetes, heart attack, high blood pressure, and some cancers.  Keep your heart, lungs, bones, muscles, and joints strong and healthy.  How can you make being active part of your life?  Start " "slowly. Make it your long-term goal to get at least 30 minutes of exercise on most days of the week. Walking is a good choice. You also may want to do other activities, such as running, swimming, cycling, or playing tennis or team sports.  Pick activities that you like--ones that make your heart beat faster, your muscles stronger, and your muscles and joints more flexible. If you find more than one thing you like doing, do them all. You don't have to do the same thing every day.  Get your heart pumping every day. Any activity that makes your heart beat faster and keeps it at that rate for a while counts.  Here are some great ways to get your heart beating faster:  Go for a brisk walk, run, or hike.  Go for a swim or bike ride.  Take an online exercise class or dance.  Play a game of touch football, basketball, or soccer.  Play tennis, pickleball, or racquetball.  Climb stairs.  Even some household chores can be aerobic. Just do them at a faster pace. Raking or mowing the lawn, sweeping the garage, and vacuuming and cleaning your home all can help get your heart rate up.  Strengthen your muscles during the week. You don't have to lift heavy weights or grow big, bulky muscles to get stronger. Doing a few simple activities that make your muscles work against, or \"resist,\" something can help you get stronger. Aim for at least twice a week.  For example, you can:  Do push-ups or sit-ups, which use your own body weight as resistance.  Lift weights or dumbbells or use stretch bands at home or in a gym or community center.  Stretch your muscles often. Stretching will help you as you become more active. It can help you stay flexible and loosen tight muscles. It can also help improve your balance and posture and can be a great way to relax.  Be sure to stretch the muscles you'll be using when you work out. It's best to warm your muscles slightly before you stretch them. Walk or do some other light aerobic activity for a few " "minutes. Then start stretching.  When you stretch your muscles:  Do it slowly. Stretching is not about going fast or making sudden movements.  Don't push or bounce during a stretch.  Hold each stretch for at least 15 to 30 seconds, if you can. You should feel a stretch in the muscle, but not pain.  Breathe out as you do the stretch. Then breathe in as you hold the stretch. Don't hold your breath.  If you're worried about how more activity might affect your health, have a checkup before you start. Follow any special advice your doctor gives you for getting a smart start.  Where can you learn more?  Go to https://www.Stadionaut.net/patiented  Enter W332 in the search box to learn more about \"Learning About Being Physically Active.\"  Current as of: June 6, 2023               Content Version: 13.8    8263-6439 [employer].   Care instructions adapted under license by your healthcare professional. If you have questions about a medical condition or this instruction, always ask your healthcare professional. [employer] disclaims any warranty or liability for your use of this information.      Learning About Dietary Guidelines  What are the Dietary Guidelines for Americans?     Dietary Guidelines for Americans provide tips for eating well and staying healthy. This helps reduce the risk for long-term (chronic) diseases.  These guidelines recommend that you:  Eat and drink the right amount for you. The U.[place] government's food guide is called MyPlate. It can help you make your own well-balanced eating plan.  Try to balance your eating with your activity. This helps you stay at a healthy weight.  Drink alcohol in moderation, if at all.  Limit foods high in salt, saturated fat, trans fat, and added sugar.  These guidelines are from the U.S. Department of Agriculture and the U.S. Department of Health and Human Services. They are updated every 5 years.  What is MyPlate?  MyPlate is the U.[place]. government's " "food guide. It can help you make your own well-balanced eating plan. A balanced eating plan means that you eat enough, but not too much, and that your food gives you the nutrients you need to stay healthy.  MyPlate focuses on eating plenty of whole grains, fruits, and vegetables, and on limiting fat and sugar. It is available online at www.ChooseMyPlate.gov.  How can you get started?  If you're trying to eat healthier, you can slowly change your eating habits over time. You don't have to make big changes all at once. Start by adding one or two healthy foods to your meals each day.  Grains  Choose whole-grain breads and cereals and whole-wheat pasta and whole-grain crackers.  Vegetables  Eat a variety of vegetables every day. They have lots of nutrients and are part of a heart-healthy diet.  Fruits  Eat a variety of fruits every day. Fruits contain lots of nutrients. Choose fresh fruit instead of fruit juice.  Protein foods  Choose fish and lean poultry more often. Eat red meat and fried meats less often. Dried beans, tofu, and nuts are also good sources of protein.  Dairy  Choose low-fat or fat-free products from this food group. If you have problems digesting milk, try eating cheese or yogurt instead.  Fats and oils  Limit fats and oils if you're trying to cut calories. Choose healthy fats when you cook. These include canola oil and olive oil.  Where can you learn more?  Go to https://www.Codecademy.net/patiented  Enter D676 in the search box to learn more about \"Learning About Dietary Guidelines.\"  Current as of: February 28, 2023               Content Version: 13.8    4584-4487 [employer].   Care instructions adapted under license by your healthcare professional. If you have questions about a medical condition or this instruction, always ask your healthcare professional. [employer] disclaims any warranty or liability for your use of this information.      Hearing Loss: Care " Instructions  Overview     Hearing loss is a sudden or slow decrease in how well you hear. It can range from slight to profound. Permanent hearing loss can occur with aging. It also can happen when you are exposed long-term to loud noise. Examples include listening to loud music, riding motorcycles, or being around other loud machines.  Hearing loss can affect your work and home life. It can make you feel lonely or depressed. You may feel that you have lost your independence. But hearing aids and other devices can help you hear better and feel connected to others.  Follow-up care is a key part of your treatment and safety. Be sure to make and go to all appointments, and call your doctor if you are having problems. It's also a good idea to know your test results and keep a list of the medicines you take.  How can you care for yourself at home?  Avoid loud noises whenever possible. This helps keep your hearing from getting worse.  Always wear hearing protection around loud noises.  Wear a hearing aid as directed.  A professional can help you pick a hearing aid that will work best for you.  You can also get hearing aids over the counter for mild to moderate hearing loss.  Have hearing tests as your doctor suggests. They can show whether your hearing has changed. Your hearing aid may need to be adjusted.  Use other devices as needed. These may include:  Telephone amplifiers and hearing aids that can connect to a television, stereo, radio, or microphone.  Devices that use lights or vibrations. These alert you to the doorbell, a ringing telephone, or a baby monitor.  Television closed-captioning. This shows the words at the bottom of the screen. Most new TVs can do this.  TTY (text telephone). This lets you type messages back and forth on the telephone instead of talking or listening. These devices are also called TDD. When messages are typed on the keyboard, they are sent over the phone line to a receiving TTY. The  "message is shown on a monitor.  Use text messaging, social media, and email if it is hard for you to communicate by telephone.  Try to learn a listening technique called speechreading. It is not lipreading. You pay attention to people's gestures, expressions, posture, and tone of voice. These clues can help you understand what a person is saying. Face the person you are talking to, and have them face you. Make sure the lighting is good. You need to see the other person's face clearly.  Think about counseling if you need help to adjust to your hearing loss.  When should you call for help?  Watch closely for changes in your health, and be sure to contact your doctor if:    You think your hearing is getting worse.     You have new symptoms, such as dizziness or nausea.   Where can you learn more?  Go to https://www.Trubates.net/patiented  Enter R798 in the search box to learn more about \"Hearing Loss: Care Instructions.\"  Current as of: February 28, 2023               Content Version: 13.8    5333-4795 [employer].   Care instructions adapted under license by your healthcare professional. If you have questions about a medical condition or this instruction, always ask your healthcare professional. [employer] disclaims any warranty or liability for your use of this information.         "

## 2024-01-26 NOTE — PROGRESS NOTES
"Preventive Care Visit  [facility]  Jan 26, 2024      SUBJECTIVE:   Lynda is a 83 year old, presenting for the following:  Physical        1/26/2024     9:57 AM   Additional Questions   Roomed by Christina CARTWRIGHT CMA     Are you in the first 12 months of your Medicare coverage?  No    Healthy Habits:     In general, how would you rate your overall health?  Good    Frequency of exercise:  1 day/week    Duration of exercise:  15-30 minutes    Do you usually eat at least 4 servings of fruit and vegetables a day, include whole grains    & fiber and avoid regularly eating high fat or \"junk\" foods?  No    Taking medications regularly:  Yes    Medication side effects:  None    Ability to successfully perform activities of daily living:  No assistance needed    Home Safety:  No safety concerns identified    Hearing Impairment:  Difficulty following a conversation in a noisy restaurant or crowded room, feel that people are mumbling or not speaking clearly, need to ask people to speak up or repeat themselves, difficulty understanding soft or whispered speech and difficulty understanding speech on the telephone    In the past 6 months, have you been bothered by leaking of urine?  No    In general, how would you rate your overall mental or emotional health?  Good    Additional concerns today:  No      Today's PHQ-2 Score:       1/26/2024     9:56 AM   PHQ-2 ( 1999 Pf[employer])   Q1: Little interest or pleasure in doing things 0   Q2: Feeling down, depressed or hopeless 0   PHQ-2 Score 0   Q1: Little interest or pleasure in doing things Not at all   Q2: Feeling down, depressed or hopeless Not at all   PHQ-2 Score 0           Have you ever done Advance Care Planning? (For example, a Health Directive, POLST, or a discussion with a medical provider or your loved ones about your wishes): No, advance care planning information given to patient to review.  Patient plans to discuss their " wishes with loved ones or provider.         Fall risk  Fallen 2 or more times in the past year?: No  Any fall with injury in the past year?: No    Cognitive Screening   1) Repeat 3 items (Leader, Season, Table)    2) Clock draw: NORMAL  3) 3 item recall: Recalls 3 objects  Results: 3 items recalled: COGNITIVE IMPAIRMENT LESS LIKELY    Mini-CogTM Copyright [employer]. Licensed by the author for use in [facility]; reprinted with permission (soob@[facility]). All rights reserved.      Do you have sleep apnea, excessive snoring or daytime drowsiness? : no    Reviewed and updated as needed this visit by clinical staff   Tobacco  Allergies  Meds              Reviewed and updated as needed this visit by Provider                  Social History     Tobacco Use    Smoking status: Former     Types: Cigarettes     Quit date: 1999     Years since quittin.1     Passive exposure: Past    Smokeless tobacco: Never   Substance Use Topics    Alcohol use: Yes     Comment: occasional             2024     9:56 AM   Alcohol Use   Prescreen: >3 drinks/day or >7 drinks/week? No          No data to display              Do you have a current opioid prescription? No  Do you use any other controlled substances or medications that are not prescribed by a provider? None            Current providers sharing in care for this patient include:   Patient Care Team:  Enmanuel Baez DO as PCP - General (Family Medicine)  Kendall Lopez MD as Assigned Musculoskeletal Provider  Rahul Jennings MD as Assigned Surgical Provider  Erna Arrieta PA as Assigned PCP    The following health maintenance items are reviewed in Epic and correct as of today:  Health Maintenance   Topic Date Due    MICROALBUMIN  2022    DIABETIC FOOT EXAM  2024    A1C  2024    TSH W/FREE T4 REFLEX  2024    CBC  2024    ANNUAL REVIEW OF HM ORDERS  2024    EYE EXAM  2024    BMP  2025    LIPID   "01/19/2025    MEDICARE ANNUAL WELLNESS VISIT  01/26/2025    FALL RISK ASSESSMENT  01/26/2025    DEXA  04/25/2025    ADVANCE CARE PLANNING  01/26/2029    DTAP/TDAP/TD IMMUNIZATION (3 - Td or Tdap) 01/12/2033    PHQ-2 (once per calendar year)  Completed    INFLUENZA VACCINE  Completed    Pneumococcal Vaccine: 65+ Years  Completed    ZOSTER IMMUNIZATION  Completed    RSV VACCINE (Pregnancy & 60+)  Completed    COVID-19 Vaccine  Completed    IPV IMMUNIZATION  Aged Out    HPV IMMUNIZATION  Aged Out    MENINGITIS IMMUNIZATION  Aged Out    RSV MONOCLONAL ANTIBODY  Aged Out           Pertinent mammograms are reviewed under the imaging tab.  Review of Systems   Constitutional:  Negative for chills and fever.   HENT:  Positive for hearing loss. Negative for congestion, ear pain and sore throat.    Eyes:  Negative for pain and visual disturbance.   Respiratory:  Negative for cough and shortness of breath.    Cardiovascular:  Negative for chest pain and palpitations.   Gastrointestinal:  Negative for abdominal pain, constipation, diarrhea and nausea.   Genitourinary:  Negative for dysuria, frequency, genital sores, hematuria, pelvic pain, urgency, vaginal bleeding and vaginal discharge.   Musculoskeletal:  Negative for arthralgias, joint swelling and myalgias.   Skin:  Positive for rash.   Neurological:  Negative for dizziness, weakness and headaches.   Psychiatric/Behavioral:  The patient is not nervous/anxious.           OBJECTIVE:   /71 (BP Location: Right arm, Patient Position: Chair, Cuff Size: Adult Regular)   Pulse 78   Temp 97.2  F (36.2  C) (Temporal)   Resp 16   Ht 1.664 m (5' 5.5\")   Wt 82.1 kg (181 lb)   LMP  (LMP Unknown)   SpO2 94%   BMI 29.66 kg/m     Estimated body mass index is 29.66 kg/m  as calculated from the following:    Height as of this encounter: 1.664 m (5' 5.5\").    Weight as of this encounter: 82.1 kg (181 lb).  Physical Exam  GENERAL: alert and no distress  NECK: no adenopathy, no " asymmetry, masses, or scars  RESP: lungs clear to auscultation - no rales, rhonchi or wheezes  CV: regular rate and rhythm, normal S1 S2, no S3 or S4, no murmur, click or rub, no peripheral edema  ABDOMEN: soft, nontender, no hepatosplenomegaly, no masses and bowel sounds normal  MS: no gross musculoskeletal defects noted, no edema    Diagnostic Test Results:  Labs reviewed in Epic    ASSESSMENT / PLAN:     Problem List Items Addressed This Visit       PVD (peripheral vascular disease) (H24)    Relevant Medications    atorvastatin (LIPITOR) 20 MG tablet    HYPERLIPIDEMIA LDL GOAL <100    Relevant Medications    atorvastatin (LIPITOR) 20 MG tablet    Essential tremor    Relevant Medications    propranolol (INDERAL) 10 MG tablet    Hypothyroidism due to acquired atrophy of thyroid    Relevant Medications    levothyroxine (SYNTHROID/LEVOTHROID) 100 MCG tablet    Chronic atrial fibrillation (H)    Type 2 diabetes mellitus with hyperglycemia, without long-term current use of insulin (H)    Relevant Medications    empagliflozin (JARDIANCE) 25 MG TABS tablet    glipiZIDE (GLUCOTROL XL) 5 MG 24 hr tablet    levothyroxine (SYNTHROID/LEVOTHROID) 100 MCG tablet    losartan (COZAAR) 25 MG tablet     Other Visit Diagnoses       Encounter for Medicare annual wellness exam    -  Primary    Type 2 diabetes mellitus without complication, without long-term current use of insulin (H)        Relevant Medications    empagliflozin (JARDIANCE) 25 MG TABS tablet    glipiZIDE (GLUCOTROL XL) 5 MG 24 hr tablet    levothyroxine (SYNTHROID/LEVOTHROID) 100 MCG tablet    losartan (COZAAR) 25 MG tablet    Other Relevant Orders    Hemoglobin A1c          A1c permissive given age - follow up 6 months    Patient has been advised of split billing requirements and indicates understanding: Yes      Counseling  Reviewed preventive health counseling, as reflected in patient instructions      BMI  Estimated body mass index is 29.66 kg/m  as calculated from  "the following:    Height as of this encounter: 1.664 m (5' 5.5\").    Weight as of this encounter: 82.1 kg (181 lb).         She reports that she quit smoking about 24 years ago. Her smoking use included cigarettes. She has been exposed to tobacco smoke. She has never used smokeless tobacco.      Appropriate preventive services were discussed with this patient, including applicable screening as appropriate for fall prevention, nutrition, physical activity, Tobacco-use cessation, weight loss and cognition.  Checklist reviewing preventive services available has been given to the patient.    Reviewed patients plan of care and provided an AVS. The Intermediate Care Plan ( asthma action plan, low back pain action plan, and migraine action plan) for Clementina meets the Care Plan requirement. This Care Plan has been established and reviewed with the Patient.        Signed Electronically by: KYLEE STATON DO    Identified Health Risks  I have reviewed Opioid Use Disorder and Substance Use Disorder risk factors and made any needed referrals.   .undefined[[id number]^^She is at risk for lack of exercise and has been provided with information to increase physical activity for the benefit of her well-being.  The patient was counseled and encouraged to consider modifying their diet and eating habits. She was provided with information on recommended healthy diet options.  The patient was provided with written information regarding signs of hearing loss.  Answers submitted by the patient for this visit:  Annual Preventive Visit (Submitted on 1/26/2024)  Chief Complaint: Annual Exam:  Blood in stool: No  heartburn: No  peripheral edema: No  mood changes: No  Skin sensation changes: No  tenderness: No  breast mass: No  breast discharge: No    "

## 2024-02-09 ENCOUNTER — ANTICOAGULATION THERAPY VISIT (OUTPATIENT)
Dept: ANTICOAGULATION | Facility: CLINIC | Age: 84
End: 2024-02-09

## 2024-02-09 ENCOUNTER — TELEPHONE (OUTPATIENT)
Dept: FAMILY MEDICINE | Facility: CLINIC | Age: 84
End: 2024-02-09

## 2024-02-09 ENCOUNTER — LAB (OUTPATIENT)
Dept: LAB | Facility: CLINIC | Age: 84
End: 2024-02-09
Payer: COMMERCIAL

## 2024-02-09 DIAGNOSIS — I48.20 CHRONIC ATRIAL FIBRILLATION (H): Primary | ICD-10-CM

## 2024-02-09 DIAGNOSIS — I73.9 PVD (PERIPHERAL VASCULAR DISEASE) (H): ICD-10-CM

## 2024-02-09 DIAGNOSIS — I48.20 CHRONIC ATRIAL FIBRILLATION (H): ICD-10-CM

## 2024-02-09 DIAGNOSIS — Z51.81 ANTICOAGULATION GOAL OF INR 2 TO 3: ICD-10-CM

## 2024-02-09 DIAGNOSIS — Z79.01 ANTICOAGULATION GOAL OF INR 2 TO 3: ICD-10-CM

## 2024-02-09 DIAGNOSIS — Z98.890 STATUS POST CATHETER ABLATION OF ATRIAL FIBRILLATION: ICD-10-CM

## 2024-02-09 LAB — INR BLD: 1.8 (ref 0.9–1.1)

## 2024-02-09 PROCEDURE — 36415 COLL VENOUS BLD VENIPUNCTURE: CPT

## 2024-02-09 PROCEDURE — 85610 PROTHROMBIN TIME: CPT

## 2024-02-09 NOTE — PROGRESS NOTES
ANTICOAGULATION MANAGEMENT     Clementina Cooper 83 year old female is on warfarin with subtherapeutic INR result. (Goal INR 2.0-3.0)    Recent labs: (last 7 days)     02/09/24  0931   INR 1.8*       ASSESSMENT     Source(s): Chart Review  Previous INR was Therapeutic last visit; previously outside of goal range  Medication, diet, health changes since last INR chart reviewed; none identified         PLAN     Unable to reach patient today.    Left message to call back. Will try again later.     Follow up required to confirm warfarin dose taken and assess for changes    Daniella Mckinney, RN  Anticoagulation Clinic  2/9/2024

## 2024-02-09 NOTE — PROGRESS NOTES
ANTICOAGULATION MANAGEMENT     Clementina Cooper 83 year old female is on warfarin with subtherapeutic INR result. (Goal INR 2.0-3.0)    Recent labs: (last 7 days)     02/09/24  0931   INR 1.8*       ASSESSMENT     Source(s): Chart Review and Patient/Caregiver Call     Warfarin doses taken: Warfarin taken as instructed  Diet: No new diet changes identified  Medication/supplement changes: None noted  New illness, injury, or hospitalization: No  Signs or symptoms of bleeding or clotting: No  Previous result: Therapeutic last visit; previously outside of goal range  Additional findings: None       PLAN     Recommended plan for no diet, medication or health factor changes affecting INR     Dosing Instructions: Increase your warfarin dose (12.5% change) with next INR in 2 weeks       Summary  As of 2/9/2024      Full warfarin instructions:  5 mg every Tue, Fri; 2.5 mg all other days   Next INR check:  2/23/2024               Telephone call with Lynda who agrees to plan and repeated back plan correctly    Lab visit scheduled    Education provided:   Contact [phone]  with any changes, questions or concerns.     Plan made per [facility] anticoagulation protocol and per [facility] anticoagulation protocol; closest % change with current tablet size made per protocol for for INR 1.7-1.9 (goal 2-3)    Daniella Mckinney RN  Anticoagulation Clinic  2/9/2024    _______________________________________________________________________     Anticoagulation Episode Summary       Current INR goal:  2.0-3.0   TTR:  83.5% (1 y)   Target end date:  Indefinite   Send INR reminders to:  RON CRUZ    Indications    Chronic atrial fibrillation (H) [I48.20]  Status post catheter ablation of atrial fibrillation [Z98.890]  PVD (peripheral vascular disease) (H24) [I73.9]  Anticoagulation goal of INR 2 to 3 [Z51.81  Z79.01]             Comments:               Anticoagulation Care Providers       Provider Role Specialty Phone number    Giovanni  Estela TREADWELL MD Referring HOSPITALIST [phone]

## 2024-02-23 ENCOUNTER — ANTICOAGULATION THERAPY VISIT (OUTPATIENT)
Dept: ANTICOAGULATION | Facility: CLINIC | Age: 84
End: 2024-02-23

## 2024-02-23 ENCOUNTER — LAB (OUTPATIENT)
Dept: LAB | Facility: CLINIC | Age: 84
End: 2024-02-23
Payer: COMMERCIAL

## 2024-02-23 DIAGNOSIS — I48.20 CHRONIC ATRIAL FIBRILLATION (H): ICD-10-CM

## 2024-02-23 DIAGNOSIS — I73.9 PVD (PERIPHERAL VASCULAR DISEASE) (H): ICD-10-CM

## 2024-02-23 DIAGNOSIS — Z98.890 STATUS POST CATHETER ABLATION OF ATRIAL FIBRILLATION: ICD-10-CM

## 2024-02-23 DIAGNOSIS — Z51.81 ANTICOAGULATION GOAL OF INR 2 TO 3: ICD-10-CM

## 2024-02-23 DIAGNOSIS — Z79.01 ANTICOAGULATION GOAL OF INR 2 TO 3: ICD-10-CM

## 2024-02-23 DIAGNOSIS — I48.20 CHRONIC ATRIAL FIBRILLATION (H): Primary | ICD-10-CM

## 2024-02-23 LAB — INR BLD: 2.2 (ref 0.9–1.1)

## 2024-02-23 PROCEDURE — 85610 PROTHROMBIN TIME: CPT

## 2024-02-23 PROCEDURE — 36416 COLLJ CAPILLARY BLOOD SPEC: CPT

## 2024-02-23 NOTE — PROGRESS NOTES
ANTICOAGULATION MANAGEMENT     Clementina Cooper 83 year old female is on warfarin with therapeutic INR result. (Goal INR 2.0-3.0)    Recent labs: (last 7 days)     02/23/24  1043   INR 2.2*       ASSESSMENT     Source(s): Chart Review and Patient/Caregiver Call     Warfarin doses taken: Warfarin taken as instructed  Diet: No new diet changes identified  Medication/supplement changes: None noted  New illness, injury, or hospitalization: No  Signs or symptoms of bleeding or clotting: No  Previous result: Subtherapeutic  Additional findings: None       PLAN     Recommended plan for no diet, medication or health factor changes affecting INR     Dosing Instructions: Continue your current warfarin dose with next INR in 3 weeks       Summary  As of 2/23/2024      Full warfarin instructions:  5 mg every Tue, Fri; 2.5 mg all other days   Next INR check:  3/15/2024               Telephone call with Lynda who verbalizes understanding and agrees to plan    Lab visit scheduled    Education provided:   Please call back if any changes to your diet, medications or how you've been taking warfarin  Contact [phone]  with any changes, questions or concerns.     Plan made per [facility] anticoagulation protocol    Daniella Frances RN  Anticoagulation Clinic  2/23/2024    _______________________________________________________________________     Anticoagulation Episode Summary       Current INR goal:  2.0-3.0   TTR:  81.6% (1 y)   Target end date:  Indefinite   Send INR reminders to:  RON CRUZ    Indications    Chronic atrial fibrillation (H) [I48.20]  Status post catheter ablation of atrial fibrillation [Z98.890]  PVD (peripheral vascular disease) (H24) [I73.9]  Anticoagulation goal of INR 2 to 3 [Z51.81  Z79.01]             Comments:               Anticoagulation Care Providers       Provider Role Specialty Phone number    Estela Davila MD Referring HOSPITALIST [phone]

## 2024-02-26 ENCOUNTER — OFFICE VISIT (OUTPATIENT)
Dept: OPHTHALMOLOGY | Facility: CLINIC | Age: 84
End: 2024-02-26
Payer: COMMERCIAL

## 2024-02-26 DIAGNOSIS — Z98.890 S/P IRIDECTOMY: ICD-10-CM

## 2024-02-26 DIAGNOSIS — Z01.01 ENCOUNTER FOR EXAMINATION OF EYES AND VISION WITH ABNORMAL FINDINGS: Primary | ICD-10-CM

## 2024-02-26 DIAGNOSIS — Z96.1 PSEUDOPHAKIA: ICD-10-CM

## 2024-02-26 DIAGNOSIS — E11.9 TYPE 2 DIABETES MELLITUS WITHOUT COMPLICATION, WITHOUT LONG-TERM CURRENT USE OF INSULIN (H): ICD-10-CM

## 2024-02-26 DIAGNOSIS — H52.4 PRESBYOPIA: ICD-10-CM

## 2024-02-26 DIAGNOSIS — H40.003 GLAUCOMA SUSPECT, BILATERAL: ICD-10-CM

## 2024-02-26 PROCEDURE — 92014 COMPRE OPH EXAM EST PT 1/>: CPT | Performed by: OPHTHALMOLOGY

## 2024-02-26 PROCEDURE — 92015 DETERMINE REFRACTIVE STATE: CPT | Performed by: OPHTHALMOLOGY

## 2024-02-26 ASSESSMENT — VISUAL ACUITY
CORRECTION_TYPE: GLASSES
METHOD: SNELLEN - LINEAR
OS_CC+: -2
OS_CC: 20/25
OD_CC+: -2
OD_CC: 20/20

## 2024-02-26 ASSESSMENT — CONF VISUAL FIELD
OD_INFERIOR_TEMPORAL_RESTRICTION: 0
OD_INFERIOR_NASAL_RESTRICTION: 0
OS_INFERIOR_NASAL_RESTRICTION: 0
OS_INFERIOR_TEMPORAL_RESTRICTION: 0
OD_SUPERIOR_NASAL_RESTRICTION: 0
METHOD: COUNTING FINGERS
OS_SUPERIOR_NASAL_RESTRICTION: 0
OD_NORMAL: 1
OS_NORMAL: 1
OS_SUPERIOR_TEMPORAL_RESTRICTION: 0
OD_SUPERIOR_TEMPORAL_RESTRICTION: 0

## 2024-02-26 ASSESSMENT — REFRACTION_MANIFEST
OS_CYLINDER: +0.75
OD_SPHERE: -0.50
OS_ADD: +2.75
OS_AXIS: 030
OS_SPHERE: -1.00
OD_AXIS: 155
OD_CYLINDER: +0.50
OD_ADD: +2.75

## 2024-02-26 ASSESSMENT — TONOMETRY
OS_IOP_MMHG: 19
OD_IOP_MMHG: 19
IOP_METHOD: APPLANATION

## 2024-02-26 ASSESSMENT — EXTERNAL EXAM - RIGHT EYE: OD_EXAM: NORMAL

## 2024-02-26 ASSESSMENT — REFRACTION_WEARINGRX
OD_CYLINDER: +0.75
OD_AXIS: 172
OS_CYLINDER: +0.75
OS_SPHERE: -1.50
OD_SPHERE: -0.25
OD_ADD: +2.75
OS_ADD: +2.75
SPECS_TYPE: BIFOCAL
OS_AXIS: 089

## 2024-02-26 ASSESSMENT — CUP TO DISC RATIO
OS_RATIO: 0.6
OD_RATIO: 0.7

## 2024-02-26 ASSESSMENT — SLIT LAMP EXAM - LIDS
COMMENTS: 1+ DERMATOCHALASIS - UPPER LID
COMMENTS: 1+ DERMATOCHALASIS - UPPER LID

## 2024-02-26 ASSESSMENT — EXTERNAL EXAM - LEFT EYE: OS_EXAM: NORMAL

## 2024-02-26 NOTE — PATIENT INSTRUCTIONS
"Glasses prescription given - optional    May use artificial tears up to four times a day (like Refresh Optive, Systane Balance, or TheraTears. Avoid \"get the red out\" drops and generic artifical tears).      Call in October 2024 for an appointment in February 2025 for Complete Exam    Dr. Jennings [phone]      Patient Education   Diabetes weakens the blood vessels all over the body, including the eyes. Damage to the blood vessels in the eyes can cause swelling or bleeding into part of the eye (called the retina). This is called diabetic retinopathy (LUIS-tin--pu-thee). If not treated, this disease can cause vision loss or blindness.   Symptoms may include blurred or distorted vision, but many people have no symptoms. It's important to see your eye doctor regularly to check for problems.   Early treatment and good control can help protect your vision. Here are the things you can do to help prevent vision loss:      1. Keep your blood sugar levels under tight control.      2. Bring high blood pressure under control.      3. No smoking.      4. Have yearly dilated eye exams.     "

## 2024-02-26 NOTE — LETTER
"    2/26/2024         RE: Clemnetina Cooper  [address] [facility] [address]        Dear Colleague,    Thank you for referring your patient, Clementina Cooper, to the [facility]. Please see a copy of my visit note below.     Current Eye Medications:  None     Subjective:  Complete  eye exam. Vision is doing fine distance and near both eyes. No eye pain or discomfort in either eye.   Diabetic, Blood sugar has been good.     Lab Results   Component Value Date    A1C 7.4 01/19/2024    A1C 7.5 05/31/2023    A1C 7.0 11/21/2022    A1C 7.2 04/01/2022    A1C 8.3 11/16/2021    A1C 8.2 04/19/2021    A1C 9.2 01/21/2021    A1C 8.3 10/16/2020    A1C 9.0 03/30/2020    A1C 7.0 05/21/2019        Objective:  See Ophthalmology Exam.       Assessment:  Stable eye exam in patient with diabetes.  No diabetic retinopathy.  Stable intraocular pressures and discs.      ICD-10-CM    1. Encounter for examination of eyes and vision with abnormal findings  Z01.01       2. Presbyopia  H52.4       3. Type 2 diabetes mellitus without complication, without long-term current use of insulin (H)  E11.9       4. Pseudophakia, Yag Caps, ou  Z96.1       5. Glaucoma suspect, bilateral  H40.003       6. S/P iridectomy, ou  Z98.890            Plan:  Glasses prescription given - optional    May use artificial tears up to four times a day (like Refresh Optive, Systane Balance, or TheraTears. Avoid \"get the red out\" drops and generic artifical tears).      Call in October 2024 for an appointment in February 2025 for Complete Exam    Dr. Jennings [phone]    Again, thank you for allowing me to participate in the care of your patient.        Sincerely,        Rahul Jennings MD  "

## 2024-02-26 NOTE — PROGRESS NOTES
" Current Eye Medications:  None     Subjective:  Complete  eye exam. Vision is doing fine distance and near both eyes. No eye pain or discomfort in either eye.   Diabetic, Blood sugar has been good.     Lab Results   Component Value Date    A1C 7.4 01/19/2024    A1C 7.5 05/31/2023    A1C 7.0 11/21/2022    A1C 7.2 04/01/2022    A1C 8.3 11/16/2021    A1C 8.2 04/19/2021    A1C 9.2 01/21/2021    A1C 8.3 10/16/2020    A1C 9.0 03/30/2020    A1C 7.0 05/21/2019        Objective:  See Ophthalmology Exam.       Assessment:  Stable eye exam in patient with diabetes.  No diabetic retinopathy.  Stable intraocular pressures and discs.      ICD-10-CM    1. Encounter for examination of eyes and vision with abnormal findings  Z01.01       2. Presbyopia  H52.4       3. Type 2 diabetes mellitus without complication, without long-term current use of insulin (H)  E11.9       4. Pseudophakia, Yag Caps, ou  Z96.1       5. Glaucoma suspect, bilateral  H40.003       6. S/P iridectomy, ou  Z98.890            Plan:  Glasses prescription given - optional    May use artificial tears up to four times a day (like Refresh Optive, Systane Balance, or TheraTears. Avoid \"get the red out\" drops and generic artifical tears).      Call in October 2024 for an appointment in February 2025 for Complete Exam    Dr. Jennings [phone]    "

## 2024-03-13 NOTE — PROGRESS NOTES
SUBJECTIVE:   Clementina Cooper is a 78 year old female who presents to clinic today for the following health issues:    79 y/o with h/o PVD, DMII, chronic Afib (coumadin), hypothyroid and essential tremor here for f/u.  Recently presented wf/atigue & decreased exercise tolerance due to overall fatigue.   Work up revealed her afib not well controlled  ().  BB changed from propanolol to a LA Metoprolol....  HR now is 120 ... Of note, Clementina had been holding propanolol prior to presentation due to fatigue ..  Echo = mild increased RAP, basically wnl.   BNP was 1600      Feels a bit better, but still apprehensive to walk.    On metoprolol 50 now, no falls or dizziness. . .  Seeing a cardiologist 10/22/18    Mentions that she feels this episode was heralded by pleurisy.  She had (-) Ddimer last week and also is therapeutic on coumadin.     No orthopnea, no SOB at rest.   Avoiding activity because of poor energy.     Diabetes Follow-up    Patient is checking blood sugars: once daily.  Results are as follows:         am - 165    Diabetic concerns: None     Symptoms of hypoglycemia (low blood sugar): none     Paresthesias (numbness or burning in feet) or sores: No     Date of last diabetic eye exam: 2/6/18    BP Readings from Last 2 Encounters:   10/09/18 120/70   09/27/18 100/73     Hemoglobin A1C (%)   Date Value   09/27/2018 7.6 (H)   05/16/2018 7.0 (H)     LDL Cholesterol Calculated (mg/dL)   Date Value   05/16/2018 81   03/21/2017 72       Diabetes Management Resources    Amount of exercise or physical activity: None    Problems taking medications regularly: No    Medication side effects: ? propranolol    Diet: regular (no restrictions)      Problem list and histories reviewed & adjusted, as indicated.  Additional history: as documented    Patient Active Problem List   Diagnosis     PVD (peripheral vascular disease) (H)     Family history of colon cancer     HYPERLIPIDEMIA LDL GOAL <100     Open-angle  glaucoma, mild-mod, ou     Advanced directives, counseling/discussion     Essential tremor     Pseudophakia, ou; Yag Caps, od     S/P iridectomy, ou     Anticoagulation goal of INR 2 to 3     Hypothyroidism due to acquired atrophy of thyroid     Chronic atrial fibrillation (H)     overweight  HCC     Long-term (current) use of anticoagulants [Z79.01]     Type 2 diabetes mellitus without complication, without long-term current use of insulin (H)     Glaucoma suspect, bilateral     Past Surgical History:   Procedure Laterality Date     ANGIOPLASTY      right leg     C NONSPECIFIC PROCEDURE  2001    neck surgery/trauma     C STOMACH SURGERY PROCEDURE UNLISTED       CATARACT IOL, RT/LT       HC TRABECULOPLASTY BY LASER SURGERY       HC VASCULAR SURGERY PROCEDURE UNLIST       HYSTERECTOMY, CERVIX STATUS UNKNOWN  1989    uterine bleeding     HYSTERECTOMY, PAP NO LONGER INDICATED       LASER YAG CAPSULOTOMY Right 02/06/2018    right eye     LASER YAG IRIDOTOMY  remotely    both eyes (elsewhere)     PHACOEMULSIFICATION WITH STANDARD INTRAOCULAR LENS IMPLANT  7/2012; 8/2012    right eye; left eye       Social History   Substance Use Topics     Smoking status: Former Smoker     Quit date: 12/4/1999     Smokeless tobacco: Never Used     Alcohol use Yes      Comment: occasional     Family History   Problem Relation Age of Onset     Diabetes Mother      HEART DISEASE Mother      HEART DISEASE Father      HEART DISEASE Son      Cancer Brother      HEART DISEASE Sister      Cancer Brother      Cancer Brother      HEART DISEASE Sister      Macular Degeneration No family hx of      Glaucoma No family hx of          Current Outpatient Prescriptions   Medication Sig Dispense Refill     ACE NOT PRESCRIBED, INTENTIONAL, 1 each At Bedtime ACE Inhibitor not prescribed due to Other:  Neck swelling.  Also, BP is on low normal side with the betablocker 0 each 0     aspirin 81 MG tablet Take 1 tablet by mouth daily.  3     atorvastatin  (LIPITOR) 20 MG tablet TAKE 1 TABLET (20 MG) BY MOUTH DAILY 90 tablet 3     Biotin 5000 MCG CAPS Take  by mouth.       CENTRUM SILVER OR one daily       Cyanocobalamin (B-12) 1000 MCG TBCR Take 1 tablet by mouth daily       Evening Primrose Oil 1000 MG CAPS Take by mouth daily       levothyroxine (SYNTHROID/LEVOTHROID) 100 MCG tablet TAKE 1 TABLET (100 MCG) BY MOUTH DAILY 90 tablet 3     losartan (COZAAR) 25 MG tablet TAKE 0.5 TABLETS (12.5 MG) BY MOUTH DAILY 45 tablet 1     metFORMIN (GLUCOPHAGE) 500 MG tablet TAKE 1 TABLET (500 MG) BY MOUTH DAILY (WITH DINNER) 90 tablet 3     metoprolol succinate (TOPROL-XL) 50 MG 24 hr tablet Take 2 tablets (100 mg) by mouth daily 180 tablet 3     ONETOUCH ULTRA test strip USE TO TEST BLOOD SUGAR TWICE A DAY OR AS DIRECTED 200 strip 0     ORDER FOR DME Equipment being ordered:  One Touch strips to be used daily 90 days 3     ORDER FOR DME Equipment being ordered:  chemstrips for daily blood sugar checks. 1 Box 3     VITAMIN D 1000 UNIT OR TABS 1 TABLET DAILY       warfarin (COUMADIN) 5 MG tablet Take as directed by [facility]. Current dose is 5 mg S,M,W,F, and 2.5 mg T,Th, and Sat. 102 tablet 3     clobetasol (TEMOVATE) 0.05 % cream APPLY SPARINGLY TO AFFECTED AREA TWICE WEEKLY (Patient not taking: Reported on 10/9/2018) 60 g 2     propranolol (INDERAL) 10 MG tablet TAKE 1 TABLET (10 MG) BY MOUTH 2 TIMES DAILY AS NEEDED (Patient not taking: Reported on 9/27/2018) 180 tablet 3     [DISCONTINUED] metoprolol succinate (TOPROL-XL) 50 MG 24 hr tablet Take 1 tablet (50 mg) by mouth daily 30 tablet 1     Allergies   Allergen Reactions     Benzocaine      Pravastatin      Muscle aches     Vagisil [Kdc:Benzocaine+Cetyl Alcohol+Edetic Acid+Methylparaben+Propylene Glycol+...]      It is the benzocaine       Xarelto [Rivaroxaban]      Daily headache     Zocor [Hmg-Coa-R Inhibitors]      Muscle aches     Recent Labs   Lab Test  09/27/18   1017  05/16/18   0729  10/26/17   0929   03/21/17   0737    03/29/16   0738   A1C  7.6*  7.0*  6.9*   < >  8.0*   < >  7.0*   LDL   --   81   --    --   72   --   87   HDL   --   44*   --    --   42*   --   39*   TRIG   --   113   --    --   202*   --   213*   ALT   --   31   --    --   29   --   32   CR  0.83  0.84   --    --   0.82   < >  0.80   GFRESTIMATED  66  66   --    --   68   < >  69   GFRESTBLACK  80  80   --    --   82   < >  84   POTASSIUM  4.1  4.2   --    --   4.0   < >  4.1   TSH  2.37  2.61  2.84   --   5.43*   < >  6.31*    < > = values in this interval not displayed.      BP Readings from Last 3 Encounters:   10/09/18 120/70   09/27/18 100/73   05/22/18 133/60    Wt Readings from Last 3 Encounters:   10/09/18 203 lb (92.1 kg)   09/27/18 203 lb (92.1 kg)   05/22/18 207 lb (93.9 kg)                  Labs reviewed in EPIC    Reviewed and updated as needed this visit by clinical staff  Tobacco  Allergies  Meds  Med Hx  Surg Hx  Fam Hx  Soc Hx      Reviewed and updated as needed this visit by Provider         ROS:  Constitutional, HEENT, cardiovascular, pulmonary, gi and gu systems are negative, except as otherwise noted.    OBJECTIVE:     /70 (BP Location: Right arm, Patient Position: Sitting, Cuff Size: Adult Large)  Pulse 118  Temp 97  F (36.1  C) (Oral)  Wt 203 lb (92.1 kg)  SpO2 94%  BMI 33.78 kg/m2  Body mass index is 33.78 kg/(m^2).  GENERAL: healthy, alert and no distress  EYES: Eyes grossly normal to inspection, PERRL and conjunctivae and sclerae normal  NECK: no adenopathy, no asymmetry, masses, or scars and thyroid normal to palpation  RESP: lungs clear to auscultation - no rales, rhonchi or wheezes  CV: irreg irreg, HR about 110's.    MS: no gross musculoskeletal defects noted, no edema  SKIN: no suspicious lesions or rashes  NEURO: Normal strength and tone, mentation intact and speech normal  PSYCH: mentation appears normal, affect normal/bright    Diagnostic Test Results:  Results for orders placed or performed in visit on  10/09/18   INR point of care   Result Value Ref Range    INR Protime 2.0 (A) 0.86 - 1.14     TSH, BMP, CBC normal    ASSESSMENT/PLAN:       ICD-10-CM    1. Chronic atrial fibrillation (H) I48.2 INR CLINIC REFERRAL   2. Essential tremor G25.0    3. Type 2 diabetes mellitus without complication, without long-term current use of insulin (H) E11.9    4. Screening for diabetic peripheral neuropathy Z13.89    5. Need for prophylactic vaccination and inoculation against influenza Z23 FLU VACCINE, INCREASED ANTIGEN, PRESV FREE, AGE 65+ [08192]     Vaccine Administration, Initial [93879]   6. Tachycardia R00.0 metoprolol succinate (TOPROL-XL) 50 MG 24 hr tablet       Has less subjective fatigue, but still has afib with RVR.     May have been triggered by backing off of propanolol she had been on for tremor.     Will double the metoprolol.   Follow up next week.       Estela Davila MD  [facility]    Injectable Influenza Immunization Documentation    1.  Is the person to be vaccinated sick today?   No    2. Does the person to be vaccinated have an allergy to a component   of the vaccine?   No  Egg Allergy Algorithm Link    3. Has the person to be vaccinated ever had a serious reaction   to influenza vaccine in the past?   No    4. Has the person to be vaccinated ever had Guillain-Barré syndrome?   No    Form completed by Dannielle Travis ma            1 Principal Discharge DX:	Head injury

## 2024-03-15 ENCOUNTER — LAB (OUTPATIENT)
Dept: LAB | Facility: CLINIC | Age: 84
End: 2024-03-15
Payer: COMMERCIAL

## 2024-03-15 ENCOUNTER — ANTICOAGULATION THERAPY VISIT (OUTPATIENT)
Dept: ANTICOAGULATION | Facility: CLINIC | Age: 84
End: 2024-03-15

## 2024-03-15 DIAGNOSIS — I48.20 CHRONIC ATRIAL FIBRILLATION (H): ICD-10-CM

## 2024-03-15 DIAGNOSIS — Z79.01 ANTICOAGULATION GOAL OF INR 2 TO 3: ICD-10-CM

## 2024-03-15 DIAGNOSIS — Z51.81 ANTICOAGULATION GOAL OF INR 2 TO 3: ICD-10-CM

## 2024-03-15 DIAGNOSIS — I48.20 CHRONIC ATRIAL FIBRILLATION (H): Primary | ICD-10-CM

## 2024-03-15 DIAGNOSIS — I73.9 PVD (PERIPHERAL VASCULAR DISEASE) (H): ICD-10-CM

## 2024-03-15 DIAGNOSIS — Z98.890 STATUS POST CATHETER ABLATION OF ATRIAL FIBRILLATION: ICD-10-CM

## 2024-03-15 LAB — INR BLD: 2.8 (ref 0.9–1.1)

## 2024-03-15 PROCEDURE — 85610 PROTHROMBIN TIME: CPT

## 2024-03-15 PROCEDURE — 36416 COLLJ CAPILLARY BLOOD SPEC: CPT

## 2024-03-15 NOTE — PROGRESS NOTES
ANTICOAGULATION MANAGEMENT     Clementina Cooper 83 year old female is on warfarin with therapeutic INR result. (Goal INR 2.0-3.0)    Recent labs: (last 7 days)     03/15/24  1009   INR 2.8*       ASSESSMENT     Source(s): Chart Review and Patient/Caregiver Call     Warfarin doses taken: Warfarin taken as instructed  Diet: No new diet changes identified  Medication/supplement changes: None noted  New illness, injury, or hospitalization: No  Signs or symptoms of bleeding or clotting: No  Previous result: Therapeutic last visit; previously outside of goal range  Additional findings: None       PLAN     Recommended plan for no diet, medication or health factor changes affecting INR     Dosing Instructions: Continue your current warfarin dose with next INR in 4 weeks       Summary  As of 3/15/2024      Full warfarin instructions:  5 mg every Tue, Fri; 2.5 mg all other days   Next INR check:  4/12/2024               Telephone call with Lynda who verbalizes understanding and agrees to plan    Lab visit scheduled    Education provided:   Goal range and lab monitoring: goal range and significance of current result    Plan made per [facility] anticoagulation protocol    Lou Santiago RN  Anticoagulation Clinic  3/15/2024    _______________________________________________________________________     Anticoagulation Episode Summary       Current INR goal:  2.0-3.0   TTR:  81.6% (1 y)   Target end date:  Indefinite   Send INR reminders to:  RON CRUZ    Indications    Chronic atrial fibrillation (H) [I48.20]  Status post catheter ablation of atrial fibrillation [Z98.890]  PVD (peripheral vascular disease) (H24) [I73.9]  Anticoagulation goal of INR 2 to 3 [Z51.81  Z79.01]             Comments:               Anticoagulation Care Providers       Provider Role Specialty Phone number    Estela Davila MD Referring HOSPITALIST [phone]

## 2024-04-12 ENCOUNTER — LAB (OUTPATIENT)
Dept: LAB | Facility: CLINIC | Age: 84
End: 2024-04-12
Payer: COMMERCIAL

## 2024-04-12 ENCOUNTER — ANTICOAGULATION THERAPY VISIT (OUTPATIENT)
Dept: ANTICOAGULATION | Facility: CLINIC | Age: 84
End: 2024-04-12

## 2024-04-12 DIAGNOSIS — I48.20 CHRONIC ATRIAL FIBRILLATION (H): ICD-10-CM

## 2024-04-12 DIAGNOSIS — Z51.81 ANTICOAGULATION GOAL OF INR 2 TO 3: ICD-10-CM

## 2024-04-12 DIAGNOSIS — Z79.01 ANTICOAGULATION GOAL OF INR 2 TO 3: ICD-10-CM

## 2024-04-12 DIAGNOSIS — Z98.890 STATUS POST CATHETER ABLATION OF ATRIAL FIBRILLATION: ICD-10-CM

## 2024-04-12 DIAGNOSIS — I73.9 PVD (PERIPHERAL VASCULAR DISEASE) (H): ICD-10-CM

## 2024-04-12 DIAGNOSIS — I48.20 CHRONIC ATRIAL FIBRILLATION (H): Primary | ICD-10-CM

## 2024-04-12 LAB — INR BLD: 1.9 (ref 0.9–1.1)

## 2024-04-12 PROCEDURE — 36416 COLLJ CAPILLARY BLOOD SPEC: CPT

## 2024-04-12 PROCEDURE — 85610 PROTHROMBIN TIME: CPT

## 2024-04-12 NOTE — PROGRESS NOTES
ANTICOAGULATION MANAGEMENT     Clementina Cooper 83 year old female is on warfarin with subtherapeutic INR result. (Goal INR 2.0-3.0)    Recent labs: (last 7 days)     04/12/24  1025   INR 1.9*       ASSESSMENT     Source(s): Chart Review and Patient/Caregiver Call     Warfarin doses taken: Warfarin taken as instructed  Diet: No new diet changes identified  Medication/supplement changes: None noted  New illness, injury, or hospitalization: No  Signs or symptoms of bleeding or clotting: No  Previous result: Therapeutic last 2(+) visits  Additional findings: None       PLAN     Recommended plan for no diet, medication or health factor changes affecting INR     Dosing Instructions: Continue your current warfarin dose with next INR in 2 weeks       Summary  As of 4/12/2024      Full warfarin instructions:  5 mg every Tue, Fri; 2.5 mg all other days   Next INR check:  4/25/2024               Telephone call with Lynda who verbalizes understanding and agrees to plan    Lab visit scheduled    Education provided:   Please call back if any changes to your diet, medications or how you've been taking warfarin  Contact [phone]  with any changes, questions or concerns.     Plan made per [facility] anticoagulation protocol    Daniella Frances RN  Anticoagulation Clinic  4/12/2024    _______________________________________________________________________     Anticoagulation Episode Summary       Current INR goal:  2.0-3.0   TTR:  81.7% (1 y)   Target end date:  Indefinite   Send INR reminders to:  RON CRUZ    Indications    Chronic atrial fibrillation (H) [I48.20]  Status post catheter ablation of atrial fibrillation [Z98.890]  PVD (peripheral vascular disease) (H24) [I73.9]  Anticoagulation goal of INR 2 to 3 [Z51.81  Z79.01]             Comments:               Anticoagulation Care Providers       Provider Role Specialty Phone number    Estela Davila MD Referring HOSPITALIST [phone]

## 2024-04-25 ENCOUNTER — ANTICOAGULATION THERAPY VISIT (OUTPATIENT)
Dept: ANTICOAGULATION | Facility: CLINIC | Age: 84
End: 2024-04-25

## 2024-04-25 ENCOUNTER — LAB (OUTPATIENT)
Dept: LAB | Facility: CLINIC | Age: 84
End: 2024-04-25
Payer: COMMERCIAL

## 2024-04-25 DIAGNOSIS — I73.9 PVD (PERIPHERAL VASCULAR DISEASE) (H): ICD-10-CM

## 2024-04-25 DIAGNOSIS — Z98.890 STATUS POST CATHETER ABLATION OF ATRIAL FIBRILLATION: ICD-10-CM

## 2024-04-25 DIAGNOSIS — I48.20 CHRONIC ATRIAL FIBRILLATION (H): Primary | ICD-10-CM

## 2024-04-25 DIAGNOSIS — Z51.81 ANTICOAGULATION GOAL OF INR 2 TO 3: ICD-10-CM

## 2024-04-25 DIAGNOSIS — I48.20 CHRONIC ATRIAL FIBRILLATION (H): ICD-10-CM

## 2024-04-25 DIAGNOSIS — Z79.01 ANTICOAGULATION GOAL OF INR 2 TO 3: ICD-10-CM

## 2024-04-25 LAB — INR BLD: 2.4 (ref 0.9–1.1)

## 2024-04-25 PROCEDURE — 85610 PROTHROMBIN TIME: CPT

## 2024-04-25 PROCEDURE — 36416 COLLJ CAPILLARY BLOOD SPEC: CPT

## 2024-04-25 NOTE — PROGRESS NOTES
ANTICOAGULATION MANAGEMENT     Clementina Cooper 83 year old female is on warfarin with therapeutic INR result. (Goal INR 2.0-3.0)    Recent labs: (last 7 days)     04/25/24  1016   INR 2.4*       ASSESSMENT     Source(s): Chart Review and Patient/Caregiver Call     Warfarin doses taken: Warfarin taken as instructed  Diet: No new diet changes identified  Medication/supplement changes: None noted  New illness, injury, or hospitalization: No  Signs or symptoms of bleeding or clotting: No  Previous result: Subtherapeutic  Additional findings: None       PLAN     Recommended plan for no diet, medication or health factor changes affecting INR     Dosing Instructions: Continue your current warfarin dose with next INR in 3 weeks       Summary  As of 4/25/2024      Full warfarin instructions:  5 mg every Tue, Fri; 2.5 mg all other days   Next INR check:  5/16/2024               Telephone call with Lynda who verbalizes understanding and agrees to plan    Lab visit scheduled    Education provided:   Please call back if any changes to your diet, medications or how you've been taking warfarin  Contact [phone]  with any changes, questions or concerns.     Plan made per [facility] anticoagulation protocol    Daniella Frances RN  Anticoagulation Clinic  4/25/2024    _______________________________________________________________________     Anticoagulation Episode Summary       Current INR goal:  2.0-3.0   TTR:  84.5% (1 y)   Target end date:  Indefinite   Send INR reminders to:  RON CRUZ    Indications    Chronic atrial fibrillation (H) [I48.20]  Status post catheter ablation of atrial fibrillation [Z98.890]  PVD (peripheral vascular disease) (H24) [I73.9]  Anticoagulation goal of INR 2 to 3 [Z51.81  Z79.01]             Comments:               Anticoagulation Care Providers       Provider Role Specialty Phone number    Estela Davila MD Referring HOSPITALIST [phone]

## 2024-05-16 ENCOUNTER — LAB (OUTPATIENT)
Dept: LAB | Facility: CLINIC | Age: 84
End: 2024-05-16
Payer: COMMERCIAL

## 2024-05-16 ENCOUNTER — ANTICOAGULATION THERAPY VISIT (OUTPATIENT)
Dept: ANTICOAGULATION | Facility: CLINIC | Age: 84
End: 2024-05-16

## 2024-05-16 DIAGNOSIS — I73.9 PVD (PERIPHERAL VASCULAR DISEASE) (H): ICD-10-CM

## 2024-05-16 DIAGNOSIS — I48.20 CHRONIC ATRIAL FIBRILLATION (H): Primary | ICD-10-CM

## 2024-05-16 DIAGNOSIS — Z79.01 ANTICOAGULATION GOAL OF INR 2 TO 3: ICD-10-CM

## 2024-05-16 DIAGNOSIS — Z98.890 STATUS POST CATHETER ABLATION OF ATRIAL FIBRILLATION: ICD-10-CM

## 2024-05-16 DIAGNOSIS — I48.20 CHRONIC ATRIAL FIBRILLATION (H): ICD-10-CM

## 2024-05-16 DIAGNOSIS — Z51.81 ANTICOAGULATION GOAL OF INR 2 TO 3: ICD-10-CM

## 2024-05-16 LAB — INR BLD: 2.4 (ref 0.9–1.1)

## 2024-05-16 PROCEDURE — 36415 COLL VENOUS BLD VENIPUNCTURE: CPT

## 2024-05-16 PROCEDURE — 85610 PROTHROMBIN TIME: CPT

## 2024-05-16 NOTE — PROGRESS NOTES
ANTICOAGULATION MANAGEMENT     Clementina Cooper 83 year old female is on warfarin with therapeutic INR result. (Goal INR 2.0-3.0)    Recent labs: (last 7 days)     05/16/24  1110   INR 2.4*       ASSESSMENT     Source(s): Chart Review   Previous result: Therapeutic last visit; previously outside of goal range  Additional findings: None     PLAN     Recommended plan for no diet, medication or health factor changes affecting INR     Dosing Instructions: Continue your current warfarin dose with next INR in 4 weeks       Summary  As of 5/16/2024      Full warfarin instructions:  5 mg every Tue, Fri; 2.5 mg all other days   Next INR check:  6/13/2024               Detailed voice message left for Lynda with dosing instructions and follow up date.     Contact [phone]  to schedule and with any changes, questions or concerns.     Education provided:   Please call back if any changes to your diet, medications or how you've been taking warfarin    Plan made per [facility] anticoagulation protocol    Dawna Good RN  Anticoagulation Clinic  5/16/2024    _______________________________________________________________________     Anticoagulation Episode Summary       Current INR goal:  2.0-3.0   TTR:  90.3% (1 y)   Target end date:  Indefinite   Send INR reminders to:  RON CRUZ    Indications    Chronic atrial fibrillation (H) [I48.20]  Status post catheter ablation of atrial fibrillation [Z98.890]  PVD (peripheral vascular disease) (H24) [I73.9]  Anticoagulation goal of INR 2 to 3 [Z51.81  Z79.01]             Comments:               Anticoagulation Care Providers       Provider Role Specialty Phone number    Estela Davila MD Referring HOSPITALIST [phone]

## 2024-06-13 ENCOUNTER — TELEPHONE (OUTPATIENT)
Dept: FAMILY MEDICINE | Facility: CLINIC | Age: 84
End: 2024-06-13

## 2024-06-13 ENCOUNTER — ANTICOAGULATION THERAPY VISIT (OUTPATIENT)
Dept: ANTICOAGULATION | Facility: CLINIC | Age: 84
End: 2024-06-13

## 2024-06-13 ENCOUNTER — LAB (OUTPATIENT)
Dept: LAB | Facility: CLINIC | Age: 84
End: 2024-06-13
Payer: COMMERCIAL

## 2024-06-13 DIAGNOSIS — Z79.01 ANTICOAGULATION GOAL OF INR 2 TO 3: ICD-10-CM

## 2024-06-13 DIAGNOSIS — Z79.01 LONG TERM CURRENT USE OF ANTICOAGULANT THERAPY: ICD-10-CM

## 2024-06-13 DIAGNOSIS — Z79.01 ANTICOAGULATION GOAL OF INR 2 TO 3: Primary | ICD-10-CM

## 2024-06-13 DIAGNOSIS — I73.9 PVD (PERIPHERAL VASCULAR DISEASE) (H): ICD-10-CM

## 2024-06-13 DIAGNOSIS — I48.20 CHRONIC ATRIAL FIBRILLATION (H): Primary | ICD-10-CM

## 2024-06-13 DIAGNOSIS — Z51.81 ANTICOAGULATION GOAL OF INR 2 TO 3: Primary | ICD-10-CM

## 2024-06-13 DIAGNOSIS — I48.20 CHRONIC ATRIAL FIBRILLATION (H): ICD-10-CM

## 2024-06-13 DIAGNOSIS — E11.65 TYPE 2 DIABETES MELLITUS WITH HYPERGLYCEMIA, WITHOUT LONG-TERM CURRENT USE OF INSULIN (H): Primary | ICD-10-CM

## 2024-06-13 DIAGNOSIS — Z98.890 STATUS POST CATHETER ABLATION OF ATRIAL FIBRILLATION: ICD-10-CM

## 2024-06-13 DIAGNOSIS — Z51.81 ANTICOAGULATION GOAL OF INR 2 TO 3: ICD-10-CM

## 2024-06-13 LAB — INR BLD: 2.8 (ref 0.9–1.1)

## 2024-06-13 PROCEDURE — 36416 COLLJ CAPILLARY BLOOD SPEC: CPT

## 2024-06-13 PROCEDURE — 85610 PROTHROMBIN TIME: CPT

## 2024-06-13 NOTE — TELEPHONE ENCOUNTER
ANTICOAGULATION CLINIC REFERRAL RENEWAL REQUEST       An annual renewal order is required for all patients referred to [facility]tico[facility].?  Please review and sign the pended referral order for Clementina Cooper.       ANTICOAGULATION SUMMARY      Warfarin indication(s)   Atrial Fibrillation and PVD    Mechanical heart valve present?  NO       Current goal range   INR: 2.0-3.0     Goal appropriate for indication? Goal INR 2-3, standard for indication(s) above     Time in Therapeutic Range (TTR)  (Goal > 60%) 90.3%       Office visit with referring provider's group within last year yes on 1/26/24       Lou Santiago RN  [facility]tico[facility]

## 2024-06-13 NOTE — PROGRESS NOTES
ANTICOAGULATION MANAGEMENT     Clementina Cooper 83 year old female is on warfarin with therapeutic INR result. (Goal INR 2.0-3.0)    Recent labs: (last 7 days)     06/13/24  0954   INR 2.8*       ASSESSMENT     Source(s): Chart Review  Previous INR was Therapeutic last 2(+) visits  Medication, diet, health changes since last INR chart reviewed; none identified         PLAN     Recommended plan for no diet, medication or health factor changes affecting INR     Dosing Instructions: Continue your current warfarin dose with next INR in 5 weeks       Summary  As of 6/13/2024      Full warfarin instructions:  5 mg every Tue, Fri; 2.5 mg all other days   Next INR check:  7/18/2024               Detailed voice message left for Lynda with dosing instructions and follow up date.     Contact [phone]  to schedule and with any changes, questions or concerns.     Education provided:   Please call back if any changes to your diet, medications or how you've been taking warfarin  Goal range and lab monitoring: goal range and significance of current result    Plan made per [facility] anticoagulation protocol    Lou Santiago RN  Anticoagulation Clinic  6/13/2024    _______________________________________________________________________     Anticoagulation Episode Summary       Current INR goal:  2.0-3.0   TTR:  92.4% (1 y)   Target end date:  Indefinite   Send INR reminders to:  RON CRUZ    Indications    Chronic atrial fibrillation (H) [I48.20]  Status post catheter ablation of atrial fibrillation [Z98.890]  PVD (peripheral vascular disease) (H24) [I73.9]  Anticoagulation goal of INR 2 to 3 [Z51.81  Z79.01]             Comments:               Anticoagulation Care Providers       Provider Role Specialty Phone number    Estela Davila MD Referring HOSPITALIST [phone]

## 2024-07-05 DIAGNOSIS — E11.9 TYPE 2 DIABETES MELLITUS WITHOUT COMPLICATION, WITHOUT LONG-TERM CURRENT USE OF INSULIN (H): ICD-10-CM

## 2024-07-05 RX ORDER — BLOOD SUGAR DIAGNOSTIC
STRIP MISCELLANEOUS
Qty: 100 STRIP | Refills: 1 | Status: SHIPPED | OUTPATIENT
Start: 2024-07-05

## 2024-07-19 ENCOUNTER — ANTICOAGULATION THERAPY VISIT (OUTPATIENT)
Dept: ANTICOAGULATION | Facility: CLINIC | Age: 84
End: 2024-07-19

## 2024-07-19 ENCOUNTER — LAB (OUTPATIENT)
Dept: LAB | Facility: CLINIC | Age: 84
End: 2024-07-19
Payer: COMMERCIAL

## 2024-07-19 DIAGNOSIS — Z79.01 LONG TERM CURRENT USE OF ANTICOAGULANT THERAPY: ICD-10-CM

## 2024-07-19 DIAGNOSIS — Z79.01 ANTICOAGULATION GOAL OF INR 2 TO 3: ICD-10-CM

## 2024-07-19 DIAGNOSIS — Z51.81 ANTICOAGULATION GOAL OF INR 2 TO 3: ICD-10-CM

## 2024-07-19 DIAGNOSIS — I48.20 CHRONIC ATRIAL FIBRILLATION (H): Primary | ICD-10-CM

## 2024-07-19 DIAGNOSIS — I48.20 CHRONIC ATRIAL FIBRILLATION (H): ICD-10-CM

## 2024-07-19 DIAGNOSIS — Z98.890 STATUS POST CATHETER ABLATION OF ATRIAL FIBRILLATION: ICD-10-CM

## 2024-07-19 DIAGNOSIS — I73.9 PVD (PERIPHERAL VASCULAR DISEASE) (H): ICD-10-CM

## 2024-07-19 DIAGNOSIS — E03.4 HYPOTHYROIDISM DUE TO ACQUIRED ATROPHY OF THYROID: ICD-10-CM

## 2024-07-19 DIAGNOSIS — E11.9 TYPE 2 DIABETES MELLITUS WITHOUT COMPLICATION, WITHOUT LONG-TERM CURRENT USE OF INSULIN (H): ICD-10-CM

## 2024-07-19 LAB
HBA1C MFR BLD: 7.7 % (ref 0–5.6)
INR BLD: 2.3 (ref 0.9–1.1)
TSH SERPL DL<=0.005 MIU/L-ACNC: 3.05 UIU/ML (ref 0.3–4.2)

## 2024-07-19 PROCEDURE — 84443 ASSAY THYROID STIM HORMONE: CPT

## 2024-07-19 PROCEDURE — 85610 PROTHROMBIN TIME: CPT

## 2024-07-19 PROCEDURE — 36415 COLL VENOUS BLD VENIPUNCTURE: CPT

## 2024-07-19 PROCEDURE — 83036 HEMOGLOBIN GLYCOSYLATED A1C: CPT

## 2024-07-19 NOTE — PROGRESS NOTES
ANTICOAGULATION MANAGEMENT     Clementina Cooper 84 year old female is on warfarin with therapeutic INR result. (Goal INR 2.0-3.0)    Recent labs: (last 7 days)     07/19/24  0917   INR 2.3*       ASSESSMENT     Source(s): Chart Review and Patient/Caregiver Call     Warfarin doses taken: Warfarin taken as instructed  Diet: No new diet changes identified  Medication/supplement changes: None noted  New illness, injury, or hospitalization: No  Signs or symptoms of bleeding or clotting: No  Previous result: Therapeutic last 2(+) visits  Additional findings: None       PLAN     Recommended plan for no diet, medication or health factor changes affecting INR     Dosing Instructions: Continue your current warfarin dose with next INR in 6 weeks       Summary  As of 7/19/2024      Full warfarin instructions:  5 mg every Tue, Fri; 2.5 mg all other days   Next INR check:  8/30/2024               Telephone call with Lynda who verbalizes understanding and agrees to plan    Lab visit scheduled    Education provided: Please call back if any changes to your diet, medications or how you've been taking warfarin  Contact [phone] with any changes, questions or concerns.     Plan made per [facility] anticoagulation protocol    Daniella Farnces RN  Anticoagulation Clinic  7/19/2024    _______________________________________________________________________     Anticoagulation Episode Summary       Current INR goal:  2.0-3.0   TTR:  92.4% (1 y)   Target end date:  Indefinite   Send INR reminders to:  RON CRUZ    Indications    Chronic atrial fibrillation (H) [I48.20]  Status post catheter ablation of atrial fibrillation [Z98.890]  PVD (peripheral vascular disease) (H24) [I73.9]  Anticoagulation goal of INR 2 to 3 [Z51.81  Z79.01]  Long-term (current) use of anticoagulants [Z79.01] [Z79.01]             Comments:               Anticoagulation Care Providers       Provider Role Specialty Phone number    Estela Davila MD Referring  HOSPITALIST [phone]    Enmanuel Baez, DO Referring Family Medicine [phone]

## 2024-07-26 ENCOUNTER — OFFICE VISIT (OUTPATIENT)
Dept: FAMILY MEDICINE | Facility: CLINIC | Age: 84
End: 2024-07-26
Payer: COMMERCIAL

## 2024-07-26 VITALS
HEIGHT: 66 IN | DIASTOLIC BLOOD PRESSURE: 74 MMHG | BODY MASS INDEX: 28.93 KG/M2 | SYSTOLIC BLOOD PRESSURE: 120 MMHG | TEMPERATURE: 97.4 F | WEIGHT: 180 LBS | RESPIRATION RATE: 16 BRPM | OXYGEN SATURATION: 95 % | HEART RATE: 64 BPM

## 2024-07-26 DIAGNOSIS — E11.65 TYPE 2 DIABETES MELLITUS WITH HYPERGLYCEMIA, WITHOUT LONG-TERM CURRENT USE OF INSULIN (H): Primary | ICD-10-CM

## 2024-07-26 LAB
CREAT UR-MCNC: 35.2 MG/DL
MICROALBUMIN UR-MCNC: <12 MG/L
MICROALBUMIN/CREAT UR: NORMAL MG/G{CREAT}

## 2024-07-26 PROCEDURE — 82570 ASSAY OF URINE CREATININE: CPT | Performed by: FAMILY MEDICINE

## 2024-07-26 PROCEDURE — 99213 OFFICE O/P EST LOW 20 MIN: CPT | Performed by: FAMILY MEDICINE

## 2024-07-26 PROCEDURE — 82043 UR ALBUMIN QUANTITATIVE: CPT | Performed by: FAMILY MEDICINE

## 2024-07-26 RX ORDER — LOSARTAN POTASSIUM 25 MG/1
TABLET ORAL
Qty: 45 TABLET | Refills: 3 | Status: CANCELLED | OUTPATIENT
Start: 2024-07-26

## 2024-07-26 RX ORDER — GLIPIZIDE 5 MG/1
10 TABLET, FILM COATED, EXTENDED RELEASE ORAL DAILY
Qty: 180 TABLET | Refills: 3 | Status: CANCELLED | OUTPATIENT
Start: 2024-07-26

## 2024-07-26 RX ORDER — LEVOTHYROXINE SODIUM 100 UG/1
100 TABLET ORAL DAILY
Qty: 90 TABLET | Refills: 3 | Status: CANCELLED | OUTPATIENT
Start: 2024-07-26

## 2024-07-26 RX ORDER — ATORVASTATIN CALCIUM 20 MG/1
20 TABLET, FILM COATED ORAL DAILY
Qty: 90 TABLET | Refills: 3 | Status: CANCELLED | OUTPATIENT
Start: 2024-07-26

## 2024-07-26 RX ORDER — PROPRANOLOL HYDROCHLORIDE 10 MG/1
TABLET ORAL
Qty: 180 TABLET | Refills: 3 | Status: CANCELLED | OUTPATIENT
Start: 2024-07-26

## 2024-08-30 ENCOUNTER — ANTICOAGULATION THERAPY VISIT (OUTPATIENT)
Dept: ANTICOAGULATION | Facility: CLINIC | Age: 84
End: 2024-08-30

## 2024-08-30 ENCOUNTER — LAB (OUTPATIENT)
Dept: LAB | Facility: CLINIC | Age: 84
End: 2024-08-30
Payer: COMMERCIAL

## 2024-08-30 DIAGNOSIS — I48.20 CHRONIC ATRIAL FIBRILLATION (H): Primary | ICD-10-CM

## 2024-08-30 DIAGNOSIS — Z79.01 ANTICOAGULATION GOAL OF INR 2 TO 3: ICD-10-CM

## 2024-08-30 DIAGNOSIS — Z79.01 LONG TERM CURRENT USE OF ANTICOAGULANT THERAPY: ICD-10-CM

## 2024-08-30 DIAGNOSIS — I48.20 CHRONIC ATRIAL FIBRILLATION (H): ICD-10-CM

## 2024-08-30 DIAGNOSIS — Z51.81 ANTICOAGULATION GOAL OF INR 2 TO 3: ICD-10-CM

## 2024-08-30 DIAGNOSIS — Z98.890 STATUS POST CATHETER ABLATION OF ATRIAL FIBRILLATION: ICD-10-CM

## 2024-08-30 DIAGNOSIS — I73.9 PVD (PERIPHERAL VASCULAR DISEASE) (H): ICD-10-CM

## 2024-08-30 LAB — INR BLD: 2.7 (ref 0.9–1.1)

## 2024-08-30 PROCEDURE — 85610 PROTHROMBIN TIME: CPT

## 2024-08-30 PROCEDURE — 36415 COLL VENOUS BLD VENIPUNCTURE: CPT

## 2024-08-30 NOTE — PROGRESS NOTES
ANTICOAGULATION MANAGEMENT     Clementina Cooper 84 year old female is on warfarin with therapeutic INR result. (Goal INR 2.0-3.0)    Recent labs: (last 7 days)     08/30/24  0911   INR 2.7*       ASSESSMENT     Source(s): Chart Review  Previous INR was Therapeutic last 2(+) visits  Medication, diet, health changes since last INR chart reviewed; none identified         PLAN     Recommended plan for no diet, medication or health factor changes affecting INR     Dosing Instructions: Continue your current warfarin dose with next INR in 6 weeks       Summary  As of 8/30/2024      Full warfarin instructions:  5 mg every Tue, Fri; 2.5 mg all other days   Next INR check:  10/11/2024               Detailed voice message left for Lynda with dosing instructions and follow up date.     Contact [phone] to schedule and with any changes, questions or concerns.     Education provided: Please call back if any changes to your diet, medications or how you've been taking warfarin  Goal range and lab monitoring: goal range and significance of current result    Plan made per [facility] anticoagulation protocol    Lou Santiago RN  Anticoagulation Clinic  8/30/2024    _______________________________________________________________________     Anticoagulation Episode Summary       Current INR goal:  2.0-3.0   TTR:  92.4% (1 y)   Target end date:  Indefinite   Send INR reminders to:  RON CRUZ    Indications    Chronic atrial fibrillation (H) [I48.20]  Status post catheter ablation of atrial fibrillation [Z98.890]  PVD (peripheral vascular disease) (H24) [I73.9]  Anticoagulation goal of INR 2 to 3 [Z51.81  Z79.01]  Long-term (current) use of anticoagulants [Z79.01] [Z79.01]             Comments:               Anticoagulation Care Providers       Provider Role Specialty Phone number    Estela Davila MD Referring HOSPITALIST [phone]    Enmanuel Baez DO Referring Family Medicine [phone]

## 2024-09-03 DIAGNOSIS — E11.9 TYPE 2 DIABETES MELLITUS WITHOUT COMPLICATION (H): ICD-10-CM

## 2024-09-03 NOTE — TELEPHONE ENCOUNTER
Requested Prescriptions   Pending Prescriptions Disp Refills    blood glucose (ONETOUCH ULTRA) test strip 200 strip 0     Sig: Use to test blood sugar  times daily or as directed.       There is no refill protocol information for this order        Enmanuel Drew   [facility]

## 2024-09-13 DIAGNOSIS — Z51.81 ANTICOAGULATION GOAL OF INR 2 TO 3: ICD-10-CM

## 2024-09-13 DIAGNOSIS — Z79.01 ANTICOAGULATION GOAL OF INR 2 TO 3: ICD-10-CM

## 2024-09-13 RX ORDER — WARFARIN SODIUM 5 MG/1
TABLET ORAL
Qty: 65 TABLET | Refills: 0 | Status: SHIPPED | OUTPATIENT
Start: 2024-09-13

## 2024-10-11 ENCOUNTER — LAB (OUTPATIENT)
Dept: LAB | Facility: CLINIC | Age: 84
End: 2024-10-11
Payer: COMMERCIAL

## 2024-10-11 ENCOUNTER — ANTICOAGULATION THERAPY VISIT (OUTPATIENT)
Dept: ANTICOAGULATION | Facility: CLINIC | Age: 84
End: 2024-10-11

## 2024-10-11 DIAGNOSIS — Z79.01 LONG TERM CURRENT USE OF ANTICOAGULANT THERAPY: ICD-10-CM

## 2024-10-11 DIAGNOSIS — I73.9 PVD (PERIPHERAL VASCULAR DISEASE) (H): ICD-10-CM

## 2024-10-11 DIAGNOSIS — Z51.81 ANTICOAGULATION GOAL OF INR 2 TO 3: ICD-10-CM

## 2024-10-11 DIAGNOSIS — I48.20 CHRONIC ATRIAL FIBRILLATION (H): Primary | ICD-10-CM

## 2024-10-11 DIAGNOSIS — I48.20 CHRONIC ATRIAL FIBRILLATION (H): ICD-10-CM

## 2024-10-11 DIAGNOSIS — Z79.01 ANTICOAGULATION GOAL OF INR 2 TO 3: ICD-10-CM

## 2024-10-11 DIAGNOSIS — Z98.890 STATUS POST CATHETER ABLATION OF ATRIAL FIBRILLATION: ICD-10-CM

## 2024-10-11 LAB — INR BLD: 2.5 (ref 0.9–1.1)

## 2024-10-11 PROCEDURE — 85610 PROTHROMBIN TIME: CPT

## 2024-10-11 PROCEDURE — 36416 COLLJ CAPILLARY BLOOD SPEC: CPT

## 2024-10-11 NOTE — PROGRESS NOTES
ANTICOAGULATION MANAGEMENT     Clementina Cooper 84 year old female is on warfarin with therapeutic INR result. (Goal INR 2.0-3.0)    Recent labs: (last 7 days)     10/11/24  0911   INR 2.5*       ASSESSMENT     Source(s): Chart Review   Previous result: Therapeutic last 2(+) visits  Additional findings: None     PLAN     Recommended plan for no diet, medication or health factor changes affecting INR     Dosing Instructions: Continue your current warfarin dose with next INR in 6 weeks       Summary  As of 10/11/2024      Full warfarin instructions:  5 mg every Tue, Fri; 2.5 mg all other days   Next INR check:  11/22/2024               Detailed voice message left for Lynda with dosing instructions and follow up date.     Contact [phone] to schedule and with any changes, questions or concerns.     Education provided: Please call back if any changes to your diet, medications or how you've been taking warfarin    Plan made per [facility] anticoagulation protocol    Dawna Good RN  10/11/2024  Anticoagulation Clinic  [facility] for routing messages: carolina CRUZ  [facility] patient phone line: [phone]        _______________________________________________________________________     Anticoagulation Episode Summary       Current INR goal:  2.0-3.0   TTR:  92.4% (1 y)   Target end date:  Indefinite   Send INR reminders to:  RON CRUZ    Indications    Chronic atrial fibrillation (H) [I48.20]  Status post catheter ablation of atrial fibrillation [Z98.890]  PVD (peripheral vascular disease) (H) [I73.9]  Anticoagulation goal of INR 2 to 3 [Z51.81  Z79.01]  Long-term (current) use of anticoagulants [Z79.01] [Z79.01]             Comments:               Anticoagulation Care Providers       Provider Role Specialty Phone number    Estela Davila MD Referring HOSPITALIST [phone]    Enmanuel Baez DO Referring Family Medicine [phone]

## 2024-11-04 ENCOUNTER — TELEPHONE (OUTPATIENT)
Dept: FAMILY MEDICINE | Facility: CLINIC | Age: 84
End: 2024-11-04

## 2024-11-04 ENCOUNTER — OFFICE VISIT (OUTPATIENT)
Dept: FAMILY MEDICINE | Facility: CLINIC | Age: 84
End: 2024-11-04
Payer: COMMERCIAL

## 2024-11-04 VITALS
WEIGHT: 180.25 LBS | DIASTOLIC BLOOD PRESSURE: 78 MMHG | OXYGEN SATURATION: 93 % | BODY MASS INDEX: 28.97 KG/M2 | RESPIRATION RATE: 14 BRPM | SYSTOLIC BLOOD PRESSURE: 128 MMHG | HEART RATE: 102 BPM | TEMPERATURE: 97 F | HEIGHT: 66 IN

## 2024-11-04 DIAGNOSIS — J01.01 ACUTE RECURRENT MAXILLARY SINUSITIS: Primary | ICD-10-CM

## 2024-11-04 PROCEDURE — 99213 OFFICE O/P EST LOW 20 MIN: CPT | Performed by: FAMILY MEDICINE

## 2024-11-04 RX ORDER — DOXYCYCLINE 100 MG/1
100 CAPSULE ORAL 2 TIMES DAILY
Qty: 20 CAPSULE | Refills: 0 | Status: SHIPPED | OUTPATIENT
Start: 2024-11-04 | End: 2024-11-14

## 2024-11-04 ASSESSMENT — PAIN SCALES - GENERAL: PAINLEVEL_OUTOF10: NO PAIN (0)

## 2024-11-04 NOTE — PATIENT INSTRUCTIONS
Take the antibiotic    Continue usual other meds    Stay well hydrated    Follow up as needed based on symptoms     Be seen promptly if symptoms acutely worsen

## 2024-11-04 NOTE — PROGRESS NOTES
"      Tremaine Howell is a 84 year old, presenting for the following health issues:  Cold Symptoms (Sore throat off and on, sinus congestion since last Wednesday)        11/4/2024    11:16 AM   Additional Questions   Roomed by Rosalva Briggs     History of Present Illness       Reason for visit:  Sick   She is taking medications regularly.      6 days of symptoms    Bad sore throat     Head full  Chills    No fever    Not much cough    More congested head than chest     No other symptoms    Tired      Not around many sick contacts    Eating and drinking okay    Bowel and bladder okay    Tylenol helps some    Drinking fluids fine          Objective    /78 (BP Location: Right arm, Patient Position: Chair, Cuff Size: Adult Regular)   Pulse 102   Temp 97  F (36.1  C) (Temporal)   Resp 14   Ht 1.664 m (5' 5.5\")   Wt 81.8 kg (180 lb 4 oz)   LMP  (LMP Unknown)   SpO2 93%   BMI 29.54 kg/m    Body mass index is 29.54 kg/m .  Physical Exam  Constitutional:       Appearance: Normal appearance.   HENT:      Head: Normocephalic and atraumatic.      Right Ear: Tympanic membrane, ear canal and external ear normal.      Left Ear: Tympanic membrane, ear canal and external ear normal.      Nose: Nose normal.      Mouth/Throat:      Mouth: Mucous membranes are moist.      Pharynx: Oropharynx is clear.      Comments: Some drainage posteriorly   Eyes:      Conjunctiva/sclera: Conjunctivae normal.   Cardiovascular:      Rate and Rhythm: Normal rate and regular rhythm.      Heart sounds: Normal heart sounds.   Pulmonary:      Effort: Pulmonary effort is normal. No respiratory distress.      Breath sounds: Normal breath sounds.   Neurological:      Mental Status: She is alert.         ASSESSMENT / PLAN:  (J01.01) Acute recurrent maxillary sinusitis  (primary encounter diagnosis)  Comment: discussed in detail with patient.  In the past when she has had similar symptoms it has got to point where she needed antibiotics to " get better.  One recent time thaty had to change from augmentin to doxy due to side effects on augmentin.  Thus go with doxy.   Plan: doxycycline hyclate (VIBRAMYCIN) 100 MG capsule           Follow up prn symptoms    Be seen promptly if symptoms acutely worsen       I reviewed the patient's medications, allergies, medical history, family history, and social history.    Bruce Heaton MD              Signed Electronically by: Bruce Heaton MD

## 2024-11-04 NOTE — TELEPHONE ENCOUNTER
Patient was transferred to nurse triage line after scheduling appointment with Dr. Heaton as PCP had no appointments.    She has not felt well for 5 days with mild cough, sore throat sinus pressure with occasional drainage.    She has no SOB and hears no wheezes when she takes a deep breath.    She will come to clinic today to be evaluated.    Christine M Klisch, RN

## 2024-11-26 DIAGNOSIS — J01.01 ACUTE RECURRENT MAXILLARY SINUSITIS: ICD-10-CM

## 2024-11-27 RX ORDER — DOXYCYCLINE 100 MG/1
100 CAPSULE ORAL 2 TIMES DAILY
Qty: 20 CAPSULE | Refills: 0 | Status: SHIPPED | OUTPATIENT
Start: 2024-11-27

## 2024-11-27 NOTE — TELEPHONE ENCOUNTER
Patient reports her symptoms returned after completing 10 day course of antibiotics and asking for an extension on them.     Thank you,  Shelbi Corea RN

## 2024-11-27 NOTE — TELEPHONE ENCOUNTER
Patient returned call, notified of below. She asked that we reach out to PCP to see if PCP willing to order any additional meds or willing to do a virtual telephone visit with her today.     Thanks,  KYLIE Hoang  [facility]

## 2024-11-27 NOTE — TELEPHONE ENCOUNTER
I called and spoke to the patient and let her know the prescription has been sent to her pharmacy and she should be getting a call when it is ready at the pharmacy.    Christina Wilcox [facility]

## 2024-11-27 NOTE — TELEPHONE ENCOUNTER
Routing to PCP again as Dr. Heaton is out of office    Patient calling again    Claudia Guardado RN

## 2024-11-27 NOTE — TELEPHONE ENCOUNTER
Medication Question or Refill    Contacts       Contact Date/Time Type Contact Phone/Fax    11/26/2024 06:01 PM CST Phone (Incoming) Lynda Cooper (Self) [phone] (H)            What medication are you calling about (include dose and sig)?: Doxycycline     Preferred Pharmacy:   [id number] - [place], [place] - [address] [facility]  [place] [address]  Phone: [phone] Fax: [phone]      Controlled Substance Agreement on file:   CSA -- Patient Level:    CSA: None found at the patient level.       Who prescribed the medication?: Dr Heaton    Do you need a refill? Yes    When did you use the medication last? 11/13/24    Patient offered an appointment? No    Do you have any questions or concerns?  Yes: Pt was feeling better using the medication but now that she is off of it she is starting to have the same symptoms.      Okay to leave a detailed message?: Yes at Home number on file [phone] (home)

## 2024-11-27 NOTE — TELEPHONE ENCOUNTER
Please let her know that Dr. Heaton is out today and Friday.  I suspect she has a viral illness given the timing of her meds and symptoms.  She can wait for Dr. Heaton to return or she can schedule a follow up visit with another provider or be seen in UC.      Carly Marc PA-C

## 2024-12-13 ENCOUNTER — ANCILLARY PROCEDURE (OUTPATIENT)
Dept: GENERAL RADIOLOGY | Facility: CLINIC | Age: 84
End: 2024-12-13
Payer: COMMERCIAL

## 2024-12-13 DIAGNOSIS — R09.89 CHEST CONGESTION: ICD-10-CM

## 2024-12-13 PROCEDURE — 71046 X-RAY EXAM CHEST 2 VIEWS: CPT | Mod: TC | Performed by: RADIOLOGY

## 2024-12-18 ENCOUNTER — TELEPHONE (OUTPATIENT)
Dept: FAMILY MEDICINE | Facility: CLINIC | Age: 84
End: 2024-12-18
Payer: COMMERCIAL

## 2024-12-18 DIAGNOSIS — R09.89 CHEST CONGESTION: Primary | ICD-10-CM

## 2024-12-18 NOTE — TELEPHONE ENCOUNTER
Routing message to provider.    Patient calling to request another prescription for antibiotic for sinus infection.    She is still having sinus congestion and pressure.  Completed 2 10 courses of doxycycline .    Was seen 12/13/24 and that provider did not order another course of antibiotics.    Patient did not want to make another appointment but asking if anything else can be done.    Please advise.    Christine M Klisch, RN

## 2024-12-18 NOTE — TELEPHONE ENCOUNTER
PCP is out of office this week.  Will route to the provider who just saw the patient 5 days ago for their input regarding this.    Kendall Contreras MD

## 2024-12-19 RX ORDER — PREDNISONE 20 MG/1
20 TABLET ORAL DAILY
Qty: 5 TABLET | Refills: 0 | Status: SHIPPED | OUTPATIENT
Start: 2024-12-19

## 2024-12-19 NOTE — TELEPHONE ENCOUNTER
Called patient prescribed 20 mg of prednisone x 5 days. Discussed calling if symptoms worsen. Patient acknowledged and agreed to plan of care.

## 2024-12-27 ENCOUNTER — VIRTUAL VISIT (OUTPATIENT)
Dept: FAMILY MEDICINE | Facility: CLINIC | Age: 84
End: 2024-12-27
Payer: COMMERCIAL

## 2024-12-27 DIAGNOSIS — J01.00 SUBACUTE MAXILLARY SINUSITIS: Primary | ICD-10-CM

## 2024-12-27 RX ORDER — AZELASTINE 1 MG/ML
1 SPRAY, METERED NASAL 2 TIMES DAILY
Qty: 30 ML | Refills: 2 | Status: SHIPPED | OUTPATIENT
Start: 2024-12-27

## 2024-12-27 RX ORDER — ELASTIC BANDAGE 1"X2.2YD
1 BANDAGE TOPICAL 3 TIMES DAILY
Qty: 30 PACKET | Refills: 2 | Status: SHIPPED | OUTPATIENT
Start: 2024-12-27

## 2024-12-27 RX ORDER — FLUTICASONE PROPIONATE 50 MCG
1 SPRAY, SUSPENSION (ML) NASAL DAILY
Qty: 16 G | Refills: 2 | Status: SHIPPED | OUTPATIENT
Start: 2024-12-27

## 2024-12-27 NOTE — PROGRESS NOTES
"Lynda is a 84 year old who is being evaluated via a billable telephone visit.    What phone number would you like to be contacted at? [phone]  How would you like to obtain your AVS? Mail a copy  Originating Location (pt. Location): Home    Distant Location (provider location):  On-site    Assessment & Plan       ICD-10-CM    1. Subacute maxillary sinusitis  J01.00 fluticasone (FLONASE) 50 MCG/ACT nasal spray     azelastine (ASTELIN) 0.1 % nasal spray     amoxicillin-clavulanate (AUGMENTIN) 875-125 MG tablet     sodium chloride-sodium bicarb (CLASSIC NETI POT SINUS WASH) 2300-700 MG KIT              The longitudinal plan of care for the diagnosis(es)/condition(s) as documented were addressed during this visit. Due to the added complexity in care, I will continue to support Lynda in the subsequent management and with ongoing continuity of care.     BMI  Estimated body mass index is 29.66 kg/m  as calculated from the following:    Height as of 12/13/24: 1.664 m (5' 5.5\").    Weight as of 12/13/24: 82.1 kg (181 lb).   Weight management plan: Discussed healthy diet and exercise guidelines      There are no Patient Instructions on file for this visit.    Subjective   Lynda is a 84 year old, presenting for the following health issues:  Sinus Problem      12/27/2024     3:38 PM   Additional Questions   Roomed by Mary   Accompanied by Spouse         12/27/2024     3:38 PM   Patient Reported Additional Medications   Patient reports taking the following new medications none     HPI     Was seen 12/13/24 for sinus concerns and was given prednisone with no relief            Review of Systems  Constitutional, HEENT, cardiovascular, pulmonary, gi and gu systems are negative, except as otherwise noted.      Objective           Vitals:  No vitals were obtained today due to virtual visit.    Physical Exam   General: Alert and no distress //Respiratory: No audible wheeze, cough, or shortness of breath // Psychiatric:  " Appropriate affect, tone, and pace of words            Phone call duration: 12 minutes  Signed Electronically by: Florian Pena MD

## 2024-12-30 ENCOUNTER — TELEPHONE (OUTPATIENT)
Dept: FAMILY MEDICINE | Facility: CLINIC | Age: 84
End: 2024-12-30

## 2024-12-30 DIAGNOSIS — Z79.01 ANTICOAGULATION GOAL OF INR 2 TO 3: ICD-10-CM

## 2024-12-30 DIAGNOSIS — Z51.81 ANTICOAGULATION GOAL OF INR 2 TO 3: ICD-10-CM

## 2024-12-30 RX ORDER — WARFARIN SODIUM 5 MG/1
TABLET ORAL
Qty: 65 TABLET | Refills: 1 | Status: SHIPPED | OUTPATIENT
Start: 2024-12-30

## 2024-12-30 NOTE — TELEPHONE ENCOUNTER
Patient called regarding Augmentin.     Stated she took one dose and had abdominal pain. Per pt she did eat before hand.     Asking if she should take half the dose or if she should take a different abx?    Thanks,  KYLIE Hoang  [facility]

## 2024-12-30 NOTE — TELEPHONE ENCOUNTER
Pt calling to request refill for Warfarin Rx.    Pt has upcoming lab appointment for INR on 1/3.     Lima Cruz RN

## 2024-12-30 NOTE — TELEPHONE ENCOUNTER
ANTICOAGULATION MANAGEMENT:  Medication Refill    Anticoagulation Summary  As of 11/22/2024      Warfarin maintenance plan:  5 mg (5 mg x 1) every Tue, Fri; 2.5 mg (5 mg x 0.5) all other days   Next INR check:  1/3/2025   Target end date:  Indefinite    Indications    Chronic atrial fibrillation (H) [I48.20]  Status post catheter ablation of atrial fibrillation [Z98.890]  PVD (peripheral vascular disease) (H) [I73.9]  Anticoagulation goal of INR 2 to 3 [Z51.81  Z79.01]  Long-term (current) use of anticoagulants [Z79.01] [Z79.01]                 Anticoagulation Care Providers       Provider Role Specialty Phone number    Estela Davila MD Referring HOSPITALIST [phone]    Enmanuel Baez DO Referring Family Medicine [phone]            Refill Criteria    Visit with referring provider/group: Meets criteria: visit within referring provider group in the last 15 months on 7/2024    [facility] referral last signed: 06/13/2024; within last year:  Yes    Lab monitoring not exceeding 2 weeks overdue: Yes    Clementina meets all criteria for refill. Rx instructions and quantity supplied updated to match patient's current dosing plan. Warfarin 90 day supply with 1 refill granted per [facility] protocol     Kylah Gonzalez RN  Anticoagulation Clinic

## 2025-01-09 ENCOUNTER — ANTICOAGULATION THERAPY VISIT (OUTPATIENT)
Dept: ANTICOAGULATION | Facility: CLINIC | Age: 85
End: 2025-01-09

## 2025-01-09 ENCOUNTER — LAB (OUTPATIENT)
Dept: LAB | Facility: CLINIC | Age: 85
End: 2025-01-09
Payer: COMMERCIAL

## 2025-01-09 DIAGNOSIS — I73.9 PVD (PERIPHERAL VASCULAR DISEASE): ICD-10-CM

## 2025-01-09 DIAGNOSIS — I48.20 CHRONIC ATRIAL FIBRILLATION (H): Primary | ICD-10-CM

## 2025-01-09 DIAGNOSIS — Z79.01 ANTICOAGULATION GOAL OF INR 2 TO 3: ICD-10-CM

## 2025-01-09 DIAGNOSIS — I25.10 CORONARY ATHEROSCLEROSIS: ICD-10-CM

## 2025-01-09 DIAGNOSIS — Z51.81 ANTICOAGULATION GOAL OF INR 2 TO 3: ICD-10-CM

## 2025-01-09 DIAGNOSIS — E11.65 TYPE 2 DIABETES MELLITUS WITH HYPERGLYCEMIA, WITHOUT LONG-TERM CURRENT USE OF INSULIN (H): ICD-10-CM

## 2025-01-09 DIAGNOSIS — Z79.01 LONG TERM CURRENT USE OF ANTICOAGULANT THERAPY: ICD-10-CM

## 2025-01-09 DIAGNOSIS — I48.20 CHRONIC ATRIAL FIBRILLATION (H): ICD-10-CM

## 2025-01-09 DIAGNOSIS — Z98.890 STATUS POST CATHETER ABLATION OF ATRIAL FIBRILLATION: ICD-10-CM

## 2025-01-09 LAB — INR BLD: 2.5 (ref 0.9–1.1)

## 2025-01-09 NOTE — PROGRESS NOTES
ANTICOAGULATION MANAGEMENT     Clementina Cooper 84 year old female is on warfarin with therapeutic INR result. (Goal INR 2.0-3.0)    Recent labs: (last 7 days)     01/09/25  1028   INR 2.5*       ASSESSMENT     Source(s): Chart Review and Patient/Caregiver Call     Warfarin doses taken: Warfarin taken as instructed  Diet: No new diet changes identified  Medication/supplement changes: None noted  New illness, injury, or hospitalization: No  Signs or symptoms of bleeding or clotting: No  Previous result: Subtherapeutic  Additional findings: None       PLAN     Recommended plan for no diet, medication or health factor changes affecting INR     Dosing Instructions: Continue your current warfarin dose with next INR in 2 weeks       Summary  As of 1/9/2025      Full warfarin instructions:  5 mg every Tue, Fri; 2.5 mg all other days   Next INR check:  1/20/2025               Telephone call with Lynda who verbalizes understanding and agrees to plan    Lab visit scheduled    Education provided: Please call back if any changes to your diet, medications or how you've been taking warfarin    Plan made per [facility] anticoagulation protocol    Lou Santiago RN  1/9/2025  Anticoagulation Clinic  [facility] for routing messages: carolina CRUZ  [facility] patient phone line: [phone]        _______________________________________________________________________     Anticoagulation Episode Summary       Current INR goal:  2.0-3.0   TTR:  88.4% (1 y)   Target end date:  Indefinite   Send INR reminders to:  RON CRUZ    Indications    Chronic atrial fibrillation (H) [I48.20]  Status post catheter ablation of atrial fibrillation [Z98.890]  PVD (peripheral vascular disease) [I73.9]  Anticoagulation goal of INR 2 to 3 [Z51.81  Z79.01]  Long-term (current) use of anticoagulants [Z79.01] [Z79.01]             Comments:  --             Anticoagulation Care Providers       Provider Role Specialty Phone number    Estela Davila MD  Referring HOSPITALIST [phone]    Enmanuel Baez,  Referring Family Medicine [phone]

## 2025-01-20 ENCOUNTER — LAB (OUTPATIENT)
Dept: LAB | Facility: CLINIC | Age: 85
End: 2025-01-20
Payer: COMMERCIAL

## 2025-01-20 ENCOUNTER — ANTICOAGULATION THERAPY VISIT (OUTPATIENT)
Dept: ANTICOAGULATION | Facility: CLINIC | Age: 85
End: 2025-01-20

## 2025-01-20 DIAGNOSIS — Z51.81 ANTICOAGULATION GOAL OF INR 2 TO 3: ICD-10-CM

## 2025-01-20 DIAGNOSIS — E11.65 TYPE 2 DIABETES MELLITUS WITH HYPERGLYCEMIA, WITHOUT LONG-TERM CURRENT USE OF INSULIN (H): Primary | ICD-10-CM

## 2025-01-20 DIAGNOSIS — I73.9 PVD (PERIPHERAL VASCULAR DISEASE): ICD-10-CM

## 2025-01-20 DIAGNOSIS — Z79.01 LONG TERM CURRENT USE OF ANTICOAGULANT THERAPY: ICD-10-CM

## 2025-01-20 DIAGNOSIS — Z98.890 STATUS POST CATHETER ABLATION OF ATRIAL FIBRILLATION: ICD-10-CM

## 2025-01-20 DIAGNOSIS — I48.20 CHRONIC ATRIAL FIBRILLATION (H): ICD-10-CM

## 2025-01-20 DIAGNOSIS — I25.10 CORONARY ATHEROSCLEROSIS: ICD-10-CM

## 2025-01-20 DIAGNOSIS — Z79.01 ANTICOAGULATION GOAL OF INR 2 TO 3: ICD-10-CM

## 2025-01-20 DIAGNOSIS — I48.20 CHRONIC ATRIAL FIBRILLATION (H): Primary | ICD-10-CM

## 2025-01-20 LAB
ANION GAP SERPL CALCULATED.3IONS-SCNC: 10 MMOL/L (ref 7–15)
BUN SERPL-MCNC: 17.3 MG/DL (ref 8–23)
CALCIUM SERPL-MCNC: 9.5 MG/DL (ref 8.8–10.4)
CHLORIDE SERPL-SCNC: 104 MMOL/L (ref 98–107)
CHOLEST SERPL-MCNC: 155 MG/DL
CREAT SERPL-MCNC: 0.79 MG/DL (ref 0.51–0.95)
EGFRCR SERPLBLD CKD-EPI 2021: 73 ML/MIN/1.73M2
FASTING STATUS PATIENT QL REPORTED: YES
FASTING STATUS PATIENT QL REPORTED: YES
GLUCOSE SERPL-MCNC: 167 MG/DL (ref 70–99)
HCO3 SERPL-SCNC: 26 MMOL/L (ref 22–29)
HDLC SERPL-MCNC: 38 MG/DL
INR BLD: 2.2 (ref 0.9–1.1)
LDLC SERPL CALC-MCNC: 88 MG/DL
NONHDLC SERPL-MCNC: 117 MG/DL
POTASSIUM SERPL-SCNC: 4.7 MMOL/L (ref 3.4–5.3)
SODIUM SERPL-SCNC: 140 MMOL/L (ref 135–145)
TRIGL SERPL-MCNC: 144 MG/DL

## 2025-01-20 PROCEDURE — 85610 PROTHROMBIN TIME: CPT

## 2025-01-20 NOTE — PROGRESS NOTES
ANTICOAGULATION MANAGEMENT     Clementina Cooper 84 year old female is on warfarin with therapeutic INR result. (Goal INR 2.0-3.0)    Recent labs: (last 7 days)     01/20/25  0924   INR 2.2*       ASSESSMENT     Source(s): Chart Review and Patient/Caregiver Call     Warfarin doses taken: Warfarin taken as instructed  Diet: No new diet changes identified  Medication/supplement changes: None noted  New illness, injury, or hospitalization: No  Signs or symptoms of bleeding or clotting: No  Previous result: Therapeutic last visit; previously outside of goal range  Additional findings: None       PLAN     Recommended plan for no diet, medication or health factor changes affecting INR     Dosing Instructions: Continue your current warfarin dose with next INR in 3 weeks       Summary  As of 1/20/2025      Full warfarin instructions:  5 mg every Tue, Fri; 2.5 mg all other days   Next INR check:  2/13/2025               Telephone call with Lynda who verbalizes understanding and agrees to plan and who agrees to plan and repeated back plan correctly    Lab visit scheduled    Education provided: Contact [phone] with any changes, questions or concerns.     Plan made per [facility] anticoagulation protocol    Kylah Gonzalez, RN  1/20/2025  Anticoagulation Clinic  [employer] for routing messages: carolina CRUZ  [facility] patient phone line: [phone]        _______________________________________________________________________     Anticoagulation Episode Summary       Current INR goal:  2.0-3.0   TTR:  88.4% (1 y)   Target end date:  Indefinite   Send INR reminders to:  RON CRUZ    Indications    Chronic atrial fibrillation (H) [I48.20]  Status post catheter ablation of atrial fibrillation [Z98.890]  PVD (peripheral vascular disease) [I73.9]  Anticoagulation goal of INR 2 to 3 [Z51.81  Z79.01]  Long-term (current) use of anticoagulants [Z79.01] [Z79.01]             Comments:  --             Anticoagulation Care  Providers       Provider Role Specialty Phone number    Estela Davila MD Referring HOSPITALIST [phone]    Enmanuel Baez DO Referring Family Medicine [phone]

## 2025-01-27 ENCOUNTER — OFFICE VISIT (OUTPATIENT)
Dept: FAMILY MEDICINE | Facility: CLINIC | Age: 85
End: 2025-01-27
Payer: COMMERCIAL

## 2025-01-27 VITALS
DIASTOLIC BLOOD PRESSURE: 77 MMHG | HEART RATE: 67 BPM | RESPIRATION RATE: 16 BRPM | BODY MASS INDEX: 28.77 KG/M2 | WEIGHT: 179 LBS | SYSTOLIC BLOOD PRESSURE: 129 MMHG | HEIGHT: 66 IN | OXYGEN SATURATION: 95 % | TEMPERATURE: 97.3 F

## 2025-01-27 DIAGNOSIS — I48.19 PERSISTENT ATRIAL FIBRILLATION (H): ICD-10-CM

## 2025-01-27 DIAGNOSIS — E11.65 TYPE 2 DIABETES MELLITUS WITH HYPERGLYCEMIA, WITHOUT LONG-TERM CURRENT USE OF INSULIN (H): ICD-10-CM

## 2025-01-27 DIAGNOSIS — E03.4 HYPOTHYROIDISM DUE TO ACQUIRED ATROPHY OF THYROID: ICD-10-CM

## 2025-01-27 DIAGNOSIS — Z00.00 ENCOUNTER FOR MEDICARE ANNUAL WELLNESS EXAM: ICD-10-CM

## 2025-01-27 DIAGNOSIS — E78.5 HYPERLIPIDEMIA LDL GOAL <100: ICD-10-CM

## 2025-01-27 DIAGNOSIS — E11.9 TYPE 2 DIABETES MELLITUS WITHOUT COMPLICATION, WITHOUT LONG-TERM CURRENT USE OF INSULIN (H): ICD-10-CM

## 2025-01-27 DIAGNOSIS — G25.0 ESSENTIAL TREMOR: ICD-10-CM

## 2025-01-27 DIAGNOSIS — Z79.899 MEDICATION MANAGEMENT: Primary | ICD-10-CM

## 2025-01-27 DIAGNOSIS — I73.9 PVD (PERIPHERAL VASCULAR DISEASE): ICD-10-CM

## 2025-01-27 DIAGNOSIS — J01.00 SUBACUTE MAXILLARY SINUSITIS: ICD-10-CM

## 2025-01-27 PROCEDURE — G0439 PPPS, SUBSEQ VISIT: HCPCS | Performed by: FAMILY MEDICINE

## 2025-01-27 RX ORDER — GLIPIZIDE 5 MG/1
10 TABLET, FILM COATED, EXTENDED RELEASE ORAL DAILY
Qty: 180 TABLET | Refills: 3 | Status: SHIPPED | OUTPATIENT
Start: 2025-01-27

## 2025-01-27 RX ORDER — ATORVASTATIN CALCIUM 20 MG/1
20 TABLET, FILM COATED ORAL DAILY
Qty: 90 TABLET | Refills: 3 | Status: SHIPPED | OUTPATIENT
Start: 2025-01-27

## 2025-01-27 RX ORDER — LOSARTAN POTASSIUM 25 MG/1
TABLET ORAL
Qty: 45 TABLET | Refills: 3 | Status: SHIPPED | OUTPATIENT
Start: 2025-01-27

## 2025-01-27 RX ORDER — PROPRANOLOL HYDROCHLORIDE 10 MG/1
TABLET ORAL
Qty: 180 TABLET | Refills: 3 | Status: SHIPPED | OUTPATIENT
Start: 2025-01-27

## 2025-01-27 RX ORDER — LEVOTHYROXINE SODIUM 100 UG/1
100 TABLET ORAL DAILY
Qty: 90 TABLET | Refills: 3 | Status: SHIPPED | OUTPATIENT
Start: 2025-01-27

## 2025-01-27 RX ORDER — AZELASTINE 1 MG/ML
1 SPRAY, METERED NASAL 2 TIMES DAILY
Qty: 30 ML | Refills: 2 | Status: CANCELLED | OUTPATIENT
Start: 2025-01-27

## 2025-01-27 SDOH — HEALTH STABILITY: PHYSICAL HEALTH: ON AVERAGE, HOW MANY MINUTES DO YOU ENGAGE IN EXERCISE AT THIS LEVEL?: 20 MIN

## 2025-01-27 SDOH — HEALTH STABILITY: PHYSICAL HEALTH: ON AVERAGE, HOW MANY DAYS PER WEEK DO YOU ENGAGE IN MODERATE TO STRENUOUS EXERCISE (LIKE A BRISK WALK)?: 6 DAYS

## 2025-01-27 ASSESSMENT — SOCIAL DETERMINANTS OF HEALTH (SDOH): HOW OFTEN DO YOU GET TOGETHER WITH FRIENDS OR RELATIVES?: TWICE A WEEK

## 2025-01-27 NOTE — PATIENT INSTRUCTIONS
Patient Education   Preventive Care Advice   This is general advice given by our system to help you stay healthy. However, your care team may have specific advice just for you. Please talk to your care team about your preventive care needs.  Nutrition  Eat 5 or more servings of fruits and vegetables each day.  Try wheat bread, brown rice and whole grain pasta (instead of white bread, rice, and pasta).  Get enough calcium and vitamin D. Check the label on foods and aim for 100% of the RDA (recommended daily allowance).  Lifestyle  Exercise at least 150 minutes each week  (30 minutes a day, 5 days a week).  Do muscle strengthening activities 2 days a week. These help control your weight and prevent disease.  No smoking.  Wear sunscreen to prevent skin cancer.  Have a dental exam and cleaning every 6 months.  Yearly exams  See your health care team every year to talk about:  Any changes in your health.  Any medicines your care team has prescribed.  Preventive care, family planning, and ways to prevent chronic diseases.  Shots (vaccines)   HPV shots (up to age 26), if you've never had them before.  Hepatitis B shots (up to age 59), if you've never had them before.  COVID-19 shot: Get this shot when it's due.  Flu shot: Get a flu shot every year.  Tetanus shot: Get a tetanus shot every 10 years.  Pneumococcal, hepatitis A, and RSV shots: Ask your care team if you need these based on your risk.  Shingles shot (for age 50 and up)  General health tests  Diabetes screening:  Starting at age 35, Get screened for diabetes at least every 3 years.  If you are younger than age 35, ask your care team if you should be screened for diabetes.  Cholesterol test: At age 39, start having a cholesterol test every 5 years, or more often if advised.  Bone density scan (DEXA): At age 50, ask your care team if you should have this scan for osteoporosis (brittle bones).  Hepatitis C: Get tested at least once in your life.  STIs (sexually  transmitted infections)  Before age 24: Ask your care team if you should be screened for STIs.  After age 24: Get screened for STIs if you're at risk. You are at risk for STIs (including HIV) if:  You are sexually active with more than one person.  You don't use condoms every time.  You or a partner was diagnosed with a sexually transmitted infection.  If you are at risk for HIV, ask about PrEP medicine to prevent HIV.  Get tested for HIV at least once in your life, whether you are at risk for HIV or not.  Cancer screening tests  Cervical cancer screening: If you have a cervix, begin getting regular cervical cancer screening tests starting at age 21.  Breast cancer scan (mammogram): If you've ever had breasts, begin having regular mammograms starting at age 40. This is a scan to check for breast cancer.  Colon cancer screening: It is important to start screening for colon cancer at age 45.  Have a colonoscopy test every 10 years (or more often if you're at risk) Or, ask your provider about stool tests like a FIT test every year or Cologuard test every 3 years.  To learn more about your testing options, visit:   .  For help making a decision, visit:   https://bit.ly/gv37851.  Prostate cancer screening test: If you have a prostate, ask your care team if a prostate cancer screening test (PSA) at age 55 is right for you.  Lung cancer screening: If you are a current or former smoker ages 50 to 80, ask your care team if ongoing lung cancer screenings are right for you.  For informational purposes only. Not to replace the advice of your health care provider. Copyright   2023 [facility]. All rights reserved. Clinically reviewed by the [facility]. [employer] [id number] - REV 01/24.  Preventing Falls: Care Instructions  Injuries and health problems such as trouble walking or poor eyesight can increase your risk of falling. So can some medicines. But there are things you can do to help  "prevent falls. You can exercise to get stronger. You can also arrange your home to make it safer.    Talk to your doctor about the medicines you take. Ask if any of them increase the risk of falls and whether they can be changed or stopped.   Try to exercise regularly. It can help improve your strength and balance. This can help lower your risk of falling.         Practice fall safety and prevention.   Wear low-heeled shoes that fit well and give your feet good support. Talk to your doctor if you have foot problems that make this hard.  Carry a cellphone or wear a medical alert device that you can use to call for help.  Use stepladders instead of chairs to reach high objects. Don't climb if you're at risk for falls. Ask for help, if needed.  Wear the correct eyeglasses, if you need them.        Make your home safer.   Remove rugs, cords, clutter, and furniture from walkways.  Keep your house well lit. Use night-lights in hallways and bathrooms.  Install and use sturdy handrails on stairways.  Wear nonskid footwear, even inside. Don't walk barefoot or in socks without shoes.        Be safe outside.   Use handrails, curb cuts, and ramps whenever possible.  Keep your hands free by using a shoulder bag or backpack.  Try to walk in well-lit areas. Watch out for uneven ground, changes in pavement, and debris.  Be careful in the winter. Walk on the grass or gravel when sidewalks are slippery. Use de-icer on steps and walkways. Add non-slip devices to shoes.    Put grab bars and nonskid mats in your shower or tub and near the toilet. Try to use a shower chair or bath bench when bathing.   Get into a tub or shower by putting in your weaker leg first. Get out with your strong side first. Have a phone or medical alert device in the bathroom with you.   Where can you learn more?  Go to https://www.[employer].net/patiented  Enter G117 in the search box to learn more about \"Preventing Falls: Care Instructions.\"  Current as of: " July 31, 2024  Content Version: 14.3    2024 [employer].   Care instructions adapted under license by your healthcare professional. If you have questions about a medical condition or this instruction, always ask your healthcare professional. [employer] disclaims any warranty or liability for your use of this information.    Hearing Loss: Care Instructions  Overview     Hearing loss is a sudden or slow decrease in how well you hear. It can range from slight to profound. Permanent hearing loss can occur with aging. It also can happen when you are exposed long-term to loud noise. Examples include listening to loud music, riding motorcycles, or being around other loud machines.  Hearing loss can affect your work and home life. It can make you feel lonely or depressed. You may feel that you have lost your independence. But hearing aids and other devices can help you hear better and feel connected to others.  Follow-up care is a key part of your treatment and safety. Be sure to make and go to all appointments, and call your doctor if you are having problems. It's also a good idea to know your test results and keep a list of the medicines you take.  How can you care for yourself at home?  Avoid loud noises whenever possible. This helps keep your hearing from getting worse.  Always wear hearing protection around loud noises.  Wear a hearing aid as directed.  A professional can help you pick a hearing aid that will work best for you.  You can also get hearing aids over the counter for mild to moderate hearing loss.  Have hearing tests as your doctor suggests. They can show whether your hearing has changed. Your hearing aid may need to be adjusted.  Use other devices as needed. These may include:  Telephone amplifiers and hearing aids that can connect to a television, stereo, radio, or microphone.  Devices that use lights or vibrations. These alert you to the doorbell, a ringing telephone, or a baby  "monitor.  Television closed-captioning. This shows the words at the bottom of the screen. Most new TVs can do this.  TTY (text telephone). This lets you type messages back and forth on the telephone instead of talking or listening. These devices are also called TDD. When messages are typed on the keyboard, they are sent over the phone line to a receiving TTY. The message is shown on a monitor.  Use text messaging, social media, and email if it is hard for you to communicate by telephone.  Try to learn a listening technique called speechreading. It is not lipreading. You pay attention to people's gestures, expressions, posture, and tone of voice. These clues can help you understand what a person is saying. Face the person you are talking to, and have them face you. Make sure the lighting is good. You need to see the other person's face clearly.  Think about counseling if you need help to adjust to your hearing loss.  When should you call for help?  Watch closely for changes in your health, and be sure to contact your doctor if:    You think your hearing is getting worse.     You have new symptoms, such as dizziness or nausea.   Where can you learn more?  Go to https://www.[employer].net/patiented  Enter R798 in the search box to learn more about \"Hearing Loss: Care Instructions.\"  Current as of: September 27, 2023  Content Version: 14.3    2024 [employer].   Care instructions adapted under license by your healthcare professional. If you have questions about a medical condition or this instruction, always ask your healthcare professional. [employer] disclaims any warranty or liability for your use of this information.       "

## 2025-01-27 NOTE — PROGRESS NOTES
Preventive Care Visit  [facility]  Jan 27, 2025      Assessment & Plan   Problem List Items Addressed This Visit       PVD (peripheral vascular disease)    Relevant Medications    atorvastatin (LIPITOR) 20 MG tablet    HYPERLIPIDEMIA LDL GOAL <100    Relevant Medications    atorvastatin (LIPITOR) 20 MG tablet    Essential tremor    Relevant Medications    propranolol (INDERAL) 10 MG tablet    Hypothyroidism due to acquired atrophy of thyroid    Relevant Medications    levothyroxine (SYNTHROID/LEVOTHROID) 100 MCG tablet    Type 2 diabetes mellitus with hyperglycemia, without long-term current use of insulin (H)    Relevant Medications    empagliflozin (JARDIANCE) 25 MG TABS tablet    glipiZIDE (GLUCOTROL XL) 5 MG 24 hr tablet    levothyroxine (SYNTHROID/LEVOTHROID) 100 MCG tablet    losartan (COZAAR) 25 MG tablet    Other Relevant Orders    OPTOMETRY REFERRAL     Other Visit Diagnoses       Medication management    -  Primary    Subacute maxillary sinusitis        Type 2 diabetes mellitus without complication, without long-term current use of insulin (H)        Relevant Medications    empagliflozin (JARDIANCE) 25 MG TABS tablet    glipiZIDE (GLUCOTROL XL) 5 MG 24 hr tablet    levothyroxine (SYNTHROID/LEVOTHROID) 100 MCG tablet    losartan (COZAAR) 25 MG tablet    Encounter for Medicare annual wellness exam        Persistent atrial fibrillation (H)               Stable chronic conditions  Follow up 6 months DM/Thyroid  Patient has been advised of split billing requirements and indicates understanding: N/A       Counseling  Appropriate preventive services were addressed with this patient via screening, questionnaire, or discussion as appropriate for fall prevention, nutrition, physical activity, Tobacco-use cessation, social engagement, weight loss and cognition.  Checklist reviewing preventive services available has been given to the patient.  Reviewed  patient's diet, addressing concerns and/or questions.   The patient was provided with written information regarding signs of hearing loss.           Tremaine Howell is a 84 year old, presenting for the following:  Physical        1/27/2025    10:02 AM   Additional Questions   Roomed by Christina CARTWRIGHT CMA           HPI      No complaints today    Health Care Directive  Patient does not have a Health Care Directive: Discussed advance care planning with patient; information given to patient to review.      1/27/2025   General Health   How would you rate your overall physical health? Good   Feel stress (tense, anxious, or unable to sleep) Not at all         1/27/2025   Nutrition   Diet: Regular (no restrictions)         1/27/2025   Exercise   Days per week of moderate/strenous exercise 6 days   Average minutes spent exercising at this level 20 min         1/27/2025   Social Factors   Frequency of gathering with friends or relatives Twice a week   Worry food won't last until get money to buy more No   Food not last or not have enough money for food? No   Do you have housing? (Housing is defined as stable permanent housing and does not include staying ouside in a car, in a tent, in an abandoned building, in an overnight shelter, or couch-surfing.) Yes   Are you worried about losing your housing? No   Lack of transportation? No   Unable to get utilities (heat,electricity)? No         1/27/2025   Fall Risk   Fallen 2 or more times in the past year? No    Trouble with walking or balance? No    Gait Speed Test (Document in seconds) 3   Gait Speed Test Interpretation Less than or equal to 5.00 seconds - PASS       Proxy-reported          1/27/2025   Activities of Daily Living- Home Safety   Needs help with the following daily activites None of the above   Safety concerns in the home None of the above         1/27/2025   Dental   Dentist two times every year? Yes         1/27/2025   Hearing Screening   Hearing concerns? (!) I  FEEL THAT PEOPLE ARE MUMBLING OR NOT SPEAKING CLEARLY.    (!) I NEED TO ASK PEOPLE TO SPEAK UP OR REPEAT THEMSELVES.    (!) IT'S HARD TO FOLLOW A CONVERSATION IN A NOISY RESTAURANT OR CROWDED ROOM.    (!) TROUBLE UNDESTANDING A SPEAKER IN A PUBLIC MEETING OR [employer] SERVICE.    (!) TROUBLE UNDERSTANDING SOFT OR WHISPERED SPEECH.    (!) TROUBLE UNDERSTANDING SPEECH ON THE TELEPHONE       Multiple values from one day are sorted in reverse-chronological order         2025   Driving Risk Screening   Patient/family members have concerns about driving No         2025   General Alertness/Fatigue Screening   Have you been more tired than usual lately? No         2025   Urinary Incontinence Screening   Bothered by leaking urine in past 6 months No         2025   TB Screening   Were you born outside of the [place]? No         Today's PHQ-2 Score:       2025    10:01 AM   PHQ-2 ( 1999 Pfizer)   Q1: Little interest or pleasure in doing things 0    Q2: Feeling down, depressed or hopeless 0    PHQ-2 Score 0    Q1: Little interest or pleasure in doing things Not at all   Q2: Feeling down, depressed or hopeless Not at all   PHQ-2 Score 0       Proxy-reported           2025   Substance Use   Alcohol more than 3/day or more than 7/wk No   Do you have a current opioid prescription? No   How severe/bad is pain from 1 to 10? 0/10 (No Pain)   Do you use any other substances recreationally? No     Social History     Tobacco Use    Smoking status: Former     Current packs/day: 0.00     Types: Cigarettes     Quit date: 1999     Years since quittin.1     Passive exposure: Past    Smokeless tobacco: Never   Vaping Use    Vaping status: Never Used   Substance Use Topics    Alcohol use: Yes     Comment: occasional    Drug use: No                      Reviewed and updated as needed this visit by Provider                      Current providers sharing in care for this patient include:  Patient Care  "Team:  Enmanuel Baez DO as PCP - General (Family Medicine)  Rahul Jennings MD as Assigned Surgical Provider  Enmanuel Baez DO as Assigned PCP    The following health maintenance items are reviewed in Epic and correct as of today:  Health Maintenance   Topic Date Due    Medicare Annual MTM Pharmacist Visit (once per calendar year)  Never done    EYE EXAM  02/26/2025    A1C  03/13/2025    DEXA  04/25/2025    TSH W/FREE T4 REFLEX  07/19/2025    MICROALBUMIN  07/26/2025    DIABETIC FOOT EXAM  07/26/2025    ANNUAL REVIEW OF HM ORDERS  12/13/2025    BMP  01/20/2026    LIPID  01/20/2026    MEDICARE ANNUAL WELLNESS VISIT  01/27/2026    FALL RISK ASSESSMENT  01/27/2026    ADVANCE CARE PLANNING  01/27/2030    DTAP/TDAP/TD IMMUNIZATION (3 - Td or Tdap) 01/12/2033    PHQ-2 (once per calendar year)  Completed    INFLUENZA VACCINE  Completed    Pneumococcal Vaccine: 50+ Years  Completed    ZOSTER IMMUNIZATION  Completed    RSV VACCINE  Completed    COVID-19 Vaccine  Completed    HPV IMMUNIZATION  Aged Out    MENINGITIS IMMUNIZATION  Aged Out    RSV MONOCLONAL ANTIBODY  Aged Out            Objective    Exam  /77 (BP Location: Right arm, Patient Position: Chair, Cuff Size: Adult Regular)   Pulse 67   Temp 97.3  F (36.3  C) (Temporal)   Resp 16   Ht 1.664 m (5' 5.5\")   Wt 81.2 kg (179 lb)   LMP  (LMP Unknown)   SpO2 95%   BMI 29.33 kg/m     Estimated body mass index is 29.33 kg/m  as calculated from the following:    Height as of this encounter: 1.664 m (5' 5.5\").    Weight as of this encounter: 81.2 kg (179 lb).    Physical Exam  GENERAL: alert and no distress  NECK: no adenopathy, no asymmetry, masses, or scars  RESP: lungs clear to auscultation - no rales, rhonchi or wheezes  CV: irregular irregular 3/6   ABDOMEN: soft, nontender, no hepatosplenomegaly, no masses and bowel sounds normal  MS: no gross musculoskeletal defects noted, no edema         1/27/2025   Mini Cog   Clock Draw Score 2 Normal   3 Item " Recall 3 objects recalled   Mini Cog Total Score 5              Signed Electronically by: KYLEE STATON, DO

## 2025-02-13 ENCOUNTER — ANTICOAGULATION THERAPY VISIT (OUTPATIENT)
Dept: ANTICOAGULATION | Facility: CLINIC | Age: 85
End: 2025-02-13

## 2025-02-13 ENCOUNTER — LAB (OUTPATIENT)
Dept: LAB | Facility: CLINIC | Age: 85
End: 2025-02-13
Payer: COMMERCIAL

## 2025-02-13 DIAGNOSIS — Z79.01 ANTICOAGULATION GOAL OF INR 2 TO 3: ICD-10-CM

## 2025-02-13 DIAGNOSIS — Z51.81 ANTICOAGULATION GOAL OF INR 2 TO 3: ICD-10-CM

## 2025-02-13 DIAGNOSIS — I48.20 CHRONIC ATRIAL FIBRILLATION (H): Primary | ICD-10-CM

## 2025-02-13 DIAGNOSIS — I48.20 CHRONIC ATRIAL FIBRILLATION (H): ICD-10-CM

## 2025-02-13 DIAGNOSIS — Z79.01 LONG TERM CURRENT USE OF ANTICOAGULANT THERAPY: ICD-10-CM

## 2025-02-13 DIAGNOSIS — I73.9 PVD (PERIPHERAL VASCULAR DISEASE): ICD-10-CM

## 2025-02-13 DIAGNOSIS — Z98.890 STATUS POST CATHETER ABLATION OF ATRIAL FIBRILLATION: ICD-10-CM

## 2025-02-13 LAB — INR BLD: 1.7 (ref 0.9–1.1)

## 2025-02-13 NOTE — PROGRESS NOTES
ANTICOAGULATION MANAGEMENT     Clementina Cooper 84 year old female is on warfarin with subtherapeutic INR result. (Goal INR 2.0-3.0)    Recent labs: (last 7 days)     02/13/25  0928   INR 1.7*       ASSESSMENT     Source(s): Chart Review and Patient/Caregiver Call     Warfarin doses taken: Missed dose(s) may be affecting INR  Diet: No new diet changes identified  Medication/supplement changes: None noted  New illness, injury, or hospitalization: No  Signs or symptoms of bleeding or clotting: No  Previous result: Therapeutic last 2(+) visits  Additional findings: None       PLAN     Recommended plan for temporary change(s) affecting INR     Dosing Instructions: booster dose then continue your current warfarin dose with next INR in 2 weeks       Summary  As of 2/13/2025      Full warfarin instructions:  2/13: 5 mg; Otherwise 5 mg every Tue, Fri; 2.5 mg all other days   Next INR check:  2/27/2025               Telephone call with Lynda who verbalizes understanding and agrees to plan    Lab visit scheduled    Education provided: Goal range and lab monitoring: goal range and significance of current result    Plan made per [facility] anticoagulation protocol    Lou Santiago RN  2/13/2025  Anticoagulation Clinic  [employer] for routing messages: carolina CRUZ  [facility] patient phone line: [phone]        _______________________________________________________________________     Anticoagulation Episode Summary       Current INR goal:  2.0-3.0   TTR:  87.2% (1 y)   Target end date:  Indefinite   Send INR reminders to:  RON CRUZ    Indications    Chronic atrial fibrillation (H) [I48.20]  Status post catheter ablation of atrial fibrillation [Z98.890]  PVD (peripheral vascular disease) [I73.9]  Anticoagulation goal of INR 2 to 3 [Z51.81  Z79.01]  Long-term (current) use of anticoagulants [Z79.01] [Z79.01]             Comments:  --             Anticoagulation Care Providers       Provider Role Specialty Phone number     Estela Davila MD Referring HOSPITALIST [phone]    Enmanuel Baez DO Referring Family Medicine [phone]

## 2025-02-27 ENCOUNTER — ANTICOAGULATION THERAPY VISIT (OUTPATIENT)
Dept: ANTICOAGULATION | Facility: CLINIC | Age: 85
End: 2025-02-27

## 2025-02-27 ENCOUNTER — LAB (OUTPATIENT)
Dept: LAB | Facility: CLINIC | Age: 85
End: 2025-02-27
Payer: COMMERCIAL

## 2025-02-27 DIAGNOSIS — Z79.01 LONG TERM CURRENT USE OF ANTICOAGULANT THERAPY: ICD-10-CM

## 2025-02-27 DIAGNOSIS — Z79.01 ANTICOAGULATION GOAL OF INR 2 TO 3: ICD-10-CM

## 2025-02-27 DIAGNOSIS — I48.20 CHRONIC ATRIAL FIBRILLATION (H): Primary | ICD-10-CM

## 2025-02-27 DIAGNOSIS — Z98.890 STATUS POST CATHETER ABLATION OF ATRIAL FIBRILLATION: ICD-10-CM

## 2025-02-27 DIAGNOSIS — Z51.81 ANTICOAGULATION GOAL OF INR 2 TO 3: ICD-10-CM

## 2025-02-27 DIAGNOSIS — I73.9 PVD (PERIPHERAL VASCULAR DISEASE): ICD-10-CM

## 2025-02-27 DIAGNOSIS — I48.20 CHRONIC ATRIAL FIBRILLATION (H): ICD-10-CM

## 2025-02-27 LAB — INR BLD: 2.7 (ref 0.9–1.1)

## 2025-02-28 PROBLEM — Z98.890: Status: ACTIVE | Noted: 2025-02-28

## 2025-03-04 DIAGNOSIS — E11.9 TYPE 2 DIABETES MELLITUS WITHOUT COMPLICATION, WITHOUT LONG-TERM CURRENT USE OF INSULIN (H): ICD-10-CM

## 2025-03-04 DIAGNOSIS — E78.5 HYPERLIPIDEMIA LDL GOAL <100: ICD-10-CM

## 2025-03-04 DIAGNOSIS — I73.9 PVD (PERIPHERAL VASCULAR DISEASE): ICD-10-CM

## 2025-03-04 RX ORDER — GLIPIZIDE 5 MG/1
10 TABLET, FILM COATED, EXTENDED RELEASE ORAL DAILY
Qty: 200 TABLET | Refills: 3 | Status: SHIPPED | OUTPATIENT
Start: 2025-03-04

## 2025-03-04 NOTE — TELEPHONE ENCOUNTER
Routing refill request to provider for review/approval because:  Drug not on the [facility] refill protocol   Tosin Dozier RN CPC Triage.           ambulate

## 2025-03-13 ENCOUNTER — ANCILLARY PROCEDURE (OUTPATIENT)
Dept: GENERAL RADIOLOGY | Facility: CLINIC | Age: 85
End: 2025-03-13
Attending: FAMILY MEDICINE
Payer: COMMERCIAL

## 2025-03-13 ENCOUNTER — OFFICE VISIT (OUTPATIENT)
Dept: FAMILY MEDICINE | Facility: CLINIC | Age: 85
End: 2025-03-13
Payer: COMMERCIAL

## 2025-03-13 ENCOUNTER — TELEPHONE (OUTPATIENT)
Dept: ANTICOAGULATION | Facility: CLINIC | Age: 85
End: 2025-03-13

## 2025-03-13 VITALS
OXYGEN SATURATION: 95 % | HEART RATE: 72 BPM | DIASTOLIC BLOOD PRESSURE: 70 MMHG | HEIGHT: 66 IN | SYSTOLIC BLOOD PRESSURE: 110 MMHG | BODY MASS INDEX: 28.45 KG/M2 | WEIGHT: 177 LBS | RESPIRATION RATE: 18 BRPM | TEMPERATURE: 97.9 F

## 2025-03-13 DIAGNOSIS — J01.90 ACUTE NON-RECURRENT SINUSITIS, UNSPECIFIED LOCATION: Primary | ICD-10-CM

## 2025-03-13 DIAGNOSIS — R09.89 CHEST CONGESTION: ICD-10-CM

## 2025-03-13 DIAGNOSIS — I73.9 PVD (PERIPHERAL VASCULAR DISEASE): ICD-10-CM

## 2025-03-13 DIAGNOSIS — Z51.81 ANTICOAGULATION GOAL OF INR 2 TO 3: ICD-10-CM

## 2025-03-13 DIAGNOSIS — Z98.890 STATUS POST CATHETER ABLATION OF ATRIAL FIBRILLATION: ICD-10-CM

## 2025-03-13 DIAGNOSIS — Z79.01 ANTICOAGULATION GOAL OF INR 2 TO 3: ICD-10-CM

## 2025-03-13 DIAGNOSIS — I48.20 CHRONIC ATRIAL FIBRILLATION (H): Primary | ICD-10-CM

## 2025-03-13 DIAGNOSIS — Z79.01 LONG TERM CURRENT USE OF ANTICOAGULANT THERAPY: ICD-10-CM

## 2025-03-13 RX ORDER — PREDNISONE 20 MG/1
20 TABLET ORAL DAILY
Qty: 15 TABLET | Refills: 0 | Status: SHIPPED | OUTPATIENT
Start: 2025-03-13

## 2025-03-13 RX ORDER — AZITHROMYCIN 250 MG/1
TABLET, FILM COATED ORAL
Qty: 6 TABLET | Refills: 0 | Status: SHIPPED | OUTPATIENT
Start: 2025-03-13 | End: 2025-03-18

## 2025-03-13 ASSESSMENT — ENCOUNTER SYMPTOMS
LEG PAIN: 0
FEVER: 0
VOMITING: 0
PND: 0
SPUTUM PRODUCTION: 1
HEMOPTYSIS: 0
SWOLLEN GLANDS: 0
SYNCOPE: 0
RHINORRHEA: 1
WHEEZING: 0
SORE THROAT: 1
NECK PAIN: 0
HEADACHES: 1
CLAUDICATION: 0
ORTHOPNEA: 0
ABDOMINAL PAIN: 0

## 2025-03-13 NOTE — TELEPHONE ENCOUNTER
"ANTICOAGULATION  MANAGEMENT     Interacting Medication Review    Interacting medication(s): Azithromycin (Zithromax, Z-tigist) with warfarin.    Duration: 5 days (3/14/25 to 3/18/25)    Indication:  sinusitis    New medication?: Yes, interaction may increase INR and risk of bleeding. Closer INR monitoring recommended.        PLAN     Continue your current warfarin dose with next INR in 1 week            Unable to leave a message, voicemail mailbox is full.  Patient does have an appointment scheduled for 1 week on 3/20/25 and will plan to keep that at this time.     New recheck date updated on anticoagulation calendar     [facility] priority set/moved to \"high\" for new major drug interaction    Plan made per [facility] anticoagulation protocol    Sunitha White, RN  3/13/2025  Anticoagulation Clinic  [employer] for routing messages: carolina CRUZ  [facility] patient phone line: [phone]  "

## 2025-03-13 NOTE — PROGRESS NOTES
Assessment & Plan     Acute non-recurrent sinusitis, unspecified location    - azithromycin (ZITHROMAX) 250 MG tablet; Take 2 tablets (500 mg) by mouth daily for 1 day, THEN 1 tablet (250 mg) daily for 4 days.    Chest congestion  Reviewed CXR, no acute finding.  - XR Chest 2 Views; Future  - azithromycin (ZITHROMAX) 250 MG tablet; Take 2 tablets (500 mg) by mouth daily for 1 day, THEN 1 tablet (250 mg) daily for 4 days.  - predniSONE (DELTASONE) 20 MG tablet; Take 1 tablet (20 mg) by mouth daily.      Reviewed x-ray with patient, was negative no acute finding.  Patient been treated with multiple antibiotics since November 4 initially with doxycycline for 10 days.  Repeat course on November 27 for another 10 days.  In addition, she had negative viral test.    In addition, most recently she was given Augmentin on December 27, 2025.    She reports she continues to have congestion, sinus congestion, postnasal drainage.  Minor cough.  She has no wheezing, no fever, no chills.    Discussed with patient could be related to viral in nature, possible COPD former smoker, possible allergies.  Advised her to continue with current recommendations. In addition she does report she has inhaler ( not sure what one, no records ) advised that she could use that 1 to 2 puffs twice daily or as needed ( if it's albuterol inh. )    Possible allergies, continue with Flonase.    Tremaine Howell is a 84 year old, presenting for the following health issues:  URI    Patient reports she has been having congestion, sinus congestion, minor cough.  She has no wheezing, no fever no chills.  Patient reports she has been having the symptoms on and off for the past few months.  Initially, she was seen November 4,2024,  treated with doxycycline for 10 days, given another course of doxycycline for another 10 days.  Subsequently, she was seen December 13,2024  she had a negative viral tests.    She was treated with Augmentin on December 27 for 10  "days.    She has no lower extremity edema, no short of breath, has no cough when he sleeps, no short of breath.        3/13/2025    10:39 AM   Additional Questions   Roomed by francisco clemens    History of Present Illness       Reason for visit:  Follow up on DELMA that has been ongoing since November. Was told does not have pneumonia, seen a few providers on the past and been on several medications but symptoms gets better then comes back    She eats 0-1 servings of fruits and vegetables daily.She consumes 0 sweetened beverage(s) daily.She exercises with enough effort to increase her heart rate 9 or less minutes per day.  She exercises with enough effort to increase her heart rate 3 or less days per week.   She is taking medications regularly.            Review of Systems  Constitutional, HEENT, cardiovascular, pulmonary, GI, , musculoskeletal, neuro, skin, endocrine and psych systems are negative, except as otherwise noted.      Objective    /70 (BP Location: Right arm, Patient Position: Sitting, Cuff Size: Adult Regular)   Pulse 72   Temp 97.9  F (36.6  C) (Tympanic)   Resp 18   Ht 1.664 m (5' 5.5\")   Wt 80.3 kg (177 lb)   LMP  (LMP Unknown)   SpO2 95%   BMI 29.01 kg/m    Body mass index is 29.01 kg/m .  Physical Exam   GENERAL: alert and no distress  HENT: ear canals and TM's normal, nose and mouth without ulcers or lesions  NECK: no adenopathy, no asymmetry, masses, or scars  RESP: lungs clear to auscultation - no rales, rhonchi or wheezes  CV: regular rate and rhythm, normal S1 S2, no S3 or S4, no murmur, click or rub, no peripheral edema  ABDOMEN: soft, nontender, no hepatosplenomegaly, no masses and bowel sounds normal  PSYCH: mentation appears normal, affect normal/bright    CXR - Reviewed and interpreted by me Normal- no infiltrates, effusions, pneumothoraces, cardiomegaly or masses        Signed Electronically by: Irena Lamb MD    Answers submitted by the patient for this " visit:  Shortness of Breath Questionnaire (Submitted on 3/13/2025)  Chronicity: chronic  Onset: more than 1 month ago  Frequency: constantly  Progression since onset: waxing and waning  Episode duration: 0 Seconds  abdominal pain: No  chest pain: No  hemoptysis: No  sputum production: Yes  PND: No  ear pain: No  syncope: No  fever: No  headaches: Yes  claudication: No  leg pain: No  leg swelling: No  neck pain: No  rash: No  rhinorrhea: Yes  orthopnea: No  sore throat: Yes  coryza: No  swollen glands: No  vomiting: No  wheezing: No  Aggravating factors: nothing  Questionnaire about: Chronic problems general questions HPI Form (Submitted on 3/13/2025)  Chief Complaint: Chronic problems general questions HPI Form

## 2025-03-17 NOTE — PATIENT INSTRUCTIONS
Return to clinic 6 months (April 2021) with labs prior.     Increase glipizide to 2 tabs (5mg) in the morning, and 1 tab (2.5 mg) at supper    Call to schedule imaging   --Bone Density             [de-identified] : Chief complaint: Right elbow swelling  HPI: Patient is a 44-year-old right-hand-dominant female who presents to the office today for the evaluation of swelling over the right posterior elbow which she noticed on Friday, 3/14/2025.  She denies any known fall, injury, or trauma.  Denies any significant discomfort associated with the swelling.  Denies any history of similar symptoms in the past.  Denies any systemic symptoms including no fever or chills.  Has been applying ice to the area and wrapping the area with questionable relief.  ROS: Positive for right elbow swelling  Physical examination of the right elbow:  There is no appreciable erythema No ecchymosis Skin is intact There is localized swelling over the olecranon process Patient has full range of motion to the right elbow in flexion, extension, supination, pronation with comparable range of motion when compared with the contralateral elbow No appreciable laxity with valgus or varus stress testing There is no tenderness to palpation over the lateral epicondyle, medial epicondyle, AC fossa, radial head Mild/minimal tenderness to palpation over the olecranon process/edematous area over the olecranon process Distal sensation is grossly intact to light touch Capillary refills less than 2 seconds  Three-view x-rays of the right elbow performed in the office today show no obvious acute displaced fracture, subluxation, or dislocation  Assessment/plan: Olecranon bursitis of the right elbow, discussed natural course and history of olecranon bursitis with the patient, discussed treatment options  1.  Discussed the potential risks and benefits of aspiration with the patient, patient verbalized understanding of all associated risk and benefits and decided to proceed with the procedure, she tolerated the procedure well, the procedure note is attached, following the procedure a Band-Aid was applied over the puncture site and an Ace wrap was applied over the right elbow, 5-6 cc of clear browning/straw-colored fluid was aspirated, no concern for infectious bursitis or inflammatory process at this time, patient was advised on alarm symptoms including but not limited to fever, chills, erythema to the area, purulent drainage, she was advised that if any of these should manifest that she return to the office immediately versus present to the emergency department 2.  I recommend that the patient wear a compression sleeve/Ace wrap over the right elbow at all times with the exception of for showering/bathing, she understands that without consistent compression there is a high likelihood that the bursa will refill  3.  I recommend that the patient take over-the-counter anti-inflammatory medications on an as-needed basis to help with inflammation  4.  Patient can apply ice to the right elbow on an as-needed basis with sensory precautions 5.  Discussed activity modifications with the patient, advised not to apply pressure directly over the right elbow especially over the olecranon process  Patient will be provided with a 4-week follow-up with me for repeat evaluation, if indicated at that time can reattempt aspiration of the bursa refills, patient verbalized understanding of all findings in the office today, agrees to follow-up as directed

## 2025-03-20 ENCOUNTER — LAB (OUTPATIENT)
Dept: LAB | Facility: CLINIC | Age: 85
End: 2025-03-20
Payer: COMMERCIAL

## 2025-03-20 ENCOUNTER — ANTICOAGULATION THERAPY VISIT (OUTPATIENT)
Dept: ANTICOAGULATION | Facility: CLINIC | Age: 85
End: 2025-03-20

## 2025-03-20 DIAGNOSIS — Z51.81 ANTICOAGULATION GOAL OF INR 2 TO 3: ICD-10-CM

## 2025-03-20 DIAGNOSIS — Z98.890 STATUS POST CATHETER ABLATION OF ATRIAL FIBRILLATION: ICD-10-CM

## 2025-03-20 DIAGNOSIS — I73.9 PVD (PERIPHERAL VASCULAR DISEASE): ICD-10-CM

## 2025-03-20 DIAGNOSIS — I48.20 CHRONIC ATRIAL FIBRILLATION (H): ICD-10-CM

## 2025-03-20 DIAGNOSIS — I48.20 CHRONIC ATRIAL FIBRILLATION (H): Primary | ICD-10-CM

## 2025-03-20 DIAGNOSIS — Z79.01 LONG TERM CURRENT USE OF ANTICOAGULANT THERAPY: ICD-10-CM

## 2025-03-20 DIAGNOSIS — Z79.01 ANTICOAGULATION GOAL OF INR 2 TO 3: ICD-10-CM

## 2025-03-20 DIAGNOSIS — E11.65 TYPE 2 DIABETES MELLITUS WITH HYPERGLYCEMIA, WITHOUT LONG-TERM CURRENT USE OF INSULIN (H): Primary | ICD-10-CM

## 2025-03-20 LAB — INR BLD: 2.5 (ref 0.9–1.1)

## 2025-03-20 NOTE — PROGRESS NOTES
ANTICOAGULATION MANAGEMENT     Clementina Cooper 84 year old female is on warfarin with therapeutic INR result. (Goal INR 2.0-3.0)    Recent labs: (last 7 days)     03/20/25  0941   INR 2.5*       ASSESSMENT     Source(s): Chart Review and Patient/Caregiver Call     Warfarin doses taken: Warfarin taken as instructed  Diet: No new diet changes identified  Medication/supplement changes:  prednisone and azithromycin  New illness, injury, or hospitalization: Yes: Sinus infection  Signs or symptoms of bleeding or clotting: No  Previous result: Therapeutic last visit; previously outside of goal range  Additional findings: None       PLAN     Recommended plan for temporary change(s) affecting INR     Dosing Instructions: Continue your current warfarin dose with next INR in 3 weeks       Summary  As of 3/20/2025      Full warfarin instructions:  5 mg every Tue, Fri; 2.5 mg all other days   Next INR check:  4/10/2025               Telephone call with Lynda who verbalizes understanding and agrees to plan    Lab visit scheduled    Education provided: Goal range and lab monitoring: goal range and significance of current result    Plan made per [facility] anticoagulation protocol    Lou Santiago RN  3/20/2025  Anticoagulation Clinic  [employer] for routing messages: carolina CRUZ  [facility] patient phone line: [phone]        _______________________________________________________________________     Anticoagulation Episode Summary       Current INR goal:  2.0-3.0   TTR:  86.7% (1 y)   Target end date:  Indefinite   Send INR reminders to:  RON CRUZ    Indications    Chronic atrial fibrillation (H) [I48.20]  Status post catheter ablation of atrial fibrillation [Z98.890]  PVD (peripheral vascular disease) [I73.9]  Anticoagulation goal of INR 2 to 3 [Z51.81  Z79.01]  Long-term (current) use of anticoagulants [Z79.01] [Z79.01]             Comments:  --             Anticoagulation Care Providers       Provider Role Specialty  Phone number    Estela Davila MD Referring HOSPITALIST [phone]    Enmanuel Baez DO Referring Family Medicine [phone]

## 2025-04-10 ENCOUNTER — ANTICOAGULATION THERAPY VISIT (OUTPATIENT)
Dept: ANTICOAGULATION | Facility: CLINIC | Age: 85
End: 2025-04-10

## 2025-04-10 ENCOUNTER — LAB (OUTPATIENT)
Dept: LAB | Facility: CLINIC | Age: 85
End: 2025-04-10
Payer: COMMERCIAL

## 2025-04-10 DIAGNOSIS — Z79.01 LONG TERM CURRENT USE OF ANTICOAGULANT THERAPY: ICD-10-CM

## 2025-04-10 DIAGNOSIS — I73.9 PVD (PERIPHERAL VASCULAR DISEASE): ICD-10-CM

## 2025-04-10 DIAGNOSIS — Z79.01 ANTICOAGULATION GOAL OF INR 2 TO 3: ICD-10-CM

## 2025-04-10 DIAGNOSIS — Z51.81 ANTICOAGULATION GOAL OF INR 2 TO 3: ICD-10-CM

## 2025-04-10 DIAGNOSIS — I48.20 CHRONIC ATRIAL FIBRILLATION (H): ICD-10-CM

## 2025-04-10 DIAGNOSIS — I48.20 CHRONIC ATRIAL FIBRILLATION (H): Primary | ICD-10-CM

## 2025-04-10 DIAGNOSIS — Z98.890 STATUS POST CATHETER ABLATION OF ATRIAL FIBRILLATION: ICD-10-CM

## 2025-04-10 LAB — INR BLD: 2.8 (ref 0.9–1.1)

## 2025-04-10 NOTE — PROGRESS NOTES
ANTICOAGULATION MANAGEMENT     Clementina Cooper 84 year old female is on warfarin with therapeutic INR result. (Goal INR 2.0-3.0)    Recent labs: (last 7 days)     04/10/25  1009   INR 2.8*       ASSESSMENT     Source(s): Chart Review and Patient/Caregiver Call     Warfarin doses taken: Warfarin taken as instructed  Diet: No new diet changes identified  Medication/supplement changes: None noted  New illness, injury, or hospitalization: No  Signs or symptoms of bleeding or clotting: No  Previous result: Therapeutic last 2(+) visits  Additional findings: None       PLAN     Recommended plan for no diet, medication or health factor changes affecting INR     Dosing Instructions: Continue your current warfarin dose with next INR in 5 weeks       Summary  As of 4/10/2025      Full warfarin instructions:  5 mg every Tue, Fri; 2.5 mg all other days   Next INR check:  5/15/2025               Telephone call with Lynda who verbalizes understanding and agrees to plan    Lab visit scheduled    Education provided: Goal range and lab monitoring: goal range and significance of current result    Plan made per [facility] anticoagulation protocol    Lou Santiago RN  4/10/2025  Anticoagulation Clinic  [employer] for routing messages: carolina CRUZ  [facility] patient phone line: [phone]        _______________________________________________________________________     Anticoagulation Episode Summary       Current INR goal:  2.0-3.0   TTR:  86.9% (1 y)   Target end date:  Indefinite   Send INR reminders to:  RON CRUZ    Indications    Chronic atrial fibrillation (H) [I48.20]  Status post catheter ablation of atrial fibrillation [Z98.890]  PVD (peripheral vascular disease) [I73.9]  Anticoagulation goal of INR 2 to 3 [Z51.81  Z79.01]  Long-term (current) use of anticoagulants [Z79.01] [Z79.01]             Comments:  --             Anticoagulation Care Providers       Provider Role Specialty Phone number    Estela Davila MD  Referring HOSPITALIST [phone]    Enmanuel Baez,  Referring Family Medicine [phone]

## 2025-04-12 NOTE — TELEPHONE ENCOUNTER
Forms received from: [employer]   Phone number listed: [phone]   Fax listed: [phone]  Date received: 11/15/2017  Form description: CMN and Prescription for Therapeutic Footwear  Once forms are completed, please return to [employer] [facility] via fax.  Is patient requesting to be contacted when forms are completed: NA   Form placed: In provider's basket  Beena Hudson    
Requested information faxed to number provided.    
protein and calories

## 2025-04-14 ENCOUNTER — OFFICE VISIT (OUTPATIENT)
Dept: FAMILY MEDICINE | Facility: CLINIC | Age: 85
End: 2025-04-14
Payer: COMMERCIAL

## 2025-04-14 VITALS
SYSTOLIC BLOOD PRESSURE: 124 MMHG | TEMPERATURE: 97.8 F | HEIGHT: 66 IN | BODY MASS INDEX: 28.61 KG/M2 | RESPIRATION RATE: 16 BRPM | OXYGEN SATURATION: 94 % | DIASTOLIC BLOOD PRESSURE: 70 MMHG | WEIGHT: 178 LBS | HEART RATE: 77 BPM

## 2025-04-14 DIAGNOSIS — Z79.899 MEDICATION MANAGEMENT: Primary | ICD-10-CM

## 2025-04-14 DIAGNOSIS — H61.21 IMPACTED CERUMEN OF RIGHT EAR: ICD-10-CM

## 2025-04-14 DIAGNOSIS — J32.0 CHRONIC MAXILLARY SINUSITIS: ICD-10-CM

## 2025-04-14 PROCEDURE — 3074F SYST BP LT 130 MM HG: CPT | Performed by: FAMILY MEDICINE

## 2025-04-14 PROCEDURE — 3078F DIAST BP <80 MM HG: CPT | Performed by: FAMILY MEDICINE

## 2025-04-14 PROCEDURE — 99214 OFFICE O/P EST MOD 30 MIN: CPT | Performed by: FAMILY MEDICINE

## 2025-04-14 RX ORDER — PREDNISONE 20 MG/1
20 TABLET ORAL DAILY
Qty: 3 TABLET | Refills: 0 | Status: SHIPPED | OUTPATIENT
Start: 2025-04-14 | End: 2025-04-17

## 2025-04-14 NOTE — PROGRESS NOTES
"  Assessment & Plan   Problem List Items Addressed This Visit    None  Visit Diagnoses       Medication management    -  Primary    Relevant Orders    Med Therapy Management Referral    Chronic maxillary sinusitis        Relevant Medications    predniSONE (DELTASONE) 20 MG tablet    Impacted cerumen of right ear               Has already failed multiple abx regimens  Will use 3 days 20mg prednisone, add nasal lavage, add daily flonase indefinitely  Follow up if no improvement at 2 weeks    See avs    Tremaine   Lynda is a 84 year old, presenting for the following health issues:  Sinus Problem        4/14/2025    12:35 PM   Additional Questions   Roomed by Christina CARTWRIGHT CMA     History of Present Illness       Reason for visit:  Sinus issues off and on Since November 2024  Symptom onset:  3-7 days ago  Symptoms include:  Congestion, plugged Ears, cough with mucus production , Headache that comes and goes, fatigue  Symptom progression:  Worsening  Had these symptoms before:  Yes  Has tried/received treatment for these symptoms:  Yes  Previous treatment was successful:  Yes  Prior treatment description:  Different medications  What makes it worse:  Amoxiclilan caused her to Vomit and Azelastine isn't working  What makes it better:  Nothing   She is taking medications regularly.            Objective    /70 (BP Location: Right arm, Patient Position: Chair, Cuff Size: Adult Regular)   Pulse 77   Temp 97.8  F (36.6  C) (Oral)   Resp 16   Ht 1.664 m (5' 5.5\")   Wt 80.7 kg (178 lb)   LMP  (LMP Unknown)   SpO2 94%   BMI 29.17 kg/m    Body mass index is 29.17 kg/m .  Physical Exam   GENERAL: alert and no distress  EYES: Eyes grossly normal to inspection, PERRL and conjunctivae and sclerae normal  HENT: ear canals and TM's normal (unable to see right TM from impacted cerumen) nose and mouth without ulcers or lesions, right maxillary sinus tenderness  NECK: no adenopathy, no asymmetry, masses, or scars  RESP: " lungs clear to auscultation - no rales, rhonchi or wheezes  CV: regular rate and rhythm, normal S1 S2, no S3 or S4, no murmur, click or rub, no peripheral edema        Signed Electronically by: KYLEE STATON DO

## 2025-04-15 ENCOUNTER — TELEPHONE (OUTPATIENT)
Dept: FAMILY MEDICINE | Facility: CLINIC | Age: 85
End: 2025-04-15
Payer: COMMERCIAL

## 2025-04-15 NOTE — TELEPHONE ENCOUNTER
MTM referral from: Transitions of Care (recent hospital discharge, TCU discharge, or ED visit)    MTM referral outreach attempt #1 on April 15, 2025 at 1:22 PM      Outcome: Spoke with patient doesn't think its  necessary    Use [facility] med adv  for the carrier/Plan on the flowsheet          CRISTIAN Johansen   [phone]

## 2025-04-17 ENCOUNTER — OFFICE VISIT (OUTPATIENT)
Dept: FAMILY MEDICINE | Facility: CLINIC | Age: 85
End: 2025-04-17
Payer: COMMERCIAL

## 2025-04-17 VITALS
HEART RATE: 59 BPM | RESPIRATION RATE: 14 BRPM | WEIGHT: 179 LBS | HEIGHT: 66 IN | DIASTOLIC BLOOD PRESSURE: 78 MMHG | SYSTOLIC BLOOD PRESSURE: 137 MMHG | TEMPERATURE: 96.7 F | OXYGEN SATURATION: 94 % | BODY MASS INDEX: 28.77 KG/M2

## 2025-04-17 DIAGNOSIS — H61.21 IMPACTED CERUMEN OF RIGHT EAR: Primary | ICD-10-CM

## 2025-04-17 DIAGNOSIS — J32.9 SINUSITIS, UNSPECIFIED CHRONICITY, UNSPECIFIED LOCATION: ICD-10-CM

## 2025-04-17 ASSESSMENT — PAIN SCALES - GENERAL: PAINLEVEL_OUTOF10: NO PAIN (0)

## 2025-04-17 NOTE — PROGRESS NOTES
"  Assessment & Plan     Impacted cerumen of right ear  Right ear wash done by MA  Most wax out  She will use Debrox if needed and follow up as needed     Sinusitis, unspecified chronicity, unspecified location  As per PCP  Nasal lavage   Follow up if not better wit PCP          Subjective   Lynda is a 84 year old, presenting for the following health issues:  Ear Problem and Sinus Problem    History of Present Illness       Reason for visit:  Sinus issues off and on Since November 2024  Symptom onset:  3-7 days ago  Symptoms include:  Congestion, plugged Ears, cough with mucus production , Headache that comes and goes, fatigue  Symptom progression:  Worsening  Had these symptoms before:  Yes  Has tried/received treatment for these symptoms:  Yes  Previous treatment was successful:  Yes  Prior treatment description:  Different medications  What makes it worse:  Amoxiclilan caused her to Vomit and Azelastine isn't working  What makes it better:  Nothing   She is taking medications regularly.    Just started Flonase  Here Today as Right ear is Plugged              Review of Systems  Constitutional, neuro, ENT, endocrine, pulmonary, cardiac, gastrointestinal, genitourinary, musculoskeletal, integument and psychiatric systems are negative, except as otherwise noted.      Objective    /78   Pulse 59   Temp (!) 96.7  F (35.9  C) (Temporal)   Resp 14   Ht 1.664 m (5' 5.51\")   Wt 81.2 kg (179 lb)   LMP  (LMP Unknown)   SpO2 94%   BMI 29.32 kg/m    Body mass index is 29.32 kg/m .  Physical Exam   GENERAL: alert and no distress  HENT: normal cephalic/atraumatic, right ear: occluded with wax, left ear: normal: no effusions, no erythema, normal landmarks, nose and mouth without ulcers or lesions, oropharynx clear, and oral mucous membranes moist  NECK: no adenopathy, no asymmetry, masses, or scars  RESP: lungs clear to auscultation - no rales, rhonchi or wheezes  CV: regular rate and rhythm, normal S1 S2, no S3 or " S4, no murmur, click or rub,               Signed Electronically by: Carissa Mcdonald MD

## 2025-04-22 ENCOUNTER — NURSE TRIAGE (OUTPATIENT)
Dept: FAMILY MEDICINE | Facility: CLINIC | Age: 85
End: 2025-04-22
Payer: COMMERCIAL

## 2025-04-22 NOTE — TELEPHONE ENCOUNTER
S-(situation): Says has been seeing different doctors for ongoing sinus infection and has seen different doctors and now wants to stick with Dr. Altamirano. Was told to use netti pot, nasal spray that was Rx. Is now coughing up blood since Sunday. Head feels full and feeling terrible.     B-(background): Continues with symptoms blood is minimal and just a few flecks in mucus, denies SOB, fever or other symptoms but in general doesn't feel wells.  Please also see highlighted triage initial assessment questions for further details.      A-(assessment): Symptoms continue, patient feels is from sinus infection.     R-(recommendations): Per protocol fits home care, reviewed with Lynda, appointment scheduled as she says symptoms continue off and on for months, feels better on medication but may not have fully cleared as once medication stops symptoms return quickly.     Advice given under triage care advice protocol, patient verbalized understanding and is agreeable to plan.      Ute Casey R.N.       Reason for Disposition    Few streaks of blood mixed in with yellow or green sputum (all other triage questions negative)    Patient wants to be seen     Since home care is disposition, does not need to be seen today, scheduled for  tomorrow, patient to call back per advice given under protocol or proceed to ED or Urgent Care as appropriate.    Additional Information    Negative: SEVERE difficulty breathing (e.g., struggling for each breath, speaks in single words)    Negative: Chest pain and difficulty breathing    Negative: Bluish (or gray) lips or face now    Negative: Passed out (e.g., fainted, lost consciousness, blacked out and was not responding)    Negative: Shock suspected (e.g., cold/pale/clammy skin, too weak to stand, low BP, rapid pulse)    Negative: Difficult to awaken or acting confused (e.g., disoriented, slurred speech)    Negative: Recent chest injury (i.e., past 24 hours)    Negative: Coughed up blood and  large amount (Such as: 'a half cup of blood')    Negative: Sounds like a life-threatening emergency to the triager    Negative: MODERATE difficulty breathing (e.g., speaks in phrases, SOB even at rest, pulse 100-120) and still present when not coughing    Negative: Chest pain    Negative: Unclear to triager if the patient is coughing up blood or vomiting blood    Negative: History of prior 'blood clot' in leg or lungs (i.e., deep vein thrombosis, pulmonary embolism)    Negative: History of inherited increased risk of blood clots (e.g., Factor 5 Leiden, Anti-thrombin 3, Protein C or Protein S deficiency, Prothrombin mutation)    Negative: Pregnant or postpartum (from 0 to 6 weeks after delivery)    Negative: Hip or leg fracture (broken bone) in past month (or had cast on leg or ankle in past month)    Negative: Long-distance travel in past month (e.g., car, bus, train, plane; with trip lasting 6 or more hours)    Negative: Bedridden (e.g., CVA, chronic illness, recovering from surgery)    Negative: Patient sounds very sick or weak to the triager    Negative: MILD difficulty breathing (e.g., minimal/no SOB at rest, SOB with walking, pulse <100) and still present when not coughing (Exception: No change from usual, chronic shortness of breath.)    Negative: Coughed up blood and > 1 tablespoon (15 ml)    Negative: Fever > 103 F (39.4 C)    Negative: Fever > 101 F (38.3 C) and age > 60 years    Negative: Fever > 100 F (37.8 C) and diabetes mellitus or weak immune system (e.g., HIV positive, cancer chemo, splenectomy, organ transplant, chronic steroids)    Negative: Has underlying lung disease (e.g., COPD, chronic bronchitis or emphysema) and sputum has turned yellow or green in color    Negative: Coughing up matt-colored sputum    Negative: Coughing up yellow or green sputum and present > 5 days    Negative: Fever present > 3 days (72 hours)    Negative: Taking Coumadin (warfarin) or other strong blood thinner, or known  "bleeding disorder (e.g., thrombocytopenia)    Negative: Nasal discharge and present > 10 days    Negative: Cough has been present for > 3 weeks    Negative: More than one episode of coughing up blood and < 1 tablespoon (15 ml) of pure red blood    Negative: Exposure to TB (Tuberculosis)    Answer Assessment - Initial Assessment Questions  1. ONSET: \"When did the cough begin?\"       Sunday    2. SEVERITY: \"How bad is the cough today?\" \"Did the blood appear after a coughing spell?\"       Says cough is occasional. Rates as mild.     3. SPUTUM: \"Describe the color of your sputum\" (e.g., none, dry cough; clear, white, yellow, green)      Bloody--bright red with mucus.     4. HEMOPTYSIS: \"How much blood?\" (e.g., flecks, streaks, tablespoons)      Says not a lot--drops in mucus.     5. DIFFICULTY BREATHING: \"Are you having difficulty breathing?\" If Yes, ask: \"How bad is it?\" (e.g., mild, moderate, severe)       Denies    6. FEVER: \"Do you have a fever?\" If Yes, ask: \"What is your temperature, how was it measured, and when did it start?\"      No    7. CARDIAC HISTORY: \"Do you have any history of heart disease?\" (e.g., heart attack, congestive heart failure)       No    8. LUNG HISTORY: \"Do you have any history of lung disease?\"  (e.g., pulmonary embolus, asthma, emphysema)      No    9. PE RISK FACTORS: \"Do you have a history of blood clots?\" Note: Other risk factors include recent major surgery, recent prolonged travel, being bedridden.      No    10. OTHER SYMPTOMS: \"Do you have any other symptoms?\" (e.g., runny nose, wheezing, chest pain)        Head feels full, feeling terrible she say.     11. PREGNANCY: \"Is there any chance you are pregnant?\" \"When was your last menstrual period?\"        No    12. TRAVEL: \"Have you traveled out of the country in the last month?\" (e.g., travel history, exposures)        No    Protocols used: Coughing Up Blood-A-[place]    "

## 2025-04-23 ENCOUNTER — TELEPHONE (OUTPATIENT)
Dept: FAMILY MEDICINE | Facility: CLINIC | Age: 85
End: 2025-04-23

## 2025-04-23 ENCOUNTER — ANCILLARY PROCEDURE (OUTPATIENT)
Dept: GENERAL RADIOLOGY | Facility: CLINIC | Age: 85
End: 2025-04-23
Attending: FAMILY MEDICINE
Payer: COMMERCIAL

## 2025-04-23 ENCOUNTER — OFFICE VISIT (OUTPATIENT)
Dept: FAMILY MEDICINE | Facility: CLINIC | Age: 85
End: 2025-04-23
Payer: COMMERCIAL

## 2025-04-23 VITALS
DIASTOLIC BLOOD PRESSURE: 74 MMHG | SYSTOLIC BLOOD PRESSURE: 128 MMHG | WEIGHT: 179 LBS | BODY MASS INDEX: 28.77 KG/M2 | HEIGHT: 66 IN | OXYGEN SATURATION: 94 % | HEART RATE: 55 BPM | RESPIRATION RATE: 16 BRPM | TEMPERATURE: 97.7 F

## 2025-04-23 DIAGNOSIS — R49.0 HOARSENESS: ICD-10-CM

## 2025-04-23 DIAGNOSIS — R04.2 HEMOPTYSIS: ICD-10-CM

## 2025-04-23 DIAGNOSIS — J32.9 CHRONIC SINUSITIS, UNSPECIFIED LOCATION: ICD-10-CM

## 2025-04-23 DIAGNOSIS — R04.2 HEMOPTYSIS: Primary | ICD-10-CM

## 2025-04-23 PROCEDURE — 99214 OFFICE O/P EST MOD 30 MIN: CPT | Performed by: FAMILY MEDICINE

## 2025-04-23 PROCEDURE — 3078F DIAST BP <80 MM HG: CPT | Performed by: FAMILY MEDICINE

## 2025-04-23 PROCEDURE — 3074F SYST BP LT 130 MM HG: CPT | Performed by: FAMILY MEDICINE

## 2025-04-23 PROCEDURE — 71046 X-RAY EXAM CHEST 2 VIEWS: CPT | Mod: TC | Performed by: RADIOLOGY

## 2025-04-23 RX ORDER — DOXYCYCLINE HYCLATE 100 MG
100 TABLET ORAL 2 TIMES DAILY
Qty: 28 TABLET | Refills: 0 | Status: SHIPPED | OUTPATIENT
Start: 2025-04-23 | End: 2025-05-07

## 2025-04-23 NOTE — TELEPHONE ENCOUNTER
Routing Message to provider.    Patient asking for Xray results.    Please advise.    Christine M Klisch, RN

## 2025-04-23 NOTE — PROGRESS NOTES
"  Assessment & Plan   Problem List Items Addressed This Visit    None  Visit Diagnoses       Hemoptysis    -  Primary    Relevant Orders    Adult ENT  Referral    XR Chest 2 Views    Hoarseness        Chronic sinusitis, unspecified location        Relevant Medications    doxycycline hyclate (VIBRA-TABS) 100 MG tablet           Follow up with ENT  Trial 14 days doxycycline  Pending CXR           Subjective   Lynda is a 84 year old, presenting for the following health issues:  RECHECK (Sinus Infection on going 6 months)        4/23/2025     9:33 AM   Additional Questions   Roomed by Christina CARTWRIGHT CMA     History of Present Illness       Reason for visit:  Sinus issues off and on Since November 2024  Symptom onset:  3-7 days ago  Symptoms include:  Congestion, plugged Ears, cough with mucus production , Headache that comes and goes, fatigue  Symptom progression:  Worsening  Had these symptoms before:  Yes  Has tried/received treatment for these symptoms:  Yes  Previous treatment was successful:  Yes  Prior treatment description:  Different medications  What makes it worse:  Amoxiclilan caused her to Vomit and Azelastine isn't working  What makes it better:  Nothing   She is taking medications regularly.      +blood in sputum x 3-4 days now, streaking, not gross blood    +fatigue      Objective    /74 (BP Location: Right arm, Patient Position: Chair, Cuff Size: Adult Regular)   Pulse 55   Temp 97.7  F (36.5  C) (Oral)   Resp 16   Ht 1.664 m (5' 5.51\")   Wt 81.2 kg (179 lb)   LMP  (LMP Unknown)   SpO2 94%   BMI 29.33 kg/m    Body mass index is 29.33 kg/m .  Physical Exam   GENERAL: alert and no distress  HENT: + hoarse voice worse than last visit, nose and mouth without ulcers or lesions  NECK: no adenopathy, no asymmetry, masses, or scars  RESP: lungs clear to auscultation - no rales, rhonchi or wheezes  CV: regular rate and rhythm            Signed Electronically by: KYLEE STATON,     "

## 2025-04-23 NOTE — TELEPHONE ENCOUNTER
Test Results    Contacts       Contact Date/Time Type Contact Phone/Fax    04/23/2025 04:11 PM CDT Phone (Incoming) Lynda Cooper (Self) [phone] (H)            Who ordered the test:  Dr Baez     Type of test: Lab and X-ray    Date of test:  04/23/2025    Where was the test performed:  [facility]     What are your questions/concerns?:  Results     Okay to leave a detailed message?: Yes at Cell number on file:    No relevant phone numbers on file.    [phone]

## 2025-04-24 NOTE — TELEPHONE ENCOUNTER
RN called patient/family and relayed provider's message. Patient/family verbalized understanding.     Claudia YU RN, BSN  [facility]: [place]

## 2025-05-02 NOTE — NURSING NOTE
"Chief Complaint   Patient presents with     Diabetes     Health Maintenance     eye,INR referral        Initial /74 (BP Location: Other (Comment), Patient Position: Sitting, Cuff Size: Adult Regular)  Pulse 63  Temp 97  F (36.1  C) (Oral)  Wt 205 lb (93 kg)  SpO2 94%  BMI 33.59 kg/m2 Estimated body mass index is 33.59 kg/(m^2) as calculated from the following:    Height as of 3/15/17: 5' 5.5\" (1.664 m).    Weight as of this encounter: 205 lb (93 kg).  Medication Reconciliation: complete   Dannielle Travis ma      " Opt out

## 2025-05-15 ENCOUNTER — RESULTS FOLLOW-UP (OUTPATIENT)
Dept: NURSING | Facility: CLINIC | Age: 85
End: 2025-05-15

## 2025-05-15 ENCOUNTER — ANTICOAGULATION THERAPY VISIT (OUTPATIENT)
Dept: ANTICOAGULATION | Facility: CLINIC | Age: 85
End: 2025-05-15

## 2025-05-15 ENCOUNTER — DOCUMENTATION ONLY (OUTPATIENT)
Dept: ANTICOAGULATION | Facility: CLINIC | Age: 85
End: 2025-05-15

## 2025-05-15 ENCOUNTER — LAB (OUTPATIENT)
Dept: LAB | Facility: CLINIC | Age: 85
End: 2025-05-15
Payer: COMMERCIAL

## 2025-05-15 DIAGNOSIS — Z51.81 ANTICOAGULATION GOAL OF INR 2 TO 3: ICD-10-CM

## 2025-05-15 DIAGNOSIS — I48.20 CHRONIC ATRIAL FIBRILLATION (H): Primary | ICD-10-CM

## 2025-05-15 DIAGNOSIS — Z79.01 LONG TERM CURRENT USE OF ANTICOAGULANT THERAPY: ICD-10-CM

## 2025-05-15 DIAGNOSIS — Z79.01 ANTICOAGULATION GOAL OF INR 2 TO 3: ICD-10-CM

## 2025-05-15 DIAGNOSIS — I48.20 CHRONIC ATRIAL FIBRILLATION (H): ICD-10-CM

## 2025-05-15 DIAGNOSIS — Z98.890 STATUS POST CATHETER ABLATION OF ATRIAL FIBRILLATION: ICD-10-CM

## 2025-05-15 DIAGNOSIS — I73.9 PVD (PERIPHERAL VASCULAR DISEASE): ICD-10-CM

## 2025-05-15 LAB — INR BLD: 2.2 (ref 0.9–1.1)

## 2025-05-15 NOTE — PROGRESS NOTES
ANTICOAGULATION MANAGEMENT     Clementina Cooper 84 year old female is on warfarin with therapeutic INR result. (Goal INR 2.0-3.0)    Recent labs: (last 7 days)     05/15/25  0925   INR 2.2*       ASSESSMENT     Source(s): Chart Review  Previous INR was Therapeutic last 2(+) visits  Medication, diet, health changes since last INR chart reviewed; none identified         PLAN     Unable to reach Lynda today.    No instructions provided. Unable to leave voicemail.    Follow up required to confirm warfarin dose taken and assess for changes    Lou Santiago RN  5/15/2025  Anticoagulation Clinic  [facility] for routing messages: carolina CRUZ  [facility] patient phone line: [phone]

## 2025-05-15 NOTE — PROGRESS NOTES
ANTICOAGULATION MANAGEMENT     Clementina Cooper 84 year old female is on warfarin with therapeutic INR result. (Goal INR 2.0-3.0)    Recent labs: (last 7 days)     05/15/25  0925   INR 2.2*       ASSESSMENT     Source(s): Chart Review and Patient/Caregiver Call     Warfarin doses taken: Warfarin taken as instructed  Diet: No new diet changes identified  Medication/supplement changes: None noted  New illness, injury, or hospitalization: No  Signs or symptoms of bleeding or clotting: No  Previous result: Therapeutic last 2(+) visits  Additional findings: None       PLAN     Recommended plan for no diet, medication or health factor changes affecting INR     Dosing Instructions: Continue your current warfarin dose with next INR in 6 weeks       Summary  As of 5/15/2025      Full warfarin instructions:  5 mg every Tue, Fri; 2.5 mg all other days   Next INR check:  6/26/2025               Telephone call with Lynda who verbalizes understanding and agrees to plan    Lab visit scheduled    Education provided: Goal range and lab monitoring: goal range and significance of current result    Plan made per [facility] anticoagulation protocol    Lou Santiago RN  5/15/2025  Anticoagulation Clinic  [employer] for routing messages: carolina CRUZ  [facility] patient phone line: [phone]        _______________________________________________________________________     Anticoagulation Episode Summary       Current INR goal:  2.0-3.0   TTR:  88.3% (1 y)   Target end date:  Indefinite   Send INR reminders to:  RON CRUZ    Indications    Chronic atrial fibrillation (H) [I48.20]  Status post catheter ablation of atrial fibrillation [Z98.890]  PVD (peripheral vascular disease) [I73.9]  Anticoagulation goal of INR 2 to 3 [Z51.81  Z79.01]  Long-term (current) use of anticoagulants [Z79.01] [Z79.01]             Comments:  --             Anticoagulation Care Providers       Provider Role Specialty Phone number    Estela Davila MD  Referring HOSPITALIST [phone]    Enmanuel Baez,  Referring Family Medicine [phone]

## 2025-05-15 NOTE — PROGRESS NOTES
ANTICOAGULATION CLINIC REFERRAL RENEWAL REQUEST       An annual renewal order is required for all patients referred to [facility].?  Please review and sign the pended referral order for Clementina Cooper.       ANTICOAGULATION SUMMARY      Warfarin indication(s)   Atrial Fibrillation and PVD    Mechanical heart valve present?  NO       Current goal range   INR: 2.0-3.0     Goal appropriate for indication? Goal INR 2-3, standard for indication(s) above     Time in Therapeutic Range (TTR)  (Goal > 60%) 86.9%       Office visit with referring provider's group within last year yes on 4/23/25       Lou Santiago RN  [facility]

## 2025-06-26 ENCOUNTER — RESULTS FOLLOW-UP (OUTPATIENT)
Dept: ANTICOAGULATION | Facility: CLINIC | Age: 85
End: 2025-06-26

## 2025-06-26 ENCOUNTER — ANTICOAGULATION THERAPY VISIT (OUTPATIENT)
Dept: ANTICOAGULATION | Facility: CLINIC | Age: 85
End: 2025-06-26

## 2025-06-26 ENCOUNTER — LAB (OUTPATIENT)
Dept: LAB | Facility: CLINIC | Age: 85
End: 2025-06-26
Payer: COMMERCIAL

## 2025-06-26 DIAGNOSIS — Z79.01 LONG TERM CURRENT USE OF ANTICOAGULANT THERAPY: ICD-10-CM

## 2025-06-26 DIAGNOSIS — E11.65 TYPE 2 DIABETES MELLITUS WITH HYPERGLYCEMIA, WITHOUT LONG-TERM CURRENT USE OF INSULIN (H): ICD-10-CM

## 2025-06-26 DIAGNOSIS — I73.9 PVD (PERIPHERAL VASCULAR DISEASE): ICD-10-CM

## 2025-06-26 DIAGNOSIS — Z79.01 ANTICOAGULATION GOAL OF INR 2 TO 3: ICD-10-CM

## 2025-06-26 DIAGNOSIS — I48.20 CHRONIC ATRIAL FIBRILLATION (H): ICD-10-CM

## 2025-06-26 DIAGNOSIS — E03.4 HYPOTHYROIDISM DUE TO ACQUIRED ATROPHY OF THYROID: Primary | ICD-10-CM

## 2025-06-26 DIAGNOSIS — I48.20 CHRONIC ATRIAL FIBRILLATION (H): Primary | ICD-10-CM

## 2025-06-26 DIAGNOSIS — Z51.81 ANTICOAGULATION GOAL OF INR 2 TO 3: ICD-10-CM

## 2025-06-26 DIAGNOSIS — Z98.890 STATUS POST CATHETER ABLATION OF ATRIAL FIBRILLATION: ICD-10-CM

## 2025-06-26 LAB
INR BLD: 1.8 (ref 0.9–1.1)
T4 FREE SERPL-MCNC: 1.25 NG/DL (ref 0.9–1.7)
TSH SERPL DL<=0.005 MIU/L-ACNC: 4.55 UIU/ML (ref 0.3–4.2)

## 2025-06-26 NOTE — PROGRESS NOTES
ANTICOAGULATION MANAGEMENT     Clementina Cooper 84 year old female is on warfarin with subtherapeutic INR result. (Goal INR 2.0-3.0)    Recent labs: (last 7 days)     06/26/25  0920   INR 1.8*       ASSESSMENT     Source(s): Chart Review and Patient/Caregiver Call     Warfarin doses taken: Warfarin taken as instructed  Diet: Increased greens/vitamin K in diet; plans to resume previous intake  Medication/supplement changes: None noted  New illness, injury, or hospitalization: No  Signs or symptoms of bleeding or clotting: No  Previous result: Therapeutic last 2(+) visits  Additional findings: None       PLAN     Recommended plan for temporary change(s) affecting INR     Dosing Instructions: Continue your current warfarin dose with next INR in 2 weeks       Summary  As of 6/26/2025      Full warfarin instructions:  5 mg every Tue, Fri; 2.5 mg all other days   Next INR check:  7/10/2025               Telephone call with Lynda who verbalizes understanding and agrees to plan    Lab visit scheduled    Education provided: Dietary considerations: importance of consistent vitamin K intake and impact of vitamin K foods on INR  Contact [phone] with any changes, questions or concerns.     Plan made per [facility] anticoagulation protocol    Shelby Sanford RN  6/26/2025  Anticoagulation Clinic  [employer] for routing messages: carolina CRUZ  [facility] patient phone line: [phone]        _______________________________________________________________________     Anticoagulation Episode Summary       Current INR goal:  2.0-3.0   TTR:  82.5% (1 y)   Target end date:  Indefinite   Send INR reminders to:  RON CRUZ    Indications    Chronic atrial fibrillation (H) [I48.20]  Status post catheter ablation of atrial fibrillation [Z98.890]  PVD (peripheral vascular disease) [I73.9]  Anticoagulation goal of INR 2 to 3 [Z51.81  Z79.01]  Long-term (current) use of anticoagulants [Z79.01] [Z79.01]             Comments:  --              Anticoagulation Care Providers       Provider Role Specialty Phone number    Estela Davila MD Referring HOSPITALIST [phone]    Enmanuel Baez,  Referring Family Medicine [phone]

## 2025-06-27 ENCOUNTER — RESULTS FOLLOW-UP (OUTPATIENT)
Dept: FAMILY MEDICINE | Facility: CLINIC | Age: 85
End: 2025-06-27
Payer: COMMERCIAL

## 2025-07-07 DIAGNOSIS — Z51.81 ANTICOAGULATION GOAL OF INR 2 TO 3: ICD-10-CM

## 2025-07-07 DIAGNOSIS — Z79.01 ANTICOAGULATION GOAL OF INR 2 TO 3: ICD-10-CM

## 2025-07-08 RX ORDER — WARFARIN SODIUM 5 MG/1
2.5-5 TABLET ORAL EVERY EVENING
Qty: 65 TABLET | Refills: 1 | Status: SHIPPED | OUTPATIENT
Start: 2025-07-08

## 2025-07-08 NOTE — TELEPHONE ENCOUNTER
ANTICOAGULATION MANAGEMENT:  Medication Refill    Anticoagulation Summary  As of 6/26/2025      Warfarin maintenance plan:  5 mg (5 mg x 1) every Tue, Fri; 2.5 mg (5 mg x 0.5) all other days   Next INR check:  7/10/2025   Target end date:  Indefinite    Indications    Chronic atrial fibrillation (H) [I48.20]  Status post catheter ablation of atrial fibrillation [Z98.890]  PVD (peripheral vascular disease) [I73.9]  Anticoagulation goal of INR 2 to 3 [Z51.81  Z79.01]  Long-term (current) use of anticoagulants [Z79.01] [Z79.01]                 Anticoagulation Care Providers       Provider Role Specialty Phone number    Estela Davila MD Referring HOSPITALIST [phone]    Enmanuel Baez DO Referring Family Medicine [phone]            Refill Criteria    Visit with referring provider/group: Meets criteria: visit within referring provider group in the last 15 months on 4/23/25    [facility] referral last signed: 05/15/2025; within last year:  Yes    Lab monitoring is up to date (not exceeding 2 weeks overdue): Yes    Clementina meets all criteria for refill. Rx instructions and quantity match patient's current dosing plan. Warfarin 90 day supply with 1 refill granted per [facility] protocol     Kylah Gonzalez RN  Anticoagulation Clinic

## 2025-07-10 ENCOUNTER — RESULTS FOLLOW-UP (OUTPATIENT)
Dept: NURSING | Facility: CLINIC | Age: 85
End: 2025-07-10

## 2025-07-10 ENCOUNTER — LAB (OUTPATIENT)
Dept: LAB | Facility: CLINIC | Age: 85
End: 2025-07-10
Payer: COMMERCIAL

## 2025-07-10 ENCOUNTER — ANTICOAGULATION THERAPY VISIT (OUTPATIENT)
Dept: ANTICOAGULATION | Facility: CLINIC | Age: 85
End: 2025-07-10

## 2025-07-10 DIAGNOSIS — Z79.01 LONG TERM CURRENT USE OF ANTICOAGULANT THERAPY: ICD-10-CM

## 2025-07-10 DIAGNOSIS — Z51.81 ANTICOAGULATION GOAL OF INR 2 TO 3: ICD-10-CM

## 2025-07-10 DIAGNOSIS — I48.20 CHRONIC ATRIAL FIBRILLATION (H): Primary | ICD-10-CM

## 2025-07-10 DIAGNOSIS — Z79.01 ANTICOAGULATION GOAL OF INR 2 TO 3: ICD-10-CM

## 2025-07-10 DIAGNOSIS — Z98.890 STATUS POST CATHETER ABLATION OF ATRIAL FIBRILLATION: ICD-10-CM

## 2025-07-10 DIAGNOSIS — I73.9 PVD (PERIPHERAL VASCULAR DISEASE): ICD-10-CM

## 2025-07-10 DIAGNOSIS — I48.20 CHRONIC ATRIAL FIBRILLATION (H): ICD-10-CM

## 2025-07-10 LAB — INR BLD: 2.7 (ref 0.9–1.1)

## 2025-07-10 NOTE — PROGRESS NOTES
ANTICOAGULATION MANAGEMENT     Clementina Cooper 85 year old female is on warfarin with therapeutic INR result. (Goal INR 2.0-3.0)    Recent labs: (last 7 days)     07/10/25  0944   INR 2.7*       ASSESSMENT     Source(s): Chart Review  Previous INR was Subtherapeutic  Medication, diet, health changes since last INR: chart reviewed; none identified         PLAN     Recommended plan for no diet, medication or health factor changes affecting INR     Dosing Instructions: Continue your current warfarin dose with next INR in 3 weeks       Summary  As of 7/10/2025      Full warfarin instructions:  5 mg every Tue, Fri; 2.5 mg all other days   Next INR check:  7/31/2025               Detailed voice message left for Lynda with dosing instructions and follow up date.     Contact [phone] to schedule and with any changes, questions or concerns.     Education provided: Please call back if any changes to your diet, medications or how you've been taking warfarin  Goal range and lab monitoring: goal range and significance of current result    Plan made per [facility] anticoagulation protocol    Lou Santiago RN  7/10/2025  Anticoagulation Clinic  [facility] for routing messages: carolina RCUZ  [facility] patient phone line: [phone]        _______________________________________________________________________     Anticoagulation Episode Summary       Current INR goal:  2.0-3.0   TTR:  81.7% (1 y)   Target end date:  Indefinite   Send INR reminders to:  RON CRUZ    Indications    Chronic atrial fibrillation (H) [I48.20]  Status post catheter ablation of atrial fibrillation [Z98.890]  PVD (peripheral vascular disease) [I73.9]  Anticoagulation goal of INR 2 to 3 [Z51.81  Z79.01]  Long-term (current) use of anticoagulants [Z79.01] [Z79.01]             Comments:  --             Anticoagulation Care Providers       Provider Role Specialty Phone number    Estela Davila MD Referring HOSPITALIST [phone]    Sasha  Enmanuel DURAN DO [facility] [phone]

## 2025-07-10 NOTE — PROGRESS NOTES
ANTICOAGULATION MANAGEMENT     Clementina Cooper 85 year old female is on warfarin with therapeutic INR result. (Goal INR 2.0-3.0)    Recent labs: (last 7 days)     07/10/25  0944   INR 2.7*       ASSESSMENT     Source(s): Chart Review  Previous INR was Subtherapeutic  Medication, diet, health changes since last INR: chart reviewed; none identified         PLAN     Unable to reach Lynda today.    LMTCB    Follow up required to confirm warfarin dose taken and assess for changes    Lou Santiago RN  7/10/2025  Anticoagulation Clinic  [facility] for routing messages: carolina CRUZ  [facility] patient phone line: [phone]

## 2025-07-31 ENCOUNTER — ANTICOAGULATION THERAPY VISIT (OUTPATIENT)
Dept: ANTICOAGULATION | Facility: CLINIC | Age: 85
End: 2025-07-31

## 2025-07-31 ENCOUNTER — LAB (OUTPATIENT)
Dept: LAB | Facility: CLINIC | Age: 85
End: 2025-07-31
Payer: COMMERCIAL

## 2025-07-31 ENCOUNTER — RESULTS FOLLOW-UP (OUTPATIENT)
Dept: NURSING | Facility: CLINIC | Age: 85
End: 2025-07-31

## 2025-07-31 DIAGNOSIS — Z79.01 ANTICOAGULATION GOAL OF INR 2 TO 3: ICD-10-CM

## 2025-07-31 DIAGNOSIS — Z51.81 ANTICOAGULATION GOAL OF INR 2 TO 3: ICD-10-CM

## 2025-07-31 DIAGNOSIS — Z79.01 LONG TERM CURRENT USE OF ANTICOAGULANT THERAPY: ICD-10-CM

## 2025-07-31 DIAGNOSIS — I48.20 CHRONIC ATRIAL FIBRILLATION (H): ICD-10-CM

## 2025-07-31 DIAGNOSIS — I48.20 CHRONIC ATRIAL FIBRILLATION (H): Primary | ICD-10-CM

## 2025-07-31 DIAGNOSIS — Z98.890 STATUS POST CATHETER ABLATION OF ATRIAL FIBRILLATION: ICD-10-CM

## 2025-07-31 DIAGNOSIS — I73.9 PVD (PERIPHERAL VASCULAR DISEASE): ICD-10-CM

## 2025-07-31 LAB — INR BLD: 2.7 (ref 0.9–1.1)

## 2025-07-31 NOTE — PROGRESS NOTES
ANTICOAGULATION MANAGEMENT     Clementina Cooper 85 year old female is on warfarin with therapeutic INR result. (Goal INR 2.0-3.0)    Recent labs: (last 7 days)     07/31/25  0923   INR 2.7*       ASSESSMENT     Source(s): Chart Review  Previous INR was Therapeutic last visit; previously outside of goal range  Medication, diet, health changes since last INR: chart reviewed; none identified         PLAN     Recommended plan for no diet, medication or health factor changes affecting INR     Dosing Instructions: Continue your current warfarin dose with next INR in 4 weeks       Summary  As of 7/31/2025      Full warfarin instructions:  5 mg every Tue, Fri; 2.5 mg all other days   Next INR check:  8/28/2025               Detailed voice message left for Lynda with dosing instructions and follow up date.     Contact [phone] to schedule and with any changes, questions or concerns.     Education provided: Goal range and lab monitoring: goal range and significance of current result    Plan made per [facility] anticoagulation protocol    Lou Santiago RN  7/31/2025  Anticoagulation Clinic  [employer] for routing messages: carolina CRUZ  [facility] patient phone line: [phone]        _______________________________________________________________________     Anticoagulation Episode Summary       Current INR goal:  2.0-3.0   TTR:  81.7% (1 y)   Target end date:  Indefinite   Send INR reminders to:  RON CRUZ    Indications    Chronic atrial fibrillation (H) [I48.20]  Status post catheter ablation of atrial fibrillation [Z98.890]  PVD (peripheral vascular disease) [I73.9]  Anticoagulation goal of INR 2 to 3 [Z51.81  Z79.01]  Long-term (current) use of anticoagulants [Z79.01] [Z79.01]             Comments:  --             Anticoagulation Care Providers       Provider Role Specialty Phone number    Estela Davila MD Referring HOSPITALIST [phone]    Enmanuel Baez DO Referring Family Medicine [phone]

## 2025-07-31 NOTE — PROGRESS NOTES
ANTICOAGULATION MANAGEMENT     Clementina Cooper 85 year old female is on warfarin with therapeutic INR result. (Goal INR 2.0-3.0)    Recent labs: (last 7 days)     07/31/25  0923   INR 2.7*       ASSESSMENT     Source(s): Chart Review  Previous INR was Therapeutic last visit; previously outside of goal range  Medication, diet, health changes since last INR: chart reviewed; none identified         PLAN     Unable to reach Lynda today.    Spoke with spouse who said Lynda was not available, advised I would call back around 1 pm    Follow up required to confirm warfarin dose taken and assess for changes    Lou Santiago RN  7/31/2025  Anticoagulation Clinic  [employer] for routing messages: carolina CRUZ  [facility] patient phone line: [phone]     The patient is a 65y Female complaining of chest pain.

## 2025-08-06 ENCOUNTER — OFFICE VISIT (OUTPATIENT)
Dept: FAMILY MEDICINE | Facility: CLINIC | Age: 85
End: 2025-08-06
Payer: COMMERCIAL

## 2025-08-06 VITALS
SYSTOLIC BLOOD PRESSURE: 110 MMHG | TEMPERATURE: 97.6 F | OXYGEN SATURATION: 97 % | RESPIRATION RATE: 16 BRPM | WEIGHT: 175 LBS | HEART RATE: 59 BPM | BODY MASS INDEX: 28.12 KG/M2 | DIASTOLIC BLOOD PRESSURE: 68 MMHG | HEIGHT: 66 IN

## 2025-08-06 DIAGNOSIS — E03.4 HYPOTHYROIDISM DUE TO ACQUIRED ATROPHY OF THYROID: ICD-10-CM

## 2025-08-06 DIAGNOSIS — E11.65 TYPE 2 DIABETES MELLITUS WITH HYPERGLYCEMIA, WITHOUT LONG-TERM CURRENT USE OF INSULIN (H): Primary | ICD-10-CM

## 2025-08-06 LAB
CREAT UR-MCNC: 43.8 MG/DL
EST. AVERAGE GLUCOSE BLD GHB EST-MCNC: 183 MG/DL
HBA1C MFR BLD: 8 % (ref 0–5.6)
MICROALBUMIN UR-MCNC: <12 MG/L
MICROALBUMIN/CREAT UR: NORMAL MG/G{CREAT}

## 2025-08-06 PROCEDURE — 99214 OFFICE O/P EST MOD 30 MIN: CPT | Performed by: FAMILY MEDICINE

## 2025-08-06 PROCEDURE — 3074F SYST BP LT 130 MM HG: CPT | Performed by: FAMILY MEDICINE

## 2025-08-06 PROCEDURE — 82570 ASSAY OF URINE CREATININE: CPT | Performed by: FAMILY MEDICINE

## 2025-08-06 PROCEDURE — 36415 COLL VENOUS BLD VENIPUNCTURE: CPT | Performed by: FAMILY MEDICINE

## 2025-08-06 PROCEDURE — 82043 UR ALBUMIN QUANTITATIVE: CPT | Performed by: FAMILY MEDICINE

## 2025-08-06 PROCEDURE — 83036 HEMOGLOBIN GLYCOSYLATED A1C: CPT | Performed by: FAMILY MEDICINE

## 2025-08-06 PROCEDURE — 3078F DIAST BP <80 MM HG: CPT | Performed by: FAMILY MEDICINE

## 2025-08-12 ENCOUNTER — TELEPHONE (OUTPATIENT)
Dept: FAMILY MEDICINE | Facility: CLINIC | Age: 85
End: 2025-08-12
Payer: COMMERCIAL

## 2025-08-18 ENCOUNTER — OFFICE VISIT (OUTPATIENT)
Dept: ORTHOPEDICS | Facility: CLINIC | Age: 85
End: 2025-08-18
Payer: COMMERCIAL

## 2025-08-18 VITALS — RESPIRATION RATE: 18 BRPM | WEIGHT: 175 LBS | HEIGHT: 66 IN | BODY MASS INDEX: 28.12 KG/M2

## 2025-08-18 DIAGNOSIS — M17.12 PRIMARY OSTEOARTHRITIS OF LEFT KNEE: Primary | ICD-10-CM

## 2025-08-18 PROCEDURE — 20610 DRAIN/INJ JOINT/BURSA W/O US: CPT | Mod: LT | Performed by: ORTHOPAEDIC SURGERY

## 2025-08-18 RX ORDER — METHYLPREDNISOLONE ACETATE 80 MG/ML
80 INJECTION, SUSPENSION INTRA-ARTICULAR; INTRALESIONAL; INTRAMUSCULAR; SOFT TISSUE
Status: COMPLETED | OUTPATIENT
Start: 2025-08-18 | End: 2025-08-18

## 2025-08-18 RX ORDER — LIDOCAINE HYDROCHLORIDE 10 MG/ML
5 INJECTION, SOLUTION INFILTRATION; PERINEURAL
Status: COMPLETED | OUTPATIENT
Start: 2025-08-18 | End: 2025-08-18

## 2025-08-18 RX ADMIN — LIDOCAINE HYDROCHLORIDE 5 ML: 10 INJECTION, SOLUTION INFILTRATION; PERINEURAL at 14:29

## 2025-08-18 RX ADMIN — METHYLPREDNISOLONE ACETATE 80 MG: 80 INJECTION, SUSPENSION INTRA-ARTICULAR; INTRALESIONAL; INTRAMUSCULAR; SOFT TISSUE at 14:29

## 2025-08-28 ENCOUNTER — LAB (OUTPATIENT)
Dept: LAB | Facility: CLINIC | Age: 85
End: 2025-08-28
Payer: COMMERCIAL

## 2025-08-28 ENCOUNTER — OFFICE VISIT (OUTPATIENT)
Dept: OPHTHALMOLOGY | Facility: CLINIC | Age: 85
End: 2025-08-28
Payer: COMMERCIAL

## 2025-08-28 ENCOUNTER — ANTICOAGULATION THERAPY VISIT (OUTPATIENT)
Dept: ANTICOAGULATION | Facility: CLINIC | Age: 85
End: 2025-08-28

## 2025-08-28 DIAGNOSIS — Z79.01 ANTICOAGULATION GOAL OF INR 2 TO 3: ICD-10-CM

## 2025-08-28 DIAGNOSIS — Z51.81 ANTICOAGULATION GOAL OF INR 2 TO 3: ICD-10-CM

## 2025-08-28 DIAGNOSIS — Z79.01 LONG TERM CURRENT USE OF ANTICOAGULANT THERAPY: ICD-10-CM

## 2025-08-28 DIAGNOSIS — I48.20 CHRONIC ATRIAL FIBRILLATION (H): Primary | ICD-10-CM

## 2025-08-28 DIAGNOSIS — Z98.890 STATUS POST CATHETER ABLATION OF ATRIAL FIBRILLATION: ICD-10-CM

## 2025-08-28 DIAGNOSIS — H40.003 GLAUCOMA SUSPECT, BILATERAL: Primary | ICD-10-CM

## 2025-08-28 DIAGNOSIS — I48.20 CHRONIC ATRIAL FIBRILLATION (H): ICD-10-CM

## 2025-08-28 DIAGNOSIS — I73.9 PVD (PERIPHERAL VASCULAR DISEASE): ICD-10-CM

## 2025-08-28 LAB — INR BLD: 3 (ref 0.9–1.1)

## 2025-08-28 PROCEDURE — 92012 INTRM OPH EXAM EST PATIENT: CPT | Performed by: OPHTHALMOLOGY

## 2025-08-28 PROCEDURE — 92133 CPTRZD OPH DX IMG PST SGM ON: CPT | Performed by: OPHTHALMOLOGY

## 2025-08-28 PROCEDURE — 92083 EXTENDED VISUAL FIELD XM: CPT | Performed by: OPHTHALMOLOGY

## 2025-08-28 RX ORDER — TIMOLOL MALEATE 5 MG/ML
1 SOLUTION/ DROPS OPHTHALMIC EVERY MORNING
Qty: 15 ML | Refills: 4 | Status: SHIPPED | OUTPATIENT
Start: 2025-08-28

## 2025-08-28 ASSESSMENT — VISUAL ACUITY
METHOD: SNELLEN - LINEAR
OS_PH_SC: 20/30
OD_SC: 20/25
OS_SC: 20/40
OD_SC+: +1

## 2025-08-28 ASSESSMENT — TONOMETRY
IOP_METHOD: APPLANATION
OS_IOP_MMHG: 16
OD_IOP_MMHG: 18

## 2025-08-28 ASSESSMENT — SLIT LAMP EXAM - LIDS
COMMENTS: 1+ DERMATOCHALASIS - UPPER LID
COMMENTS: 1+ DERMATOCHALASIS - UPPER LID

## 2025-08-28 ASSESSMENT — EXTERNAL EXAM - LEFT EYE: OS_EXAM: NORMAL

## 2025-08-28 ASSESSMENT — EXTERNAL EXAM - RIGHT EYE: OD_EXAM: NORMAL

## 2025-09-04 ASSESSMENT — PACHYMETRY
OS_CT(UM): 538
OD_CT(UM): 531